# Patient Record
Sex: MALE | Race: WHITE | Employment: UNEMPLOYED | URBAN - METROPOLITAN AREA
[De-identification: names, ages, dates, MRNs, and addresses within clinical notes are randomized per-mention and may not be internally consistent; named-entity substitution may affect disease eponyms.]

---

## 2017-02-13 PROBLEM — E11.69 DIABETES MELLITUS TYPE 2 IN OBESE (HCC): Status: ACTIVE | Noted: 2017-02-13

## 2017-02-13 PROBLEM — E66.9 DIABETES MELLITUS TYPE 2 IN OBESE (HCC): Status: ACTIVE | Noted: 2017-02-13

## 2017-02-14 PROBLEM — H72.92 PERFORATION OF LEFT TYMPANIC MEMBRANE: Status: ACTIVE | Noted: 2017-02-14

## 2018-06-11 ENCOUNTER — HOSPITAL ENCOUNTER (OUTPATIENT)
Age: 48
Discharge: HOME | End: 2018-06-13
Payer: COMMERCIAL

## 2018-06-11 ENCOUNTER — OFFICE VISIT (OUTPATIENT)
Dept: FAMILY MEDICINE CLINIC | Age: 48
End: 2018-06-11
Payer: COMMERCIAL

## 2018-06-11 VITALS
RESPIRATION RATE: 18 BRPM | WEIGHT: 286 LBS | BODY MASS INDEX: 47.65 KG/M2 | SYSTOLIC BLOOD PRESSURE: 136 MMHG | HEART RATE: 84 BPM | HEIGHT: 65 IN | TEMPERATURE: 97.3 F | DIASTOLIC BLOOD PRESSURE: 88 MMHG | OXYGEN SATURATION: 96 %

## 2018-06-11 DIAGNOSIS — E66.9 DIABETES MELLITUS TYPE 2 IN OBESE (HCC): ICD-10-CM

## 2018-06-11 DIAGNOSIS — E11.69 DIABETES MELLITUS TYPE 2 IN OBESE (HCC): ICD-10-CM

## 2018-06-11 DIAGNOSIS — J44.9 CHRONIC OBSTRUCTIVE PULMONARY DISEASE, UNSPECIFIED COPD TYPE (HCC): ICD-10-CM

## 2018-06-11 DIAGNOSIS — J45.30 MILD PERSISTENT ASTHMA WITHOUT COMPLICATION: ICD-10-CM

## 2018-06-11 DIAGNOSIS — K43.9 VENTRAL HERNIA WITHOUT OBSTRUCTION OR GANGRENE: ICD-10-CM

## 2018-06-11 DIAGNOSIS — R10.84 GENERALIZED ABDOMINAL PAIN: Primary | ICD-10-CM

## 2018-06-11 LAB
BASOPHILS ABSOLUTE: 0.12 E9/L (ref 0–0.2)
BASOPHILS RELATIVE PERCENT: 0.9 % (ref 0–2)
EOSINOPHILS ABSOLUTE: 0.45 E9/L (ref 0.05–0.5)
EOSINOPHILS RELATIVE PERCENT: 3.3 % (ref 0–6)
HCT VFR BLD CALC: 47.7 % (ref 37–54)
HEMOGLOBIN: 15.8 G/DL (ref 12.5–16.5)
IMMATURE GRANULOCYTES #: 0.11 E9/L
IMMATURE GRANULOCYTES %: 0.8 % (ref 0–5)
LYMPHOCYTES ABSOLUTE: 3.48 E9/L (ref 1.5–4)
LYMPHOCYTES RELATIVE PERCENT: 25.2 % (ref 20–42)
MCH RBC QN AUTO: 30 PG (ref 26–35)
MCHC RBC AUTO-ENTMCNC: 33.1 % (ref 32–34.5)
MCV RBC AUTO: 90.5 FL (ref 80–99.9)
MONOCYTES ABSOLUTE: 1.24 E9/L (ref 0.1–0.95)
MONOCYTES RELATIVE PERCENT: 9 % (ref 2–12)
NEUTROPHILS ABSOLUTE: 8.41 E9/L (ref 1.8–7.3)
NEUTROPHILS RELATIVE PERCENT: 60.8 % (ref 43–80)
PDW BLD-RTO: 13.9 FL (ref 11.5–15)
PLATELET # BLD: 243 E9/L (ref 130–450)
PMV BLD AUTO: 11.5 FL (ref 7–12)
RBC # BLD: 5.27 E12/L (ref 3.8–5.8)
WBC # BLD: 13.8 E9/L (ref 4.5–11.5)

## 2018-06-11 PROCEDURE — 3023F SPIROM DOC REV: CPT | Performed by: FAMILY MEDICINE

## 2018-06-11 PROCEDURE — 3044F HG A1C LEVEL LT 7.0%: CPT | Performed by: FAMILY MEDICINE

## 2018-06-11 PROCEDURE — 36415 COLL VENOUS BLD VENIPUNCTURE: CPT | Performed by: FAMILY MEDICINE

## 2018-06-11 PROCEDURE — 2022F DILAT RTA XM EVC RTNOPTHY: CPT | Performed by: FAMILY MEDICINE

## 2018-06-11 PROCEDURE — 4004F PT TOBACCO SCREEN RCVD TLK: CPT | Performed by: FAMILY MEDICINE

## 2018-06-11 PROCEDURE — 85025 COMPLETE CBC W/AUTO DIFF WBC: CPT

## 2018-06-11 PROCEDURE — G8427 DOCREV CUR MEDS BY ELIG CLIN: HCPCS | Performed by: FAMILY MEDICINE

## 2018-06-11 PROCEDURE — 80061 LIPID PANEL: CPT

## 2018-06-11 PROCEDURE — G8926 SPIRO NO PERF OR DOC: HCPCS | Performed by: FAMILY MEDICINE

## 2018-06-11 PROCEDURE — 99214 OFFICE O/P EST MOD 30 MIN: CPT | Performed by: FAMILY MEDICINE

## 2018-06-11 PROCEDURE — 80053 COMPREHEN METABOLIC PANEL: CPT

## 2018-06-11 PROCEDURE — G8417 CALC BMI ABV UP PARAM F/U: HCPCS | Performed by: FAMILY MEDICINE

## 2018-06-11 PROCEDURE — 83036 HEMOGLOBIN GLYCOSYLATED A1C: CPT

## 2018-06-11 ASSESSMENT — PATIENT HEALTH QUESTIONNAIRE - PHQ9
2. FEELING DOWN, DEPRESSED OR HOPELESS: 0
1. LITTLE INTEREST OR PLEASURE IN DOING THINGS: 0
SUM OF ALL RESPONSES TO PHQ9 QUESTIONS 1 & 2: 0
SUM OF ALL RESPONSES TO PHQ QUESTIONS 1-9: 0

## 2018-06-12 LAB
ALBUMIN SERPL-MCNC: 4.2 G/DL (ref 3.5–5.2)
ALP BLD-CCNC: 72 U/L (ref 40–129)
ALT SERPL-CCNC: 34 U/L (ref 0–40)
ANION GAP SERPL CALCULATED.3IONS-SCNC: 15 MMOL/L (ref 7–16)
AST SERPL-CCNC: 25 U/L (ref 0–39)
BILIRUB SERPL-MCNC: 0.2 MG/DL (ref 0–1.2)
BUN BLDV-MCNC: 11 MG/DL (ref 6–20)
CALCIUM SERPL-MCNC: 9.5 MG/DL (ref 8.6–10.2)
CHLORIDE BLD-SCNC: 101 MMOL/L (ref 98–107)
CHOLESTEROL, TOTAL: 135 MG/DL (ref 0–199)
CO2: 24 MMOL/L (ref 22–29)
CREAT SERPL-MCNC: 0.9 MG/DL (ref 0.7–1.2)
GFR AFRICAN AMERICAN: >60
GFR NON-AFRICAN AMERICAN: >60 ML/MIN/1.73
GLUCOSE BLD-MCNC: 92 MG/DL (ref 74–109)
HBA1C MFR BLD: 6.6 % (ref 4.8–5.9)
HDLC SERPL-MCNC: 37 MG/DL
LDL CHOLESTEROL CALCULATED: 59 MG/DL (ref 0–99)
POTASSIUM SERPL-SCNC: 4.5 MMOL/L (ref 3.5–5)
SODIUM BLD-SCNC: 140 MMOL/L (ref 132–146)
TOTAL PROTEIN: 7.1 G/DL (ref 6.4–8.3)
TRIGL SERPL-MCNC: 193 MG/DL (ref 0–149)
VLDLC SERPL CALC-MCNC: 39 MG/DL

## 2018-07-02 ENCOUNTER — APPOINTMENT (OUTPATIENT)
Dept: GENERAL RADIOLOGY | Age: 48
End: 2018-07-02
Payer: COMMERCIAL

## 2018-07-02 ENCOUNTER — HOSPITAL ENCOUNTER (EMERGENCY)
Age: 48
Discharge: HOME | End: 2018-07-02
Payer: COMMERCIAL

## 2018-07-02 VITALS
HEIGHT: 65 IN | DIASTOLIC BLOOD PRESSURE: 110 MMHG | TEMPERATURE: 98.5 F | RESPIRATION RATE: 18 BRPM | BODY MASS INDEX: 47.15 KG/M2 | SYSTOLIC BLOOD PRESSURE: 162 MMHG | WEIGHT: 283 LBS | OXYGEN SATURATION: 95 % | HEART RATE: 84 BPM

## 2018-07-02 DIAGNOSIS — R68.89 FLU-LIKE SYMPTOMS: ICD-10-CM

## 2018-07-02 DIAGNOSIS — H73.012 BULLOUS MYRINGITIS OF LEFT EAR: Primary | ICD-10-CM

## 2018-07-02 LAB
INFLUENZA A BY PCR: NOT DETECTED
INFLUENZA B BY PCR: NOT DETECTED

## 2018-07-02 PROCEDURE — 99283 EMERGENCY DEPT VISIT LOW MDM: CPT

## 2018-07-02 PROCEDURE — 87502 INFLUENZA DNA AMP PROBE: CPT

## 2018-07-02 PROCEDURE — 71046 X-RAY EXAM CHEST 2 VIEWS: CPT

## 2018-07-02 RX ORDER — AMOXICILLIN AND CLAVULANATE POTASSIUM 875; 125 MG/1; MG/1
1 TABLET, FILM COATED ORAL 2 TIMES DAILY
Qty: 14 TABLET | Refills: 0 | Status: SHIPPED | OUTPATIENT
Start: 2018-07-02 | End: 2018-07-09

## 2018-07-02 RX ORDER — OFLOXACIN 3 MG/ML
5 SOLUTION AURICULAR (OTIC) 2 TIMES DAILY
Qty: 10 ML | Refills: 0 | Status: SHIPPED | OUTPATIENT
Start: 2018-07-02 | End: 2018-07-12

## 2018-07-02 RX ORDER — OSELTAMIVIR PHOSPHATE 75 MG/1
75 CAPSULE ORAL 2 TIMES DAILY
Qty: 20 CAPSULE | Refills: 0 | Status: SHIPPED | OUTPATIENT
Start: 2018-07-02 | End: 2018-07-02

## 2018-07-02 ASSESSMENT — PAIN DESCRIPTION - DESCRIPTORS: DESCRIPTORS: HEADACHE

## 2018-07-02 ASSESSMENT — PAIN DESCRIPTION - FREQUENCY: FREQUENCY: CONTINUOUS

## 2018-07-02 ASSESSMENT — PAIN DESCRIPTION - ORIENTATION: ORIENTATION: RIGHT;LEFT;ANTERIOR;POSTERIOR

## 2018-07-02 ASSESSMENT — PAIN DESCRIPTION - PAIN TYPE: TYPE: ACUTE PAIN

## 2018-07-02 ASSESSMENT — PAIN DESCRIPTION - LOCATION: LOCATION: HEAD

## 2018-07-02 ASSESSMENT — PAIN DESCRIPTION - PROGRESSION: CLINICAL_PROGRESSION: GRADUALLY WORSENING

## 2018-07-02 ASSESSMENT — PAIN SCALES - GENERAL: PAINLEVEL_OUTOF10: 4

## 2018-07-02 NOTE — ED PROVIDER NOTES
[facility]     Department of Emergency Medicine   ED  Provider Note  Admit Date/RoomTime: 7/2/2018  4:31 PM  ED Room: 15/15  Chief Complaint:   Otalgia (left ear pain starting yesterday morning) and Other (pt states he started with generalized body aches and a headache starting yesterday)    History of Present Illness   Source of history provided by:  patient. History/Exam Limitations: none. Waqar Orellana is a 50 y.o. old male with a past medical history of:   Past Medical History:   Diagnosis Date    Abdominal pain     for egd 6/8/2017    Allergic rhinitis     Anxiety     Asthma     stable    COPD (chronic obstructive pulmonary disease) [facility]     Depression     no meds    Diabetes mellitus ([facility]     GERD (gastroesophageal reflux disease)     Headache(784.0)     Hyperlipidemia     Hypertension     Obesity     Osteoarthritis     Sleep apnea     BMS CPAP 7, not using    presents to the emergency department by private vehicle, for nasal congestion, cough, body aches and left ear pain, which began 1 day(s) prior to arrival.  Since onset the symptoms have been persistent and mild-moderate in severity. The symptoms are associated with earache, muscle aches, nasal congestion and nausea. There has been NO chest pain, diarrhea, neck stiffness, rash, urinary frequency or vomiting. ROS   Pertinent positives and negatives are stated within HPI, all other systems reviewed and are negative. Past Surgical History:  has a past surgical history that includes Tonsillectomy (1983); hiatal hernia repair (2012); Tympanoplasty; Colonoscopy (06/08/2017); hernia repair (2012); and Endoscopy, colon, diagnostic. Social History:  reports that he has been smoking. He has a 45.00 pack-year smoking history. He has never used smokeless tobacco. He reports that he uses drugs, including Marijuana. He reports that he does not drink alcohol.   Family History: family history includes Asthma in his sister; COPD in his Questions are answered at this time and they are agreeable with the plan. Assessment     1. Bullous myringitis of left ear    2. Flu-like symptoms      Plan   Discharge to home  Patient condition is stable    New Medications     New Prescriptions    AMOXICILLIN-CLAVULANATE (AUGMENTIN) 875-125 MG PER TABLET    Take 1 tablet by mouth 2 times daily for 7 days    OFLOXACIN (FLOXIN) 0.3 % OTIC SOLUTION    Place 5 drops into the left ear 2 times daily for 10 days     Electronically signed by ANNABEL Philip CNP   DD: 7/2/18  **This report was transcribed using voice recognition software. Every effort was made to ensure accuracy; however, inadvertent computerized transcription errors may be present.   END OF ED PROVIDER NOTE         ANNABEL Ramirez CNP  07/02/18 0393

## 2018-09-18 ENCOUNTER — HOSPITAL ENCOUNTER (INPATIENT)
Age: 48
LOS: 3 days | Discharge: HOME HEALTH | DRG: 872 | End: 2018-09-21
Attending: EMERGENCY MEDICINE | Admitting: INTERNAL MEDICINE

## 2018-09-18 DIAGNOSIS — L02.219 CELLULITIS AND ABSCESS OF TRUNK: ICD-10-CM

## 2018-09-18 DIAGNOSIS — A41.9 SEPSIS, DUE TO UNSPECIFIED ORGANISM: Primary | ICD-10-CM

## 2018-09-18 DIAGNOSIS — L02.212 ABSCESS OF BACK: ICD-10-CM

## 2018-09-18 DIAGNOSIS — L03.319 CELLULITIS AND ABSCESS OF TRUNK: ICD-10-CM

## 2018-09-18 PROBLEM — H72.92 PERFORATION OF LEFT TYMPANIC MEMBRANE: Status: RESOLVED | Noted: 2017-02-14 | Resolved: 2018-09-18

## 2018-09-18 LAB
ALBUMIN SERPL-MCNC: 2.9 G/DL (ref 3.5–5.2)
ALP BLD-CCNC: 111 U/L (ref 40–129)
ALT SERPL-CCNC: 43 U/L (ref 0–40)
ANION GAP SERPL CALCULATED.3IONS-SCNC: 14 MMOL/L (ref 7–16)
AST SERPL-CCNC: 24 U/L (ref 0–39)
BASOPHILS ABSOLUTE: 0.09 E9/L (ref 0–0.2)
BASOPHILS RELATIVE PERCENT: 0.5 % (ref 0–2)
BILIRUB SERPL-MCNC: 0.3 MG/DL (ref 0–1.2)
BUN BLDV-MCNC: 12 MG/DL (ref 6–20)
CALCIUM SERPL-MCNC: 9 MG/DL (ref 8.6–10.2)
CHLORIDE BLD-SCNC: 93 MMOL/L (ref 98–107)
CHP ED QC CHECK: YES
CO2: 23 MMOL/L (ref 22–29)
CREAT SERPL-MCNC: 0.8 MG/DL (ref 0.7–1.2)
EKG ATRIAL RATE: 92 BPM
EKG P AXIS: 17 DEGREES
EKG P-R INTERVAL: 132 MS
EKG Q-T INTERVAL: 324 MS
EKG QRS DURATION: 88 MS
EKG QTC CALCULATION (BAZETT): 400 MS
EKG R AXIS: 72 DEGREES
EKG T AXIS: 66 DEGREES
EKG VENTRICULAR RATE: 92 BPM
EOSINOPHILS ABSOLUTE: 0.46 E9/L (ref 0.05–0.5)
EOSINOPHILS RELATIVE PERCENT: 2.3 % (ref 0–6)
GFR AFRICAN AMERICAN: >60
GFR NON-AFRICAN AMERICAN: >60 ML/MIN/1.73
GLUCOSE BLD-MCNC: 247 MG/DL
GLUCOSE BLD-MCNC: 308 MG/DL (ref 74–109)
HCT VFR BLD CALC: 45.3 % (ref 37–54)
HEMOGLOBIN: 14.7 G/DL (ref 12.5–16.5)
IMMATURE GRANULOCYTES #: 0.21 E9/L
IMMATURE GRANULOCYTES %: 1.1 % (ref 0–5)
LACTIC ACID: 1 MMOL/L (ref 0.5–2.2)
LACTIC ACID: 2.3 MMOL/L (ref 0.5–2.2)
LYMPHOCYTES ABSOLUTE: 1.68 E9/L (ref 1.5–4)
LYMPHOCYTES RELATIVE PERCENT: 8.5 % (ref 20–42)
MCH RBC QN AUTO: 29.5 PG (ref 26–35)
MCHC RBC AUTO-ENTMCNC: 32.5 % (ref 32–34.5)
MCV RBC AUTO: 90.8 FL (ref 80–99.9)
METER GLUCOSE: 247 MG/DL (ref 70–110)
MONOCYTES ABSOLUTE: 1.38 E9/L (ref 0.1–0.95)
MONOCYTES RELATIVE PERCENT: 7 % (ref 2–12)
NEUTROPHILS ABSOLUTE: 16 E9/L (ref 1.8–7.3)
NEUTROPHILS RELATIVE PERCENT: 80.6 % (ref 43–80)
PDW BLD-RTO: 13.5 FL (ref 11.5–15)
PLATELET # BLD: 283 E9/L (ref 130–450)
PMV BLD AUTO: 10.1 FL (ref 7–12)
POTASSIUM SERPL-SCNC: 4.3 MMOL/L (ref 3.5–5)
RBC # BLD: 4.99 E12/L (ref 3.8–5.8)
SODIUM BLD-SCNC: 130 MMOL/L (ref 132–146)
TOTAL PROTEIN: 7.4 G/DL (ref 6.4–8.3)
WBC # BLD: 19.8 E9/L (ref 4.5–11.5)

## 2018-09-18 PROCEDURE — 36415 COLL VENOUS BLD VENIPUNCTURE: CPT

## 2018-09-18 PROCEDURE — 6370000000 HC RX 637 (ALT 250 FOR IP): Performed by: PHYSICIAN ASSISTANT

## 2018-09-18 PROCEDURE — 82962 GLUCOSE BLOOD TEST: CPT

## 2018-09-18 PROCEDURE — 87040 BLOOD CULTURE FOR BACTERIA: CPT

## 2018-09-18 PROCEDURE — 96366 THER/PROPH/DIAG IV INF ADDON: CPT

## 2018-09-18 PROCEDURE — 6360000002 HC RX W HCPCS: Performed by: EMERGENCY MEDICINE

## 2018-09-18 PROCEDURE — 83605 ASSAY OF LACTIC ACID: CPT

## 2018-09-18 PROCEDURE — 80053 COMPREHEN METABOLIC PANEL: CPT

## 2018-09-18 PROCEDURE — 99285 EMERGENCY DEPT VISIT HI MDM: CPT

## 2018-09-18 PROCEDURE — 93005 ELECTROCARDIOGRAM TRACING: CPT | Performed by: EMERGENCY MEDICINE

## 2018-09-18 PROCEDURE — 96365 THER/PROPH/DIAG IV INF INIT: CPT

## 2018-09-18 PROCEDURE — 2060000000 HC ICU INTERMEDIATE R&B

## 2018-09-18 PROCEDURE — 85025 COMPLETE CBC W/AUTO DIFF WBC: CPT

## 2018-09-18 PROCEDURE — 2580000003 HC RX 258: Performed by: EMERGENCY MEDICINE

## 2018-09-18 RX ORDER — OXYCODONE HYDROCHLORIDE AND ACETAMINOPHEN 5; 325 MG/1; MG/1
1 TABLET ORAL ONCE
Status: COMPLETED | OUTPATIENT
Start: 2018-09-18 | End: 2018-09-18

## 2018-09-18 RX ORDER — ONDANSETRON 2 MG/ML
4 INJECTION INTRAMUSCULAR; INTRAVENOUS ONCE
Status: COMPLETED | OUTPATIENT
Start: 2018-09-18 | End: 2018-09-18

## 2018-09-18 RX ORDER — MORPHINE SULFATE 4 MG/ML
4 INJECTION, SOLUTION INTRAMUSCULAR; INTRAVENOUS ONCE
Status: COMPLETED | OUTPATIENT
Start: 2018-09-18 | End: 2018-09-18

## 2018-09-18 RX ORDER — 0.9 % SODIUM CHLORIDE 0.9 %
2000 INTRAVENOUS SOLUTION INTRAVENOUS ONCE
Status: COMPLETED | OUTPATIENT
Start: 2018-09-18 | End: 2018-09-18

## 2018-09-18 RX ADMIN — SODIUM CHLORIDE 2000 ML: 9 INJECTION, SOLUTION INTRAVENOUS at 21:09

## 2018-09-18 RX ADMIN — OXYCODONE HYDROCHLORIDE AND ACETAMINOPHEN 1 TABLET: 5; 325 TABLET ORAL at 20:47

## 2018-09-18 RX ADMIN — VANCOMYCIN HYDROCHLORIDE 2000 MG: 10 INJECTION, POWDER, LYOPHILIZED, FOR SOLUTION INTRAVENOUS at 21:30

## 2018-09-18 RX ADMIN — ONDANSETRON 4 MG: 2 SOLUTION INTRAMUSCULAR; INTRAVENOUS at 22:41

## 2018-09-18 RX ADMIN — MORPHINE SULFATE 4 MG: 4 INJECTION INTRAVENOUS at 22:41

## 2018-09-18 ASSESSMENT — PAIN DESCRIPTION - LOCATION: LOCATION: BACK

## 2018-09-18 ASSESSMENT — ENCOUNTER SYMPTOMS
SHORTNESS OF BREATH: 0
SORE THROAT: 0
ABDOMINAL PAIN: 0
COUGH: 0
VOMITING: 0
COLOR CHANGE: 1
BACK PAIN: 1
RHINORRHEA: 0
DIARRHEA: 0
NAUSEA: 0

## 2018-09-18 ASSESSMENT — PAIN SCALES - GENERAL
PAINLEVEL_OUTOF10: 9
PAINLEVEL_OUTOF10: 9
PAINLEVEL_OUTOF10: 10
PAINLEVEL_OUTOF10: 10

## 2018-09-18 ASSESSMENT — PAIN DESCRIPTION - PAIN TYPE: TYPE: ACUTE PAIN

## 2018-09-18 ASSESSMENT — PAIN DESCRIPTION - ORIENTATION: ORIENTATION: LEFT;UPPER

## 2018-09-18 ASSESSMENT — PAIN DESCRIPTION - DESCRIPTORS: DESCRIPTORS: THROBBING

## 2018-09-19 ENCOUNTER — ANESTHESIA (OUTPATIENT)
Dept: OPERATING ROOM | Age: 48
DRG: 872 | End: 2018-09-19

## 2018-09-19 ENCOUNTER — APPOINTMENT (OUTPATIENT)
Dept: CT IMAGING | Age: 48
DRG: 872 | End: 2018-09-19

## 2018-09-19 ENCOUNTER — ANESTHESIA EVENT (OUTPATIENT)
Dept: OPERATING ROOM | Age: 48
DRG: 872 | End: 2018-09-19

## 2018-09-19 VITALS — SYSTOLIC BLOOD PRESSURE: 153 MMHG | OXYGEN SATURATION: 100 % | DIASTOLIC BLOOD PRESSURE: 102 MMHG

## 2018-09-19 LAB
ANION GAP SERPL CALCULATED.3IONS-SCNC: 16 MMOL/L (ref 7–16)
BASOPHILS ABSOLUTE: 0 E9/L (ref 0–0.2)
BASOPHILS RELATIVE PERCENT: 0.4 % (ref 0–2)
BUN BLDV-MCNC: 9 MG/DL (ref 6–20)
CALCIUM SERPL-MCNC: 8.3 MG/DL (ref 8.6–10.2)
CHLORIDE BLD-SCNC: 95 MMOL/L (ref 98–107)
CO2: 23 MMOL/L (ref 22–29)
CREAT SERPL-MCNC: 0.8 MG/DL (ref 0.7–1.2)
EOSINOPHILS ABSOLUTE: 1.04 E9/L (ref 0.05–0.5)
EOSINOPHILS RELATIVE PERCENT: 5.2 % (ref 0–6)
GFR AFRICAN AMERICAN: >60
GFR NON-AFRICAN AMERICAN: >60 ML/MIN/1.73
GLUCOSE BLD-MCNC: 128 MG/DL
GLUCOSE BLD-MCNC: 224 MG/DL (ref 74–109)
HCT VFR BLD CALC: 39.5 % (ref 37–54)
HEMOGLOBIN: 12.9 G/DL (ref 12.5–16.5)
LYMPHOCYTES ABSOLUTE: 1.4 E9/L (ref 1.5–4)
LYMPHOCYTES RELATIVE PERCENT: 7 % (ref 20–42)
MCH RBC QN AUTO: 29.3 PG (ref 26–35)
MCHC RBC AUTO-ENTMCNC: 32.7 % (ref 32–34.5)
MCV RBC AUTO: 89.8 FL (ref 80–99.9)
METER GLUCOSE: 112 MG/DL (ref 70–110)
METER GLUCOSE: 128 MG/DL (ref 70–110)
METER GLUCOSE: 152 MG/DL (ref 70–110)
METER GLUCOSE: 185 MG/DL (ref 70–110)
MONOCYTES ABSOLUTE: 1.2 E9/L (ref 0.1–0.95)
MONOCYTES RELATIVE PERCENT: 6.1 % (ref 2–12)
NEUTROPHILS ABSOLUTE: 16.4 E9/L (ref 1.8–7.3)
NEUTROPHILS RELATIVE PERCENT: 81.7 % (ref 43–80)
PDW BLD-RTO: 13.3 FL (ref 11.5–15)
PLATELET # BLD: 244 E9/L (ref 130–450)
PMV BLD AUTO: 10.2 FL (ref 7–12)
POTASSIUM SERPL-SCNC: 3.9 MMOL/L (ref 3.5–5)
RBC # BLD: 4.4 E12/L (ref 3.8–5.8)
SODIUM BLD-SCNC: 134 MMOL/L (ref 132–146)
WBC # BLD: 20 E9/L (ref 4.5–11.5)

## 2018-09-19 PROCEDURE — 2709999900 HC NON-CHARGEABLE SUPPLY: Performed by: SURGERY

## 2018-09-19 PROCEDURE — 3700000000 HC ANESTHESIA ATTENDED CARE: Performed by: SURGERY

## 2018-09-19 PROCEDURE — 99254 IP/OBS CNSLTJ NEW/EST MOD 60: CPT | Performed by: SURGERY

## 2018-09-19 PROCEDURE — 71260 CT THORAX DX C+: CPT

## 2018-09-19 PROCEDURE — 2580000003 HC RX 258: Performed by: RADIOLOGY

## 2018-09-19 PROCEDURE — 6360000002 HC RX W HCPCS: Performed by: INTERNAL MEDICINE

## 2018-09-19 PROCEDURE — 87070 CULTURE OTHR SPECIMN AEROBIC: CPT

## 2018-09-19 PROCEDURE — 6370000000 HC RX 637 (ALT 250 FOR IP): Performed by: INTERNAL MEDICINE

## 2018-09-19 PROCEDURE — 36415 COLL VENOUS BLD VENIPUNCTURE: CPT

## 2018-09-19 PROCEDURE — 7100000001 HC PACU RECOVERY - ADDTL 15 MIN: Performed by: SURGERY

## 2018-09-19 PROCEDURE — 2580000003 HC RX 258: Performed by: INTERNAL MEDICINE

## 2018-09-19 PROCEDURE — 87186 SC STD MICRODIL/AGAR DIL: CPT

## 2018-09-19 PROCEDURE — 87075 CULTR BACTERIA EXCEPT BLOOD: CPT

## 2018-09-19 PROCEDURE — 0H96XZZ DRAINAGE OF BACK SKIN, EXTERNAL APPROACH: ICD-10-PCS | Performed by: SURGERY

## 2018-09-19 PROCEDURE — 80048 BASIC METABOLIC PNL TOTAL CA: CPT

## 2018-09-19 PROCEDURE — 6360000002 HC RX W HCPCS: Performed by: ANESTHESIOLOGY

## 2018-09-19 PROCEDURE — 6370000000 HC RX 637 (ALT 250 FOR IP): Performed by: STUDENT IN AN ORGANIZED HEALTH CARE EDUCATION/TRAINING PROGRAM

## 2018-09-19 PROCEDURE — 2500000003 HC RX 250 WO HCPCS: Performed by: NURSE ANESTHETIST, CERTIFIED REGISTERED

## 2018-09-19 PROCEDURE — 3600000012 HC SURGERY LEVEL 2 ADDTL 15MIN: Performed by: SURGERY

## 2018-09-19 PROCEDURE — 6360000002 HC RX W HCPCS: Performed by: EMERGENCY MEDICINE

## 2018-09-19 PROCEDURE — 7100000000 HC PACU RECOVERY - FIRST 15 MIN: Performed by: SURGERY

## 2018-09-19 PROCEDURE — 2580000003 HC RX 258: Performed by: NURSE ANESTHETIST, CERTIFIED REGISTERED

## 2018-09-19 PROCEDURE — 85025 COMPLETE CBC W/AUTO DIFF WBC: CPT

## 2018-09-19 PROCEDURE — 87205 SMEAR GRAM STAIN: CPT

## 2018-09-19 PROCEDURE — 3600000002 HC SURGERY LEVEL 2 BASE: Performed by: SURGERY

## 2018-09-19 PROCEDURE — 2580000003 HC RX 258: Performed by: STUDENT IN AN ORGANIZED HEALTH CARE EDUCATION/TRAINING PROGRAM

## 2018-09-19 PROCEDURE — 10060 I&D ABSCESS SIMPLE/SINGLE: CPT | Performed by: SURGERY

## 2018-09-19 PROCEDURE — 6360000002 HC RX W HCPCS: Performed by: NURSE ANESTHETIST, CERTIFIED REGISTERED

## 2018-09-19 PROCEDURE — 2060000000 HC ICU INTERMEDIATE R&B

## 2018-09-19 PROCEDURE — 82962 GLUCOSE BLOOD TEST: CPT

## 2018-09-19 PROCEDURE — 6360000004 HC RX CONTRAST MEDICATION: Performed by: RADIOLOGY

## 2018-09-19 PROCEDURE — 3700000001 HC ADD 15 MINUTES (ANESTHESIA): Performed by: SURGERY

## 2018-09-19 PROCEDURE — 2500000003 HC RX 250 WO HCPCS: Performed by: STUDENT IN AN ORGANIZED HEALTH CARE EDUCATION/TRAINING PROGRAM

## 2018-09-19 PROCEDURE — 6360000002 HC RX W HCPCS: Performed by: SURGERY

## 2018-09-19 RX ORDER — NICOTINE 21 MG/24HR
1 PATCH, TRANSDERMAL 24 HOURS TRANSDERMAL DAILY
Status: DISCONTINUED | OUTPATIENT
Start: 2018-09-19 | End: 2018-09-21 | Stop reason: HOSPADM

## 2018-09-19 RX ORDER — NICOTINE POLACRILEX 4 MG
15 LOZENGE BUCCAL PRN
Status: DISCONTINUED | OUTPATIENT
Start: 2018-09-19 | End: 2018-09-21 | Stop reason: HOSPADM

## 2018-09-19 RX ORDER — ONDANSETRON 2 MG/ML
4 INJECTION INTRAMUSCULAR; INTRAVENOUS EVERY 6 HOURS PRN
Status: DISCONTINUED | OUTPATIENT
Start: 2018-09-19 | End: 2018-09-21 | Stop reason: HOSPADM

## 2018-09-19 RX ORDER — PROPOFOL 10 MG/ML
INJECTION, EMULSION INTRAVENOUS CONTINUOUS PRN
Status: DISCONTINUED | OUTPATIENT
Start: 2018-09-19 | End: 2018-09-19 | Stop reason: SDUPTHER

## 2018-09-19 RX ORDER — FENTANYL CITRATE 50 UG/ML
50 INJECTION, SOLUTION INTRAMUSCULAR; INTRAVENOUS EVERY 5 MIN PRN
Status: DISCONTINUED | OUTPATIENT
Start: 2018-09-19 | End: 2018-09-19 | Stop reason: HOSPADM

## 2018-09-19 RX ORDER — DEXTROSE MONOHYDRATE 50 MG/ML
100 INJECTION, SOLUTION INTRAVENOUS PRN
Status: DISCONTINUED | OUTPATIENT
Start: 2018-09-19 | End: 2018-09-21 | Stop reason: HOSPADM

## 2018-09-19 RX ORDER — IBUPROFEN 800 MG/1
800 TABLET ORAL
Status: DISCONTINUED | OUTPATIENT
Start: 2018-09-19 | End: 2018-09-21 | Stop reason: HOSPADM

## 2018-09-19 RX ORDER — SODIUM CHLORIDE 0.9 % (FLUSH) 0.9 %
10 SYRINGE (ML) INJECTION EVERY 12 HOURS SCHEDULED
Status: DISCONTINUED | OUTPATIENT
Start: 2018-09-19 | End: 2018-09-21 | Stop reason: HOSPADM

## 2018-09-19 RX ORDER — SODIUM CHLORIDE 0.9 % (FLUSH) 0.9 %
10 SYRINGE (ML) INJECTION PRN
Status: DISCONTINUED | OUTPATIENT
Start: 2018-09-19 | End: 2018-09-21 | Stop reason: HOSPADM

## 2018-09-19 RX ORDER — KETAMINE HYDROCHLORIDE 10 MG/ML
INJECTION, SOLUTION INTRAMUSCULAR; INTRAVENOUS PRN
Status: DISCONTINUED | OUTPATIENT
Start: 2018-09-19 | End: 2018-09-19 | Stop reason: SDUPTHER

## 2018-09-19 RX ORDER — LABETALOL HYDROCHLORIDE 5 MG/ML
5 INJECTION, SOLUTION INTRAVENOUS EVERY 10 MIN PRN
Status: DISCONTINUED | OUTPATIENT
Start: 2018-09-19 | End: 2018-09-19 | Stop reason: HOSPADM

## 2018-09-19 RX ORDER — SODIUM CHLORIDE 0.9 % (FLUSH) 0.9 %
10 SYRINGE (ML) INJECTION PRN
Status: COMPLETED | OUTPATIENT
Start: 2018-09-19 | End: 2018-09-19

## 2018-09-19 RX ORDER — PANTOPRAZOLE SODIUM 40 MG/1
40 TABLET, DELAYED RELEASE ORAL
Status: DISCONTINUED | OUTPATIENT
Start: 2018-09-19 | End: 2018-09-21 | Stop reason: HOSPADM

## 2018-09-19 RX ORDER — CITALOPRAM 20 MG/1
20 TABLET ORAL DAILY
Status: DISCONTINUED | OUTPATIENT
Start: 2018-09-19 | End: 2018-09-21 | Stop reason: HOSPADM

## 2018-09-19 RX ORDER — FENTANYL CITRATE 50 UG/ML
25 INJECTION, SOLUTION INTRAMUSCULAR; INTRAVENOUS EVERY 5 MIN PRN
Status: DISCONTINUED | OUTPATIENT
Start: 2018-09-19 | End: 2018-09-19 | Stop reason: HOSPADM

## 2018-09-19 RX ORDER — MORPHINE SULFATE 2 MG/ML
2 INJECTION, SOLUTION INTRAMUSCULAR; INTRAVENOUS EVERY 4 HOURS PRN
Status: DISCONTINUED | OUTPATIENT
Start: 2018-09-19 | End: 2018-09-19 | Stop reason: SDUPTHER

## 2018-09-19 RX ORDER — MORPHINE SULFATE 2 MG/ML
2 INJECTION, SOLUTION INTRAMUSCULAR; INTRAVENOUS EVERY 4 HOURS PRN
Status: DISCONTINUED | OUTPATIENT
Start: 2018-09-19 | End: 2018-09-21 | Stop reason: HOSPADM

## 2018-09-19 RX ORDER — ATORVASTATIN CALCIUM 40 MG/1
40 TABLET, FILM COATED ORAL NIGHTLY
Status: DISCONTINUED | OUTPATIENT
Start: 2018-09-19 | End: 2018-09-21 | Stop reason: HOSPADM

## 2018-09-19 RX ORDER — LIDOCAINE HYDROCHLORIDE AND EPINEPHRINE 10; 10 MG/ML; UG/ML
40 INJECTION, SOLUTION INFILTRATION; PERINEURAL ONCE
Status: COMPLETED | OUTPATIENT
Start: 2018-09-19 | End: 2018-09-19

## 2018-09-19 RX ORDER — DIPHENHYDRAMINE HYDROCHLORIDE 50 MG/ML
12.5 INJECTION INTRAMUSCULAR; INTRAVENOUS
Status: DISCONTINUED | OUTPATIENT
Start: 2018-09-19 | End: 2018-09-19 | Stop reason: HOSPADM

## 2018-09-19 RX ORDER — ONDANSETRON 2 MG/ML
4 INJECTION INTRAMUSCULAR; INTRAVENOUS
Status: DISCONTINUED | OUTPATIENT
Start: 2018-09-19 | End: 2018-09-19 | Stop reason: HOSPADM

## 2018-09-19 RX ORDER — SODIUM CHLORIDE 9 MG/ML
INJECTION, SOLUTION INTRAVENOUS CONTINUOUS
Status: DISCONTINUED | OUTPATIENT
Start: 2018-09-19 | End: 2018-09-21 | Stop reason: HOSPADM

## 2018-09-19 RX ORDER — SODIUM CHLORIDE 9 MG/ML
INJECTION, SOLUTION INTRAVENOUS CONTINUOUS PRN
Status: DISCONTINUED | OUTPATIENT
Start: 2018-09-19 | End: 2018-09-19 | Stop reason: SDUPTHER

## 2018-09-19 RX ORDER — OXYCODONE HYDROCHLORIDE AND ACETAMINOPHEN 5; 325 MG/1; MG/1
1 TABLET ORAL EVERY 4 HOURS PRN
Status: DISCONTINUED | OUTPATIENT
Start: 2018-09-19 | End: 2018-09-21 | Stop reason: HOSPADM

## 2018-09-19 RX ORDER — IPRATROPIUM BROMIDE AND ALBUTEROL SULFATE 2.5; .5 MG/3ML; MG/3ML
1 SOLUTION RESPIRATORY (INHALATION) EVERY 4 HOURS PRN
Status: DISCONTINUED | OUTPATIENT
Start: 2018-09-19 | End: 2018-09-21 | Stop reason: HOSPADM

## 2018-09-19 RX ORDER — FENTANYL CITRATE 50 UG/ML
INJECTION, SOLUTION INTRAMUSCULAR; INTRAVENOUS PRN
Status: DISCONTINUED | OUTPATIENT
Start: 2018-09-19 | End: 2018-09-19 | Stop reason: SDUPTHER

## 2018-09-19 RX ORDER — MEPERIDINE HYDROCHLORIDE 50 MG/ML
12.5 INJECTION INTRAMUSCULAR; INTRAVENOUS; SUBCUTANEOUS EVERY 5 MIN PRN
Status: DISCONTINUED | OUTPATIENT
Start: 2018-09-19 | End: 2018-09-19 | Stop reason: HOSPADM

## 2018-09-19 RX ORDER — DEXTROSE MONOHYDRATE 25 G/50ML
12.5 INJECTION, SOLUTION INTRAVENOUS PRN
Status: DISCONTINUED | OUTPATIENT
Start: 2018-09-19 | End: 2018-09-21 | Stop reason: HOSPADM

## 2018-09-19 RX ORDER — BUDESONIDE 0.5 MG/2ML
500 INHALANT ORAL 2 TIMES DAILY
Status: DISCONTINUED | OUTPATIENT
Start: 2018-09-19 | End: 2018-09-21 | Stop reason: HOSPADM

## 2018-09-19 RX ORDER — MIDAZOLAM HYDROCHLORIDE 1 MG/ML
INJECTION INTRAMUSCULAR; INTRAVENOUS PRN
Status: DISCONTINUED | OUTPATIENT
Start: 2018-09-19 | End: 2018-09-19 | Stop reason: SDUPTHER

## 2018-09-19 RX ORDER — LISINOPRIL 20 MG/1
40 TABLET ORAL DAILY
Status: DISCONTINUED | OUTPATIENT
Start: 2018-09-19 | End: 2018-09-21 | Stop reason: HOSPADM

## 2018-09-19 RX ORDER — ACETAMINOPHEN 325 MG/1
650 TABLET ORAL EVERY 4 HOURS PRN
Status: DISCONTINUED | OUTPATIENT
Start: 2018-09-19 | End: 2018-09-21 | Stop reason: HOSPADM

## 2018-09-19 RX ADMIN — PROPOFOL 100 MCG/KG/MIN: 10 INJECTION, EMULSION INTRAVENOUS at 12:51

## 2018-09-19 RX ADMIN — Medication 10 ML: at 01:51

## 2018-09-19 RX ADMIN — IOPAMIDOL 90 ML: 755 INJECTION, SOLUTION INTRAVENOUS at 01:51

## 2018-09-19 RX ADMIN — KETAMINE HYDROCHLORIDE 10 MG: 10 INJECTION, SOLUTION INTRAMUSCULAR; INTRAVENOUS at 13:00

## 2018-09-19 RX ADMIN — VANCOMYCIN HYDROCHLORIDE 2000 MG: 10 INJECTION, POWDER, LYOPHILIZED, FOR SOLUTION INTRAVENOUS at 16:42

## 2018-09-19 RX ADMIN — FENTANYL CITRATE 25 MCG: 50 INJECTION, SOLUTION INTRAMUSCULAR; INTRAVENOUS at 13:07

## 2018-09-19 RX ADMIN — HYDROMORPHONE HYDROCHLORIDE 0.5 MG: 1 INJECTION, SOLUTION INTRAMUSCULAR; INTRAVENOUS; SUBCUTANEOUS at 01:37

## 2018-09-19 RX ADMIN — SODIUM CHLORIDE: 9 INJECTION, SOLUTION INTRAVENOUS at 12:41

## 2018-09-19 RX ADMIN — FENTANYL CITRATE 25 MCG: 50 INJECTION INTRAMUSCULAR; INTRAVENOUS at 13:55

## 2018-09-19 RX ADMIN — IBUPROFEN 800 MG: 800 TABLET ORAL at 08:09

## 2018-09-19 RX ADMIN — SODIUM CHLORIDE: 9 INJECTION, SOLUTION INTRAVENOUS at 04:20

## 2018-09-19 RX ADMIN — MIDAZOLAM 2 MG: 1 INJECTION INTRAMUSCULAR; INTRAVENOUS at 12:51

## 2018-09-19 RX ADMIN — KETAMINE HYDROCHLORIDE 50 MG: 10 INJECTION, SOLUTION INTRAMUSCULAR; INTRAVENOUS at 13:20

## 2018-09-19 RX ADMIN — SODIUM CHLORIDE: 9 INJECTION, SOLUTION INTRAVENOUS at 21:55

## 2018-09-19 RX ADMIN — ONDANSETRON 4 MG: 2 SOLUTION INTRAMUSCULAR; INTRAVENOUS at 13:00

## 2018-09-19 RX ADMIN — KETAMINE HYDROCHLORIDE 30 MG: 10 INJECTION, SOLUTION INTRAMUSCULAR; INTRAVENOUS at 12:53

## 2018-09-19 RX ADMIN — LISINOPRIL 40 MG: 20 TABLET ORAL at 08:09

## 2018-09-19 RX ADMIN — ATORVASTATIN CALCIUM 40 MG: 40 TABLET, FILM COATED ORAL at 20:23

## 2018-09-19 RX ADMIN — FENTANYL CITRATE 25 MCG: 50 INJECTION INTRAMUSCULAR; INTRAVENOUS at 13:50

## 2018-09-19 RX ADMIN — PIPERACILLIN SODIUM AND TAZOBACTAM SODIUM 3.38 G: 3; .375 INJECTION, POWDER, LYOPHILIZED, FOR SOLUTION INTRAVENOUS at 15:56

## 2018-09-19 RX ADMIN — PANTOPRAZOLE SODIUM 40 MG: 40 TABLET, DELAYED RELEASE ORAL at 06:48

## 2018-09-19 RX ADMIN — IBUPROFEN 800 MG: 800 TABLET ORAL at 04:22

## 2018-09-19 RX ADMIN — LIDOCAINE HYDROCHLORIDE,EPINEPHRINE BITARTRATE 40 ML: 10; .01 INJECTION, SOLUTION INFILTRATION; PERINEURAL at 03:32

## 2018-09-19 RX ADMIN — PIPERACILLIN SODIUM AND TAZOBACTAM SODIUM 3.38 G: 3; .375 INJECTION, POWDER, LYOPHILIZED, FOR SOLUTION INTRAVENOUS at 23:06

## 2018-09-19 RX ADMIN — MORPHINE SULFATE 2 MG: 2 INJECTION, SOLUTION INTRAMUSCULAR; INTRAVENOUS at 20:27

## 2018-09-19 RX ADMIN — Medication 2 G: at 12:56

## 2018-09-19 RX ADMIN — OXYCODONE HYDROCHLORIDE AND ACETAMINOPHEN 1 TABLET: 5; 325 TABLET ORAL at 16:00

## 2018-09-19 ASSESSMENT — PULMONARY FUNCTION TESTS
PIF_VALUE: 0

## 2018-09-19 ASSESSMENT — PAIN DESCRIPTION - LOCATION
LOCATION: BACK

## 2018-09-19 ASSESSMENT — PAIN DESCRIPTION - PAIN TYPE
TYPE: SURGICAL PAIN
TYPE: SURGICAL PAIN
TYPE: ACUTE PAIN
TYPE: SURGICAL PAIN
TYPE: SURGICAL PAIN
TYPE: ACUTE PAIN
TYPE: ACUTE PAIN

## 2018-09-19 ASSESSMENT — PAIN DESCRIPTION - DESCRIPTORS
DESCRIPTORS: OTHER (COMMENT)
DESCRIPTORS: BURNING;DISCOMFORT

## 2018-09-19 ASSESSMENT — PAIN SCALES - GENERAL
PAINLEVEL_OUTOF10: 7
PAINLEVEL_OUTOF10: 0
PAINLEVEL_OUTOF10: 8
PAINLEVEL_OUTOF10: 6
PAINLEVEL_OUTOF10: 4
PAINLEVEL_OUTOF10: 4
PAINLEVEL_OUTOF10: 6
PAINLEVEL_OUTOF10: 0
PAINLEVEL_OUTOF10: 10
PAINLEVEL_OUTOF10: 10
PAINLEVEL_OUTOF10: 3
PAINLEVEL_OUTOF10: 5

## 2018-09-19 ASSESSMENT — PAIN DESCRIPTION - ORIENTATION
ORIENTATION: UPPER

## 2018-09-19 ASSESSMENT — LIFESTYLE VARIABLES: SMOKING_STATUS: 1

## 2018-09-19 NOTE — ED NOTES
Patient extremely hostile, screaming at this RN yelling about an incident that happened on previous shift. Apologized to patient if he had an unpleasant experience and patient stated \"I know you know all about it, you people talk, that  out there staring at me probably told everyone\". Again attempted to offer blameless apology to patient and patient continued to yell at this RN. Patient states I was screaming and you never came, probably because you know about what happened earlier, I had a long talk to Scot Calderon about it. That's why you are ignoring me now\". Assured patient that I was not ignoring him and that I was not aware that he called. Patient stated  \"How was I going to call you without my call light? \". Noted call light that was previously on cart now to be on back wall and I put call light back on cart. Informed patient that he did have his call light on cart the last time I was in room and patient stated Nivia Holm my wife stuck it up there\" patient pointing at back wall. 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 Patient began sticking his finger in my face. Asked patient to not stick his finger in my face again. Patient states \"You are a liar, get out of here I want your supervisor now\".            Dayana Perez RN  09/19/18 [id number]

## 2018-09-19 NOTE — H&P
[address] [facility]  Attending History and Physical      CHIEF COMPLAINT:  Back pain      HISTORY OF PRESENT ILLNESS:      The patient is a 50 y.o. male patient of Dr Jennie Arguelles who presents with back pain. Worsening over 2 weeks. + raised red area. No drainage. No associated fever and chills. exac pressure. Relieved after pain meds. Pt was found to have an abscess in ED and were unable to I&D the wound. He was taken to OR for drainage.     Past Medical History:    Past Medical History:   Diagnosis Date    Abdominal pain     for egd 6/8/2017    Allergic rhinitis     Anxiety     Asthma     stable    COPD (chronic obstructive pulmonary disease) (HCC)     Depression     no meds    Diabetes mellitus (HCC)     GERD (gastroesophageal reflux disease)     Headache(784.0)     Hyperlipidemia     Hypertension     Obesity     Osteoarthritis     Sleep apnea     BMS CPAP 7, not using       Past Surgical History:    Past Surgical History:   Procedure Laterality Date    COLONOSCOPY  06/08/2017    Dr. Tawana Bethea polyp, diverticulitis    ENDOSCOPY, COLON, DIAGNOSTIC      HERNIA REPAIR  2012    Dr. January Toney  2012   Philippe Ramon 50         Medications Prior to Admission:    Prescriptions Prior to Admission: FREESTYLE LANCETS MISC, CHECK BLOOD SUGAR ONCE DAILY  lisinopril (PRINIVIL;ZESTRIL) 40 MG tablet, TAKE 1 TABLET BY MOUTH DAILY  metFORMIN (GLUCOPHAGE) 500 MG tablet, TAKE 1 TABLET BY MOUTH TWICE DAILY WITH MEALS  VENTOLIN  (90 Base) MCG/ACT inhaler, INHALE 2 PUFFS INTO THE LUNGS EVERY 6 HOURS AS NEEDED FOR WHEEZING OR SHORTNESS OF BREATH  atorvastatin (LIPITOR) 40 MG tablet, TAKE 1 TABLET BY MOUTH DAILY  omeprazole (PRILOSEC) 40 MG delayed release capsule, TAKE 1 CAPSULE BY MOUTH DAILY  FREESTYLE LITE strip, TEST BLOOD SUGAR TWICE DAILY  citalopram (CELEXA) 20 MG tablet, TAKE 1 TABLET BY MOUTH DAILY  FREESTYLE LITE strip, TEST BLOOD SUGAR ONCE focal deficits  Breast: deferred  Rectal: deferred  Genitalia:  deferred    LABS:    CBC with Differential:    Lab Results   Component Value Date    WBC 20.0 09/19/2018    RBC 4.40 09/19/2018    HGB 12.9 09/19/2018    HCT 39.5 09/19/2018     09/19/2018    MCV 89.8 09/19/2018    MCH 29.3 09/19/2018    MCHC 32.7 09/19/2018    RDW 13.3 09/19/2018    SEGSPCT 58 08/02/2012    LYMPHOPCT 7.0 09/19/2018    MONOPCT 6.1 09/19/2018    BASOPCT 0.4 09/19/2018    MONOSABS 1.20 09/19/2018    LYMPHSABS 1.40 09/19/2018    EOSABS 1.04 09/19/2018    BASOSABS 0.00 09/19/2018     CMP:    Lab Results   Component Value Date     09/19/2018    K 3.9 09/19/2018    CL 95 09/19/2018    CO2 23 09/19/2018    BUN 9 09/19/2018    CREATININE 0.8 09/19/2018    GFRAA >60 09/19/2018    LABGLOM >60 09/19/2018    GLUCOSE 128 09/19/2018    PROT 7.4 09/18/2018    LABALBU 2.9 09/18/2018    CALCIUM 8.3 09/19/2018    BILITOT 0.3 09/18/2018    ALKPHOS 111 09/18/2018    AST 24 09/18/2018    ALT 43 09/18/2018     Magnesium:  No results found for: MG  Phosphorus:  No results found for: PHOS  HgBA1c:    Lab Results   Component Value Date    LABA1C 6.6 06/11/2018     FLP:    Lab Results   Component Value Date    TRIG 193 06/11/2018    HDL 37 06/11/2018    LDLCALC 59 06/11/2018    LABVLDL 39 06/11/2018     TSH:    Lab Results   Component Value Date    TSH 0.961 09/08/2017       ASSESSMENT:      Principal Problem:    Abscess of back  Active Problems:    COPD (chronic obstructive pulmonary disease) (HCC)    SERGEY (obstructive sleep apnea)    Asthma    Morbid obesity with BMI of 45.0-49.9, adult ([facility] [address]    Diabetes mellitus type 2, uncontrolled ([facility] [address]    [facility]epsis ([facility]  Resolved Problems:    * No resolved hospital problems.  *      PLAN:    Surgical I&D  abx  Medications for all comorbities revciewed and ordered as appropriate  D/C planning    Cheikh Dias MD  1:37 PM  9/19/2018

## 2018-09-19 NOTE — OP NOTE
OPERATIVE NOTE    DATE OF PROCEDURE: 9/19/2018     SURGEON: Dr Coretta Chong    ASSISTANT: Dr Mason Duverney DIAGNOSIS: Back abscess    POSTOPERATIVE DIAGNOSIS: Back abscess    OPERATION: I&D Back Abscess    ANESTHESIA: General    ESTIMATED BLOOD LOSS:  <31 cc      COMPLICATIONS: None     SPECIMENS: Was Not Obtained    HISTORY: The patient is a 50y.o. year old male with history of above preop diagnosis. I explained the risk, benefits, expected outcome, and alternatives to the procedure. Patient understands and is in agreement. PROCEDURE: The patient was brought to the operating room and positioned lateral recumbent on the OR table. Sequential compression devices were placed on the patient's lower extremities and functioning. Preoperative antibiotics were administered. Anesthesia was obtained without complication as per the anesthesia record. Immediately prior to the procedure a time-out was called and the surgical checklist was reviewed and agreed upon by all present. The patient was prepped and draped in the usual sterile fashion and the procedure went forth with strict aseptic technique under maximal barrier precautions. The patient's bandages were undressed and the incision from prior bedside I&D was exposed. The site was extensively irrigated with saline and all loculations were broken up and irrigated thoroughly. The site was packed appropriately and topped with 4x4's and ABD. The ABD was taped and the surrounding area was wiped clean. Needle, sponge, and instrument counts were reported as correct x2. Dr. Coretta Chong was present and scrubbed throughout the case. The patient tolerated the procedure well without complications. He was transferred to the recovery area in good condition.     Electronically signed by Bill Pa DO on 9/19/18 at 1:35 PM

## 2018-09-19 NOTE — PROGRESS NOTES
Patient wants to go for a walk around the hospital. Told patient he is to stay on the floor. Said he either goes for a walk or he is going to leave AMA. Pt left to go for a walk with his wife.

## 2018-09-19 NOTE — ANESTHESIA PRE PROCEDURE
No relevant prior studies available. FINDINGS:     Lungs:  Unremarkable. No mass. No consolidation. Pleural space:  Unremarkable. No pneumothorax. No significant effusion. Heart:  Unremarkable. No cardiomegaly. No significant pericardial effusion. Bones/joints:  Unremarkable. No acute fracture. No dislocation. Soft tissues: There are strandy and patchy opacities seen in the    subcutaneous tissues of the back on the left measuring 13.8 cm transverse    dimension by 9.1 cm craniocaudal dimension by 5.8 cm AP dimension. There is    mild asymmetric thickening of the skin overlying the inflammatory mass. No    organized abscess is seen. Vasculature:  Unremarkable. No thoracic aortic aneurysm. Lymph nodes:  Unremarkable. No enlarged lymph nodes. Liver: There is mild diffuse hypoattenuation of the hepatic parenchyma    compatible with fatty infiltration. Kidneys and ureters: There is a 2 cm intermediate attenuation cystic mass    seen within the left kidney likely representing a benign hemorrhagic cyst.           Impression     1. Inflammatory mass seen within the posterior subcutaneous tissues of the    back on the left. No organized abscess is seen. 2.  Fatty infiltration of liver     3. Probable small hemorrhagic cyst of the left kidney       This report has been electronically signed by Chastity Aldana MD.              Anesthesia Plan      general     ASA 3       Induction: intravenous. BIS    Anesthetic plan and risks discussed with patient. Plan discussed with CRNA and attending.                   Kirsten Sargent RN   9/19/2018

## 2018-09-19 NOTE — PROCEDURES
Incision and Drainage Procedure Note    Indication: Back Abscess    Procedure: The patient was positioned appropriately and the skin over the incision site was prepped with betadine and draped in a sterile fashion. Local anesthesia was obtained by infiltration using 1% Lidocaine with epinephrine. An incision was then made over the greatest area of fluctuance and approximately 50 cc of thick, purulent and bloody material was expressed. Loculations were present but not able to be broken up due to patient intolerance. The drainage cavity was then irrigated, packed with sterile gauze and dressed with a sterile dressing and tape.      The patient tolerated the procedure poorly and the procedure was terminated prior to completion, at the patient's request.    Complications: None    Electronically signed by Siena Hale MD on 9/19/2018 at 3:32 AM

## 2018-09-19 NOTE — CONSULTS
Back:  L side of back has 20cm area of erythema, induration, edema, and TTP      LABS:  CBC  Recent Labs      09/18/18 1942   WBC  19.8*   HGB  14.7   HCT  45.3   PLT  283     BMP  Recent Labs      09/18/18 1942   NA  130*   K  4.3   CL  93*   CO2  23   BUN  12   CREATININE  0.8   CALCIUM  9.0     Liver Function  Recent Labs      09/18/18 1942   BILITOT  0.3   AST  24   ALT  43*   ALKPHOS  111   PROT  7.4   LABALBU  2.9*     No results for input(s): LACTATE in the last 72 hours. No results for input(s): INR, PTT in the last 72 hours. Invalid input(s): PT    RADIOLOGY  No results found.       ASSESSMENT:  50 y.o. male with back abscess    PLAN:  Will likely need I&D at bedside pending CT     Electronically signed by Michae Romberg, MD on 9/19/18 at 12:17 AM

## 2018-09-20 PROBLEM — E11.9 DM2 (DIABETES MELLITUS, TYPE 2) (HCC): Status: ACTIVE | Noted: 2018-09-18

## 2018-09-20 LAB
METER GLUCOSE: 122 MG/DL (ref 70–110)
METER GLUCOSE: 144 MG/DL (ref 70–110)
METER GLUCOSE: 195 MG/DL (ref 70–110)
METER GLUCOSE: 228 MG/DL (ref 70–110)

## 2018-09-20 PROCEDURE — 6370000000 HC RX 637 (ALT 250 FOR IP): Performed by: STUDENT IN AN ORGANIZED HEALTH CARE EDUCATION/TRAINING PROGRAM

## 2018-09-20 PROCEDURE — 2580000003 HC RX 258: Performed by: INTERNAL MEDICINE

## 2018-09-20 PROCEDURE — 6360000002 HC RX W HCPCS: Performed by: INTERNAL MEDICINE

## 2018-09-20 PROCEDURE — 2580000003 HC RX 258: Performed by: STUDENT IN AN ORGANIZED HEALTH CARE EDUCATION/TRAINING PROGRAM

## 2018-09-20 PROCEDURE — 82962 GLUCOSE BLOOD TEST: CPT

## 2018-09-20 PROCEDURE — 1200000000 HC SEMI PRIVATE

## 2018-09-20 PROCEDURE — 99024 POSTOP FOLLOW-UP VISIT: CPT | Performed by: SURGERY

## 2018-09-20 PROCEDURE — 6370000000 HC RX 637 (ALT 250 FOR IP): Performed by: INTERNAL MEDICINE

## 2018-09-20 RX ORDER — SENNA AND DOCUSATE SODIUM 50; 8.6 MG/1; MG/1
2 TABLET, FILM COATED ORAL DAILY
Status: DISCONTINUED | OUTPATIENT
Start: 2018-09-20 | End: 2018-09-21 | Stop reason: HOSPADM

## 2018-09-20 RX ORDER — DOCUSATE SODIUM 100 MG/1
100 CAPSULE, LIQUID FILLED ORAL 2 TIMES DAILY
Status: DISCONTINUED | OUTPATIENT
Start: 2018-09-20 | End: 2018-09-21 | Stop reason: HOSPADM

## 2018-09-20 RX ADMIN — PIPERACILLIN SODIUM AND TAZOBACTAM SODIUM 3.38 G: 3; .375 INJECTION, POWDER, LYOPHILIZED, FOR SOLUTION INTRAVENOUS at 15:46

## 2018-09-20 RX ADMIN — IBUPROFEN 800 MG: 800 TABLET ORAL at 16:56

## 2018-09-20 RX ADMIN — VANCOMYCIN HYDROCHLORIDE 2000 MG: 10 INJECTION, POWDER, LYOPHILIZED, FOR SOLUTION INTRAVENOUS at 16:57

## 2018-09-20 RX ADMIN — SODIUM CHLORIDE: 9 INJECTION, SOLUTION INTRAVENOUS at 12:54

## 2018-09-20 RX ADMIN — PIPERACILLIN SODIUM AND TAZOBACTAM SODIUM 3.38 G: 3; .375 INJECTION, POWDER, LYOPHILIZED, FOR SOLUTION INTRAVENOUS at 08:04

## 2018-09-20 RX ADMIN — PANTOPRAZOLE SODIUM 40 MG: 40 TABLET, DELAYED RELEASE ORAL at 08:04

## 2018-09-20 RX ADMIN — LISINOPRIL 40 MG: 20 TABLET ORAL at 08:03

## 2018-09-20 RX ADMIN — MORPHINE SULFATE 2 MG: 2 INJECTION, SOLUTION INTRAMUSCULAR; INTRAVENOUS at 02:23

## 2018-09-20 RX ADMIN — MORPHINE SULFATE 2 MG: 2 INJECTION, SOLUTION INTRAMUSCULAR; INTRAVENOUS at 05:52

## 2018-09-20 RX ADMIN — IBUPROFEN 800 MG: 800 TABLET ORAL at 08:04

## 2018-09-20 RX ADMIN — ATORVASTATIN CALCIUM 40 MG: 40 TABLET, FILM COATED ORAL at 20:31

## 2018-09-20 RX ADMIN — IBUPROFEN 800 MG: 800 TABLET ORAL at 11:54

## 2018-09-20 RX ADMIN — OXYCODONE HYDROCHLORIDE AND ACETAMINOPHEN 1 TABLET: 5; 325 TABLET ORAL at 20:31

## 2018-09-20 RX ADMIN — VANCOMYCIN HYDROCHLORIDE 2000 MG: 10 INJECTION, POWDER, LYOPHILIZED, FOR SOLUTION INTRAVENOUS at 04:15

## 2018-09-20 ASSESSMENT — PAIN SCALES - GENERAL
PAINLEVEL_OUTOF10: 7
PAINLEVEL_OUTOF10: 4
PAINLEVEL_OUTOF10: 7
PAINLEVEL_OUTOF10: 10
PAINLEVEL_OUTOF10: 8
PAINLEVEL_OUTOF10: 0
PAINLEVEL_OUTOF10: 8
PAINLEVEL_OUTOF10: 0

## 2018-09-20 ASSESSMENT — PAIN DESCRIPTION - PAIN TYPE: TYPE: ACUTE PAIN

## 2018-09-20 ASSESSMENT — PAIN DESCRIPTION - DESCRIPTORS: DESCRIPTORS: SHARP

## 2018-09-20 ASSESSMENT — PAIN DESCRIPTION - ORIENTATION: ORIENTATION: LEFT;UPPER

## 2018-09-20 ASSESSMENT — PAIN DESCRIPTION - LOCATION
LOCATION: BACK
LOCATION: BACK

## 2018-09-20 ASSESSMENT — PAIN DESCRIPTION - FREQUENCY: FREQUENCY: CONTINUOUS

## 2018-09-20 NOTE — PROGRESS NOTES
Patient is refusing to wear heart monitor and continues to refuse insulin despite continued education about diabetes and blood sugar control related to infection. Patient extremely aggravated with his care and doctors and states he has not seen anyone in over 24 hours from his surgical team for answers. Patient will not allow any explanation of hospitalization or otherwise. Will continue to monitor and attempt education.

## 2018-09-20 NOTE — PROGRESS NOTES
GENERAL SURGERY  DAILY PROGRESS NOTE  9/20/2018    Chief Complaint:  Chief Complaint   Patient presents with    Abscess     pt has abscess noted to left thoracic area. states it has been growing over last 10 days. pt states has had in the past and they have gone away on their own        Subjective:  S/P OR I&D of back abscess  No complaints this morning. Dressing had to be changed overnight due to drainage  No fevers, chills, pain controlled. Objective:  BP (!) 150/93   Pulse 94   Temp 98 °F (36.7 °C) (Oral)   Resp 18   Ht 5' 5\" (1.651 m)   Wt 280 lb (127 kg)   SpO2 97%   BMI 46.59 kg/m²     GENERAL:  NAD. A&Ox3. EYES:   No scleral icterus. LUNGS:  No increased work of breathing. CARDIOVASCULAR: RR  ABDOMEN:  Soft, non-distended, non-tender. BACK: L lateral wound without erythema. Packing and dressing changed at bedside.  Serosang drainage  SKIN:  Warm and dry    Assessment/Plan:  50 y.o. male with large back abscess, s/p I&D in OR 9/19    Dressing and packing changes daily  Likely ok to be discharged from surgical perspective with [facility] for wound care      Robbie Coronado DO  Resident, PGY-1  9/20/2018  6:03 AM

## 2018-09-20 NOTE — PROGRESS NOTES
Subjective:  Feeling better No CP, SOB, F, V, D, P discussed w NSG    Objective:    BP (!) 143/87   Pulse 79   Temp 97.2 °F (36.2 °C) (Temporal)   Resp 18   Ht 5' 5\" (1.651 m)   Wt 280 lb (127 kg)   SpO2 97%   BMI 46.59 kg/m²     24HR INTAKE/OUTPUT:    Intake/Output Summary (Last 24 hours) at 09/20/18 1439  Last data filed at 09/20/18 1329   Gross per 24 hour   Intake             3855 ml   Output                0 ml   Net             3855 ml     Wound dressed  Heart:  RRR, no murmurs, gallops, or rubs. Lungs:  CTA bilaterally, no wheeze, rales or rhonchi  Abd: bowel sounds present, nontender, nondistended, no masses  Extrem:  No clubbing, cyanosis, or edema    Most Recent Labs  Lab Results   Component Value Date    WBC 20.0 (H) 09/19/2018    HGB 12.9 09/19/2018    HCT 39.5 09/19/2018     09/19/2018     09/19/2018    K 3.9 09/19/2018    CL 95 (L) 09/19/2018    CREATININE 0.8 09/19/2018    BUN 9 09/19/2018    CO2 23 09/19/2018    GLUCOSE 128 09/19/2018    ALT 43 (H) 09/18/2018    AST 24 09/18/2018    INR 1.0 08/02/2012    TSH 0.961 09/08/2017    LABA1C 6.6 (H) 06/11/2018       Assessment    Principal Problem:    Abscess of back  Active Problems:    COPD (chronic obstructive pulmonary disease) (HCC)    SERGEY (obstructive sleep apnea)    Asthma    Morbid obesity with BMI of 45.0-49.9, adult ([facility] [address]    Diabetes mellitus type 2, uncontrolled ([facility] [address]    Sepsis ([facility] [address]  Resolved Problems:    * No resolved hospital problems.  *      Plan:    Cont IV abx await cultures  Wound care  ID and GS following  D/C planning-- await D/C recommendations    Teri East MD  2:39 PM  9/20/2018

## 2018-09-21 VITALS
TEMPERATURE: 98 F | OXYGEN SATURATION: 96 % | WEIGHT: 288 LBS | BODY MASS INDEX: 47.98 KG/M2 | DIASTOLIC BLOOD PRESSURE: 80 MMHG | SYSTOLIC BLOOD PRESSURE: 144 MMHG | HEIGHT: 65 IN | HEART RATE: 79 BPM | RESPIRATION RATE: 16 BRPM

## 2018-09-21 PROBLEM — A49.01 MSSA (METHICILLIN SUSCEPTIBLE STAPHYLOCOCCUS AUREUS) INFECTION: Status: ACTIVE | Noted: 2018-09-21

## 2018-09-21 LAB
ANAEROBIC CULTURE: NORMAL
ANION GAP SERPL CALCULATED.3IONS-SCNC: 18 MMOL/L (ref 7–16)
BASOPHILS ABSOLUTE: 0.06 E9/L (ref 0–0.2)
BASOPHILS RELATIVE PERCENT: 0.5 % (ref 0–2)
BUN BLDV-MCNC: 8 MG/DL (ref 6–20)
CALCIUM SERPL-MCNC: 8.6 MG/DL (ref 8.6–10.2)
CHLORIDE BLD-SCNC: 98 MMOL/L (ref 98–107)
CO2: 22 MMOL/L (ref 22–29)
CREAT SERPL-MCNC: 0.8 MG/DL (ref 0.7–1.2)
EOSINOPHILS ABSOLUTE: 0.69 E9/L (ref 0.05–0.5)
EOSINOPHILS RELATIVE PERCENT: 5.8 % (ref 0–6)
GFR AFRICAN AMERICAN: >60
GFR NON-AFRICAN AMERICAN: >60 ML/MIN/1.73
GLUCOSE BLD-MCNC: 139 MG/DL (ref 74–109)
GRAM STAIN RESULT: ABNORMAL
HCT VFR BLD CALC: 38.2 % (ref 37–54)
HEMOGLOBIN: 12.4 G/DL (ref 12.5–16.5)
IMMATURE GRANULOCYTES #: 0.19 E9/L
IMMATURE GRANULOCYTES %: 1.6 % (ref 0–5)
LYMPHOCYTES ABSOLUTE: 1.69 E9/L (ref 1.5–4)
LYMPHOCYTES RELATIVE PERCENT: 14.2 % (ref 20–42)
MCH RBC QN AUTO: 29.6 PG (ref 26–35)
MCHC RBC AUTO-ENTMCNC: 32.5 % (ref 32–34.5)
MCV RBC AUTO: 91.2 FL (ref 80–99.9)
METER GLUCOSE: 195 MG/DL (ref 70–110)
METER GLUCOSE: 95 MG/DL (ref 70–110)
MONOCYTES ABSOLUTE: 0.89 E9/L (ref 0.1–0.95)
MONOCYTES RELATIVE PERCENT: 7.5 % (ref 2–12)
NEUTROPHILS ABSOLUTE: 8.39 E9/L (ref 1.8–7.3)
NEUTROPHILS RELATIVE PERCENT: 70.4 % (ref 43–80)
ORGANISM: ABNORMAL
PDW BLD-RTO: 13.2 FL (ref 11.5–15)
PLATELET # BLD: 268 E9/L (ref 130–450)
PMV BLD AUTO: 10.1 FL (ref 7–12)
POTASSIUM SERPL-SCNC: 4 MMOL/L (ref 3.5–5)
RBC # BLD: 4.19 E12/L (ref 3.8–5.8)
SODIUM BLD-SCNC: 138 MMOL/L (ref 132–146)
WBC # BLD: 11.9 E9/L (ref 4.5–11.5)
WOUND/ABSCESS: ABNORMAL
WOUND/ABSCESS: ABNORMAL

## 2018-09-21 PROCEDURE — 2580000003 HC RX 258: Performed by: INTERNAL MEDICINE

## 2018-09-21 PROCEDURE — 99024 POSTOP FOLLOW-UP VISIT: CPT | Performed by: SURGERY

## 2018-09-21 PROCEDURE — 82962 GLUCOSE BLOOD TEST: CPT

## 2018-09-21 PROCEDURE — 6370000000 HC RX 637 (ALT 250 FOR IP): Performed by: INTERNAL MEDICINE

## 2018-09-21 PROCEDURE — 6370000000 HC RX 637 (ALT 250 FOR IP): Performed by: STUDENT IN AN ORGANIZED HEALTH CARE EDUCATION/TRAINING PROGRAM

## 2018-09-21 PROCEDURE — 85025 COMPLETE CBC W/AUTO DIFF WBC: CPT

## 2018-09-21 PROCEDURE — 6360000002 HC RX W HCPCS: Performed by: INTERNAL MEDICINE

## 2018-09-21 PROCEDURE — 36415 COLL VENOUS BLD VENIPUNCTURE: CPT

## 2018-09-21 PROCEDURE — 80048 BASIC METABOLIC PNL TOTAL CA: CPT

## 2018-09-21 RX ORDER — IBUPROFEN 800 MG/1
800 TABLET ORAL
Qty: 120 TABLET | Refills: 3 | Status: SHIPPED | OUTPATIENT
Start: 2018-09-21 | End: 2019-06-24

## 2018-09-21 RX ORDER — SENNA AND DOCUSATE SODIUM 50; 8.6 MG/1; MG/1
2 TABLET, FILM COATED ORAL 2 TIMES DAILY PRN
Qty: 50 TABLET | Refills: 0 | Status: SHIPPED | OUTPATIENT
Start: 2018-09-21 | End: 2019-06-24

## 2018-09-21 RX ORDER — OXYCODONE HYDROCHLORIDE AND ACETAMINOPHEN 5; 325 MG/1; MG/1
1 TABLET ORAL EVERY 6 HOURS PRN
Qty: 30 TABLET | Refills: 0 | Status: SHIPPED | OUTPATIENT
Start: 2018-09-21 | End: 2018-10-05

## 2018-09-21 RX ORDER — ACETAMINOPHEN 325 MG/1
650 TABLET ORAL EVERY 4 HOURS PRN
Qty: 120 TABLET | Refills: 3 | Status: SHIPPED | OUTPATIENT
Start: 2018-09-21 | End: 2019-06-24

## 2018-09-21 RX ORDER — DOXYCYCLINE HYCLATE 100 MG
100 TABLET ORAL 2 TIMES DAILY
Qty: 28 TABLET | Refills: 0 | Status: SHIPPED | OUTPATIENT
Start: 2018-09-21 | End: 2018-10-05

## 2018-09-21 RX ADMIN — STANDARDIZED SENNA CONCENTRATE AND DOCUSATE SODIUM 2 TABLET: 8.6; 5 TABLET, FILM COATED ORAL at 08:24

## 2018-09-21 RX ADMIN — IBUPROFEN 800 MG: 800 TABLET ORAL at 11:19

## 2018-09-21 RX ADMIN — DOCUSATE SODIUM 100 MG: 100 CAPSULE, LIQUID FILLED ORAL at 08:24

## 2018-09-21 RX ADMIN — LISINOPRIL 40 MG: 20 TABLET ORAL at 08:23

## 2018-09-21 RX ADMIN — PIPERACILLIN SODIUM AND TAZOBACTAM SODIUM 3.38 G: 3; .375 INJECTION, POWDER, LYOPHILIZED, FOR SOLUTION INTRAVENOUS at 08:26

## 2018-09-21 RX ADMIN — MORPHINE SULFATE 2 MG: 2 INJECTION, SOLUTION INTRAMUSCULAR; INTRAVENOUS at 05:12

## 2018-09-21 RX ADMIN — Medication 10 ML: at 08:25

## 2018-09-21 RX ADMIN — PIPERACILLIN SODIUM AND TAZOBACTAM SODIUM 3.38 G: 3; .375 INJECTION, POWDER, LYOPHILIZED, FOR SOLUTION INTRAVENOUS at 01:05

## 2018-09-21 RX ADMIN — OXYCODONE HYDROCHLORIDE AND ACETAMINOPHEN 1 TABLET: 5; 325 TABLET ORAL at 01:00

## 2018-09-21 RX ADMIN — PANTOPRAZOLE SODIUM 40 MG: 40 TABLET, DELAYED RELEASE ORAL at 08:25

## 2018-09-21 RX ADMIN — IBUPROFEN 800 MG: 800 TABLET ORAL at 08:24

## 2018-09-21 ASSESSMENT — PAIN DESCRIPTION - PAIN TYPE
TYPE: ACUTE PAIN
TYPE: ACUTE PAIN

## 2018-09-21 ASSESSMENT — PAIN SCALES - GENERAL
PAINLEVEL_OUTOF10: 5
PAINLEVEL_OUTOF10: 4
PAINLEVEL_OUTOF10: 10
PAINLEVEL_OUTOF10: 7

## 2018-09-21 ASSESSMENT — PAIN DESCRIPTION - DESCRIPTORS
DESCRIPTORS: ACHING;DISCOMFORT;SORE
DESCRIPTORS: SHOOTING;STABBING;THROBBING

## 2018-09-21 ASSESSMENT — PAIN DESCRIPTION - ONSET: ONSET: ON-GOING

## 2018-09-21 ASSESSMENT — PAIN DESCRIPTION - FREQUENCY
FREQUENCY: CONTINUOUS
FREQUENCY: CONTINUOUS

## 2018-09-21 ASSESSMENT — PAIN DESCRIPTION - PROGRESSION: CLINICAL_PROGRESSION: NOT CHANGED

## 2018-09-21 ASSESSMENT — PAIN DESCRIPTION - ORIENTATION: ORIENTATION: LEFT;UPPER

## 2018-09-21 ASSESSMENT — PAIN DESCRIPTION - LOCATION
LOCATION: BACK
LOCATION: BACK

## 2018-09-21 NOTE — CONSULTS
grandfather; Heart Disease (age of onset: 43) in his brother; Heart Disease (age of onset: 48) in his brother; Heart Disease (age of onset: 46) in his father. REVIEW OF SYSTEMS:    Constitutional: + fever and chills, weight loss, loss of appetite  HEENT: no headaches, blurring, eye pain, tinnitus, colds/nasal congestion, sore throat  Respiratory: no cough, dyspnea  Cardiovascular: no chest pain, orthopnea, PND, palpitations  Gastrointestinal: no nausea, vomiting, diarrhea, constipation, abdominal pain, bloody stools  Genitourinary: no dysuria, frequency, nocturia, urgency, hesitancy, incontinence  Musculoskeletal: no arthralgia, +myalgia, morning stiffness  Neurologic: no paralysis, paresthesia, seizures, tremors, or headaches  Skin: as per HPI  Endocrine: no heat or cold intolerance and no polyphagia, polydipsia, or polyuria        PHYSICAL EXAM:    BP (!) 140/90   Pulse 79   Temp 97.8 °F (36.6 °C) (Temporal)   Resp 16   Ht 5' 5\" (1.651 m)   Wt 288 lb (130.6 kg)   SpO2 98%   BMI 47.93 kg/m²    VENT SETTINGS:   Vent Information  FiO2 : 21 %    General appearance: Alert, Awake, Oriented times 3, no distress  Skin: Back abscess status post I&D, with deep packing in place surrounding minimal redness    Left groin another lesion- tender lesion, red, minimal fluctuation, no drainage  Eyes: Pink palpebral conjunctivae. PERRL  Ears: External ears normal. No tragal tenderness. TM intact  Nose/Sinuses: Nares normal. Septum midline. Mucosa normal. No sinus tenderness. Oropharynx: Oropharynx clear with no exudates seen  Neck: Neck supple. No jugular venous distension, lymphadenopathy or thyromegaly Trachea midline  Lungs: Lungs clear to auscultation bilaterally. No rhonchi, crackles or wheezes  Heart: S1 S2  Regular rate and rhythm. No rub, murmur or gallop  Abdomen: Abdomen soft, non-tender.  BS normal. No masses, organomegaly  Extremities: No edema, Peripheral pulses palpable  Musculoskeletal: Muscular strength appears intact. No joint effusion, tenderness, swelling or warmth      DATA:    Labs:     Last 3 CBC:  Recent Labs      09/18/18 1942 09/19/18   0435  09/21/18   0628   WBC  19.8*  20.0*  11.9*   RBC  4.99  4.40  4.19   HGB  14.7  12.9  12.4*   PLT  283  244  268   MPV  10.1  10.2  10.1       Last 3 BMP  Recent Labs      09/18/18   1942   09/19/18   0435  09/19/18   1155  09/21/18   0628   NA  130*   --   134   --   138   K  4.3   --   3.9   --   4.0   CL  93*   --   95*   --   98   CO2  23   --   23   --   22   BUN  12   --   9   --   8   CREATININE  0.8   --   0.8   --   0.8   GLUCOSE  308*   < >  224*  128  139*   CALCIUM  9.0   --   8.3*   --   8.6    < > = values in this interval not displayed. LIVER PROFILE:  Recent Labs      09/18/18 1942   AST  24   ALT  43*   LABALBU  2.9*     Microbiology :  Recent Labs      09/18/18 1942   BC  24 Hours- no growth     Recent Labs      09/18/18 2115   BLOODCULT2  24 Hours- no growth     No results for input(s): Laneta Abiel in the last 72 hours. No results for input(s): CULTRESP in the last 72 hours. Recent Labs      09/19/18   0350   WNDABS  Heavy growth         Radiology :  CT CHEST W CONTRAST   Final Result     1. Inflammatory mass seen within the posterior subcutaneous tissues of the    back on the left. No organized abscess is seen. 2.  Fatty infiltration of liver     3.   Probable small hemorrhagic cyst of the left kidney      This report has been electronically signed by Lola Malik MD.              Assessment and Plan:      Back abscess(carbuncle) s/p I and D on 9/19  Left groin lesion - not yet an abscess  DM type 2    Plan  - D/C van and zosyn  - can be discharged on Doxycycline 100mg BID for 2 weeks (avoding too freq drugs as he doesn't take much)  - needs a close follow up with gen surg also , he has another abscess developing in left groin  - chlorhexidine baths    Thank you for your consult, please call for any qs                Melissa Tamia Belcher MD

## 2018-09-21 NOTE — CARE COORDINATION
Per [employer]'s financial screening, patient IS NOT eligible for help with meds; will await ID input for antibiotic plan

## 2018-09-21 NOTE — DISCHARGE SUMMARY
Physician Discharge Summary     Patient ID:  Florentin Carpio  [id number]  86 y.o.  1970    Admit date: 9/18/2018    Discharge date and time:  9/21/2018    Admission Diagnoses:   Patient Active Problem List   Diagnosis    COPD (chronic obstructive pulmonary disease) ([facility]    Smoker    SERGEY (obstructive sleep apnea)    Asthma    Morbid obesity with BMI of 45.0-49.9, adult ([facility]    Diverticulitis of colon    Abscess of back    DM2 (diabetes mellitus, type 2) ([facility]    Sepsis ([facility]    MSSA (methicillin susceptible Staphylococcus aureus) infection       Discharge Diagnoses: MSSA back abscess    Consults: ID and general surgery    Procedures: I&D     Hospital Course: Pt admitted w back abscess after failed I&D in ER. Underwent operative I&D. Seen by ID. Will complete abx as outpatient. Discharge Exam:  See progress note from today    Disposition: home    Patient Instructions:   Current Discharge Medication List      START taking these medications    Details   oxyCODONE-acetaminophen (PERCOCET) 5-325 MG per tablet Take 1 tablet by mouth every 6 hours as needed for Pain (for pain level 4-6) for up to 14 days. Екатерина Jimenes Date: 9/21/18  Qty: 30 tablet, Refills: 0    Associated Diagnoses: Abscess of back      ibuprofen (ADVIL;MOTRIN) 800 MG tablet Take 1 tablet by mouth 3 times daily (with meals)  Qty: 120 tablet, Refills: 3      acetaminophen (TYLENOL) 325 MG tablet Take 2 tablets by mouth every 4 hours as needed for Fever (Temp greater than 100.5, for pain score 1-3)  Qty: 120 tablet, Refills: 3      sennosides-docusate sodium (SENOKOT-S) 8.6-50 MG tablet Take 2 tablets by mouth 2 times daily as needed for Constipation  Qty: 50 tablet, Refills: 0         CONTINUE these medications which have NOT CHANGED    Details   FREESTYLE LANCETS [facility] ONCE DAILY  Qty: 100 each, Refills: 0      lisinopril (PRINIVIL;ZESTRIL) 40 MG tablet TAKE 1 TABLET BY MOUTH DAILY  Qty: 30 tablet, Refills: 0 metFORMIN (GLUCOPHAGE) 500 MG tablet TAKE 1 TABLET BY MOUTH TWICE DAILY WITH MEALS  Qty: 60 tablet, Refills: 0      VENTOLIN  (90 Base) MCG/ACT inhaler INHALE 2 PUFFS INTO THE LUNGS EVERY 6 HOURS AS NEEDED FOR WHEEZING OR SHORTNESS OF BREATH  Qty: 18 g, Refills: 0      atorvastatin (LIPITOR) 40 MG tablet TAKE 1 TABLET BY MOUTH DAILY  Qty: 30 tablet, Refills: 0      omeprazole (PRILOSEC) 40 MG delayed release capsule TAKE 1 CAPSULE BY MOUTH DAILY  Qty: 30 capsule, Refills: 0      !! FREESTYLE LITE strip TEST BLOOD SUGAR TWICE DAILY  Qty: 200 strip, Refills: 0      citalopram (CELEXA) 20 MG tablet TAKE 1 TABLET BY MOUTH DAILY  Qty: 30 tablet, Refills: 5      !! FREESTYLE LITE strip TEST BLOOD SUGAR ONCE DAILY  Qty: 100 strip, Refills: 0      BREO ELLIPTA 200-25 MCG/INH AEPB INHALE 1 PUFF INTO THE LUNGS DAILY  Qty: 60 each, Refills: 5      Multiple Vitamin (ONE-A-DAY MENS PO) Take by mouth      Blood Glucose Monitoring Suppl EZEKIEL Dx: DM2  Qty: 1 Device, Refills: 0      Respiratory Therapy Supplies EZKEIEL Please provide patient with CPAP equipment. Setting CPAP 7. Medium Respironics Nasal Gel Mask with heated humidification at HS. Qty: 1 Device, Refills: 0   Doxycycline for 14 days    ! ! - Potential duplicate medications found. Please discuss with provider.         Activity: no heavy lifting, pushing, pulling with the implant side for 2 months  Diet: diabetic diet    Follow-up with [address] 2 weeks  Note that over 30 minutes was spent in preparing discharge papers, discussing discharge with patient, medication review, etc.    Signed:  Keiko Lauren MD  9/21/2018  9:03 AM

## 2018-09-23 LAB
BLOOD CULTURE, ROUTINE: NORMAL
CULTURE, BLOOD 2: NORMAL

## 2018-09-25 ENCOUNTER — TELEPHONE (OUTPATIENT)
Dept: SURGERY | Age: 48
End: 2018-09-25

## 2018-09-25 NOTE — TELEPHONE ENCOUNTER
MA received a call from pt's wife/EC [place] Red stating that pt needs a post op appointment with Dr. Geovani Del Castillo, MA scheduled pt for 10/10/18 at 1:15 pm, pt's wife verbalized understanding of time/date/location of appointment. MA mailed appointment letter as well. Electronically signed by Dion Daniel on 9/25/18 at 2:41 PM

## 2018-10-10 ENCOUNTER — OFFICE VISIT (OUTPATIENT)
Dept: SURGERY | Age: 48
End: 2018-10-10

## 2018-10-10 VITALS
HEART RATE: 102 BPM | RESPIRATION RATE: 19 BRPM | HEIGHT: 65 IN | DIASTOLIC BLOOD PRESSURE: 86 MMHG | WEIGHT: 285 LBS | BODY MASS INDEX: 47.48 KG/M2 | OXYGEN SATURATION: 98 % | SYSTOLIC BLOOD PRESSURE: 136 MMHG

## 2018-10-10 DIAGNOSIS — L02.212 BACK ABSCESS: Primary | ICD-10-CM

## 2018-10-10 PROCEDURE — 99212 OFFICE O/P EST SF 10 MIN: CPT | Performed by: SURGERY

## 2018-10-24 RX ORDER — LISINOPRIL 40 MG/1
40 TABLET ORAL DAILY
Qty: 30 TABLET | Refills: 0 | Status: SHIPPED | OUTPATIENT
Start: 2018-10-24 | End: 2019-06-24

## 2018-10-24 RX ORDER — ALBUTEROL SULFATE 90 UG/1
AEROSOL, METERED RESPIRATORY (INHALATION)
Qty: 18 G | Refills: 0 | Status: SHIPPED | OUTPATIENT
Start: 2018-10-24 | End: 2019-06-24

## 2018-10-24 NOTE — TELEPHONE ENCOUNTER
From: Nancy Clark  Sent: 10/24/2018 12:45 AM EDT  Subject: Medication Renewal Request    Harwood Lor would like a refill of the following medications:     Blood Glucose Monitoring Suppl EZEKIEL Michael Hyatt MD]   Patient Comment: I'd like to have this sent to [address] in [facility] if that's possible     VENTOLIN  (90 Base) MCG/ACT inhaler Michael Hyatt MD]   Patient Comment: I'd like to have this sent to [address] in [facility] if that's possible     lisinopril (PRINIVIL;ZESTRIL) 40 MG tablet Michael Hyatt MD]   Patient Comment: I'd like to have this sent to [address] in [facility] if that's possible    Preferred pharmacy: [facility] [address] Christine Chawla [phone]

## 2018-11-07 ENCOUNTER — TELEPHONE (OUTPATIENT)
Dept: SURGERY | Age: 48
End: 2018-11-07

## 2018-11-21 ENCOUNTER — OFFICE VISIT (OUTPATIENT)
Dept: SURGERY | Age: 48
End: 2018-11-21

## 2018-11-21 VITALS
HEIGHT: 65 IN | BODY MASS INDEX: 47.82 KG/M2 | RESPIRATION RATE: 16 BRPM | HEART RATE: 88 BPM | TEMPERATURE: 98.1 F | SYSTOLIC BLOOD PRESSURE: 158 MMHG | WEIGHT: 287 LBS | DIASTOLIC BLOOD PRESSURE: 100 MMHG | OXYGEN SATURATION: 95 %

## 2018-11-21 DIAGNOSIS — L02.212 ABSCESS OF BACK: ICD-10-CM

## 2018-11-21 PROCEDURE — 99024 POSTOP FOLLOW-UP VISIT: CPT | Performed by: SURGERY

## 2018-11-21 PROCEDURE — 99211 OFF/OP EST MAY X REQ PHY/QHP: CPT | Performed by: SURGERY

## 2019-05-21 ENCOUNTER — HOSPITAL ENCOUNTER (EMERGENCY)
Age: 49
Discharge: HOME | End: 2019-05-21
Attending: EMERGENCY MEDICINE

## 2019-05-21 VITALS
SYSTOLIC BLOOD PRESSURE: 146 MMHG | BODY MASS INDEX: 47.48 KG/M2 | WEIGHT: 285 LBS | OXYGEN SATURATION: 98 % | HEART RATE: 85 BPM | DIASTOLIC BLOOD PRESSURE: 96 MMHG | RESPIRATION RATE: 14 BRPM | HEIGHT: 65 IN | TEMPERATURE: 97 F

## 2019-05-21 DIAGNOSIS — L02.91 ABSCESS: Primary | ICD-10-CM

## 2019-05-21 PROCEDURE — 10060 I&D ABSCESS SIMPLE/SINGLE: CPT

## 2019-05-21 PROCEDURE — 6370000000 HC RX 637 (ALT 250 FOR IP): Performed by: EMERGENCY MEDICINE

## 2019-05-21 PROCEDURE — 99282 EMERGENCY DEPT VISIT SF MDM: CPT

## 2019-05-21 RX ORDER — DOXYCYCLINE HYCLATE 100 MG
100 TABLET ORAL 2 TIMES DAILY
Qty: 20 TABLET | Refills: 0 | Status: SHIPPED | OUTPATIENT
Start: 2019-05-21 | End: 2019-05-31

## 2019-05-21 RX ORDER — DOXYCYCLINE HYCLATE 100 MG/1
100 CAPSULE ORAL ONCE
Status: COMPLETED | OUTPATIENT
Start: 2019-05-21 | End: 2019-05-21

## 2019-05-21 RX ORDER — HYDROCODONE BITARTRATE AND ACETAMINOPHEN 5; 325 MG/1; MG/1
1 TABLET ORAL ONCE
Status: COMPLETED | OUTPATIENT
Start: 2019-05-21 | End: 2019-05-21

## 2019-05-21 RX ORDER — LIDOCAINE HYDROCHLORIDE AND EPINEPHRINE 10; 10 MG/ML; UG/ML
20 INJECTION, SOLUTION INFILTRATION; PERINEURAL ONCE
Status: DISCONTINUED | OUTPATIENT
Start: 2019-05-21 | End: 2019-05-22 | Stop reason: HOSPADM

## 2019-05-21 RX ADMIN — HYDROCODONE BITARTRATE AND ACETAMINOPHEN 1 TABLET: 5; 325 TABLET ORAL at 22:37

## 2019-05-21 RX ADMIN — DOXYCYCLINE HYCLATE 100 MG: 100 CAPSULE ORAL at 22:37

## 2019-05-21 ASSESSMENT — PAIN SCALES - GENERAL: PAINLEVEL_OUTOF10: 5

## 2019-05-24 NOTE — ED PROVIDER NOTES
HPI:  5/24/19, Time: 6:48 AM         Jc Price is a 50 y.o. male presenting to the ED for abscess. Patient is concerned that he has an abscess on his back. He has these multiple times in the past. He tried nothing at all with any relief. Denies fever, chills, nausea, vomiting, chest pain, change in bowel or bladder, neck pain or stiffness, or any other symptoms or complaints. Review of Systems:   Pertinent positives and negatives are stated within HPI, all other systems reviewed and are negative.          --------------------------------------------- PAST HISTORY ---------------------------------------------  Past Medical History:  has a past medical history of Abdominal pain, Allergic rhinitis, Anxiety, Asthma, COPD (chronic obstructive pulmonary disease) ([facility] Depression, Diabetes mellitus ([facility] GERD (gastroesophageal reflux disease), Headache(784.0), Hyperlipidemia, Hypertension, Obesity, Osteoarthritis, and Sleep apnea. Past Surgical History:  has a past surgical history that includes Tonsillectomy (1983); hiatal hernia repair (2012); Tympanoplasty; Colonoscopy (06/08/2017); hernia repair (2012); Endoscopy, colon, diagnostic; and pr drain skin abscess simple (N/A, 9/19/2018). Social History:  reports that he has been smoking cigarettes. He has a 45.00 pack-year smoking history. He has never used smokeless tobacco. He reports that he has current or past drug history. Drug: Marijuana. He reports that he does not drink alcohol. Family History: family history includes Asthma in his sister; COPD in his brother and mother; Cancer in his maternal grandmother, mother, and paternal grandmother; Diabetes in his brother, father, and sister; Heart Disease in his maternal grandmother and paternal grandfather; Heart Disease (age of onset: 43) in his brother; Heart Disease (age of onset: 48) in his brother; Heart Disease (age of onset: 46) in his father.      The patients home medications have been reviewed. Allergies: Patient has no known allergies. -------------------------------------------------- RESULTS -------------------------------------------------  All laboratory and radiology results have been personally reviewed by myself   LABS:  No results found for this visit on 05/21/19. RADIOLOGY:  Interpreted by Radiologist.  No orders to display       ------------------------- NURSING NOTES AND VITALS REVIEWED ---------------------------   The nursing notes within the ED encounter and vital signs as below have been reviewed. BP (!) 146/96   Pulse 85   Temp 97 °F (36.1 °C)   Resp 14   Ht 5' 5\" (1.651 m)   Wt 285 lb (129.3 kg)   SpO2 98%   BMI 47.43 kg/m²   Oxygen Saturation Interpretation: Normal      ---------------------------------------------------PHYSICAL EXAM--------------------------------------      Constitutional/General: Alert and oriented x3, well appearing, non toxic in NAD  Head: Normocephalic and atraumatic  Eyes: PERRL, EOMI  Mouth: Oropharynx clear, handling secretions, no trismus  Neck: Supple, full ROM,   Pulmonary: Lungs clear to auscultation bilaterally, no wheezes, rales, or rhonchi. Not in respiratory distress  Cardiovascular:  Regular rate and rhythm, no murmurs, gallops, or rubs. 2+ distal pulses  Abdomen: Soft, non tender, non distended,   Extremities: Moves all extremities x 4. Warm and well perfused  Skin: warm and dry without rash. Multiple remote scars. Abscess noted to the right scapular area  Neurologic: GCS 15,  Psych: Normal Affect      ------------------------------ ED COURSE/MEDICAL DECISION MAKING----------------------  Medications   HYDROcodone-acetaminophen (NORCO) 5-325 MG per tablet 1 tablet (1 tablet Oral Given 5/21/19 2237)   doxycycline hyclate (VIBRAMYCIN) capsule 100 mg (100 mg Oral Given 5/21/19 2237)     PROCEDURE  5/21/19       Time: 1130    INCISION AND DRAINAGE  Risks, benefits and alternatives (for applicable procedures below) described. Performed By: Rajan Winter DO. Indication: Abscess  Informed consent obtained: The patient provided verbal consent for this procedure. .  Prep: The skin was cleansed with povidone iodine and draped in a sterile fashion. Anesthetic: The wound area was anesthetized with Lidocaine 1% with epinephrine. Incision: Soft tissue abscess of back was Incised by scalpal and moderate fluid was drained. A wound culture was not obtained. The wound  was irrigated and was not packed with iodoform gauze. The wound was then covered with a sterile dressing. Patient tolerated the procedure well. ED COURSE:       Medical Decision Making:    Patient presents with an uncomplicated abscess. This was incised and drained. He was started on doxycycline. He is to follow up with his PCP in one to 2 days. He is educated on post I&D care. Counseling: The emergency provider has spoken with the patient and discussed todays results, in addition to providing specific details for the plan of care and counseling regarding the diagnosis and prognosis. Questions are answered at this time and they are agreeable with the plan.      --------------------------------- IMPRESSION AND DISPOSITION ---------------------------------    IMPRESSION  1. Abscess        DISPOSITION  Disposition: Discharge to home  Patient condition is stable      NOTE: This report was transcribed using voice recognition software.  Every effort was made to ensure accuracy; however, inadvertent computerized transcription errors may be present        Jeanine Cortes MD  05/24/19 [id number]

## 2019-06-24 ENCOUNTER — HOSPITAL ENCOUNTER (INPATIENT)
Age: 49
LOS: 7 days | Discharge: HOME HEALTH | DRG: 463 | End: 2019-07-01
Attending: EMERGENCY MEDICINE | Admitting: SURGERY

## 2019-06-24 ENCOUNTER — ANESTHESIA (OUTPATIENT)
Dept: OPERATING ROOM | Age: 49
DRG: 463 | End: 2019-06-24

## 2019-06-24 ENCOUNTER — ANESTHESIA EVENT (OUTPATIENT)
Dept: OPERATING ROOM | Age: 49
DRG: 463 | End: 2019-06-24

## 2019-06-24 ENCOUNTER — APPOINTMENT (OUTPATIENT)
Dept: CT IMAGING | Age: 49
DRG: 463 | End: 2019-06-24

## 2019-06-24 ENCOUNTER — APPOINTMENT (OUTPATIENT)
Dept: ULTRASOUND IMAGING | Age: 49
DRG: 463 | End: 2019-06-24

## 2019-06-24 DIAGNOSIS — A41.9 SEPTICEMIA (HCC): ICD-10-CM

## 2019-06-24 DIAGNOSIS — M79.89 NECROTIZING SOFT TISSUE INFECTION: ICD-10-CM

## 2019-06-24 DIAGNOSIS — L02.91 ABSCESS: ICD-10-CM

## 2019-06-24 DIAGNOSIS — M72.6 NECROTIZING FASCIITIS (HCC): ICD-10-CM

## 2019-06-24 DIAGNOSIS — A41.9 SEPSIS, DUE TO UNSPECIFIED ORGANISM: Primary | ICD-10-CM

## 2019-06-24 LAB
ALBUMIN SERPL-MCNC: 3.2 G/DL (ref 3.5–5.2)
ALP BLD-CCNC: 77 U/L (ref 40–129)
ALT SERPL-CCNC: 21 U/L (ref 0–40)
ANION GAP SERPL CALCULATED.3IONS-SCNC: 14 MMOL/L (ref 7–16)
AST SERPL-CCNC: 11 U/L (ref 0–39)
BASOPHILS ABSOLUTE: 0.12 E9/L (ref 0–0.2)
BASOPHILS RELATIVE PERCENT: 0.5 % (ref 0–2)
BILIRUB SERPL-MCNC: 0.9 MG/DL (ref 0–1.2)
BUN BLDV-MCNC: 10 MG/DL (ref 6–20)
CALCIUM SERPL-MCNC: 9.2 MG/DL (ref 8.6–10.2)
CHLORIDE BLD-SCNC: 95 MMOL/L (ref 98–107)
CHP ED QC CHECK: YES
CO2: 22 MMOL/L (ref 22–29)
CREAT SERPL-MCNC: 0.8 MG/DL (ref 0.7–1.2)
EOSINOPHILS ABSOLUTE: 0.03 E9/L (ref 0.05–0.5)
EOSINOPHILS RELATIVE PERCENT: 0.1 % (ref 0–6)
GFR AFRICAN AMERICAN: >60
GFR NON-AFRICAN AMERICAN: >60 ML/MIN/1.73
GLUCOSE BLD-MCNC: 158 MG/DL
GLUCOSE BLD-MCNC: 158 MG/DL (ref 74–99)
HCT VFR BLD CALC: 45.1 % (ref 37–54)
HEMOGLOBIN: 14.9 G/DL (ref 12.5–16.5)
IMMATURE GRANULOCYTES #: 0.32 E9/L
IMMATURE GRANULOCYTES %: 1.3 % (ref 0–5)
LACTIC ACID: 1.7 MMOL/L (ref 0.5–2.2)
LYMPHOCYTES ABSOLUTE: 1.28 E9/L (ref 1.5–4)
LYMPHOCYTES RELATIVE PERCENT: 5.4 % (ref 20–42)
MCH RBC QN AUTO: 29.4 PG (ref 26–35)
MCHC RBC AUTO-ENTMCNC: 33 % (ref 32–34.5)
MCV RBC AUTO: 89 FL (ref 80–99.9)
MONOCYTES ABSOLUTE: 1.57 E9/L (ref 0.1–0.95)
MONOCYTES RELATIVE PERCENT: 6.6 % (ref 2–12)
NEUTROPHILS ABSOLUTE: 20.52 E9/L (ref 1.8–7.3)
NEUTROPHILS RELATIVE PERCENT: 86.1 % (ref 43–80)
PDW BLD-RTO: 13.2 FL (ref 11.5–15)
PLATELET # BLD: 170 E9/L (ref 130–450)
PMV BLD AUTO: 10.7 FL (ref 7–12)
POIKILOCYTES: ABNORMAL
POLYCHROMASIA: ABNORMAL
POTASSIUM REFLEX MAGNESIUM: 3.8 MMOL/L (ref 3.5–5)
RBC # BLD: 5.07 E12/L (ref 3.8–5.8)
SODIUM BLD-SCNC: 131 MMOL/L (ref 132–146)
TOTAL PROTEIN: 7.6 G/DL (ref 6.4–8.3)
WBC # BLD: 23.8 E9/L (ref 4.5–11.5)

## 2019-06-24 PROCEDURE — 86850 RBC ANTIBODY SCREEN: CPT

## 2019-06-24 PROCEDURE — 99285 EMERGENCY DEPT VISIT HI MDM: CPT

## 2019-06-24 PROCEDURE — 6360000002 HC RX W HCPCS: Performed by: STUDENT IN AN ORGANIZED HEALTH CARE EDUCATION/TRAINING PROGRAM

## 2019-06-24 PROCEDURE — 81001 URINALYSIS AUTO W/SCOPE: CPT

## 2019-06-24 PROCEDURE — 99223 1ST HOSP IP/OBS HIGH 75: CPT | Performed by: SURGERY

## 2019-06-24 PROCEDURE — 86901 BLOOD TYPING SEROLOGIC RH(D): CPT

## 2019-06-24 PROCEDURE — 0JBB0ZZ EXCISION OF PERINEUM SUBCUTANEOUS TISSUE AND FASCIA, OPEN APPROACH: ICD-10-PCS | Performed by: SURGERY

## 2019-06-24 PROCEDURE — 3600000012 HC SURGERY LEVEL 2 ADDTL 15MIN: Performed by: SURGERY

## 2019-06-24 PROCEDURE — 96367 TX/PROPH/DG ADDL SEQ IV INF: CPT

## 2019-06-24 PROCEDURE — 96366 THER/PROPH/DIAG IV INF ADDON: CPT

## 2019-06-24 PROCEDURE — 2000000000 HC ICU R&B

## 2019-06-24 PROCEDURE — 2500000003 HC RX 250 WO HCPCS: Performed by: NURSE ANESTHETIST, CERTIFIED REGISTERED

## 2019-06-24 PROCEDURE — 2580000003 HC RX 258: Performed by: STUDENT IN AN ORGANIZED HEALTH CARE EDUCATION/TRAINING PROGRAM

## 2019-06-24 PROCEDURE — 87040 BLOOD CULTURE FOR BACTERIA: CPT

## 2019-06-24 PROCEDURE — 87088 URINE BACTERIA CULTURE: CPT

## 2019-06-24 PROCEDURE — 3600000002 HC SURGERY LEVEL 2 BASE: Performed by: SURGERY

## 2019-06-24 PROCEDURE — 2580000003 HC RX 258: Performed by: NURSE ANESTHETIST, CERTIFIED REGISTERED

## 2019-06-24 PROCEDURE — 86900 BLOOD TYPING SEROLOGIC ABO: CPT

## 2019-06-24 PROCEDURE — 74177 CT ABD & PELVIS W/CONTRAST: CPT

## 2019-06-24 PROCEDURE — 2500000003 HC RX 250 WO HCPCS: Performed by: STUDENT IN AN ORGANIZED HEALTH CARE EDUCATION/TRAINING PROGRAM

## 2019-06-24 PROCEDURE — 83605 ASSAY OF LACTIC ACID: CPT

## 2019-06-24 PROCEDURE — 6360000004 HC RX CONTRAST MEDICATION: Performed by: RADIOLOGY

## 2019-06-24 PROCEDURE — 6360000002 HC RX W HCPCS: Performed by: NURSE ANESTHETIST, CERTIFIED REGISTERED

## 2019-06-24 PROCEDURE — 96376 TX/PRO/DX INJ SAME DRUG ADON: CPT

## 2019-06-24 PROCEDURE — 80053 COMPREHEN METABOLIC PANEL: CPT

## 2019-06-24 PROCEDURE — 96375 TX/PRO/DX INJ NEW DRUG ADDON: CPT

## 2019-06-24 PROCEDURE — 96365 THER/PROPH/DIAG IV INF INIT: CPT

## 2019-06-24 PROCEDURE — 85025 COMPLETE CBC W/AUTO DIFF WBC: CPT

## 2019-06-24 PROCEDURE — 76870 US EXAM SCROTUM: CPT

## 2019-06-24 PROCEDURE — 2580000003 HC RX 258: Performed by: RADIOLOGY

## 2019-06-24 PROCEDURE — 36415 COLL VENOUS BLD VENIPUNCTURE: CPT

## 2019-06-24 PROCEDURE — 3700000000 HC ANESTHESIA ATTENDED CARE: Performed by: SURGERY

## 2019-06-24 PROCEDURE — 2709999900 HC NON-CHARGEABLE SUPPLY: Performed by: SURGERY

## 2019-06-24 PROCEDURE — 3700000001 HC ADD 15 MINUTES (ANESTHESIA): Performed by: SURGERY

## 2019-06-24 RX ORDER — OXYCODONE HYDROCHLORIDE AND ACETAMINOPHEN 5; 325 MG/1; MG/1
2 TABLET ORAL PRN
Status: ACTIVE | OUTPATIENT
Start: 2019-06-24 | End: 2019-06-24

## 2019-06-24 RX ORDER — MORPHINE SULFATE 2 MG/ML
1 INJECTION, SOLUTION INTRAMUSCULAR; INTRAVENOUS EVERY 5 MIN PRN
Status: DISCONTINUED | OUTPATIENT
Start: 2019-06-24 | End: 2019-06-25 | Stop reason: HOSPADM

## 2019-06-24 RX ORDER — MORPHINE SULFATE 2 MG/ML
2 INJECTION, SOLUTION INTRAMUSCULAR; INTRAVENOUS EVERY 5 MIN PRN
Status: DISCONTINUED | OUTPATIENT
Start: 2019-06-24 | End: 2019-06-25 | Stop reason: HOSPADM

## 2019-06-24 RX ORDER — LIDOCAINE HYDROCHLORIDE 20 MG/ML
INJECTION, SOLUTION INTRAVENOUS PRN
Status: DISCONTINUED | OUTPATIENT
Start: 2019-06-24 | End: 2019-06-25 | Stop reason: SDUPTHER

## 2019-06-24 RX ORDER — OXYCODONE HYDROCHLORIDE AND ACETAMINOPHEN 5; 325 MG/1; MG/1
1 TABLET ORAL PRN
Status: ACTIVE | OUTPATIENT
Start: 2019-06-24 | End: 2019-06-24

## 2019-06-24 RX ORDER — FENTANYL CITRATE 50 UG/ML
50 INJECTION, SOLUTION INTRAMUSCULAR; INTRAVENOUS ONCE
Status: COMPLETED | OUTPATIENT
Start: 2019-06-24 | End: 2019-06-24

## 2019-06-24 RX ORDER — ONDANSETRON 2 MG/ML
4 INJECTION INTRAMUSCULAR; INTRAVENOUS
Status: ACTIVE | OUTPATIENT
Start: 2019-06-24 | End: 2019-06-24

## 2019-06-24 RX ORDER — SODIUM CHLORIDE 9 MG/ML
1000 INJECTION, SOLUTION INTRAVENOUS ONCE
Status: DISCONTINUED | OUTPATIENT
Start: 2019-06-24 | End: 2019-06-25

## 2019-06-24 RX ORDER — SODIUM CHLORIDE 9 MG/ML
1000 INJECTION, SOLUTION INTRAVENOUS ONCE
Status: COMPLETED | OUTPATIENT
Start: 2019-06-24 | End: 2019-06-24

## 2019-06-24 RX ORDER — MEPERIDINE HYDROCHLORIDE 50 MG/ML
12.5 INJECTION INTRAMUSCULAR; INTRAVENOUS; SUBCUTANEOUS
Status: DISCONTINUED | OUTPATIENT
Start: 2019-06-24 | End: 2019-06-25 | Stop reason: HOSPADM

## 2019-06-24 RX ORDER — CLINDAMYCIN PHOSPHATE 900 MG/50ML
900 INJECTION INTRAVENOUS ONCE
Status: COMPLETED | OUTPATIENT
Start: 2019-06-24 | End: 2019-06-24

## 2019-06-24 RX ORDER — FENTANYL CITRATE 50 UG/ML
INJECTION, SOLUTION INTRAMUSCULAR; INTRAVENOUS PRN
Status: DISCONTINUED | OUTPATIENT
Start: 2019-06-24 | End: 2019-06-25 | Stop reason: SDUPTHER

## 2019-06-24 RX ORDER — SODIUM CHLORIDE 0.9 % (FLUSH) 0.9 %
10 SYRINGE (ML) INJECTION ONCE
Status: COMPLETED | OUTPATIENT
Start: 2019-06-24 | End: 2019-06-24

## 2019-06-24 RX ORDER — PROPOFOL 10 MG/ML
INJECTION, EMULSION INTRAVENOUS PRN
Status: DISCONTINUED | OUTPATIENT
Start: 2019-06-24 | End: 2019-06-25 | Stop reason: SDUPTHER

## 2019-06-24 RX ORDER — SUCCINYLCHOLINE CHLORIDE 20 MG/ML
INJECTION INTRAMUSCULAR; INTRAVENOUS PRN
Status: DISCONTINUED | OUTPATIENT
Start: 2019-06-24 | End: 2019-06-25 | Stop reason: SDUPTHER

## 2019-06-24 RX ORDER — SODIUM CHLORIDE, SODIUM LACTATE, POTASSIUM CHLORIDE, AND CALCIUM CHLORIDE .6; .31; .03; .02 G/100ML; G/100ML; G/100ML; G/100ML
4000 INJECTION, SOLUTION INTRAVENOUS ONCE
Status: DISCONTINUED | OUTPATIENT
Start: 2019-06-24 | End: 2019-06-24

## 2019-06-24 RX ADMIN — MIDAZOLAM HYDROCHLORIDE 2 MG: 1 INJECTION, SOLUTION INTRAMUSCULAR; INTRAVENOUS at 23:44

## 2019-06-24 RX ADMIN — SUCCINYLCHOLINE CHLORIDE 160 MG: 20 INJECTION, SOLUTION INTRAMUSCULAR; INTRAVENOUS at 23:49

## 2019-06-24 RX ADMIN — FENTANYL CITRATE 50 MCG: 50 INJECTION INTRAMUSCULAR; INTRAVENOUS at 22:11

## 2019-06-24 RX ADMIN — PROPOFOL 200 MG: 10 INJECTION, EMULSION INTRAVENOUS at 23:49

## 2019-06-24 RX ADMIN — ROCURONIUM BROMIDE 30 MG: 10 INJECTION, SOLUTION INTRAVENOUS at 23:52

## 2019-06-24 RX ADMIN — LIDOCAINE HYDROCHLORIDE 60 MG: 20 INJECTION, SOLUTION INTRAVENOUS at 23:49

## 2019-06-24 RX ADMIN — CLINDAMYCIN IN 5 PERCENT DEXTROSE 900 MG: 18 INJECTION, SOLUTION INTRAVENOUS at 19:48

## 2019-06-24 RX ADMIN — FENTANYL CITRATE 100 MCG: 50 INJECTION, SOLUTION INTRAMUSCULAR; INTRAVENOUS at 23:49

## 2019-06-24 RX ADMIN — VANCOMYCIN HYDROCHLORIDE 2500 MG: 1 INJECTION, POWDER, LYOPHILIZED, FOR SOLUTION INTRAVENOUS at 21:41

## 2019-06-24 RX ADMIN — SODIUM CHLORIDE 1000 ML: 9 INJECTION, SOLUTION INTRAVENOUS at 23:13

## 2019-06-24 RX ADMIN — SODIUM CHLORIDE 1000 ML: 9 INJECTION, SOLUTION INTRAVENOUS at 23:10

## 2019-06-24 RX ADMIN — SODIUM CHLORIDE: 9 INJECTION, SOLUTION INTRAVENOUS at 23:43

## 2019-06-24 RX ADMIN — PIPERACILLIN, TAZOBACTAM 4.5 G: 4; .5 INJECTION, POWDER, LYOPHILIZED, FOR SOLUTION INTRAVENOUS at 19:59

## 2019-06-24 RX ADMIN — IOPAMIDOL 110 ML: 755 INJECTION, SOLUTION INTRAVENOUS at 21:21

## 2019-06-24 RX ADMIN — FENTANYL CITRATE 100 MCG: 50 INJECTION INTRAMUSCULAR; INTRAVENOUS at 19:45

## 2019-06-24 RX ADMIN — Medication 10 ML: at 21:21

## 2019-06-24 ASSESSMENT — PAIN DESCRIPTION - LOCATION
LOCATION: OTHER (COMMENT)
LOCATION: SCROTUM

## 2019-06-24 ASSESSMENT — PAIN SCALES - GENERAL
PAINLEVEL_OUTOF10: 10
PAINLEVEL_OUTOF10: 8

## 2019-06-24 ASSESSMENT — PAIN DESCRIPTION - PAIN TYPE: TYPE: ACUTE PAIN

## 2019-06-24 ASSESSMENT — LIFESTYLE VARIABLES: SMOKING_STATUS: 1

## 2019-06-24 ASSESSMENT — PAIN DESCRIPTION - ORIENTATION: ORIENTATION: RIGHT;LEFT

## 2019-06-24 NOTE — ED PROVIDER NOTES
Patient is a 66-year-old male with history of high blood pressure, high choelstrol, diabetes who is presenting for evaluation of swelling and pain left testicle. Patient notes that he started with a boil and then Saturday started getting progressively more painful and enlarged. It is been swollen as well. Patient notes that has been getting chills, sweats at home. He is nauseous with no vomiting. He denies any recent antibiotic use. He has not been evaluated for this prior. The history is provided by the patient. Review of Systems   Constitutional: Positive for chills and fever. Negative for fatigue. HENT: Negative for congestion and rhinorrhea. Respiratory: Negative for cough, chest tightness and shortness of breath. Cardiovascular: Negative for chest pain. Gastrointestinal: Negative for abdominal pain, blood in stool, constipation, diarrhea, nausea and vomiting. Genitourinary: Positive for genital sores, scrotal swelling and testicular pain. Negative for difficulty urinating and dysuria. Musculoskeletal: Negative for back pain. Skin: Negative for rash. Neurological: Negative for dizziness and numbness. Physical Exam   Constitutional: He is oriented to person, place, and time. He appears well-developed and well-nourished. HENT:   Head: Normocephalic and atraumatic. Eyes: Conjunctivae and EOM are normal. Right eye exhibits no discharge. Left eye exhibits no discharge. Neck: Normal range of motion. Cardiovascular: Normal rate, regular rhythm and normal heart sounds. No murmur heard. Pulmonary/Chest: Effort normal and breath sounds normal. No stridor. No respiratory distress. He has no wheezes. Abdominal: Soft. Bowel sounds are normal. He exhibits no distension. There is no tenderness. There is no guarding. Genitourinary: Penile tenderness present. Genitourinary Comments: Left scrotal and perineal erythema, induration and swelling.    Musculoskeletal: Normal Surgical History:  has a past surgical history that includes Tonsillectomy (1983); hiatal hernia repair (2012); Tympanoplasty; Colonoscopy (06/08/2017); hernia repair (2012); Endoscopy, colon, diagnostic; pr drain skin abscess simple (N/A, 9/19/2018); and Abdomen surgery (N/A, 6/24/2019). Social History:  reports that he has been smoking cigarettes. He has a 45.00 pack-year smoking history. He has never used smokeless tobacco. He reports that he has current or past drug history. Drug: Marijuana. He reports that he does not drink alcohol. Family History: family history includes Asthma in his sister; COPD in his brother and mother; Cancer in his maternal grandmother, mother, and paternal grandmother; Diabetes in his brother, father, and sister; Heart Disease in his maternal grandmother and paternal grandfather; Heart Disease (age of onset: 43) in his brother; Heart Disease (age of onset: 48) in his brother; Heart Disease (age of onset: 46) in his father. The patients home medications have been reviewed. Allergies: Patient has no known allergies.     -------------------------------------------------- RESULTS -------------------------------------------------    LABS:  Results for orders placed or performed during the hospital encounter of 06/24/19   Blood Culture #1   Result Value Ref Range    Blood Culture, Routine 24 Hours- no growth    Culture blood #1   Result Value Ref Range    Blood Culture, Routine 24 Hours- no growth    CBC Auto Differential   Result Value Ref Range    WBC 23.8 (H) 4.5 - 11.5 E9/L    RBC 5.07 3.80 - 5.80 E12/L    Hemoglobin 14.9 12.5 - 16.5 g/dL    Hematocrit 45.1 37.0 - 54.0 %    MCV 89.0 80.0 - 99.9 fL    MCH 29.4 26.0 - 35.0 pg    MCHC 33.0 32.0 - 34.5 %    RDW 13.2 11.5 - 15.0 fL    Platelets 404 985 - 893 E9/L    MPV 10.7 7.0 - 12.0 fL    Neutrophils % 86.1 (H) 43.0 - 80.0 %    Immature Granulocytes % 1.3 0.0 - 5.0 %    Lymphocytes % 5.4 (L) 20.0 - 42.0 %    Monocytes % 6.6 2.0 - 12.0 %    Eosinophils % 0.1 0.0 - 6.0 %    Basophils % 0.5 0.0 - 2.0 %    Neutrophils # 20.52 (H) 1.80 - 7.30 E9/L    Immature Granulocytes # 0.32 E9/L    Lymphocytes # 1.28 (L) 1.50 - 4.00 E9/L    Monocytes # 1.57 (H) 0.10 - 0.95 E9/L    Eosinophils # 0.03 (L) 0.05 - 0.50 E9/L    Basophils # 0.12 0.00 - 0.20 E9/L    Polychromasia 1+     Poikilocytes 1+    Comprehensive Metabolic Panel w/ Reflex to MG   Result Value Ref Range    Sodium 131 (L) 132 - 146 mmol/L    Potassium reflex Magnesium 3.8 3.5 - 5.0 mmol/L    Chloride 95 (L) 98 - 107 mmol/L    CO2 22 22 - 29 mmol/L    Anion Gap 14 7 - 16 mmol/L    Glucose 158 (H) 74 - 99 mg/dL    BUN 10 6 - 20 mg/dL    CREATININE 0.8 0.7 - 1.2 mg/dL    GFR Non-African American >60 >=60 mL/min/1.73    GFR African American >60     Calcium 9.2 8.6 - 10.2 mg/dL    Total Protein 7.6 6.4 - 8.3 g/dL    Alb 3.2 (L) 3.5 - 5.2 g/dL    Total Bilirubin 0.9 0.0 - 1.2 mg/dL    Alkaline Phosphatase 77 40 - 129 U/L    ALT 21 0 - 40 U/L    AST 11 0 - 39 U/L   Lactic Acid, Plasma   Result Value Ref Range    Lactic Acid 1.7 0.5 - 2.2 mmol/L   Urinalysis   Result Value Ref Range    Color, UA Yellow Straw/Yellow    Clarity, UA Clear Clear    Glucose, Ur Negative Negative mg/dL    Bilirubin Urine Negative Negative    Ketones, Urine 15 (A) Negative mg/dL    Specific Gravity, UA 1.010 1.005 - 1.030    Blood, Urine Negative Negative    pH, UA 6.0 5.0 - 9.0    Protein, UA 30 (A) Negative mg/dL    Urobilinogen, Urine 1.0 <2.0 E.U./dL    Nitrite, Urine Negative Negative    Leukocyte Esterase, Urine Negative Negative   Microscopic Urinalysis   Result Value Ref Range    WBC, UA 2-5 0 - 5 /HPF    RBC, UA NONE 0 - 2 /HPF    Bacteria, UA MODERATE (A) /HPF   Comprehensive Metabolic Panel w/ Reflex to MG   Result Value Ref Range    Sodium 133 132 - 146 mmol/L    Potassium reflex Magnesium 3.7 3.5 - 5.0 mmol/L    Chloride 101 98 - 107 mmol/L    CO2 21 (L) 22 - 29 mmol/L    Anion Gap 11 7 - 16 mmol/L abscess was   identified ultrasonographically. ------------------------- NURSING NOTES AND VITALS REVIEWED ---------------------------  Date / Time Roomed:  6/24/2019  6:50 PM  ED Bed Assignment:  [id number]    The nursing notes within the ED encounter and vital signs as below have been reviewed.      Patient Vitals for the past 24 hrs:   BP Temp Temp src Pulse Resp SpO2 Height   06/26/19 0100 (!) 92/58 -- -- 76 18 98 % --   06/26/19 0000 (!) 99/59 97.3 °F (36.3 °C) Temporal 77 16 99 % --   06/25/19 2300 (!) 103/59 -- -- 81 19 97 % --   06/25/19 2200 (!) 104/59 98.4 °F (36.9 °C) Temporal 80 29 100 % --   06/25/19 2100 112/65 -- -- 77 13 93 % --   06/25/19 2000 116/64 97.6 °F (36.4 °C) Temporal 80 17 100 % --   06/25/19 1900 117/71 98.1 °F (36.7 °C) Temporal 83 18 99 % --   06/25/19 1800 105/71 98.2 °F (36.8 °C) -- 90 16 99 % --   06/25/19 1700 (!) 107/59 -- -- 76 19 100 % --   06/25/19 1600 125/70 98.1 °F (36.7 °C) Temporal 78 21 100 % --   06/25/19 1500 127/71 -- -- 85 18 100 % --   06/25/19 1400 115/63 97.4 °F (36.3 °C) Temporal 79 19 100 % --   06/25/19 1300 103/78 -- -- 85 (!) 6 100 % --   06/25/19 1200 124/67 97.6 °F (36.4 °C) Temporal 80 14 100 % --   06/25/19 1100 112/71 -- -- 82 22 100 % --   06/25/19 1000 127/82 98 °F (36.7 °C) Temporal 89 22 100 % --   06/25/19 0900 -- -- -- 93 23 100 % --   06/25/19 0800 108/79 97 °F (36.1 °C) Temporal 81 16 100 % --   06/25/19 0745 -- -- -- -- 28 98 % --   06/25/19 0723 -- -- -- 85 29 100 % --   06/25/19 0700 90/63 -- -- 85 (!) 31 98 % --   06/25/19 0645 -- -- -- -- -- -- 5' 5\" (1.651 m)   06/25/19 0600 110/74 97.8 °F (36.6 °C) Temporal 88 21 100 % --   06/25/19 0500 88/62 -- -- 85 (!) 32 99 % --   06/25/19 0400 100/68 97.4 °F (36.3 °C) Temporal 85 (!) 31 100 % --   06/25/19 0300 102/76 -- -- 90 27 100 % --   06/25/19 0230 116/74 99.4 °F (37.4 °C) Temporal 88 (!) 33 100 % --   06/25/19 0215 106/71 99.4 °F (37.4 °C) -- 90 (!) 33 99 % --   06/25/19 0200 101/68 99 °F (37.2 °C) Temporal 93 28 98 % --       Oxygen Saturation Interpretation: Normal    ------------------------------------------ PROGRESS NOTES ------------------------------------------    Counseling:  I have spoken with the patient and discussed todays results, in addition to providing specific details for the plan of care and counseling regarding the diagnosis and prognosis. Their questions are answered at this time and they are agreeable with the plan of admission.    --------------------------------- ADDITIONAL PROVIDER NOTES ---------------------------------  Consultations:  Time: 2231. Spoke with Surgical service. Discussed case. They will admit the patient. This patient's ED course included: a personal history and physicial examination, re-evaluation prior to disposition, multiple bedside re-evaluations, IV medications, cardiac monitoring, continuous pulse oximetry and complex medical decision making and emergency management    This patient has remained hemodynamically stable during their ED course. Diagnosis:  1. Sepsis, due to unspecified organism ([facility]    2. Necrotizing soft tissue infection    3. Abscess    4. Septicemia ([facility]    5. Necrotizing fasciitis ([facility]        Disposition:  Patient's disposition: Admit to surgical service. Patient to OR  Patient's condition is stable.               Jahaira Mejia DO  Resident  06/26/19 Denver Badger, MD  06/30/19 [id number]

## 2019-06-25 ENCOUNTER — ANESTHESIA EVENT (OUTPATIENT)
Dept: OPERATING ROOM | Age: 49
DRG: 463 | End: 2019-06-25

## 2019-06-25 ENCOUNTER — APPOINTMENT (OUTPATIENT)
Dept: GENERAL RADIOLOGY | Age: 49
DRG: 463 | End: 2019-06-25

## 2019-06-25 VITALS
SYSTOLIC BLOOD PRESSURE: 135 MMHG | DIASTOLIC BLOOD PRESSURE: 49 MMHG | TEMPERATURE: 102.4 F | OXYGEN SATURATION: 97 % | RESPIRATION RATE: 15 BRPM

## 2019-06-25 LAB
AADO2: 78 MMHG
ABO/RH: NORMAL
ALBUMIN SERPL-MCNC: 2.6 G/DL (ref 3.5–5.2)
ALP BLD-CCNC: 58 U/L (ref 40–129)
ALT SERPL-CCNC: 16 U/L (ref 0–40)
ANION GAP SERPL CALCULATED.3IONS-SCNC: 11 MMOL/L (ref 7–16)
ANTIBODY SCREEN: NORMAL
AST SERPL-CCNC: 9 U/L (ref 0–39)
B.E.: -4.8 MMOL/L (ref -3–3)
BACTERIA: ABNORMAL /HPF
BILIRUB SERPL-MCNC: 0.6 MG/DL (ref 0–1.2)
BILIRUBIN URINE: NEGATIVE
BLOOD, URINE: NEGATIVE
BUN BLDV-MCNC: 12 MG/DL (ref 6–20)
CALCIUM IONIZED: 1.1 MMOL/L (ref 1.15–1.33)
CALCIUM SERPL-MCNC: 7.3 MG/DL (ref 8.6–10.2)
CHLORIDE BLD-SCNC: 101 MMOL/L (ref 98–107)
CLARITY: CLEAR
CO2: 21 MMOL/L (ref 22–29)
COHB: 0.1 % (ref 0–1.5)
COLOR: YELLOW
CREAT SERPL-MCNC: 1 MG/DL (ref 0.7–1.2)
CRITICAL: ABNORMAL
DATE ANALYZED: ABNORMAL
DATE OF COLLECTION: ABNORMAL
FIO2: 50 %
GFR AFRICAN AMERICAN: >60
GFR NON-AFRICAN AMERICAN: >60 ML/MIN/1.73
GLUCOSE BLD-MCNC: 163 MG/DL (ref 74–99)
GLUCOSE URINE: NEGATIVE MG/DL
HBA1C MFR BLD: 7.5 % (ref 4–5.6)
HCO3: 20.3 MMOL/L (ref 22–26)
HCT VFR BLD CALC: 35.9 % (ref 37–54)
HEMOGLOBIN: 11.7 G/DL (ref 12.5–16.5)
HHB: 0.8 % (ref 0–5)
KETONES, URINE: 15 MG/DL
LAB: ABNORMAL
LEUKOCYTE ESTERASE, URINE: NEGATIVE
Lab: ABNORMAL
MAGNESIUM: 1.7 MG/DL (ref 1.6–2.6)
MCH RBC QN AUTO: 29.8 PG (ref 26–35)
MCHC RBC AUTO-ENTMCNC: 32.6 % (ref 32–34.5)
MCV RBC AUTO: 91.3 FL (ref 80–99.9)
METER GLUCOSE: 106 MG/DL (ref 74–99)
METER GLUCOSE: 121 MG/DL (ref 74–99)
METER GLUCOSE: 164 MG/DL (ref 74–99)
METER GLUCOSE: 181 MG/DL (ref 74–99)
METER GLUCOSE: 189 MG/DL (ref 74–99)
METER GLUCOSE: 201 MG/DL (ref 74–99)
METHB: 0.4 % (ref 0–1.5)
MODE: AC
NITRITE, URINE: NEGATIVE
O2 CONTENT: 18.1 ML/DL
O2 SATURATION: 99.2 % (ref 92–98.5)
O2HB: 98.7 % (ref 94–97)
OPERATOR ID: ABNORMAL
PATIENT TEMP: 37 C
PCO2: 38 MMHG (ref 35–45)
PDW BLD-RTO: 13.3 FL (ref 11.5–15)
PEEP/CPAP: 5 CMH2O
PFO2: 4.47 MMHG/%
PH BLOOD GAS: 7.35 (ref 7.35–7.45)
PH UA: 6 (ref 5–9)
PHOSPHORUS: 3 MG/DL (ref 2.5–4.5)
PLATELET # BLD: 158 E9/L (ref 130–450)
PMV BLD AUTO: 11.1 FL (ref 7–12)
PO2: 223.3 MMHG (ref 60–100)
POTASSIUM REFLEX MAGNESIUM: 3.7 MMOL/L (ref 3.5–5)
PROTEIN UA: 30 MG/DL
RBC # BLD: 3.93 E12/L (ref 3.8–5.8)
RBC UA: ABNORMAL /HPF (ref 0–2)
RI(T): 35 %
RR MECHANICAL: 16 B/MIN
SODIUM BLD-SCNC: 133 MMOL/L (ref 132–146)
SOURCE, BLOOD GAS: ABNORMAL
SPECIFIC GRAVITY UA: 1.01 (ref 1–1.03)
THB: 12.7 G/DL (ref 11.5–16.5)
TIME ANALYZED: 604
TOTAL PROTEIN: 5.5 G/DL (ref 6.4–8.3)
UROBILINOGEN, URINE: 1 E.U./DL
VT MECHANICAL: 600 ML
WBC # BLD: 20.2 E9/L (ref 4.5–11.5)
WBC UA: ABNORMAL /HPF (ref 0–5)

## 2019-06-25 PROCEDURE — 87075 CULTR BACTERIA EXCEPT BLOOD: CPT

## 2019-06-25 PROCEDURE — 87186 SC STD MICRODIL/AGAR DIL: CPT

## 2019-06-25 PROCEDURE — 71045 X-RAY EXAM CHEST 1 VIEW: CPT

## 2019-06-25 PROCEDURE — 6370000000 HC RX 637 (ALT 250 FOR IP): Performed by: STUDENT IN AN ORGANIZED HEALTH CARE EDUCATION/TRAINING PROGRAM

## 2019-06-25 PROCEDURE — 36556 INSERT NON-TUNNEL CV CATH: CPT

## 2019-06-25 PROCEDURE — 6360000002 HC RX W HCPCS: Performed by: STUDENT IN AN ORGANIZED HEALTH CARE EDUCATION/TRAINING PROGRAM

## 2019-06-25 PROCEDURE — 84100 ASSAY OF PHOSPHORUS: CPT

## 2019-06-25 PROCEDURE — 88304 TISSUE EXAM BY PATHOLOGIST: CPT

## 2019-06-25 PROCEDURE — 83036 HEMOGLOBIN GLYCOSYLATED A1C: CPT

## 2019-06-25 PROCEDURE — 6360000002 HC RX W HCPCS

## 2019-06-25 PROCEDURE — 82962 GLUCOSE BLOOD TEST: CPT

## 2019-06-25 PROCEDURE — 83735 ASSAY OF MAGNESIUM: CPT

## 2019-06-25 PROCEDURE — 80053 COMPREHEN METABOLIC PANEL: CPT

## 2019-06-25 PROCEDURE — 2580000003 HC RX 258: Performed by: STUDENT IN AN ORGANIZED HEALTH CARE EDUCATION/TRAINING PROGRAM

## 2019-06-25 PROCEDURE — 94799 UNLISTED PULMONARY SVC/PX: CPT

## 2019-06-25 PROCEDURE — 7100000001 HC PACU RECOVERY - ADDTL 15 MIN

## 2019-06-25 PROCEDURE — 0BH17EZ INSERTION OF ENDOTRACHEAL AIRWAY INTO TRACHEA, VIA NATURAL OR ARTIFICIAL OPENING: ICD-10-PCS | Performed by: SURGERY

## 2019-06-25 PROCEDURE — 82805 BLOOD GASES W/O2 SATURATION: CPT

## 2019-06-25 PROCEDURE — 94003 VENT MGMT INPAT SUBQ DAY: CPT

## 2019-06-25 PROCEDURE — 87205 SMEAR GRAM STAIN: CPT

## 2019-06-25 PROCEDURE — 36592 COLLECT BLOOD FROM PICC: CPT

## 2019-06-25 PROCEDURE — 2500000003 HC RX 250 WO HCPCS: Performed by: STUDENT IN AN ORGANIZED HEALTH CARE EDUCATION/TRAINING PROGRAM

## 2019-06-25 PROCEDURE — 36415 COLL VENOUS BLD VENIPUNCTURE: CPT

## 2019-06-25 PROCEDURE — C9113 INJ PANTOPRAZOLE SODIUM, VIA: HCPCS | Performed by: STUDENT IN AN ORGANIZED HEALTH CARE EDUCATION/TRAINING PROGRAM

## 2019-06-25 PROCEDURE — 2700000000 HC OXYGEN THERAPY PER DAY

## 2019-06-25 PROCEDURE — 82330 ASSAY OF CALCIUM: CPT

## 2019-06-25 PROCEDURE — 87081 CULTURE SCREEN ONLY: CPT

## 2019-06-25 PROCEDURE — 85027 COMPLETE CBC AUTOMATED: CPT

## 2019-06-25 PROCEDURE — 5A1935Z RESPIRATORY VENTILATION, LESS THAN 24 CONSECUTIVE HOURS: ICD-10-PCS | Performed by: SURGERY

## 2019-06-25 PROCEDURE — 6360000002 HC RX W HCPCS: Performed by: NURSE ANESTHETIST, CERTIFIED REGISTERED

## 2019-06-25 PROCEDURE — 10061 I&D ABSCESS COMP/MULTIPLE: CPT | Performed by: SURGERY

## 2019-06-25 PROCEDURE — 2000000000 HC ICU R&B

## 2019-06-25 PROCEDURE — 94002 VENT MGMT INPAT INIT DAY: CPT

## 2019-06-25 PROCEDURE — 87077 CULTURE AEROBIC IDENTIFY: CPT

## 2019-06-25 PROCEDURE — 7100000000 HC PACU RECOVERY - FIRST 15 MIN

## 2019-06-25 PROCEDURE — 2500000003 HC RX 250 WO HCPCS: Performed by: NURSE ANESTHETIST, CERTIFIED REGISTERED

## 2019-06-25 PROCEDURE — 99291 CRITICAL CARE FIRST HOUR: CPT | Performed by: SURGERY

## 2019-06-25 PROCEDURE — 02HV33Z INSERTION OF INFUSION DEVICE INTO SUPERIOR VENA CAVA, PERCUTANEOUS APPROACH: ICD-10-PCS | Performed by: STUDENT IN AN ORGANIZED HEALTH CARE EDUCATION/TRAINING PROGRAM

## 2019-06-25 PROCEDURE — 87070 CULTURE OTHR SPECIMN AEROBIC: CPT

## 2019-06-25 RX ORDER — MIDAZOLAM HYDROCHLORIDE 1 MG/ML
INJECTION INTRAMUSCULAR; INTRAVENOUS PRN
Status: DISCONTINUED | OUTPATIENT
Start: 2019-06-24 | End: 2019-06-25 | Stop reason: SDUPTHER

## 2019-06-25 RX ORDER — SODIUM HYPOCHLORITE 2.5 MG/ML
SOLUTION TOPICAL DAILY
Status: DISCONTINUED | OUTPATIENT
Start: 2019-06-25 | End: 2019-07-01 | Stop reason: HOSPADM

## 2019-06-25 RX ORDER — OXYCODONE HYDROCHLORIDE 5 MG/1
5 TABLET ORAL EVERY 4 HOURS PRN
Status: DISCONTINUED | OUTPATIENT
Start: 2019-06-25 | End: 2019-07-01 | Stop reason: HOSPADM

## 2019-06-25 RX ORDER — CITALOPRAM 20 MG/1
20 TABLET ORAL DAILY
Status: DISCONTINUED | OUTPATIENT
Start: 2019-06-25 | End: 2019-06-25

## 2019-06-25 RX ORDER — SODIUM CHLORIDE 0.9 % (FLUSH) 0.9 %
10 SYRINGE (ML) INJECTION EVERY 12 HOURS SCHEDULED
Status: DISCONTINUED | OUTPATIENT
Start: 2019-06-25 | End: 2019-07-01 | Stop reason: HOSPADM

## 2019-06-25 RX ORDER — PROPOFOL 10 MG/ML
10 INJECTION, EMULSION INTRAVENOUS
Status: DISCONTINUED | OUTPATIENT
Start: 2019-06-25 | End: 2019-06-25

## 2019-06-25 RX ORDER — MIDAZOLAM HYDROCHLORIDE 1 MG/ML
INJECTION INTRAMUSCULAR; INTRAVENOUS
Status: COMPLETED
Start: 2019-06-25 | End: 2019-06-25

## 2019-06-25 RX ORDER — MORPHINE SULFATE 2 MG/ML
2 INJECTION, SOLUTION INTRAMUSCULAR; INTRAVENOUS
Status: DISCONTINUED | OUTPATIENT
Start: 2019-06-25 | End: 2019-06-26 | Stop reason: SDUPTHER

## 2019-06-25 RX ORDER — SODIUM CHLORIDE 9 MG/ML
INJECTION, SOLUTION INTRAVENOUS CONTINUOUS
Status: DISCONTINUED | OUTPATIENT
Start: 2019-06-25 | End: 2019-06-25

## 2019-06-25 RX ORDER — NEOSTIGMINE METHYLSULFATE 1 MG/ML
INJECTION, SOLUTION INTRAVENOUS PRN
Status: DISCONTINUED | OUTPATIENT
Start: 2019-06-25 | End: 2019-06-25 | Stop reason: SDUPTHER

## 2019-06-25 RX ORDER — CHLORHEXIDINE GLUCONATE 0.12 MG/ML
15 RINSE ORAL 2 TIMES DAILY
Status: DISCONTINUED | OUTPATIENT
Start: 2019-06-25 | End: 2019-06-25

## 2019-06-25 RX ORDER — GLYCOPYRROLATE 1 MG/5 ML
SYRINGE (ML) INTRAVENOUS PRN
Status: DISCONTINUED | OUTPATIENT
Start: 2019-06-25 | End: 2019-06-25 | Stop reason: SDUPTHER

## 2019-06-25 RX ORDER — DEXTROSE MONOHYDRATE 50 MG/ML
100 INJECTION, SOLUTION INTRAVENOUS PRN
Status: DISCONTINUED | OUTPATIENT
Start: 2019-06-25 | End: 2019-07-01 | Stop reason: HOSPADM

## 2019-06-25 RX ORDER — PROPOFOL 10 MG/ML
INJECTION, EMULSION INTRAVENOUS
Status: COMPLETED
Start: 2019-06-25 | End: 2019-06-25

## 2019-06-25 RX ORDER — MORPHINE SULFATE 4 MG/ML
4 INJECTION, SOLUTION INTRAMUSCULAR; INTRAVENOUS
Status: DISCONTINUED | OUTPATIENT
Start: 2019-06-25 | End: 2019-06-26 | Stop reason: SDUPTHER

## 2019-06-25 RX ORDER — CLINDAMYCIN PHOSPHATE 900 MG/50ML
900 INJECTION INTRAVENOUS EVERY 8 HOURS
Status: COMPLETED | OUTPATIENT
Start: 2019-06-25 | End: 2019-06-26

## 2019-06-25 RX ORDER — OXYCODONE HYDROCHLORIDE 10 MG/1
10 TABLET ORAL EVERY 4 HOURS PRN
Status: DISCONTINUED | OUTPATIENT
Start: 2019-06-25 | End: 2019-07-01 | Stop reason: HOSPADM

## 2019-06-25 RX ORDER — SODIUM CHLORIDE 9 MG/ML
INJECTION, SOLUTION INTRAVENOUS CONTINUOUS
Status: DISCONTINUED | OUTPATIENT
Start: 2019-06-26 | End: 2019-06-27

## 2019-06-25 RX ORDER — MIDAZOLAM HYDROCHLORIDE 1 MG/ML
4 INJECTION INTRAMUSCULAR; INTRAVENOUS ONCE
Status: COMPLETED | OUTPATIENT
Start: 2019-06-25 | End: 2019-06-25

## 2019-06-25 RX ORDER — SODIUM CHLORIDE 0.9 % (FLUSH) 0.9 %
10 SYRINGE (ML) INJECTION PRN
Status: DISCONTINUED | OUTPATIENT
Start: 2019-06-25 | End: 2019-07-01 | Stop reason: HOSPADM

## 2019-06-25 RX ORDER — SODIUM CHLORIDE 9 MG/ML
1000 INJECTION, SOLUTION INTRAVENOUS ONCE
Status: COMPLETED | OUTPATIENT
Start: 2019-06-25 | End: 2019-06-25

## 2019-06-25 RX ORDER — SODIUM CHLORIDE 9 MG/ML
INJECTION, SOLUTION INTRAVENOUS CONTINUOUS PRN
Status: DISCONTINUED | OUTPATIENT
Start: 2019-06-24 | End: 2019-06-25 | Stop reason: SDUPTHER

## 2019-06-25 RX ORDER — ROCURONIUM BROMIDE 10 MG/ML
INJECTION, SOLUTION INTRAVENOUS PRN
Status: DISCONTINUED | OUTPATIENT
Start: 2019-06-24 | End: 2019-06-25 | Stop reason: SDUPTHER

## 2019-06-25 RX ORDER — MAGNESIUM SULFATE IN WATER 40 MG/ML
4 INJECTION, SOLUTION INTRAVENOUS ONCE
Status: COMPLETED | OUTPATIENT
Start: 2019-06-25 | End: 2019-06-25

## 2019-06-25 RX ORDER — DEXTROSE MONOHYDRATE 25 G/50ML
12.5 INJECTION, SOLUTION INTRAVENOUS PRN
Status: DISCONTINUED | OUTPATIENT
Start: 2019-06-25 | End: 2019-07-01 | Stop reason: HOSPADM

## 2019-06-25 RX ORDER — NICOTINE POLACRILEX 4 MG
15 LOZENGE BUCCAL PRN
Status: DISCONTINUED | OUTPATIENT
Start: 2019-06-25 | End: 2019-07-01 | Stop reason: HOSPADM

## 2019-06-25 RX ORDER — ONDANSETRON 2 MG/ML
INJECTION INTRAMUSCULAR; INTRAVENOUS PRN
Status: DISCONTINUED | OUTPATIENT
Start: 2019-06-25 | End: 2019-06-25 | Stop reason: SDUPTHER

## 2019-06-25 RX ORDER — ACETAMINOPHEN 325 MG/1
650 TABLET ORAL EVERY 4 HOURS PRN
Status: DISCONTINUED | OUTPATIENT
Start: 2019-06-25 | End: 2019-07-01 | Stop reason: HOSPADM

## 2019-06-25 RX ORDER — PANTOPRAZOLE SODIUM 40 MG/10ML
40 INJECTION, POWDER, LYOPHILIZED, FOR SOLUTION INTRAVENOUS DAILY
Status: DISCONTINUED | OUTPATIENT
Start: 2019-06-25 | End: 2019-07-01 | Stop reason: HOSPADM

## 2019-06-25 RX ADMIN — MORPHINE SULFATE 4 MG: 4 INJECTION INTRAVENOUS at 16:25

## 2019-06-25 RX ADMIN — Medication 25 MCG/HR: at 01:35

## 2019-06-25 RX ADMIN — CLINDAMYCIN IN 5 PERCENT DEXTROSE 900 MG: 18 INJECTION, SOLUTION INTRAVENOUS at 12:11

## 2019-06-25 RX ADMIN — HYOSCYAMINE SULFATE: 16 SOLUTION at 08:41

## 2019-06-25 RX ADMIN — PROPOFOL 10 MCG/KG/MIN: 10 INJECTION, EMULSION INTRAVENOUS at 01:35

## 2019-06-25 RX ADMIN — VANCOMYCIN HYDROCHLORIDE 1500 MG: 10 INJECTION, POWDER, LYOPHILIZED, FOR SOLUTION INTRAVENOUS at 10:20

## 2019-06-25 RX ADMIN — MORPHINE SULFATE 4 MG: 4 INJECTION INTRAVENOUS at 13:18

## 2019-06-25 RX ADMIN — ENOXAPARIN SODIUM 40 MG: 40 INJECTION SUBCUTANEOUS at 08:40

## 2019-06-25 RX ADMIN — CALCIUM GLUCONATE 2 G: 98 INJECTION, SOLUTION INTRAVENOUS at 08:41

## 2019-06-25 RX ADMIN — PROPOFOL 30 MCG/KG/MIN: 10 INJECTION, EMULSION INTRAVENOUS at 04:17

## 2019-06-25 RX ADMIN — FENTANYL CITRATE 50 MCG: 50 INJECTION, SOLUTION INTRAMUSCULAR; INTRAVENOUS at 00:58

## 2019-06-25 RX ADMIN — INSULIN LISPRO 6 UNITS: 100 INJECTION, SOLUTION INTRAVENOUS; SUBCUTANEOUS at 18:05

## 2019-06-25 RX ADMIN — Medication 3 MG: at 00:55

## 2019-06-25 RX ADMIN — Medication 10 ML: at 22:13

## 2019-06-25 RX ADMIN — POTASSIUM BICARBONATE 40 MEQ: 782 TABLET, EFFERVESCENT ORAL at 08:41

## 2019-06-25 RX ADMIN — MAGNESIUM SULFATE IN WATER 4 G: 40 INJECTION, SOLUTION INTRAVENOUS at 09:37

## 2019-06-25 RX ADMIN — INSULIN LISPRO 3 UNITS: 100 INJECTION, SOLUTION INTRAVENOUS; SUBCUTANEOUS at 01:59

## 2019-06-25 RX ADMIN — Medication 10 ML: at 08:41

## 2019-06-25 RX ADMIN — MIDAZOLAM 4 MG: 1 INJECTION INTRAMUSCULAR; INTRAVENOUS at 01:53

## 2019-06-25 RX ADMIN — SODIUM CHLORIDE: 9 INJECTION, SOLUTION INTRAVENOUS at 11:36

## 2019-06-25 RX ADMIN — Medication 0.6 MG: at 00:55

## 2019-06-25 RX ADMIN — CLINDAMYCIN IN 5 PERCENT DEXTROSE 900 MG: 18 INJECTION, SOLUTION INTRAVENOUS at 04:10

## 2019-06-25 RX ADMIN — CHLORHEXIDINE GLUCONATE 15 ML: 1.2 RINSE ORAL at 01:57

## 2019-06-25 RX ADMIN — ONDANSETRON HYDROCHLORIDE 4 MG: 2 INJECTION, SOLUTION INTRAMUSCULAR; INTRAVENOUS at 00:09

## 2019-06-25 RX ADMIN — MIDAZOLAM HYDROCHLORIDE 4 MG: 1 INJECTION INTRAMUSCULAR; INTRAVENOUS at 01:53

## 2019-06-25 RX ADMIN — INSULIN LISPRO 3 UNITS: 100 INJECTION, SOLUTION INTRAVENOUS; SUBCUTANEOUS at 04:48

## 2019-06-25 RX ADMIN — OXYCODONE HYDROCHLORIDE 10 MG: 10 TABLET ORAL at 18:05

## 2019-06-25 RX ADMIN — INSULIN LISPRO 3 UNITS: 100 INJECTION, SOLUTION INTRAVENOUS; SUBCUTANEOUS at 10:13

## 2019-06-25 RX ADMIN — SODIUM CHLORIDE 1000 ML: 9 INJECTION, SOLUTION INTRAVENOUS at 02:49

## 2019-06-25 RX ADMIN — SODIUM CHLORIDE: 9 INJECTION, SOLUTION INTRAVENOUS at 01:45

## 2019-06-25 RX ADMIN — CLINDAMYCIN IN 5 PERCENT DEXTROSE 900 MG: 18 INJECTION, SOLUTION INTRAVENOUS at 22:13

## 2019-06-25 RX ADMIN — PANTOPRAZOLE SODIUM 40 MG: 40 INJECTION, POWDER, FOR SOLUTION INTRAVENOUS at 08:40

## 2019-06-25 RX ADMIN — FENTANYL CITRATE 50 MCG: 50 INJECTION, SOLUTION INTRAMUSCULAR; INTRAVENOUS at 00:20

## 2019-06-25 RX ADMIN — VANCOMYCIN HYDROCHLORIDE 1500 MG: 10 INJECTION, POWDER, LYOPHILIZED, FOR SOLUTION INTRAVENOUS at 22:13

## 2019-06-25 ASSESSMENT — PULMONARY FUNCTION TESTS
PIF_VALUE: 26
PIF_VALUE: 29
PIF_VALUE: 16
PIF_VALUE: 26
PIF_VALUE: 29
PIF_VALUE: 31
PIF_VALUE: 25
PIF_VALUE: 16
PIF_VALUE: 28

## 2019-06-25 ASSESSMENT — PAIN SCALES - GENERAL
PAINLEVEL_OUTOF10: 0
PAINLEVEL_OUTOF10: 2
PAINLEVEL_OUTOF10: 0
PAINLEVEL_OUTOF10: 9
PAINLEVEL_OUTOF10: 0
PAINLEVEL_OUTOF10: 8
PAINLEVEL_OUTOF10: 10
PAINLEVEL_OUTOF10: 0
PAINLEVEL_OUTOF10: 0
PAINLEVEL_OUTOF10: 2
PAINLEVEL_OUTOF10: 10

## 2019-06-25 ASSESSMENT — LIFESTYLE VARIABLES: SMOKING_STATUS: 1

## 2019-06-25 NOTE — H&P
GENERAL SURGERY  CONSULT NOTE  6/24/2019  HPI  Aaron Lund is a 52 y.o. male who presents for evaluation of scrotal pain. Patient has a PMH of DM and has been taking his medication. He states on Saturday he noticed a boil on his scrotum and since then it has progressively increased and become more red and painful. He has never had this happen before. He did have a boil on his back I&D in October 2018. He has had some chills, denies fevers. He has been unable to sit on his scrotum due to pain. He is not on [address]. Past Medical History:   Diagnosis Date    Abdominal pain     for egd 6/8/2017    Allergic rhinitis     Anxiety     Asthma     stable    COPD (chronic obstructive pulmonary disease) (HCC)     Depression     no meds    Diabetes mellitus (HCC)     GERD (gastroesophageal reflux disease)     Headache(784.0)     Hyperlipidemia     Hypertension     Obesity     Osteoarthritis     Sleep apnea     BMS CPAP 7, not using       Past Surgical History:   Procedure Laterality Date    COLONOSCOPY  06/08/2017    Dr. Darina Lombard polyp, diverticulitis    ENDOSCOPY, COLON, DIAGNOSTIC      HERNIA REPAIR  2012    Dr. Mancini [facility]  2012    ND DRAIN SKIN ABSCESS SIMPLE N/A 9/19/2018    I AND D BACK ABCESS performed by Claudia Lopez MD at [address] [facility]         Medications Prior to Admission:    Prior to Admission medications    Medication Sig Start Date End Date Taking?  Authorizing Provider   metFORMIN (GLUCOPHAGE) 500 MG tablet TAKE 1 TABLET BY MOUTH TWICE DAILY WITH MEALS 8/6/18   Ginna Ta MD       No Known Allergies    Family History   Problem Relation Age of Onset   [employer] COPD Mother    [employer] Cancer Mother     Heart Disease Father 46        CABG    Diabetes Father     Diabetes Sister     Diabetes Brother     Heart Disease Brother 43        MI    Heart Disease Maternal Grandmother     Cancer Maternal Grandmother     Cancer Paternal inguinoscrotal area and into the left para gluteal region where an abscess formation is seen more posteriorly. 2.Presently there is no soft tissue air air associated with this inflammatory process to full characterize necrotizing fasciitis which is a potential development. Preliminary report given to Dr. Jean Marie at the time of the interpretation. ALERT:  ABNORMAL REPORT. ASSESSMENT:  52 y.o. male with scrotal erythema with concern for cellulitis vs NSTI.     PLAN:  IVF  Type and Screen  Antibiotics  OR for incision and drainage of perineal abscess possible excisional debridement with Dr. Tamela Mills  D/w Dr. Tamela Mills    Electronically signed by Britney Wasserman MD on 6/24/19 at 11:02 PM

## 2019-06-25 NOTE — ED NOTES
Blood cultures obtained by Viv Zamora RN , per policy. Set two of two drawn at this time.  Previous second set refused by lab for being sent with no stickers              Nayla Chauhan RN  06/24/19 2100

## 2019-06-25 NOTE — BRIEF OP NOTE
Brief Postoperative Note  ______________________________________________________________    Patient: Radha Chris  YOB: 1970  MRN: [id number]  Date of Procedure: 6/24/2019    Pre-Op Diagnosis: perineal abscess    Post-Op Diagnosis: Same       Procedure(s):  INCISION AND DRAINAGE PERINEAL ABSCESS, INCISIONAL DEBRIDEMENT    Anesthesia: General    Surgeon(s):  Neda Tolentino MD    Assistant: Willow PASCAL    Estimated Blood Loss (mL): 50    Complications: None    Specimens:   ID Type Source Tests Collected by Time Destination   1 : PERINEAL ABSCESS Body Fluid Perineum ANAEROBIC CULTURE, FUNGUS CULTURE, GRAM STAIN, SURGICAL CULTURE, ACID FAST CULTURE WITH SMEAR Neda Tolentino MD 6/25/2019 0018    A : PERINEAL ABSCESS Tissue Perineum SURGICAL PATHOLOGY Neda Tolentino MD 6/25/2019 0029        Implants:  * No implants in log *      Drains:   NG/OG/NJ/NE Tube Orogastric 18 fr Left mouth (Active)       Urethral Catheter Straight-tip; Non-latex (Active)       Findings: Necrotic subcutaneous tissue, fascia and muscle    Marylen Dyers, MD  Date: 6/25/2019  Time: 2:33 AM

## 2019-06-25 NOTE — PROGRESS NOTES
VT=.42L  VC= 1.4L  VE= 9.7L   NIF= -40 CM H20  RSBI= 48  RR= 23  LEAK TEST= YES LEAK HEARD AFTER DEFLATING ET TUBE

## 2019-06-25 NOTE — ANESTHESIA PRE PROCEDURE
Department of Anesthesiology  Preprocedure Note       Name:  Lainey Damian   Age:  52 y.o.  :  1970                                          MRN:  [id number]         Date:  2019      Surgeon: Mack Cabral):  Sherren Gowers, MD    Procedure: DEBRIDEMENT PERINEAL WOUND (N/A )    Medications prior to admission:   Prior to Admission medications    Medication Sig Start Date End Date Taking?  Authorizing Provider   metFORMIN (GLUCOPHAGE) 500 MG tablet TAKE 1 TABLET BY MOUTH TWICE DAILY WITH MEALS 18   Guillermo Coker MD       Current medications:    Current Facility-Administered Medications   Medication Dose Route Frequency Provider Last Rate Last Dose    sodium chloride flush 0.9 % injection 10 mL  10 mL Intravenous 2 times per day Lake Pham MD   10 mL at 19 0841    sodium chloride flush 0.9 % injection 10 mL  10 mL Intravenous PRN Lake Pham MD        acetaminophen (TYLENOL) tablet 650 mg  650 mg Oral Q4H PRN Lake Pham MD        enoxaparin (LOVENOX) injection 40 mg  40 mg Subcutaneous Daily Lake Pham MD   40 mg at 19 0840    [START ON 2019] piperacillin-tazobactam (ZOSYN) 3.375 g in dextrose 5 % 100 mL IVPB extended infusion (mini-bag)  3.375 g Intravenous Q8H Lake Pham MD        clindamycin (CLEOCIN) 900 mg in dextrose 5 % 50 mL IVPB  900 mg Intravenous Clotjaime Mon MD   Stopped at 19 1318    glucose (GLUTOSE) 40 % oral gel 15 g  15 g Oral PRN Shira Coleman MD        dextrose 50 % IV solution  12.5 g Intravenous PRN Shira Coleman MD        glucagon (rDNA) injection 1 mg  1 mg Intramuscular PRN Shira Coleman MD        dextrose 5 % solution  100 mL/hr Intravenous PRN Shira Coleman MD        insulin lispro (HUMALOG) injection vial 0-18 Units  0-18 Units Subcutaneous Q4H Shira Coleman MD   3 Units at 19 1013    pantoprazole (PROTONIX) injection 40 mg  40 mg Intravenous Daily Shira Coleman MD   40 mg at 06/25/19 0840    sodium hypochlorite (DAKINS) external solution   Irrigation Daily Nga Walton MD        oxyCODONE (ROXICODONE) immediate release tablet 5 mg  5 mg Oral Q4H PRN Shashi Parish MD        Or   Chandra Meza oxyCODONE HCl (OXY-IR) immediate release tablet 10 mg  10 mg Oral Q4H PRN Shashi Parish MD        morphine (PF) injection 2 mg  2 mg Intravenous Q2H PRN Shashi Parish MD        Or    morphine sulfate (PF) injection 4 mg  4 mg Intravenous Q2H PRN Shashi Parish MD   4 mg at 06/25/19 1625    vancomycin (VANCOCIN) 1,500 mg in sodium chloride 0.9 % 300 mL IVPB  1,500 mg Intravenous Q12H Shashi Parish MD   Stopped at 06/25/19 1210    [START ON 6/26/2019] 0.9 % sodium chloride infusion   Intravenous Continuous Shashi Parish MD           Allergies:  No Known Allergies    Problem List:    Patient Active Problem List   Diagnosis Code    [facility]ch[facility] obstructive [facility] disease) [facility] J44.9    Smoker F17.200    SERGEY (obstructive sleep apnea) G47.33    Asthma J45.909    Morbid obesity with BMI of 45.0-49.9, adult ([facility]) E66.01, Z68.42    Diverticulitis of colon K57.32    Abscess of back L02.212    DM2 (diabetes mellitus, type 2) ([facility]) E11.9    Sepsis ([facility] A41.9    MSSA (methicillin susceptible Staphylococcus aureus) infection A49.01    Necrotizing soft tissue infection M79.89       Past Medical History:        Diagnosis Date    Abdominal pain     for egd 6/8/2017    Allergic rhinitis     Anxiety     Asthma     stable    COPD (chronic obstructive pulmonary disease) [facility]     Depression     no meds    Diabetes mellitus ([facility]     GERD (gastroesophageal reflux disease)     Headache(784.0)     Hyperlipidemia     Hypertension     Obesity     Osteoarthritis     Sleep apnea     BMS CPAP 7, not using       Past Surgical History:        Procedure Laterality Date    ABDOMEN SURGERY N/A 6/24/2019    INCISION AND DRAINAGE PERINEAL ABSCESS, INCISIONAL DEBRIDEMENT performed by Sharif Watson MD at [facility] COLONOSCOPY  06/08/2017    Dr. Dorothy Meyers polyp, diverticulitis    ENDOSCOPY, COLON, DIAGNOSTIC      HERNIA REPAIR  2012    Dr. Juancarlos Blanco N/A 9/19/2018    I AND D BACK ABCESS performed by Gavi Gomez MD at [address]         Social History:    Social History     Tobacco Use    Smoking status: Current Every Day Smoker     Packs/day: 1.50     Years: 30.00     Pack years: 45.00     Types: Cigarettes    Smokeless tobacco: Never Used   Substance Use Topics    Alcohol use: No     Alcohol/week: 0.0 oz                                Ready to quit: Not Answered  Counseling given: Not Answered      Vital Signs (Current):   Vitals:    06/25/19 1200 06/25/19 1300 06/25/19 1400 06/25/19 1500   BP: 124/67 103/78 115/63    Pulse: 80 85 79 85   Resp: 14 (!) 6 19 18   Temp: 36.4 °C (97.6 °F)  36.3 °C (97.4 °F)    TempSrc: Temporal  Temporal    SpO2: 100% 100% 100% 100%   Weight:       Height:                                                  BP Readings from Last 3 Encounters:   06/25/19 115/63   06/25/19 (!) 135/49   05/21/19 (!) 146/96       NPO Status: Pt instructed to be NPO after 0000 for surgery tomorrow 6/26/19      BMI:   Wt Readings from Last 3 Encounters:   06/25/19 283 lb 6.4 oz (128.5 kg)   05/21/19 285 lb (129.3 kg)   11/21/18 287 lb (130.2 kg)     Body mass index is 47.16 kg/m².     CBC:   Lab Results   Component Value Date    WBC 20.2 06/25/2019    RBC 3.93 06/25/2019    HGB 11.7 06/25/2019    HCT 35.9 06/25/2019    MCV 91.3 06/25/2019    RDW 13.3 06/25/2019     06/25/2019       CMP:   Lab Results   Component Value Date     06/25/2019    K 3.7 06/25/2019     06/25/2019    CO2 21 06/25/2019    BUN 12 06/25/2019    CREATININE 1.0 06/25/2019    GFRAA >60 06/25/2019    LABGLOM >60 06/25/2019    GLUCOSE 163 06/25/2019    PROT 5.5 06/25/2019    CALCIUM 7.3 06/25/2019    AISHAT Final 09/18/2018  9:02 PM [facility]   Q-T Interval 324  ms Final 09/18/2018  9:02 PM [facility]   QTc Calculation (Bazett) 400  ms Final 09/18/2018  9:02 PM [facility]   P [place] 17  degrees Final 09/18/2018  9:02 PM [facility]   R [place] 72  degrees Final 09/18/2018  9:02 PM [facility]   T [place] 66  degrees Final 09/18/2018  9:02 PM [facility]   Testing Performed By     Lab - [address] Name Director Address Valid Date Range   360-[facility] MUSE Unknown Unknown 04/18/16 0721-Present   Narrative   Performed by: [facility]   Normal sinus rhythm  Normal ECG  No previous ECGs available  Confirmed by Brady Sullivan ([id number]) on 9/21/2018 11:50:46 PM         Anesthesia Evaluation  Patient summary reviewed and Nursing notes reviewed no history of anesthetic complications:   Airway: Mallampati: III  TM distance: <3 FB   Neck ROM: full  Mouth opening: < 3 FB Dental:      Comment: Very poor dentition. Denies loose, chipped, cracked teeth    Pulmonary:   (+) COPD:  sleep apnea: on noncompliant,  decreased breath sounds,  asthma: current smoker                           Cardiovascular:    (+) hypertension:, hyperlipidemia      ECG reviewed  Rhythm: regular  Rate: normal           Beta Blocker:  Not on Beta Blocker         Neuro/Psych:   (+) headaches:, psychiatric history:depression/anxiety             GI/Hepatic/Renal:   (+) GERD:, morbid obesity         ROS comment: Hx: Diverticulitis of colon  . Endo/Other:    (+) Diabetes (metformin @ home)Type II DM, using insulin, : arthritis: OA., . Pt had no PAT visit        ROS comment: Hx: Sepsis  MSSA (methicillin susceptible Staphylococcus aureus) infection  Necrotizing soft tissue infection    Had I&D perineal abscess in OR 6/24/19    For debridement perineal wound 6/26/19 in OR Abdominal:   (+) obese,     Abdomen: soft. Vascular: negative vascular ROS.                                    Anesthesia Plan      general     ASA 4     (NC 3L  R chest subclavian  #18g RAC)  Induction: intravenous. MIPS: Postoperative opioids intended, Prophylactic antiemetics administered and Postoperative trial extubation. Anesthetic plan and risks discussed with patient. Use of blood products discussed with patient whom consented to blood products. Plan discussed with CRNA and attending. Terri Lobato RN , [facility]  6/25/2019        Pt seen, examined, chart reviewed, plan discussed. Belkis Romero  6/25/2019  10:46 PM     Agree with above assessment. Physical exam unchanged. Spoke to patient about anesthetic plan. Patient understands and wishes to proceed.

## 2019-06-25 NOTE — DISCHARGE SUMMARY
Physician Discharge Summary     Patient ID:  Kit Lopez  [id number]  79 y.o.  1970    Admit date: 6/24/2019    Discharge date and time: 7/1/2019    Admitting Physician: Gil Cowart MD     Admission Diagnoses: Necrotizing soft tissue infection [M79.89]  Necrotizing soft tissue infection [M79.89]    Discharge Diagnoses: Active Problems:    Necrotizing soft tissue infection    HANS (acute kidney injury) ([address]    Acute blood loss anemia  Resolved Problems:    * No resolved hospital problems. *      Admission Condition: serious    Discharged Condition: stable    Indication for Admission: necrotizing soft tissue infection    HPI: This is a 52yo male with history of insulin independent diabetes who presented with scrotal pain. Found to have necrotizing soft tissue infection and underwent urgent OR debridement of left perineum overnight from 6/24-6/25. He was left intubated and admitted to SICU postoperatively. Started on empiric vancomycin, zosyn, and clindamycin. Hospital Course/Procedures/Operation/treatments:   6/25: s/p debridement overnight. Extubated in SICU. Dressing changes started with 1/4% Dakins. 6/26: patient was taken back for a 2nd look at wound and more debridement was performed. Patient tolerated well. Transferred out of the SICU to a general floor  6/27: still complaining of tenderness over wound area. Tolerates dressing changes when prefaced with pain medications. No other complaints. Hemodynamically stable. White count trending down. 6/28: No acute issues overnight. Wound with improved cellulitis around edges, dry, no visible necrotic tissue requiring debridement. Continue daily dressing changes. AMPAC 15/24 and due to IV antibiotics, will require placement. 6/29: Continued improved cellulitis. ID recommended Vanc/Zosyn 10-14 more days, PICC ordered  6/30: pain continues to improve. Wound appears clean  7/1: Pain improved, Cr bumped for last two days, urine lytes ordered.  Rob consistent w Intrinsic injury, ?secondary to Vanc. ID Rx'd Abx. Patient left AMA. Consults:   IP CONSULT TO INFECTIOUS DISEASES    Significant Diagnostic Studies:   Us Scrotum And Testicles    Result Date: 2019  Patient MRN:  [id number] : 1970 Age: 52 years Gender: Male Order Date:  2019 4:45 PM EXAM: US SCROTUM AND TESTICLES NUMBER OF IMAGES:  79 views INDICATION: Left inguinal abscess COMPARISON: CT abdomen and pelvis 2005 The right testicle measures 3.9 x 3.5 x 2.3 cm, and shows uniform and normal testicular echotexture and normal color and spectral Doppler flow. A small amount of scrotal fluid is present on the right. There is no evidence of epididymal pathology. The left testicle measures 4.0 x 2.9 x 2.4 cm, and shows normal parenchymal echotexture without focal abnormalities. Vascularity is intact by color and spectral Doppler imaging. There is a moderate amount of left hemiscrotal fluid, and there is some echoes in the upper scrotum, which could represent a hernia. This should be correlated clinically. There is no evidence of epididymal inflammatory change. There is lateral scrotal wall thickening. The testicles are normal in appearance. Increased echogenic appearance in the upper left hemiscrotum could be a hernia. The left scrotal wall is abnormally thickened and shows increased vascularity that may represent cellulitis, but no discrete abscess was identified ultrasonographically. Ct Abdomen Pelvis W Iv Contrast Additional Contrast? None    Result Date: 2019  Patient MRN:  [id number] : 1970 Age: 52 years Gender: Male Order Date:  2019 4:45 PM TECHNIQUE/NUMBER OF IMAGES/COMPARISON/CLINICAL HISTORY: CT of abdomen and pelvis IV contrast After IV contrast axial images with sagittal and coronal reconstructions were obtained. 488 images 110 mL of Isovue-370 History abscess under the left testicle recent examination at worst. Fever and chills.  Comparison

## 2019-06-25 NOTE — ANESTHESIA PRE PROCEDURE
Department of Anesthesiology  Preprocedure Note       Name:  Lg Reyes   Age:  52 y.o.  :  1970                                          MRN:  [id number]         Date:  2019      Surgeon: Lenin Lowe):  Arminda Barraza MD    Procedure: Procedure(s): I AND D perineal abscess    Medications prior to admission:   Prior to Admission medications    Medication Sig Start Date End Date Taking?  Authorizing Provider   metFORMIN (GLUCOPHAGE) 500 MG tablet TAKE 1 TABLET BY MOUTH TWICE DAILY WITH MEALS 18   Ericka Castillo MD       Current medications:    Current Facility-Administered Medications   Medication Dose Route Frequency Provider Last Rate Last Dose    vancomycin (VANCOCIN) 2,500 mg in dextrose 5 % 500 mL IVPB  2,500 mg Intravenous Once Min Toure DO   2,500 mg at 19 214    0.9 % sodium chloride infusion  1,000 mL Intravenous Once Betina Canales MD        0.9 % sodium chloride infusion  1,000 mL Intravenous Once Betina Canales MD         Current Outpatient Medications   Medication Sig Dispense Refill    metFORMIN (GLUCOPHAGE) 500 MG tablet TAKE 1 TABLET BY MOUTH TWICE DAILY WITH MEALS 60 tablet 0       Allergies:  No Known Allergies    Problem List:    Patient Active Problem List   Diagnosis Code    COPD (chronic obstructive pulmonary disease) ([facility] J44.9    Smoker F17.200    SERGEY (obstructive sleep apnea) G47.33    Asthma J45.909    Morbid obesity with BMI of 45.0-49.9, adult ([facility]) E66.01, Z68.42    Diverticulitis of colon K57.32    Abscess of back L02.212    DM2 (diabetes mellitus, type 2) ([facility]) E11.9    Seps[facility] A41.9    MSSA (methicillin susceptible Staphylococcus aureus) infection A49.01    Necrotizing soft tissue infection M79.89       Past Medical History:        Diagnosis Date    Abdominal pain     for egd 2017    Allergic rhinitis     Anxiety     Asthma     stable    COPD [facility]) [facility]     Depression no meds    Diabetes mellitus [facility]     GERD (gastroesophageal reflux disease)     Headache(784.0)     Hyperlipidemia     Hypertension     Obesity     Osteoarthritis     Sleep apnea     BMS CPAP 7, not using       Past Surgical History:        Procedure Laterality Date    COLONOSCOPY  06/08/2017    Dr. Chuy Arriaga polyp, diverticulitis    ENDOSCOPY, COLON, DIAGNOSTIC      HERNIA REPAIR  2012    Dr. Rajani Lozano N/A 9/19/2018    I AND D BACK ABCESS performed by Steffi Desouza MD at [address] [facility]         Social History:    Social History     Tobacco Use    Smoking status: Current Every Day Smoker     Packs/day: 1.50     Years: 30.00     Pack years: 45.00     Types: Cigarettes    Smokeless tobacco: Never Used   Substance Use Topics    Alcohol use: No     Alcohol/week: 0.0 oz                                Ready to quit: Not Answered  Counseling given: Not Answered      Vital Signs (Current):   Vitals:    06/24/19 1609 06/24/19 1636 06/24/19 1905 06/24/19 2143   BP:  97/63 137/83 136/79   Pulse: 109 110 104 100   Resp:  24 17 20   Temp:  97.7 °F (36.5 °C)     TempSrc:  Temporal     SpO2: 96% 97% 97% 96%   Weight:  285 lb (129.3 kg)                                                BP Readings from Last 3 Encounters:   06/24/19 136/79   05/21/19 (!) 146/96   11/21/18 (!) 158/100       NPO Status> 8hrs  BMI:   Wt Readings from Last 3 Encounters:   06/24/19 285 lb (129.3 kg)   05/21/19 285 lb (129.3 kg)   11/21/18 287 lb (130.2 kg)     Body mass index is 47.43 kg/m².     CBC:   Lab Results   Component Value Date    WBC 23.8 06/24/2019    RBC 5.07 06/24/2019    HGB 14.9 06/24/2019    HCT 45.1 06/24/2019    MCV 89.0 06/24/2019    RDW 13.2 06/24/2019     06/24/2019       CMP:   Lab Results   Component Value Date     06/24/2019    K 3.8 06/24/2019    CL 95 06/24/2019    CO2 22 06/24/2019    BUN 10 06/24/2019 CREATININE 0.8 06/24/2019    GFRAA >60 06/24/2019    LABGLOM >60 06/24/2019    GLUCOSE 158 06/24/2019    GLUCOSE 158 06/24/2019    PROT 7.6 06/24/2019    CALCIUM 9.2 06/24/2019    BILITOT 0.9 06/24/2019    ALKPHOS 77 06/24/2019    AST 11 06/24/2019    ALT 21 06/24/2019       POC Tests: No results for input(s): POCGLU, POCNA, POCK, POCCL, POCBUN, POCHEMO, POCHCT in the last 72 hours. Coags:   Lab Results   Component Value Date    PROTIME 11.2 08/02/2012    INR 1.0 08/02/2012    APTT 33.9 08/02/2012       HCG (If Applicable): No results found for: PREGTESTUR, PREGSERUM, HCG, HCGQUANT     ABGs:   Lab Results   Component Value Date    J7ZWNFFD 96.4 02/17/2012        Type & Screen (If Applicable):  No results found for: LABABO, [address]    Anesthesia Evaluation  Patient summary reviewed and Nursing notes reviewed no history of anesthetic complications:   Airway: Mallampati: III  TM distance: <3 FB   Neck ROM: full  Mouth opening: > = 3 FB Dental:      Comment: Denies loose teeth  Poor dentition    Pulmonary: breath sounds clear to auscultation  (+) COPD:  sleep apnea: on noncompliant,  asthma: current smoker                           Cardiovascular:    (+) hypertension:, hyperlipidemia        Rhythm: regular  Rate: normal                    Neuro/Psych:   (+) headaches:, psychiatric history:depression/anxiety             GI/Hepatic/Renal:   (+) hiatal hernia ( repaired [place] fundoplication), GERD:, morbid obesity         ROS comment: diverticulitis.    Endo/Other:    (+) DiabetesType II DM, , : arthritis: OA., .                 Abdominal:           Vascular:                                   Ventricular Rate 79  BPM Final 05/12/2017 5:41 PM [facility]   Atrial Rate 79  BPM Final 05/12/2017 5:41 PM [facility]   P-R Interval 136  ms Final 05/12/2017 5:41 PM [facility]   QRS Duration 88  ms Final 05/12/2017 5:41 PM [facility]   Q-T Interval 368  ms Final 05/12/2017 5:41 PM [facility]   QTc Calculation (Bazett) 421  ms Final 05/12/2017 5:41 PM [facility]   P [place] 18  degrees Final 05/12/2017 5:41 PM [facility]   R [place] 50  degrees Final 05/12/2017 5:41 PM [facility]   T [place] 22  degrees Final 05/12/2017 5:41 PM [facility]   Testing Performed By   Lab - Abbreviation Name Director Address Valid Date Range   360-[facility] MUSE Unknown Unknown 04/18/16 1121-Present   Narrative   Sinus rhythm with occasional premature ventricular complexes  Otherwise normal ECG  No previous ECGs available     CT Chest With Intravenous Contrast       CLINICAL HISTORY:     50years old, male; Condition or disease; Other: Abscess; Additional info:    Posterior thorax abscess radiation anteriorly       TECHNIQUE:     Axial computed tomography images of the chest with intravenous contrast.  All    CT scans at this facility use at least one of these dose optimization    techniques: automated exposure control; mA and/or kV adjustment per patient    size (includes targeted exams where dose is matched to clinical indication); or    iterative reconstruction. Coronal and sagittal reformatted images were created and reviewed. CONTRAST:     90 mL of ISOVUE 370 administered intravenously. COMPARISON:     No relevant prior studies available. FINDINGS:     Lungs:  Unremarkable. No mass. No consolidation. Pleural space:  Unremarkable. No pneumothorax. No significant effusion. Heart:  Unremarkable. No cardiomegaly. No significant pericardial effusion. Bones/joints:  Unremarkable. No acute fracture. No dislocation. Soft tissues: There are strandy and patchy opacities seen in the    subcutaneous tissues of the back on the left measuring 13.8 cm transverse    dimension by 9.1 cm craniocaudal dimension by 5.8 cm AP dimension. There is    mild asymmetric thickening of the skin overlying the inflammatory mass. No    organized abscess is seen. Vasculature:  Unremarkable. No thoracic aortic aneurysm. Lymph nodes:  Unremarkable. No enlarged lymph nodes. Liver: There is mild diffuse hypoattenuation of the hepatic parenchyma    compatible with fatty infiltration. Kidneys and ureters: There is a 2 cm intermediate attenuation cystic mass    seen within the left kidney likely representing a benign hemorrhagic cyst.           Impression     1. Inflammatory mass seen within the posterior subcutaneous tissues of the    back on the left. No organized abscess is seen. 2.  Fatty infiltration of liver     3. Probable small hemorrhagic cyst of the left kidney       This report has been electronically signed by Juan Manuel Shetty MD.                Anesthesia Plan      general     ASA 3 - emergent       Induction: intravenous and rapid sequence. BIS  MIPS: Postoperative opioids intended, Prophylactic antiemetics administered and Postoperative trial extubation. Anesthetic plan and risks discussed with patient. Plan discussed with CRNA and attending.                 Roberto Topete DO   6/24/2019

## 2019-06-25 NOTE — CARE COORDINATION
SOCIAL WORK/CASEMANAGEMENT TRANSITION OF CARE PLANNING: I called wife to complete assessment. Pt was on vent today and now extubated but asleep. The wife works 10-12 hour shifts. The pt was denied disability but never appealed it. The couple have a 21year old autistic son and wife drives weekly over 407 miles to care for her father that is ill. The pt was independent pta. No dme or hhc pta. They share a 2 story home with 2nd floor bed and bath. Pt is not a  and doesn't have a pcp. Wife said she doesn't qualify for medicaid. Will wait for public benefits information to see if pt is hcap with meds on discharge. sw to follow. Philomena Mena  6/25/2019

## 2019-06-26 ENCOUNTER — ANESTHESIA (OUTPATIENT)
Dept: OPERATING ROOM | Age: 49
DRG: 463 | End: 2019-06-26

## 2019-06-26 VITALS — TEMPERATURE: 98.2 F | DIASTOLIC BLOOD PRESSURE: 89 MMHG | SYSTOLIC BLOOD PRESSURE: 142 MMHG | OXYGEN SATURATION: 99 %

## 2019-06-26 LAB
ALBUMIN SERPL-MCNC: 2.4 G/DL (ref 3.5–5.2)
ALP BLD-CCNC: 57 U/L (ref 40–129)
ALT SERPL-CCNC: 14 U/L (ref 0–40)
ANION GAP SERPL CALCULATED.3IONS-SCNC: 9 MMOL/L (ref 7–16)
AST SERPL-CCNC: 12 U/L (ref 0–39)
BILIRUB SERPL-MCNC: 0.4 MG/DL (ref 0–1.2)
BUN BLDV-MCNC: 8 MG/DL (ref 6–20)
CALCIUM IONIZED: 1.17 MMOL/L (ref 1.15–1.33)
CALCIUM SERPL-MCNC: 7.9 MG/DL (ref 8.6–10.2)
CHLORIDE BLD-SCNC: 104 MMOL/L (ref 98–107)
CO2: 24 MMOL/L (ref 22–29)
CREAT SERPL-MCNC: 0.7 MG/DL (ref 0.7–1.2)
GFR AFRICAN AMERICAN: >60
GFR NON-AFRICAN AMERICAN: >60 ML/MIN/1.73
GLUCOSE BLD-MCNC: 92 MG/DL (ref 74–99)
GRAM STAIN ORDERABLE: NORMAL
HCT VFR BLD CALC: 32.6 % (ref 37–54)
HEMOGLOBIN: 10.6 G/DL (ref 12.5–16.5)
MAGNESIUM: 2.3 MG/DL (ref 1.6–2.6)
MCH RBC QN AUTO: 29.7 PG (ref 26–35)
MCHC RBC AUTO-ENTMCNC: 32.5 % (ref 32–34.5)
MCV RBC AUTO: 91.3 FL (ref 80–99.9)
METER GLUCOSE: 111 MG/DL (ref 74–99)
METER GLUCOSE: 133 MG/DL (ref 74–99)
METER GLUCOSE: 218 MG/DL (ref 74–99)
METER GLUCOSE: 96 MG/DL (ref 74–99)
MRSA CULTURE ONLY: NORMAL
PDW BLD-RTO: 13.4 FL (ref 11.5–15)
PHOSPHORUS: 2.6 MG/DL (ref 2.5–4.5)
PLATELET # BLD: 172 E9/L (ref 130–450)
PMV BLD AUTO: 11 FL (ref 7–12)
POTASSIUM REFLEX MAGNESIUM: 4 MMOL/L (ref 3.5–5)
RBC # BLD: 3.57 E12/L (ref 3.8–5.8)
SODIUM BLD-SCNC: 137 MMOL/L (ref 132–146)
TOTAL PROTEIN: 5.3 G/DL (ref 6.4–8.3)
VANCOMYCIN TROUGH: 7.4 MCG/ML (ref 5–16)
WBC # BLD: 14.1 E9/L (ref 4.5–11.5)

## 2019-06-26 PROCEDURE — 85027 COMPLETE CBC AUTOMATED: CPT

## 2019-06-26 PROCEDURE — 6370000000 HC RX 637 (ALT 250 FOR IP): Performed by: STUDENT IN AN ORGANIZED HEALTH CARE EDUCATION/TRAINING PROGRAM

## 2019-06-26 PROCEDURE — 2580000003 HC RX 258: Performed by: STUDENT IN AN ORGANIZED HEALTH CARE EDUCATION/TRAINING PROGRAM

## 2019-06-26 PROCEDURE — 36415 COLL VENOUS BLD VENIPUNCTURE: CPT

## 2019-06-26 PROCEDURE — 3700000000 HC ANESTHESIA ATTENDED CARE: Performed by: SURGERY

## 2019-06-26 PROCEDURE — 2140000000 HC CCU INTERMEDIATE R&B

## 2019-06-26 PROCEDURE — 3600000002 HC SURGERY LEVEL 2 BASE: Performed by: SURGERY

## 2019-06-26 PROCEDURE — 6360000002 HC RX W HCPCS: Performed by: STUDENT IN AN ORGANIZED HEALTH CARE EDUCATION/TRAINING PROGRAM

## 2019-06-26 PROCEDURE — 3600000012 HC SURGERY LEVEL 2 ADDTL 15MIN: Performed by: SURGERY

## 2019-06-26 PROCEDURE — 80053 COMPREHEN METABOLIC PANEL: CPT

## 2019-06-26 PROCEDURE — 6360000002 HC RX W HCPCS

## 2019-06-26 PROCEDURE — 83735 ASSAY OF MAGNESIUM: CPT

## 2019-06-26 PROCEDURE — C9113 INJ PANTOPRAZOLE SODIUM, VIA: HCPCS | Performed by: STUDENT IN AN ORGANIZED HEALTH CARE EDUCATION/TRAINING PROGRAM

## 2019-06-26 PROCEDURE — 36592 COLLECT BLOOD FROM PICC: CPT

## 2019-06-26 PROCEDURE — 82330 ASSAY OF CALCIUM: CPT

## 2019-06-26 PROCEDURE — 2500000003 HC RX 250 WO HCPCS

## 2019-06-26 PROCEDURE — 7100000001 HC PACU RECOVERY - ADDTL 15 MIN

## 2019-06-26 PROCEDURE — 99233 SBSQ HOSP IP/OBS HIGH 50: CPT | Performed by: SURGERY

## 2019-06-26 PROCEDURE — 2580000003 HC RX 258

## 2019-06-26 PROCEDURE — 2500000003 HC RX 250 WO HCPCS: Performed by: STUDENT IN AN ORGANIZED HEALTH CARE EDUCATION/TRAINING PROGRAM

## 2019-06-26 PROCEDURE — 6370000000 HC RX 637 (ALT 250 FOR IP)

## 2019-06-26 PROCEDURE — 84100 ASSAY OF PHOSPHORUS: CPT

## 2019-06-26 PROCEDURE — 0JBB0ZZ EXCISION OF PERINEUM SUBCUTANEOUS TISSUE AND FASCIA, OPEN APPROACH: ICD-10-PCS | Performed by: SURGERY

## 2019-06-26 PROCEDURE — 82962 GLUCOSE BLOOD TEST: CPT

## 2019-06-26 PROCEDURE — 3700000001 HC ADD 15 MINUTES (ANESTHESIA): Performed by: SURGERY

## 2019-06-26 PROCEDURE — 51702 INSERT TEMP BLADDER CATH: CPT

## 2019-06-26 PROCEDURE — 2709999900 HC NON-CHARGEABLE SUPPLY: Performed by: SURGERY

## 2019-06-26 PROCEDURE — 80202 ASSAY OF VANCOMYCIN: CPT

## 2019-06-26 PROCEDURE — 11004 DBRDMT SKIN XTRNL GENT&PER: CPT | Performed by: SURGERY

## 2019-06-26 PROCEDURE — 2700000000 HC OXYGEN THERAPY PER DAY

## 2019-06-26 PROCEDURE — 7100000000 HC PACU RECOVERY - FIRST 15 MIN

## 2019-06-26 RX ORDER — PROMETHAZINE HYDROCHLORIDE 25 MG/ML
6.25 INJECTION, SOLUTION INTRAMUSCULAR; INTRAVENOUS
Status: DISCONTINUED | OUTPATIENT
Start: 2019-06-26 | End: 2019-06-26

## 2019-06-26 RX ORDER — MEPERIDINE HYDROCHLORIDE 50 MG/ML
12.5 INJECTION INTRAMUSCULAR; INTRAVENOUS; SUBCUTANEOUS EVERY 5 MIN PRN
Status: DISCONTINUED | OUTPATIENT
Start: 2019-06-26 | End: 2019-06-26

## 2019-06-26 RX ORDER — FENTANYL CITRATE 50 UG/ML
50 INJECTION, SOLUTION INTRAMUSCULAR; INTRAVENOUS EVERY 5 MIN PRN
Status: DISCONTINUED | OUTPATIENT
Start: 2019-06-26 | End: 2019-06-26

## 2019-06-26 RX ORDER — ONDANSETRON 2 MG/ML
INJECTION INTRAMUSCULAR; INTRAVENOUS
Status: COMPLETED
Start: 2019-06-26 | End: 2019-06-26

## 2019-06-26 RX ORDER — MIDAZOLAM HYDROCHLORIDE 1 MG/ML
INJECTION INTRAMUSCULAR; INTRAVENOUS PRN
Status: DISCONTINUED | OUTPATIENT
Start: 2019-06-26 | End: 2019-06-26 | Stop reason: SDUPTHER

## 2019-06-26 RX ORDER — NEOSTIGMINE METHYLSULFATE 1 MG/ML
INJECTION, SOLUTION INTRAVENOUS PRN
Status: DISCONTINUED | OUTPATIENT
Start: 2019-06-26 | End: 2019-06-26 | Stop reason: SDUPTHER

## 2019-06-26 RX ORDER — SODIUM CHLORIDE 9 MG/ML
INJECTION, SOLUTION INTRAVENOUS CONTINUOUS PRN
Status: DISCONTINUED | OUTPATIENT
Start: 2019-06-26 | End: 2019-06-26 | Stop reason: SDUPTHER

## 2019-06-26 RX ORDER — ALBUTEROL SULFATE 90 UG/1
AEROSOL, METERED RESPIRATORY (INHALATION) PRN
Status: DISCONTINUED | OUTPATIENT
Start: 2019-06-26 | End: 2019-06-26 | Stop reason: SDUPTHER

## 2019-06-26 RX ORDER — ONDANSETRON 2 MG/ML
4 INJECTION INTRAMUSCULAR; INTRAVENOUS EVERY 6 HOURS PRN
Status: DISCONTINUED | OUTPATIENT
Start: 2019-06-26 | End: 2019-07-01 | Stop reason: HOSPADM

## 2019-06-26 RX ORDER — LIDOCAINE HYDROCHLORIDE 20 MG/ML
INJECTION, SOLUTION INTRAVENOUS PRN
Status: DISCONTINUED | OUTPATIENT
Start: 2019-06-26 | End: 2019-06-26 | Stop reason: SDUPTHER

## 2019-06-26 RX ORDER — NICOTINE 21 MG/24HR
1 PATCH, TRANSDERMAL 24 HOURS TRANSDERMAL DAILY
Status: DISCONTINUED | OUTPATIENT
Start: 2019-06-26 | End: 2019-07-01 | Stop reason: HOSPADM

## 2019-06-26 RX ORDER — MORPHINE SULFATE 2 MG/ML
2 INJECTION, SOLUTION INTRAMUSCULAR; INTRAVENOUS
Status: DISCONTINUED | OUTPATIENT
Start: 2019-06-26 | End: 2019-06-30

## 2019-06-26 RX ORDER — MORPHINE SULFATE 2 MG/ML
4 INJECTION, SOLUTION INTRAMUSCULAR; INTRAVENOUS
Status: DISCONTINUED | OUTPATIENT
Start: 2019-06-26 | End: 2019-06-30

## 2019-06-26 RX ORDER — ONDANSETRON 2 MG/ML
INJECTION INTRAMUSCULAR; INTRAVENOUS PRN
Status: DISCONTINUED | OUTPATIENT
Start: 2019-06-26 | End: 2019-06-26 | Stop reason: SDUPTHER

## 2019-06-26 RX ORDER — DIPHENHYDRAMINE HYDROCHLORIDE 50 MG/ML
12.5 INJECTION INTRAMUSCULAR; INTRAVENOUS
Status: DISCONTINUED | OUTPATIENT
Start: 2019-06-26 | End: 2019-06-26

## 2019-06-26 RX ORDER — FENTANYL CITRATE 50 UG/ML
25 INJECTION, SOLUTION INTRAMUSCULAR; INTRAVENOUS EVERY 5 MIN PRN
Status: DISCONTINUED | OUTPATIENT
Start: 2019-06-26 | End: 2019-06-26

## 2019-06-26 RX ORDER — ROCURONIUM BROMIDE 10 MG/ML
INJECTION, SOLUTION INTRAVENOUS PRN
Status: DISCONTINUED | OUTPATIENT
Start: 2019-06-26 | End: 2019-06-26 | Stop reason: SDUPTHER

## 2019-06-26 RX ORDER — OXYCODONE HYDROCHLORIDE AND ACETAMINOPHEN 5; 325 MG/1; MG/1
1 TABLET ORAL
Status: DISCONTINUED | OUTPATIENT
Start: 2019-06-26 | End: 2019-06-26

## 2019-06-26 RX ORDER — PROPOFOL 10 MG/ML
INJECTION, EMULSION INTRAVENOUS PRN
Status: DISCONTINUED | OUTPATIENT
Start: 2019-06-26 | End: 2019-06-26 | Stop reason: SDUPTHER

## 2019-06-26 RX ORDER — GLYCOPYRROLATE 1 MG/5 ML
SYRINGE (ML) INTRAVENOUS PRN
Status: DISCONTINUED | OUTPATIENT
Start: 2019-06-26 | End: 2019-06-26 | Stop reason: SDUPTHER

## 2019-06-26 RX ORDER — FENTANYL CITRATE 50 UG/ML
INJECTION, SOLUTION INTRAMUSCULAR; INTRAVENOUS PRN
Status: DISCONTINUED | OUTPATIENT
Start: 2019-06-26 | End: 2019-06-26 | Stop reason: SDUPTHER

## 2019-06-26 RX ADMIN — FENTANYL CITRATE 50 MCG: 50 INJECTION, SOLUTION INTRAMUSCULAR; INTRAVENOUS at 11:47

## 2019-06-26 RX ADMIN — CLINDAMYCIN IN 5 PERCENT DEXTROSE 900 MG: 18 INJECTION, SOLUTION INTRAVENOUS at 04:47

## 2019-06-26 RX ADMIN — LIDOCAINE HYDROCHLORIDE 80 MG: 20 INJECTION, SOLUTION INTRAVENOUS at 11:18

## 2019-06-26 RX ADMIN — OXYCODONE HYDROCHLORIDE 10 MG: 10 TABLET ORAL at 05:54

## 2019-06-26 RX ADMIN — ONDANSETRON HYDROCHLORIDE 4 MG: 2 INJECTION, SOLUTION INTRAMUSCULAR; INTRAVENOUS at 11:49

## 2019-06-26 RX ADMIN — FENTANYL CITRATE 50 MCG: 50 INJECTION, SOLUTION INTRAMUSCULAR; INTRAVENOUS at 11:18

## 2019-06-26 RX ADMIN — MIDAZOLAM HYDROCHLORIDE 2 MG: 1 INJECTION, SOLUTION INTRAMUSCULAR; INTRAVENOUS at 11:09

## 2019-06-26 RX ADMIN — SODIUM PHOSPHATE, MONOBASIC, MONOHYDRATE 10 MMOL: 276; 142 INJECTION, SOLUTION INTRAVENOUS at 08:08

## 2019-06-26 RX ADMIN — SODIUM CHLORIDE: 9 INJECTION, SOLUTION INTRAVENOUS at 00:35

## 2019-06-26 RX ADMIN — CLINDAMYCIN IN 5 PERCENT DEXTROSE 900 MG: 18 INJECTION, SOLUTION INTRAVENOUS at 19:31

## 2019-06-26 RX ADMIN — SODIUM CHLORIDE: 9 INJECTION, SOLUTION INTRAVENOUS at 19:21

## 2019-06-26 RX ADMIN — Medication 10 ML: at 19:32

## 2019-06-26 RX ADMIN — MORPHINE SULFATE 4 MG: 4 INJECTION INTRAVENOUS at 14:56

## 2019-06-26 RX ADMIN — PIPERACILLIN SODIUM,TAZOBACTAM SODIUM 3.38 G: 3; .375 INJECTION, POWDER, FOR SOLUTION INTRAVENOUS at 13:06

## 2019-06-26 RX ADMIN — OXYCODONE HYDROCHLORIDE 10 MG: 10 TABLET ORAL at 19:30

## 2019-06-26 RX ADMIN — ROCURONIUM BROMIDE 35 MG: 10 INJECTION, SOLUTION INTRAVENOUS at 11:18

## 2019-06-26 RX ADMIN — Medication 10 ML: at 22:55

## 2019-06-26 RX ADMIN — MORPHINE SULFATE 4 MG: 4 INJECTION INTRAVENOUS at 06:36

## 2019-06-26 RX ADMIN — ENOXAPARIN SODIUM 40 MG: 40 INJECTION SUBCUTANEOUS at 09:17

## 2019-06-26 RX ADMIN — HYOSCYAMINE SULFATE: 16 SOLUTION at 09:26

## 2019-06-26 RX ADMIN — OXYCODONE HYDROCHLORIDE 10 MG: 10 TABLET ORAL at 00:42

## 2019-06-26 RX ADMIN — MORPHINE SULFATE 2 MG: 2 INJECTION, SOLUTION INTRAMUSCULAR; INTRAVENOUS at 22:55

## 2019-06-26 RX ADMIN — Medication 3 MG: at 12:16

## 2019-06-26 RX ADMIN — INSULIN LISPRO 6 UNITS: 100 INJECTION, SOLUTION INTRAVENOUS; SUBCUTANEOUS at 22:01

## 2019-06-26 RX ADMIN — PIPERACILLIN SODIUM,TAZOBACTAM SODIUM 3.38 G: 3; .375 INJECTION, POWDER, FOR SOLUTION INTRAVENOUS at 19:31

## 2019-06-26 RX ADMIN — MORPHINE SULFATE 4 MG: 4 INJECTION INTRAVENOUS at 19:31

## 2019-06-26 RX ADMIN — PROPOFOL 170 MG: 10 INJECTION, EMULSION INTRAVENOUS at 11:18

## 2019-06-26 RX ADMIN — OXYCODONE HYDROCHLORIDE 5 MG: 5 TABLET ORAL at 23:48

## 2019-06-26 RX ADMIN — PIPERACILLIN SODIUM,TAZOBACTAM SODIUM 3.38 G: 3; .375 INJECTION, POWDER, FOR SOLUTION INTRAVENOUS at 04:48

## 2019-06-26 RX ADMIN — CLINDAMYCIN IN 5 PERCENT DEXTROSE 900 MG: 18 INJECTION, SOLUTION INTRAVENOUS at 13:05

## 2019-06-26 RX ADMIN — Medication 10 ML: at 09:21

## 2019-06-26 RX ADMIN — ONDANSETRON 4 MG: 2 INJECTION INTRAMUSCULAR; INTRAVENOUS at 13:08

## 2019-06-26 RX ADMIN — ROCURONIUM BROMIDE 15 MG: 10 INJECTION, SOLUTION INTRAVENOUS at 11:23

## 2019-06-26 RX ADMIN — VANCOMYCIN HYDROCHLORIDE 2000 MG: 10 INJECTION, POWDER, LYOPHILIZED, FOR SOLUTION INTRAVENOUS at 19:31

## 2019-06-26 RX ADMIN — VANCOMYCIN HYDROCHLORIDE 1500 MG: 10 INJECTION, POWDER, LYOPHILIZED, FOR SOLUTION INTRAVENOUS at 09:08

## 2019-06-26 RX ADMIN — ALBUTEROL SULFATE 2 PUFF: 90 AEROSOL, METERED RESPIRATORY (INHALATION) at 12:39

## 2019-06-26 RX ADMIN — Medication 0.6 MG: at 12:16

## 2019-06-26 RX ADMIN — SODIUM CHLORIDE: 9 INJECTION, SOLUTION INTRAVENOUS at 11:09

## 2019-06-26 RX ADMIN — SODIUM CHLORIDE: 9 INJECTION, SOLUTION INTRAVENOUS at 02:15

## 2019-06-26 RX ADMIN — PANTOPRAZOLE SODIUM 40 MG: 40 INJECTION, POWDER, FOR SOLUTION INTRAVENOUS at 09:17

## 2019-06-26 ASSESSMENT — PULMONARY FUNCTION TESTS
PIF_VALUE: 28
PIF_VALUE: 11
PIF_VALUE: 28
PIF_VALUE: 20
PIF_VALUE: 28
PIF_VALUE: 0
PIF_VALUE: 1
PIF_VALUE: 4
PIF_VALUE: 1
PIF_VALUE: 28
PIF_VALUE: 4
PIF_VALUE: 19
PIF_VALUE: 28
PIF_VALUE: 22
PIF_VALUE: 28
PIF_VALUE: 1
PIF_VALUE: 28
PIF_VALUE: 2
PIF_VALUE: 28
PIF_VALUE: 2
PIF_VALUE: 31
PIF_VALUE: 28
PIF_VALUE: 1
PIF_VALUE: 0
PIF_VALUE: 28
PIF_VALUE: 28
PIF_VALUE: 30
PIF_VALUE: 1
PIF_VALUE: 28
PIF_VALUE: 2
PIF_VALUE: 18
PIF_VALUE: 10
PIF_VALUE: 6
PIF_VALUE: 3
PIF_VALUE: 28
PIF_VALUE: 23
PIF_VALUE: 36
PIF_VALUE: 28
PIF_VALUE: 28
PIF_VALUE: 1
PIF_VALUE: 28
PIF_VALUE: 20
PIF_VALUE: 29
PIF_VALUE: 28
PIF_VALUE: 1
PIF_VALUE: 1
PIF_VALUE: 3
PIF_VALUE: 36
PIF_VALUE: 28
PIF_VALUE: 2
PIF_VALUE: 28
PIF_VALUE: 7
PIF_VALUE: 1
PIF_VALUE: 3
PIF_VALUE: 6
PIF_VALUE: 0
PIF_VALUE: 31
PIF_VALUE: 28
PIF_VALUE: 28
PIF_VALUE: 22
PIF_VALUE: 1
PIF_VALUE: 28
PIF_VALUE: 38
PIF_VALUE: 1
PIF_VALUE: 28
PIF_VALUE: 1
PIF_VALUE: 29
PIF_VALUE: 1
PIF_VALUE: 2
PIF_VALUE: 10
PIF_VALUE: 28
PIF_VALUE: 28
PIF_VALUE: 35
PIF_VALUE: 28

## 2019-06-26 ASSESSMENT — PAIN DESCRIPTION - PROGRESSION
CLINICAL_PROGRESSION: NOT CHANGED
CLINICAL_PROGRESSION: NOT CHANGED
CLINICAL_PROGRESSION: GRADUALLY WORSENING

## 2019-06-26 ASSESSMENT — PAIN DESCRIPTION - DESCRIPTORS
DESCRIPTORS: CONSTANT;DISCOMFORT;SORE
DESCRIPTORS: CONSTANT;PENETRATING;SORE
DESCRIPTORS: ACHING;CONSTANT;DISCOMFORT;SORE;TENDER

## 2019-06-26 ASSESSMENT — ENCOUNTER SYMPTOMS
BACK PAIN: 0
VOMITING: 0
COUGH: 0
ABDOMINAL PAIN: 0
RHINORRHEA: 0
SHORTNESS OF BREATH: 0
BLOOD IN STOOL: 0
NAUSEA: 0
CONSTIPATION: 0
CHEST TIGHTNESS: 0
DIARRHEA: 0

## 2019-06-26 ASSESSMENT — PAIN DESCRIPTION - LOCATION
LOCATION: SCROTUM
LOCATION: GROIN
LOCATION: GROIN

## 2019-06-26 ASSESSMENT — PAIN SCALES - GENERAL
PAINLEVEL_OUTOF10: 8
PAINLEVEL_OUTOF10: 9
PAINLEVEL_OUTOF10: 0
PAINLEVEL_OUTOF10: 9
PAINLEVEL_OUTOF10: 0
PAINLEVEL_OUTOF10: 6
PAINLEVEL_OUTOF10: 8
PAINLEVEL_OUTOF10: 8
PAINLEVEL_OUTOF10: 4
PAINLEVEL_OUTOF10: 6
PAINLEVEL_OUTOF10: 4
PAINLEVEL_OUTOF10: 8

## 2019-06-26 ASSESSMENT — PAIN DESCRIPTION - ORIENTATION
ORIENTATION: RIGHT;LEFT
ORIENTATION: RIGHT;LEFT

## 2019-06-26 ASSESSMENT — PAIN DESCRIPTION - ONSET
ONSET: ON-GOING

## 2019-06-26 ASSESSMENT — PAIN DESCRIPTION - FREQUENCY
FREQUENCY: CONTINUOUS

## 2019-06-26 ASSESSMENT — PAIN DESCRIPTION - PAIN TYPE
TYPE: ACUTE PAIN;SURGICAL PAIN

## 2019-06-26 NOTE — PLAN OF CARE
Problem: Airway Clearance - Ineffective:  Goal: Ability to maintain a clear airway will improve  Description  Ability to maintain a clear airway will improve  Outcome: Met This Shift     Problem: Breathing Pattern - Ineffective:  Goal: Ability to achieve and maintain a regular respiratory rate will improve  Description  Ability to achieve and maintain a regular respiratory rate will improve  Outcome: Met This Shift     Problem: Gas Exchange - Impaired:  Goal: Levels of oxygenation will improve  Description  Levels of oxygenation will improve  Outcome: Met This Shift     Problem: Pain:  Goal: Pain level will decrease  Description  Pain level will decrease  6/26/2019 1509 by Rodolfo Mckeon RN  Outcome: Met This Shift     Problem: Pain:  Goal: Recognizes and communicates pain  Description  Recognizes and communicates pain  Outcome: Met This Shift     Problem: Pain:  Goal: Control of acute pain  Description  Control of acute pain  Outcome: Met This Shift     Problem: Falls - Risk of:  Goal: Will remain free from falls  Description  Will remain free from falls  6/26/2019 1509 by Rodolfo Mckeon RN  Outcome: Met This Shift     Problem: Pain:  Goal: Pain level will decrease  Description  Pain level will decrease  6/26/2019 1509 by Rodolfo Mckeon RN  Outcome: Met This Shift     Problem: Skin Integrity:  Goal: Will show no infection signs and symptoms  Description  Will show no infection signs and symptoms  6/26/2019 1509 by Rodolfo Mckeon RN  Outcome: Not Met This Shift     Problem: Skin Integrity:  Goal: Absence of new skin breakdown  Description  Absence of new skin breakdown  Outcome: Not Met This Shift     Problem: Infection - Ventilator-Associated Pneumonia:  Goal: Absence of pulmonary infection  Description  Absence of pulmonary infection  Outcome: Completed

## 2019-06-26 NOTE — OP NOTE
and suctioned dry. The cavity was inspected for bleeding, and any bleeding points were cauterized. Saline-soaked Kerlix Guaze was used to pack the wound, and a sterile ABD dressing was placed, and covered with mesh panties. The patient tolerated the procedure well. Needle, sponge, and instrument counts were reported as correct x2. Dr. Lola Argueta was present and scrubbed throughout the case. The patient tolerated the procedure well without complications. The patient was transferred back to the SICU in stable condition.       Fidel Salas DO  Resident, PGY-1  6/26/2019  12:33 PM

## 2019-06-26 NOTE — ANESTHESIA POSTPROCEDURE EVALUATION
Department of Anesthesiology  Postprocedure Note    Patient: Rocio Mendosa  MRN: [id number]  YOB: 1970  Date of evaluation: 6/26/2019  Time:  1:25 PM     Procedure Summary     Date:  06/26/19 Room / Location:  [facility] [place] [address] [place] [address] OR    Anesthesia Start:  1109 Anesthesia Stop:  4166    Procedure:  DEBRIDEMENT PERINEAL WOUND (N/A ) Diagnosis:  (.)    Surgeon:  Tawana Hernandez MD Responsible Provider:  Vidal Runner, MD    Anesthesia Type:  general ASA Status:  4          Anesthesia Type: general    Regine Phase I: Regine Score: 6    Regine Phase II: Regine Score: 6    Last vitals: Reviewed and per EMR flowsheets.        Anesthesia Post Evaluation    Patient location during evaluation: ICU  Patient participation: complete - patient participated  Level of consciousness: awake  Pain score: 2  Airway patency: patent  Nausea & Vomiting: no vomiting and no nausea  Complications: no  Cardiovascular status: hemodynamically stable  Respiratory status: acceptable  Hydration status: stable

## 2019-06-27 LAB
ALBUMIN SERPL-MCNC: 2.4 G/DL (ref 3.5–5.2)
ALP BLD-CCNC: 70 U/L (ref 40–129)
ALT SERPL-CCNC: 16 U/L (ref 0–40)
ANION GAP SERPL CALCULATED.3IONS-SCNC: 9 MMOL/L (ref 7–16)
AST SERPL-CCNC: 14 U/L (ref 0–39)
BILIRUB SERPL-MCNC: 0.3 MG/DL (ref 0–1.2)
BUN BLDV-MCNC: 6 MG/DL (ref 6–20)
CALCIUM IONIZED: 1.13 MMOL/L (ref 1.15–1.33)
CALCIUM SERPL-MCNC: 7.5 MG/DL (ref 8.6–10.2)
CHLORIDE BLD-SCNC: 104 MMOL/L (ref 98–107)
CO2: 26 MMOL/L (ref 22–29)
CREAT SERPL-MCNC: 0.9 MG/DL (ref 0.7–1.2)
CULTURE SURGICAL: ABNORMAL
CULTURE SURGICAL: ABNORMAL
GFR AFRICAN AMERICAN: >60
GFR NON-AFRICAN AMERICAN: >60 ML/MIN/1.73
GLUCOSE BLD-MCNC: 135 MG/DL (ref 74–99)
GRAM STAIN RESULT: ABNORMAL
HCT VFR BLD CALC: 31.3 % (ref 37–54)
HEMOGLOBIN: 9.9 G/DL (ref 12.5–16.5)
MAGNESIUM: 2.1 MG/DL (ref 1.6–2.6)
MCH RBC QN AUTO: 29 PG (ref 26–35)
MCHC RBC AUTO-ENTMCNC: 31.6 % (ref 32–34.5)
MCV RBC AUTO: 91.8 FL (ref 80–99.9)
METER GLUCOSE: 110 MG/DL (ref 74–99)
METER GLUCOSE: 111 MG/DL (ref 74–99)
METER GLUCOSE: 132 MG/DL (ref 74–99)
METER GLUCOSE: 197 MG/DL (ref 74–99)
METER GLUCOSE: 84 MG/DL (ref 74–99)
METER GLUCOSE: 97 MG/DL (ref 74–99)
ORGANISM: ABNORMAL
PDW BLD-RTO: 13.4 FL (ref 11.5–15)
PHOSPHORUS: 2.5 MG/DL (ref 2.5–4.5)
PLATELET # BLD: 197 E9/L (ref 130–450)
PMV BLD AUTO: 10.8 FL (ref 7–12)
POTASSIUM REFLEX MAGNESIUM: 3.5 MMOL/L (ref 3.5–5)
RBC # BLD: 3.41 E12/L (ref 3.8–5.8)
SODIUM BLD-SCNC: 139 MMOL/L (ref 132–146)
TOTAL PROTEIN: 5.4 G/DL (ref 6.4–8.3)
URINE CULTURE, ROUTINE: NORMAL
WBC # BLD: 12.2 E9/L (ref 4.5–11.5)

## 2019-06-27 PROCEDURE — 36415 COLL VENOUS BLD VENIPUNCTURE: CPT

## 2019-06-27 PROCEDURE — 2140000000 HC CCU INTERMEDIATE R&B

## 2019-06-27 PROCEDURE — 83735 ASSAY OF MAGNESIUM: CPT

## 2019-06-27 PROCEDURE — 6370000000 HC RX 637 (ALT 250 FOR IP): Performed by: STUDENT IN AN ORGANIZED HEALTH CARE EDUCATION/TRAINING PROGRAM

## 2019-06-27 PROCEDURE — 82962 GLUCOSE BLOOD TEST: CPT

## 2019-06-27 PROCEDURE — 2580000003 HC RX 258: Performed by: STUDENT IN AN ORGANIZED HEALTH CARE EDUCATION/TRAINING PROGRAM

## 2019-06-27 PROCEDURE — 6360000002 HC RX W HCPCS: Performed by: SURGERY

## 2019-06-27 PROCEDURE — 82330 ASSAY OF CALCIUM: CPT

## 2019-06-27 PROCEDURE — 97530 THERAPEUTIC ACTIVITIES: CPT

## 2019-06-27 PROCEDURE — 80053 COMPREHEN METABOLIC PANEL: CPT

## 2019-06-27 PROCEDURE — 99232 SBSQ HOSP IP/OBS MODERATE 35: CPT | Performed by: SURGERY

## 2019-06-27 PROCEDURE — C9113 INJ PANTOPRAZOLE SODIUM, VIA: HCPCS | Performed by: STUDENT IN AN ORGANIZED HEALTH CARE EDUCATION/TRAINING PROGRAM

## 2019-06-27 PROCEDURE — 6360000002 HC RX W HCPCS: Performed by: STUDENT IN AN ORGANIZED HEALTH CARE EDUCATION/TRAINING PROGRAM

## 2019-06-27 PROCEDURE — 97165 OT EVAL LOW COMPLEX 30 MIN: CPT

## 2019-06-27 PROCEDURE — 84100 ASSAY OF PHOSPHORUS: CPT

## 2019-06-27 PROCEDURE — 85027 COMPLETE CBC AUTOMATED: CPT

## 2019-06-27 PROCEDURE — 97161 PT EVAL LOW COMPLEX 20 MIN: CPT

## 2019-06-27 RX ORDER — POLYETHYLENE GLYCOL 3350 17 G/17G
17 POWDER, FOR SOLUTION ORAL DAILY
Status: DISCONTINUED | OUTPATIENT
Start: 2019-06-27 | End: 2019-07-01 | Stop reason: HOSPADM

## 2019-06-27 RX ORDER — CALCIUM CARBONATE 200(500)MG
500 TABLET,CHEWABLE ORAL ONCE
Status: COMPLETED | OUTPATIENT
Start: 2019-06-27 | End: 2019-06-27

## 2019-06-27 RX ORDER — SENNA PLUS 8.6 MG/1
1 TABLET ORAL NIGHTLY
Status: DISCONTINUED | OUTPATIENT
Start: 2019-06-27 | End: 2019-07-01 | Stop reason: HOSPADM

## 2019-06-27 RX ADMIN — INSULIN LISPRO 3 UNITS: 100 INJECTION, SOLUTION INTRAVENOUS; SUBCUTANEOUS at 10:50

## 2019-06-27 RX ADMIN — ENOXAPARIN SODIUM 40 MG: 40 INJECTION SUBCUTANEOUS at 22:17

## 2019-06-27 RX ADMIN — MORPHINE SULFATE 2 MG: 2 INJECTION, SOLUTION INTRAMUSCULAR; INTRAVENOUS at 11:02

## 2019-06-27 RX ADMIN — CALCIUM CARBONATE (ANTACID) CHEW TAB 500 MG 500 MG: 500 CHEW TAB at 06:43

## 2019-06-27 RX ADMIN — PIPERACILLIN SODIUM,TAZOBACTAM SODIUM 3.38 G: 3; .375 INJECTION, POWDER, FOR SOLUTION INTRAVENOUS at 04:23

## 2019-06-27 RX ADMIN — Medication 10 ML: at 04:27

## 2019-06-27 RX ADMIN — HYOSCYAMINE SULFATE: 16 SOLUTION at 09:12

## 2019-06-27 RX ADMIN — MORPHINE SULFATE 2 MG: 2 INJECTION, SOLUTION INTRAMUSCULAR; INTRAVENOUS at 22:18

## 2019-06-27 RX ADMIN — MORPHINE SULFATE 2 MG: 2 INJECTION, SOLUTION INTRAMUSCULAR; INTRAVENOUS at 14:14

## 2019-06-27 RX ADMIN — MORPHINE SULFATE 2 MG: 2 INJECTION, SOLUTION INTRAMUSCULAR; INTRAVENOUS at 04:41

## 2019-06-27 RX ADMIN — VANCOMYCIN HYDROCHLORIDE 2000 MG: 10 INJECTION, POWDER, LYOPHILIZED, FOR SOLUTION INTRAVENOUS at 22:17

## 2019-06-27 RX ADMIN — PIPERACILLIN SODIUM,TAZOBACTAM SODIUM 3.38 G: 3; .375 INJECTION, POWDER, FOR SOLUTION INTRAVENOUS at 12:43

## 2019-06-27 RX ADMIN — PANTOPRAZOLE SODIUM 40 MG: 40 INJECTION, POWDER, FOR SOLUTION INTRAVENOUS at 09:10

## 2019-06-27 RX ADMIN — PIPERACILLIN SODIUM,TAZOBACTAM SODIUM 3.38 G: 3; .375 INJECTION, POWDER, FOR SOLUTION INTRAVENOUS at 22:12

## 2019-06-27 RX ADMIN — VANCOMYCIN HYDROCHLORIDE 2000 MG: 10 INJECTION, POWDER, LYOPHILIZED, FOR SOLUTION INTRAVENOUS at 09:09

## 2019-06-27 RX ADMIN — OXYCODONE HYDROCHLORIDE 10 MG: 10 TABLET ORAL at 05:34

## 2019-06-27 RX ADMIN — OXYCODONE HYDROCHLORIDE 5 MG: 5 TABLET ORAL at 13:20

## 2019-06-27 RX ADMIN — SODIUM CHLORIDE: 9 INJECTION, SOLUTION INTRAVENOUS at 04:23

## 2019-06-27 RX ADMIN — Medication 10 ML: at 04:42

## 2019-06-27 RX ADMIN — Medication 10 ML: at 09:11

## 2019-06-27 RX ADMIN — ENOXAPARIN SODIUM 40 MG: 40 INJECTION SUBCUTANEOUS at 09:10

## 2019-06-27 RX ADMIN — Medication 10 ML: at 22:17

## 2019-06-27 RX ADMIN — Medication 10 ML: at 17:18

## 2019-06-27 RX ADMIN — Medication 10 ML: at 04:23

## 2019-06-27 ASSESSMENT — PAIN DESCRIPTION - PROGRESSION
CLINICAL_PROGRESSION: NOT CHANGED

## 2019-06-27 ASSESSMENT — PAIN DESCRIPTION - ONSET
ONSET: ON-GOING

## 2019-06-27 ASSESSMENT — PAIN DESCRIPTION - PAIN TYPE
TYPE: ACUTE PAIN;SURGICAL PAIN
TYPE: SURGICAL PAIN
TYPE: ACUTE PAIN;SURGICAL PAIN
TYPE: SURGICAL PAIN

## 2019-06-27 ASSESSMENT — PAIN - FUNCTIONAL ASSESSMENT
PAIN_FUNCTIONAL_ASSESSMENT: ACTIVITIES ARE NOT PREVENTED
PAIN_FUNCTIONAL_ASSESSMENT: ACTIVITIES ARE NOT PREVENTED

## 2019-06-27 ASSESSMENT — PAIN SCALES - GENERAL
PAINLEVEL_OUTOF10: 8
PAINLEVEL_OUTOF10: 3
PAINLEVEL_OUTOF10: 6
PAINLEVEL_OUTOF10: 8
PAINLEVEL_OUTOF10: 0
PAINLEVEL_OUTOF10: 8

## 2019-06-27 ASSESSMENT — PAIN DESCRIPTION - FREQUENCY
FREQUENCY: INTERMITTENT
FREQUENCY: CONTINUOUS
FREQUENCY: INTERMITTENT
FREQUENCY: CONTINUOUS

## 2019-06-27 ASSESSMENT — PAIN DESCRIPTION - ORIENTATION
ORIENTATION: LEFT
ORIENTATION: RIGHT;LEFT

## 2019-06-27 ASSESSMENT — PAIN DESCRIPTION - LOCATION
LOCATION: SCROTUM

## 2019-06-27 ASSESSMENT — PAIN DESCRIPTION - DESCRIPTORS
DESCRIPTORS: ACHING;CONSTANT;DISCOMFORT
DESCRIPTORS: ACHING;CONSTANT;DISCOMFORT
DESCRIPTORS: ACHING;CONSTANT
DESCRIPTORS: ACHING;CONSTANT

## 2019-06-27 NOTE — PROGRESS NOTES
[facility]  PROGRESS NOTE  ATTENDING NOTE    S:  Transferred from SICU. Doing ok. Pain controlled.   + flatus    O:  BP (!) 154/90   Pulse 78   Temp 98.4 °F (36.9 °C) (Temporal)   Resp 16   Ht 5' 5\" (1.651 m)   Wt 284 lb 11.2 oz (129.1 kg)   SpO2 96%   BMI 47.38 kg/m²   Gen:  NAD  Chest:  CTAB, RRR  Abd:  Soft, NT, ND  Left groin:  Cellulitis present, healthy subcutaneous tissue in wound    ASSESSMENT/PLAN:  NSTI of the left groin and perineum  --ID c/s  --c/w Abx, pharm to dose vanco  --glucose control    Camron Christy MD, MSc, FACS  6/27/2019  4:24 PM

## 2019-06-27 NOTE — CONSULTS
simple (N/A, 9/19/2018); Abdomen surgery (N/A, 6/24/2019); and EXCISION HIDRADENITIS OF INGUINAL / UMBILICAL AREA (N/A, 7/27/8361). Home Medications:    Prior to Admission medications    Medication Sig Start Date End Date Taking? Authorizing Provider   metFORMIN (GLUCOPHAGE) 500 MG tablet TAKE 1 TABLET BY MOUTH TWICE DAILY WITH MEALS 8/6/18   Tristan Pimentel MD       Allergies:  Patient has no known allergies. Social History:   reports that he has been smoking cigarettes. He has a 45.00 pack-year smoking history. He has never used smokeless tobacco. He reports that he has current or past drug history. Drug: Marijuana. He reports that he does not drink alcohol. Family History: family history includes Asthma in his sister; COPD in his brother and mother; Cancer in his maternal grandmother, mother, and paternal grandmother; Diabetes in his brother, father, and sister; Heart Disease in his maternal grandmother and paternal grandfather; Heart Disease (age of onset: 43) in his brother; Heart Disease (age of onset: 48) in his brother; Heart Disease (age of onset: 46) in his father. REVIEW OF SYSTEMS:    12 point ROS has been done and pertinent neg and positive has been included in HPI and rest are non contributory        PHYSICAL EXAM:    /86   Pulse 93   Temp 97 °F (36.1 °C) (Temporal)   Resp 16   Ht 5' 5\" (1.651 m)   Wt 284 lb 11.2 oz (129.1 kg)   SpO2 93%   BMI 47.38 kg/m²    VENT SETTINGS:   Vent Information  $Ventilation: $Subsequent Day  Ventilator Started: Yes  Equipment ID: [id number]  Vent Type: 980  Vent Mode: PS  Vt Ordered: 0 mL  Rate Set: 0 bmp  Peak Flow: 0 L/min  Pressure Support: 10 cmH20  FiO2 : 40 %  Sensitivity: 3  PEEP/CPAP: 5  I Time/ I Time %: 0 s  Humidification Source: Heated wire  Humidification Temp: 37    General appearance: Alert, Awake, Oriented times 3, no distress  Skin: Perineal dressing in place  Eyes: Pink palpebral conjunctivae.  PERRL  Ears: External ears

## 2019-06-27 NOTE — PROGRESS NOTES
transfers. Did not follow commands to maintain safety. Assisted to EOB and immediately pt stood and would not sit down to EOB d/t pain - pt c/o dizziness in standing. Explained to pt that if he sat down the dizziness may subside but continued to stand anyway. Max verbal cues needed to prevent lines from being pulled d/t pt attempting to stand and turn on his own. Pt experienced x1 LOB standing and was assisted back onto EOB but immediately stood up again. Completed a few steps forward and backward with [address]. Pt then returned to side lying in bed without assist. Pt with all needs met and call light within reach. Pt will benefit from further skilled PT to address functional deficits described above. Pts/ family goals   1. None stated     Patient and or family understand(s) diagnosis, prognosis, and plan of care. Yes     PLAN  --PT care will be provided in accordance with the objectives noted above. Whenever appropriate, clear delegation orders will be provided for nursing staff. Exercises and functional mobility practice will be used as well as appropriate assistive devices or modalities to obtain goals. Patient and family education will also be administered as needed. --Frequency of treatments: 2-5x/week x 7-10 days.     Time in  0730  Time out  Na Dawood 135, PT, DPT  [id number]

## 2019-06-27 NOTE — PROGRESS NOTES
Spoke with Graciela from ID office ans notified her of new consult. Waiting on call back to hear accepting physician.

## 2019-06-28 LAB
ANION GAP SERPL CALCULATED.3IONS-SCNC: 14 MMOL/L (ref 7–16)
BUN BLDV-MCNC: 5 MG/DL (ref 6–20)
CALCIUM SERPL-MCNC: 8.4 MG/DL (ref 8.6–10.2)
CHLORIDE BLD-SCNC: 102 MMOL/L (ref 98–107)
CO2: 25 MMOL/L (ref 22–29)
CREAT SERPL-MCNC: 0.7 MG/DL (ref 0.7–1.2)
GFR AFRICAN AMERICAN: >60
GFR NON-AFRICAN AMERICAN: >60 ML/MIN/1.73
GLUCOSE BLD-MCNC: 107 MG/DL (ref 74–99)
HCT VFR BLD CALC: 32.7 % (ref 37–54)
HEMOGLOBIN: 10.7 G/DL (ref 12.5–16.5)
MCH RBC QN AUTO: 29.7 PG (ref 26–35)
MCHC RBC AUTO-ENTMCNC: 32.7 % (ref 32–34.5)
MCV RBC AUTO: 90.8 FL (ref 80–99.9)
METER GLUCOSE: 109 MG/DL (ref 74–99)
METER GLUCOSE: 111 MG/DL (ref 74–99)
METER GLUCOSE: 116 MG/DL (ref 74–99)
METER GLUCOSE: 146 MG/DL (ref 74–99)
METER GLUCOSE: 195 MG/DL (ref 74–99)
PDW BLD-RTO: 13.2 FL (ref 11.5–15)
PLATELET # BLD: 230 E9/L (ref 130–450)
PMV BLD AUTO: 10.3 FL (ref 7–12)
POTASSIUM SERPL-SCNC: 3.5 MMOL/L (ref 3.5–5)
RBC # BLD: 3.6 E12/L (ref 3.8–5.8)
SODIUM BLD-SCNC: 141 MMOL/L (ref 132–146)
VANCOMYCIN TROUGH: 11.1 MCG/ML (ref 5–16)
WBC # BLD: 11.4 E9/L (ref 4.5–11.5)

## 2019-06-28 PROCEDURE — 2580000003 HC RX 258: Performed by: STUDENT IN AN ORGANIZED HEALTH CARE EDUCATION/TRAINING PROGRAM

## 2019-06-28 PROCEDURE — 6360000002 HC RX W HCPCS: Performed by: STUDENT IN AN ORGANIZED HEALTH CARE EDUCATION/TRAINING PROGRAM

## 2019-06-28 PROCEDURE — 6370000000 HC RX 637 (ALT 250 FOR IP): Performed by: STUDENT IN AN ORGANIZED HEALTH CARE EDUCATION/TRAINING PROGRAM

## 2019-06-28 PROCEDURE — 36415 COLL VENOUS BLD VENIPUNCTURE: CPT

## 2019-06-28 PROCEDURE — 82962 GLUCOSE BLOOD TEST: CPT

## 2019-06-28 PROCEDURE — 99024 POSTOP FOLLOW-UP VISIT: CPT | Performed by: SURGERY

## 2019-06-28 PROCEDURE — 2140000000 HC CCU INTERMEDIATE R&B

## 2019-06-28 PROCEDURE — 80202 ASSAY OF VANCOMYCIN: CPT

## 2019-06-28 PROCEDURE — 80048 BASIC METABOLIC PNL TOTAL CA: CPT

## 2019-06-28 PROCEDURE — 6360000002 HC RX W HCPCS

## 2019-06-28 PROCEDURE — C9113 INJ PANTOPRAZOLE SODIUM, VIA: HCPCS | Performed by: STUDENT IN AN ORGANIZED HEALTH CARE EDUCATION/TRAINING PROGRAM

## 2019-06-28 PROCEDURE — 2580000003 HC RX 258

## 2019-06-28 PROCEDURE — 36592 COLLECT BLOOD FROM PICC: CPT

## 2019-06-28 PROCEDURE — 6360000002 HC RX W HCPCS: Performed by: SURGERY

## 2019-06-28 PROCEDURE — 85027 COMPLETE CBC AUTOMATED: CPT

## 2019-06-28 PROCEDURE — 2700000000 HC OXYGEN THERAPY PER DAY

## 2019-06-28 RX ADMIN — MORPHINE SULFATE 2 MG: 2 INJECTION, SOLUTION INTRAMUSCULAR; INTRAVENOUS at 23:53

## 2019-06-28 RX ADMIN — OXYCODONE HYDROCHLORIDE 5 MG: 5 TABLET ORAL at 22:22

## 2019-06-28 RX ADMIN — VANCOMYCIN HYDROCHLORIDE 2000 MG: 10 INJECTION, POWDER, LYOPHILIZED, FOR SOLUTION INTRAVENOUS at 09:49

## 2019-06-28 RX ADMIN — ENOXAPARIN SODIUM 40 MG: 40 INJECTION SUBCUTANEOUS at 21:10

## 2019-06-28 RX ADMIN — INSULIN LISPRO 3 UNITS: 100 INJECTION, SOLUTION INTRAVENOUS; SUBCUTANEOUS at 02:32

## 2019-06-28 RX ADMIN — Medication 10 ML: at 00:57

## 2019-06-28 RX ADMIN — PIPERACILLIN SODIUM,TAZOBACTAM SODIUM 3.38 G: 3; .375 INJECTION, POWDER, FOR SOLUTION INTRAVENOUS at 06:25

## 2019-06-28 RX ADMIN — PANTOPRAZOLE SODIUM 40 MG: 40 INJECTION, POWDER, FOR SOLUTION INTRAVENOUS at 08:30

## 2019-06-28 RX ADMIN — Medication 10 ML: at 08:31

## 2019-06-28 RX ADMIN — MORPHINE SULFATE 2 MG: 2 INJECTION, SOLUTION INTRAMUSCULAR; INTRAVENOUS at 08:31

## 2019-06-28 RX ADMIN — ENOXAPARIN SODIUM 40 MG: 40 INJECTION SUBCUTANEOUS at 08:30

## 2019-06-28 RX ADMIN — Medication 10 ML: at 21:12

## 2019-06-28 RX ADMIN — VANCOMYCIN HYDROCHLORIDE 2250 MG: 10 INJECTION, POWDER, LYOPHILIZED, FOR SOLUTION INTRAVENOUS at 21:10

## 2019-06-28 RX ADMIN — PIPERACILLIN SODIUM,TAZOBACTAM SODIUM 3.38 G: 3; .375 INJECTION, POWDER, FOR SOLUTION INTRAVENOUS at 13:26

## 2019-06-28 RX ADMIN — PIPERACILLIN SODIUM,TAZOBACTAM SODIUM 3.38 G: 3; .375 INJECTION, POWDER, FOR SOLUTION INTRAVENOUS at 22:22

## 2019-06-28 RX ADMIN — INSULIN LISPRO 3 UNITS: 100 INJECTION, SOLUTION INTRAVENOUS; SUBCUTANEOUS at 11:33

## 2019-06-28 RX ADMIN — Medication 10 ML: at 02:51

## 2019-06-28 RX ADMIN — OXYCODONE HYDROCHLORIDE 5 MG: 5 TABLET ORAL at 08:02

## 2019-06-28 RX ADMIN — HYOSCYAMINE SULFATE: 16 SOLUTION at 09:00

## 2019-06-28 ASSESSMENT — PAIN DESCRIPTION - ORIENTATION
ORIENTATION: LEFT

## 2019-06-28 ASSESSMENT — PAIN SCALES - GENERAL
PAINLEVEL_OUTOF10: 0
PAINLEVEL_OUTOF10: 6
PAINLEVEL_OUTOF10: 8
PAINLEVEL_OUTOF10: 5
PAINLEVEL_OUTOF10: 7
PAINLEVEL_OUTOF10: 5
PAINLEVEL_OUTOF10: 6
PAINLEVEL_OUTOF10: 6
PAINLEVEL_OUTOF10: 7

## 2019-06-28 ASSESSMENT — PAIN DESCRIPTION - LOCATION
LOCATION: SCROTUM

## 2019-06-28 ASSESSMENT — PAIN - FUNCTIONAL ASSESSMENT
PAIN_FUNCTIONAL_ASSESSMENT: PREVENTS OR INTERFERES SOME ACTIVE ACTIVITIES AND ADLS

## 2019-06-28 ASSESSMENT — PAIN DESCRIPTION - DESCRIPTORS
DESCRIPTORS: ACHING;DISCOMFORT
DESCRIPTORS: ACHING;DISCOMFORT;CONSTANT
DESCRIPTORS: ACHING;DISCOMFORT;CONSTANT

## 2019-06-28 ASSESSMENT — PAIN DESCRIPTION - PAIN TYPE
TYPE: ACUTE PAIN;SURGICAL PAIN

## 2019-06-28 ASSESSMENT — PAIN DESCRIPTION - FREQUENCY
FREQUENCY: CONTINUOUS

## 2019-06-28 ASSESSMENT — PAIN DESCRIPTION - PROGRESSION
CLINICAL_PROGRESSION: GRADUALLY IMPROVING
CLINICAL_PROGRESSION: GRADUALLY WORSENING
CLINICAL_PROGRESSION: GRADUALLY WORSENING

## 2019-06-28 ASSESSMENT — PAIN DESCRIPTION - ONSET
ONSET: ON-GOING

## 2019-06-28 NOTE — PROGRESS NOTES
Pharmacy Consultation Note  (Antibiotic Dosing and Monitoring)    Initial consult date: 19  Consulting physician: Dr Dave Woodward  Drug(s): Vancomycin   Indication: NSTI    Ht Readings from Last 1 Encounters:   19 5' 5\" (1.651 m)     Wt Readings from Last 1 Encounters:   19 286 lb 4.8 oz (129.9 kg)       Age/  Gender Actual BW IBW DW  Allergy Information   52 y.o. male 128.5 kg 61.5 kg 87.1 kg  Patient has no known allergies. Date  WBC BUN/CR Drug/Dose Time   Given Level(s)   (Time) Comments   19  Day #1 23.8 10.8 Vancomycin 2500 mg IV x1 dose 2141       #2 20.2 12/1 Vancomycin 1500 mg IV q12h 1020  2213  Pharmacy consulted     #3 14.1 8/0.7 Vancomycin 1500 mg IV q12h    Vancomycin 2000 mg IV q12h 0908        1931 Vanco trough 7.4 mcg/mL @0920      #4 12.2 6/0.9 Vancomycin 2000 mg IV q12h 0909  2217       #5 11.4 5/0.7 Vancomycin 2000 mg IV q12h    Vancomycin 2250 mg IV q12h 0949        <2100> Vanco trough 11.1 mcg/mL @0840      Estimated Creatinine Clearance: 161 mL/min (based on SCr of 0.7 mg/dL).       Intake/Output Summary (Last 24 hours) at 2019 1028  Last data filed at 2019 6311  Gross per 24 hour   Intake 1767 ml   Output 3825 ml   Net -2058 ml     Urine output over the last 24 hours: 1.2 mL/kg/hr (3825 mL total)    Diuretics ordered in the last 24 hours: N/A      Temp max: Temp (24hrs), Av.9 °F (36.6 °C), Min:97.2 °F (36.2 °C), Max:98.4 °F (36.9 °C)      Cultures:  available culture and sensitivity results were reviewed in [facility]     Blood cx's - Prelim no growth     Urine cx - No growth final     Surgical cx (perineal abscess) - Light growth Coag Neg Staph (Oxacillin R);          evaluating for anaerobes     Nares - MRSA not isolated    Assessment:  · 51 yo M admitted with scrotal pain and found to have an NSTI s/p excisional debridement of perineum and L scrotum on   · Empiric antibiotics initiated - Piperacillin/tazobactam and

## 2019-06-28 NOTE — PROGRESS NOTES
Patient up to bathroom during shift to shift hand off. Had a normal large BM. Denies increased scrotal pain.   Tolerated well

## 2019-06-29 LAB
ANAEROBIC CULTURE: ABNORMAL
ANION GAP SERPL CALCULATED.3IONS-SCNC: 12 MMOL/L (ref 7–16)
BLOOD CULTURE, ROUTINE: NORMAL
BLOOD CULTURE, ROUTINE: NORMAL
BUN BLDV-MCNC: 5 MG/DL (ref 6–20)
CALCIUM SERPL-MCNC: 8.6 MG/DL (ref 8.6–10.2)
CHLORIDE BLD-SCNC: 102 MMOL/L (ref 98–107)
CO2: 27 MMOL/L (ref 22–29)
CREAT SERPL-MCNC: 0.9 MG/DL (ref 0.7–1.2)
GFR AFRICAN AMERICAN: >60
GFR NON-AFRICAN AMERICAN: >60 ML/MIN/1.73
GLUCOSE BLD-MCNC: 111 MG/DL (ref 74–99)
HCT VFR BLD CALC: 33.2 % (ref 37–54)
HEMOGLOBIN: 10.9 G/DL (ref 12.5–16.5)
MCH RBC QN AUTO: 29.4 PG (ref 26–35)
MCHC RBC AUTO-ENTMCNC: 32.8 % (ref 32–34.5)
MCV RBC AUTO: 89.5 FL (ref 80–99.9)
METER GLUCOSE: 120 MG/DL (ref 74–99)
METER GLUCOSE: 124 MG/DL (ref 74–99)
METER GLUCOSE: 126 MG/DL (ref 74–99)
METER GLUCOSE: 146 MG/DL (ref 74–99)
METER GLUCOSE: 149 MG/DL (ref 74–99)
METER GLUCOSE: 151 MG/DL (ref 74–99)
METER GLUCOSE: 170 MG/DL (ref 74–99)
ORGANISM: ABNORMAL
PDW BLD-RTO: 13.1 FL (ref 11.5–15)
PLATELET # BLD: 274 E9/L (ref 130–450)
PMV BLD AUTO: 10.4 FL (ref 7–12)
POTASSIUM SERPL-SCNC: 3.5 MMOL/L (ref 3.5–5)
RBC # BLD: 3.71 E12/L (ref 3.8–5.8)
SODIUM BLD-SCNC: 141 MMOL/L (ref 132–146)
WBC # BLD: 11.6 E9/L (ref 4.5–11.5)

## 2019-06-29 PROCEDURE — 2720000010 HC SURG SUPPLY STERILE

## 2019-06-29 PROCEDURE — 6370000000 HC RX 637 (ALT 250 FOR IP): Performed by: STUDENT IN AN ORGANIZED HEALTH CARE EDUCATION/TRAINING PROGRAM

## 2019-06-29 PROCEDURE — 2140000000 HC CCU INTERMEDIATE R&B

## 2019-06-29 PROCEDURE — C9113 INJ PANTOPRAZOLE SODIUM, VIA: HCPCS | Performed by: STUDENT IN AN ORGANIZED HEALTH CARE EDUCATION/TRAINING PROGRAM

## 2019-06-29 PROCEDURE — 99024 POSTOP FOLLOW-UP VISIT: CPT | Performed by: SURGERY

## 2019-06-29 PROCEDURE — 76937 US GUIDE VASCULAR ACCESS: CPT

## 2019-06-29 PROCEDURE — 36569 INSJ PICC 5 YR+ W/O IMAGING: CPT

## 2019-06-29 PROCEDURE — 6360000002 HC RX W HCPCS: Performed by: STUDENT IN AN ORGANIZED HEALTH CARE EDUCATION/TRAINING PROGRAM

## 2019-06-29 PROCEDURE — 6360000002 HC RX W HCPCS

## 2019-06-29 PROCEDURE — 6360000002 HC RX W HCPCS: Performed by: SURGERY

## 2019-06-29 PROCEDURE — 2580000003 HC RX 258

## 2019-06-29 PROCEDURE — 36415 COLL VENOUS BLD VENIPUNCTURE: CPT

## 2019-06-29 PROCEDURE — 82962 GLUCOSE BLOOD TEST: CPT

## 2019-06-29 PROCEDURE — 2580000003 HC RX 258: Performed by: STUDENT IN AN ORGANIZED HEALTH CARE EDUCATION/TRAINING PROGRAM

## 2019-06-29 PROCEDURE — C1751 CATH, INF, PER/CENT/MIDLINE: HCPCS

## 2019-06-29 PROCEDURE — 80048 BASIC METABOLIC PNL TOTAL CA: CPT

## 2019-06-29 PROCEDURE — 85027 COMPLETE CBC AUTOMATED: CPT

## 2019-06-29 RX ORDER — HEPARIN SODIUM (PORCINE) LOCK FLUSH IV SOLN 100 UNIT/ML 100 UNIT/ML
3 SOLUTION INTRAVENOUS EVERY 12 HOURS SCHEDULED
Status: DISCONTINUED | OUTPATIENT
Start: 2019-06-29 | End: 2019-06-29 | Stop reason: SDUPTHER

## 2019-06-29 RX ORDER — HEPARIN SODIUM (PORCINE) LOCK FLUSH IV SOLN 100 UNIT/ML 100 UNIT/ML
3 SOLUTION INTRAVENOUS EVERY 12 HOURS SCHEDULED
Status: DISCONTINUED | OUTPATIENT
Start: 2019-06-29 | End: 2019-07-01 | Stop reason: HOSPADM

## 2019-06-29 RX ORDER — LIDOCAINE HYDROCHLORIDE 10 MG/ML
5 INJECTION, SOLUTION EPIDURAL; INFILTRATION; INTRACAUDAL; PERINEURAL ONCE
Status: DISCONTINUED | OUTPATIENT
Start: 2019-06-29 | End: 2019-07-01 | Stop reason: HOSPADM

## 2019-06-29 RX ORDER — HEPARIN SODIUM (PORCINE) LOCK FLUSH IV SOLN 100 UNIT/ML 100 UNIT/ML
SOLUTION INTRAVENOUS
Status: COMPLETED
Start: 2019-06-29 | End: 2019-06-29

## 2019-06-29 RX ORDER — LIDOCAINE HYDROCHLORIDE 10 MG/ML
5 INJECTION, SOLUTION EPIDURAL; INFILTRATION; INTRACAUDAL; PERINEURAL ONCE
Status: DISCONTINUED | OUTPATIENT
Start: 2019-06-29 | End: 2019-06-29 | Stop reason: SDUPTHER

## 2019-06-29 RX ORDER — SODIUM CHLORIDE 0.9 % (FLUSH) 0.9 %
10 SYRINGE (ML) INJECTION PRN
Status: DISCONTINUED | OUTPATIENT
Start: 2019-06-29 | End: 2019-06-29 | Stop reason: SDUPTHER

## 2019-06-29 RX ORDER — HEPARIN SODIUM (PORCINE) LOCK FLUSH IV SOLN 100 UNIT/ML 100 UNIT/ML
3 SOLUTION INTRAVENOUS PRN
Status: DISCONTINUED | OUTPATIENT
Start: 2019-06-29 | End: 2019-07-01 | Stop reason: HOSPADM

## 2019-06-29 RX ORDER — HEPARIN SODIUM (PORCINE) LOCK FLUSH IV SOLN 100 UNIT/ML 100 UNIT/ML
3 SOLUTION INTRAVENOUS PRN
Status: DISCONTINUED | OUTPATIENT
Start: 2019-06-29 | End: 2019-06-29 | Stop reason: SDUPTHER

## 2019-06-29 RX ADMIN — OXYCODONE HYDROCHLORIDE 5 MG: 5 TABLET ORAL at 23:03

## 2019-06-29 RX ADMIN — OXYCODONE HYDROCHLORIDE 5 MG: 5 TABLET ORAL at 14:27

## 2019-06-29 RX ADMIN — ENOXAPARIN SODIUM 40 MG: 40 INJECTION SUBCUTANEOUS at 20:13

## 2019-06-29 RX ADMIN — INSULIN LISPRO 3 UNITS: 100 INJECTION, SOLUTION INTRAVENOUS; SUBCUTANEOUS at 00:56

## 2019-06-29 RX ADMIN — VANCOMYCIN HYDROCHLORIDE 2250 MG: 10 INJECTION, POWDER, LYOPHILIZED, FOR SOLUTION INTRAVENOUS at 21:34

## 2019-06-29 RX ADMIN — PIPERACILLIN SODIUM,TAZOBACTAM SODIUM 3.38 G: 3; .375 INJECTION, POWDER, FOR SOLUTION INTRAVENOUS at 14:57

## 2019-06-29 RX ADMIN — Medication 10 ML: at 09:36

## 2019-06-29 RX ADMIN — VANCOMYCIN HYDROCHLORIDE 2250 MG: 10 INJECTION, POWDER, LYOPHILIZED, FOR SOLUTION INTRAVENOUS at 09:35

## 2019-06-29 RX ADMIN — PANTOPRAZOLE SODIUM 40 MG: 40 INJECTION, POWDER, FOR SOLUTION INTRAVENOUS at 09:36

## 2019-06-29 RX ADMIN — HYOSCYAMINE SULFATE: 16 SOLUTION at 14:57

## 2019-06-29 RX ADMIN — HEPARIN 300 UNITS: 100 SYRINGE at 20:15

## 2019-06-29 RX ADMIN — HEPARIN 500 UNITS: 100 SYRINGE at 06:08

## 2019-06-29 RX ADMIN — HEPARIN 300 UNITS: 100 SYRINGE at 09:37

## 2019-06-29 RX ADMIN — PIPERACILLIN SODIUM,TAZOBACTAM SODIUM 3.38 G: 3; .375 INJECTION, POWDER, FOR SOLUTION INTRAVENOUS at 06:04

## 2019-06-29 RX ADMIN — PIPERACILLIN SODIUM,TAZOBACTAM SODIUM 3.38 G: 3; .375 INJECTION, POWDER, FOR SOLUTION INTRAVENOUS at 21:34

## 2019-06-29 RX ADMIN — ENOXAPARIN SODIUM 40 MG: 40 INJECTION SUBCUTANEOUS at 09:36

## 2019-06-29 RX ADMIN — Medication 10 ML: at 20:14

## 2019-06-29 RX ADMIN — INSULIN LISPRO 3 UNITS: 100 INJECTION, SOLUTION INTRAVENOUS; SUBCUTANEOUS at 23:57

## 2019-06-29 ASSESSMENT — PAIN DESCRIPTION - ONSET
ONSET: ON-GOING

## 2019-06-29 ASSESSMENT — PAIN DESCRIPTION - LOCATION
LOCATION: SCROTUM
LOCATION: GROIN
LOCATION: GROIN
LOCATION: SCROTUM

## 2019-06-29 ASSESSMENT — PAIN SCALES - GENERAL
PAINLEVEL_OUTOF10: 4
PAINLEVEL_OUTOF10: 6
PAINLEVEL_OUTOF10: 0
PAINLEVEL_OUTOF10: 6
PAINLEVEL_OUTOF10: 3
PAINLEVEL_OUTOF10: 6
PAINLEVEL_OUTOF10: 4
PAINLEVEL_OUTOF10: 2

## 2019-06-29 ASSESSMENT — PAIN - FUNCTIONAL ASSESSMENT
PAIN_FUNCTIONAL_ASSESSMENT: ACTIVITIES ARE NOT PREVENTED

## 2019-06-29 ASSESSMENT — PAIN DESCRIPTION - PROGRESSION
CLINICAL_PROGRESSION: NOT CHANGED

## 2019-06-29 ASSESSMENT — PAIN DESCRIPTION - DESCRIPTORS
DESCRIPTORS: BURNING;DISCOMFORT;DULL
DESCRIPTORS: ACHING;DISCOMFORT
DESCRIPTORS: ACHING;DISCOMFORT;CONSTANT
DESCRIPTORS: ACHING;DISCOMFORT;CONSTANT

## 2019-06-29 ASSESSMENT — PAIN DESCRIPTION - PAIN TYPE
TYPE: ACUTE PAIN;SURGICAL PAIN
TYPE: ACUTE PAIN
TYPE: ACUTE PAIN;SURGICAL PAIN
TYPE: ACUTE PAIN

## 2019-06-29 ASSESSMENT — PAIN DESCRIPTION - ORIENTATION
ORIENTATION: LEFT
ORIENTATION: LEFT

## 2019-06-29 ASSESSMENT — PAIN DESCRIPTION - FREQUENCY
FREQUENCY: CONTINUOUS
FREQUENCY: CONTINUOUS

## 2019-06-29 NOTE — PROGRESS NOTES
[facility]  PROGRESS NOTE  ATTENDING NOTE    S:  Feeling better today    O:  BP (!) 154/73   Pulse 74   Temp 97 °F (36.1 °C) (Temporal)   Resp 17   Ht 5' 5\" (1.651 m)   Wt 286 lb 4.8 oz (129.9 kg)   SpO2 95%   BMI 47.64 kg/m²   Gen:  NAD  Chest:  CTAB, RRR  Abd:  Soft, NT, ND  Left groin:  Cellulitis present but improved, healthy subcutaneous tissue in wound    ASSESSMENT/PLAN:  NSTI of the left groin and perineum  --ID c/s--vanc and zosyn x 14days, will need PICC  --c/w Abx, pharm to dose vanco  --glucose control    Will need SNF placement for IV abx    Tami Robert MD, MSc, FACS  6/29/2019  8:39 AM

## 2019-06-29 NOTE — PROGRESS NOTES
Straight-tip; Non-latex-Output (mL): 450 mL  [REMOVED] Urethral Catheter Non-latex 16 fr-Output (mL): 2000 mL    DRAIN/TUBE OUTPUT:  [REMOVED] NG/OG/NJ/NE Tube Orogastric 18 fr Left mouth-Output (mL): 0 ml,     Assessment and Plan:         · Necrotizing skin and soft tissue infection of scrotal/ perineal area              -Status post I&D              -Wound cultures shows polymicrobial growth (Klebsiella pneumonia, anaerobic gram-positive cocci, anaerobic gram-positive karen, coagulase-negative staph)  · History of MSSA skin infection/carbuncle of back in September 2018  · DM type II     Plan  -Continue with vancomycin and Zosyn for now, (polymicrobial growth continue with broad coverage for now)  -Follow closely  - PICC line placed today  - van and zosyn for 10 days  -Case management to look for SNF placement              Electronically signed by Fercho Curtis MD on 6/29/2019 at 11:58 AM

## 2019-06-29 NOTE — PROGRESS NOTES
Pharmacy Consultation Note  (Antibiotic Dosing and Monitoring)    Initial consult date: 6/25/19  Consulting physician: Dr Na Balderas  Drug(s): Vancomycin   Indication: NSTI    Ht Readings from Last 1 Encounters:   06/25/19 5' 5\" (1.651 m)     Wt Readings from Last 1 Encounters:   06/28/19 286 lb 4.8 oz (129.9 kg)       Age/  Gender Actual BW IBW DW  Allergy Information   52 y.o. male 128.5 kg 61.5 kg 87.1 kg  Patient has no known allergies. Date  WBC BUN/CR Drug/Dose Time   Given Level(s)   (Time) Comments   6/24/19  Day #1 23.8 10.8 Vancomycin 2500 mg IV x1 dose 2141     6/25  #2 20.2 12/1 Vancomycin 1500 mg IV q12h 1020  2213  Pharmacy consulted   6/26  #3 14.1 8/0.7 Vancomycin 1500 mg IV q12h    Vancomycin 2000 mg IV q12h 0908        1931 Vanco trough 7.4 mcg/mL @0920    6/27  #4 12.2 6/0.9 Vancomycin 2000 mg IV q12h 0909  2217     6/28  #5 11.4 5/0.7 Vancomycin 2000 mg IV q12h    Vancomycin 2250 mg IV q12h 0949        2110 Vanco trough 11.1 mcg/mL @0840    6/29  #6 11.6  5/0.9  vancomycin 2250 mg IV q12 hr  0935       Estimated Creatinine Clearance: 125 mL/min (based on SCr of 0.9 mg/dL).       Intake/Output Summary (Last 24 hours) at 6/29/2019 1333  Last data filed at 6/29/2019 1036  Gross per 24 hour   Intake 730 ml   Output 4805 ml   Net -4075 ml     Urine output over the last 24 hours: 1.2 mL/kg/hr (3655 mL total)    Diuretics ordered in the last 24 hours: N/A      Temp max: afebrile    Cultures:    6/24 Blood cx's - Prelim no growth  6/24 Urine cx - No growth final  6/25: perineal body fluid culture:  heavy kleb (S) zosyn, anaerobic GNR, anaerobic GPC, anaerobic GPR  6/25 Surgical cx (perineal abscess) -coag neg staph (R) oxacillin, (S) vanco  6/25 Nares - MRSA not isolated    Assessment:  · 51 yo M admitted with scrotal pain and found to have an NSTI s/p excisional debridement of perineum and L scrotum on 6/24  · Empiric antibiotics initiated - Piperacillin/tazobactam and Vancomycin day

## 2019-06-30 LAB
ANION GAP SERPL CALCULATED.3IONS-SCNC: 13 MMOL/L (ref 7–16)
BUN BLDV-MCNC: 8 MG/DL (ref 6–20)
CALCIUM SERPL-MCNC: 8.6 MG/DL (ref 8.6–10.2)
CHLORIDE BLD-SCNC: 105 MMOL/L (ref 98–107)
CO2: 27 MMOL/L (ref 22–29)
CREAT SERPL-MCNC: 1.3 MG/DL (ref 0.7–1.2)
GFR AFRICAN AMERICAN: >60
GFR NON-AFRICAN AMERICAN: 59 ML/MIN/1.73
GLUCOSE BLD-MCNC: 110 MG/DL (ref 74–99)
HCT VFR BLD CALC: 34 % (ref 37–54)
HEMOGLOBIN: 11.1 G/DL (ref 12.5–16.5)
MCH RBC QN AUTO: 29.3 PG (ref 26–35)
MCHC RBC AUTO-ENTMCNC: 32.6 % (ref 32–34.5)
MCV RBC AUTO: 89.7 FL (ref 80–99.9)
METER GLUCOSE: 105 MG/DL (ref 74–99)
METER GLUCOSE: 118 MG/DL (ref 74–99)
METER GLUCOSE: 128 MG/DL (ref 74–99)
METER GLUCOSE: 129 MG/DL (ref 74–99)
METER GLUCOSE: 144 MG/DL (ref 74–99)
METER GLUCOSE: 163 MG/DL (ref 74–99)
PDW BLD-RTO: 13.2 FL (ref 11.5–15)
PLATELET # BLD: 295 E9/L (ref 130–450)
PMV BLD AUTO: 10.2 FL (ref 7–12)
POTASSIUM SERPL-SCNC: 3.5 MMOL/L (ref 3.5–5)
RBC # BLD: 3.79 E12/L (ref 3.8–5.8)
SODIUM BLD-SCNC: 145 MMOL/L (ref 132–146)
VANCOMYCIN TROUGH: 27.8 MCG/ML (ref 5–16)
WBC # BLD: 12.9 E9/L (ref 4.5–11.5)

## 2019-06-30 PROCEDURE — 2580000003 HC RX 258: Performed by: STUDENT IN AN ORGANIZED HEALTH CARE EDUCATION/TRAINING PROGRAM

## 2019-06-30 PROCEDURE — 85027 COMPLETE CBC AUTOMATED: CPT

## 2019-06-30 PROCEDURE — 6360000002 HC RX W HCPCS: Performed by: STUDENT IN AN ORGANIZED HEALTH CARE EDUCATION/TRAINING PROGRAM

## 2019-06-30 PROCEDURE — 36592 COLLECT BLOOD FROM PICC: CPT

## 2019-06-30 PROCEDURE — 6360000002 HC RX W HCPCS: Performed by: SURGERY

## 2019-06-30 PROCEDURE — 82962 GLUCOSE BLOOD TEST: CPT

## 2019-06-30 PROCEDURE — 6370000000 HC RX 637 (ALT 250 FOR IP): Performed by: STUDENT IN AN ORGANIZED HEALTH CARE EDUCATION/TRAINING PROGRAM

## 2019-06-30 PROCEDURE — 6360000002 HC RX W HCPCS

## 2019-06-30 PROCEDURE — 36415 COLL VENOUS BLD VENIPUNCTURE: CPT

## 2019-06-30 PROCEDURE — 80048 BASIC METABOLIC PNL TOTAL CA: CPT

## 2019-06-30 PROCEDURE — C9113 INJ PANTOPRAZOLE SODIUM, VIA: HCPCS | Performed by: STUDENT IN AN ORGANIZED HEALTH CARE EDUCATION/TRAINING PROGRAM

## 2019-06-30 PROCEDURE — 80202 ASSAY OF VANCOMYCIN: CPT

## 2019-06-30 PROCEDURE — 2580000003 HC RX 258

## 2019-06-30 PROCEDURE — 99024 POSTOP FOLLOW-UP VISIT: CPT | Performed by: SURGERY

## 2019-06-30 PROCEDURE — 2060000000 HC ICU INTERMEDIATE R&B

## 2019-06-30 PROCEDURE — 6370000000 HC RX 637 (ALT 250 FOR IP): Performed by: SURGERY

## 2019-06-30 RX ORDER — ATORVASTATIN CALCIUM 40 MG/1
40 TABLET, FILM COATED ORAL NIGHTLY
Status: DISCONTINUED | OUTPATIENT
Start: 2019-06-30 | End: 2019-07-01 | Stop reason: HOSPADM

## 2019-06-30 RX ORDER — SODIUM CHLORIDE 9 MG/ML
INJECTION, SOLUTION INTRAVENOUS CONTINUOUS
Status: DISCONTINUED | OUTPATIENT
Start: 2019-06-30 | End: 2019-07-01 | Stop reason: HOSPADM

## 2019-06-30 RX ORDER — DEXTROSE MONOHYDRATE 50 MG/ML
INJECTION, SOLUTION INTRAVENOUS
Status: DISPENSED
Start: 2019-06-30 | End: 2019-06-30

## 2019-06-30 RX ADMIN — HEPARIN 300 UNITS: 100 SYRINGE at 09:03

## 2019-06-30 RX ADMIN — HYOSCYAMINE SULFATE: 16 SOLUTION at 14:36

## 2019-06-30 RX ADMIN — PIPERACILLIN SODIUM,TAZOBACTAM SODIUM 3.38 G: 3; .375 INJECTION, POWDER, FOR SOLUTION INTRAVENOUS at 06:05

## 2019-06-30 RX ADMIN — SODIUM CHLORIDE: 9 INJECTION, SOLUTION INTRAVENOUS at 10:09

## 2019-06-30 RX ADMIN — ATORVASTATIN CALCIUM 40 MG: 40 TABLET, FILM COATED ORAL at 20:43

## 2019-06-30 RX ADMIN — OXYCODONE HYDROCHLORIDE 10 MG: 10 TABLET ORAL at 14:36

## 2019-06-30 RX ADMIN — Medication 10 ML: at 20:43

## 2019-06-30 RX ADMIN — SODIUM CHLORIDE: 9 INJECTION, SOLUTION INTRAVENOUS at 20:43

## 2019-06-30 RX ADMIN — INSULIN LISPRO 3 UNITS: 100 INJECTION, SOLUTION INTRAVENOUS; SUBCUTANEOUS at 04:11

## 2019-06-30 RX ADMIN — MORPHINE SULFATE 2 MG: 2 INJECTION, SOLUTION INTRAMUSCULAR; INTRAVENOUS at 01:21

## 2019-06-30 RX ADMIN — Medication 10 ML: at 06:09

## 2019-06-30 RX ADMIN — VANCOMYCIN HYDROCHLORIDE 2250 MG: 10 INJECTION, POWDER, LYOPHILIZED, FOR SOLUTION INTRAVENOUS at 09:02

## 2019-06-30 RX ADMIN — OXYCODONE HYDROCHLORIDE 10 MG: 10 TABLET ORAL at 20:43

## 2019-06-30 RX ADMIN — HEPARIN 300 UNITS: 100 SYRINGE at 20:43

## 2019-06-30 RX ADMIN — PIPERACILLIN SODIUM,TAZOBACTAM SODIUM 3.38 G: 3; .375 INJECTION, POWDER, FOR SOLUTION INTRAVENOUS at 21:30

## 2019-06-30 RX ADMIN — PANTOPRAZOLE SODIUM 40 MG: 40 INJECTION, POWDER, FOR SOLUTION INTRAVENOUS at 09:03

## 2019-06-30 RX ADMIN — Medication 10 ML: at 09:02

## 2019-06-30 RX ADMIN — OXYCODONE HYDROCHLORIDE 5 MG: 5 TABLET ORAL at 03:11

## 2019-06-30 RX ADMIN — PIPERACILLIN SODIUM,TAZOBACTAM SODIUM 3.38 G: 3; .375 INJECTION, POWDER, FOR SOLUTION INTRAVENOUS at 14:36

## 2019-06-30 RX ADMIN — Medication 10 ML: at 06:11

## 2019-06-30 ASSESSMENT — PAIN DESCRIPTION - PAIN TYPE
TYPE: ACUTE PAIN

## 2019-06-30 ASSESSMENT — PAIN DESCRIPTION - PROGRESSION
CLINICAL_PROGRESSION: NOT CHANGED
CLINICAL_PROGRESSION: GRADUALLY IMPROVING
CLINICAL_PROGRESSION: GRADUALLY IMPROVING
CLINICAL_PROGRESSION: NOT CHANGED
CLINICAL_PROGRESSION: GRADUALLY IMPROVING

## 2019-06-30 ASSESSMENT — PAIN SCALES - GENERAL
PAINLEVEL_OUTOF10: 8
PAINLEVEL_OUTOF10: 7
PAINLEVEL_OUTOF10: 6
PAINLEVEL_OUTOF10: 3
PAINLEVEL_OUTOF10: 6
PAINLEVEL_OUTOF10: 5
PAINLEVEL_OUTOF10: 2
PAINLEVEL_OUTOF10: 5
PAINLEVEL_OUTOF10: 3

## 2019-06-30 ASSESSMENT — PAIN DESCRIPTION - ORIENTATION
ORIENTATION: LEFT

## 2019-06-30 ASSESSMENT — PAIN DESCRIPTION - LOCATION
LOCATION: GROIN

## 2019-06-30 ASSESSMENT — PAIN - FUNCTIONAL ASSESSMENT
PAIN_FUNCTIONAL_ASSESSMENT: ACTIVITIES ARE NOT PREVENTED
PAIN_FUNCTIONAL_ASSESSMENT: PREVENTS OR INTERFERES SOME ACTIVE ACTIVITIES AND ADLS

## 2019-06-30 ASSESSMENT — PAIN DESCRIPTION - DESCRIPTORS
DESCRIPTORS: BURNING;DISCOMFORT;DULL
DESCRIPTORS: ACHING;DISCOMFORT;DULL
DESCRIPTORS: BURNING;DISCOMFORT
DESCRIPTORS: BURNING;DISCOMFORT;DULL
DESCRIPTORS: BURNING;DISCOMFORT;DULL

## 2019-06-30 ASSESSMENT — PAIN DESCRIPTION - FREQUENCY
FREQUENCY: INTERMITTENT

## 2019-06-30 ASSESSMENT — PAIN DESCRIPTION - ONSET
ONSET: ON-GOING

## 2019-06-30 NOTE — PROGRESS NOTES
and Vancomycin day #6  · Consulted by Dr. Veronica Bear to dose/monitor Vancomycin  · Vanco trough 11.1 mcg/mL today; Goal trough level:  15-20 mcg/mL  · SCr 1.3 up from 0.9 today; UOP 1.2 mL/kg/hr    Plan:  · Recheck trough tonight since bump in SCr and hold dose if level > 20 mcg/mL  · UO still good but even if trough therapeutic plan to empirically reduce dose    Will continue to follow.       Le Greene, PharmD, BCPS, BCCCP  6/30/2019  1:27 PM

## 2019-06-30 NOTE — PROGRESS NOTES
06/30/2019    BILITOT 0.3 06/27/2019    ALKPHOS 70 06/27/2019    AST 14 06/27/2019    ALT 16 06/27/2019          Microbiology :  No results for input(s): BC in the last 72 hours. No results for input(s): Patti Libman in the last 72 hours. No results for input(s): LABURIN in the last 72 hours. No results for input(s): CULTRESP in the last 72 hours. No results for input(s): WNDABS in the last 72 hours. Radiology :  XR CHEST PORTABLE   Final Result   1. Stable, enlarged cardiomediastinal silhouette suspected   underlying mild central pulmonary vascular congestion      XR CHEST PORTABLE   Final Result   1. Stable, enlarged cardiomediastinal silhouette with an endotracheal   tube and NG tube as described above. 2. Suspected underlying mild central pulmonary vascular congestion      XR Chest Abdomen Ng Placement   Final Result   NG tube with its distal tip overlying the generalized   region of the stomach antrum. CT ABDOMEN PELVIS W IV CONTRAST Additional Contrast? None   Final Result   1. Extensive severe cellulitis spreading through fat planes along the   left scrotal region into the left inguinoscrotal area and into the   left para gluteal region where an abscess formation is seen more   posteriorly. 2.Presently there is no soft tissue air air associated with this   inflammatory process to full characterize necrotizing fasciitis which   is a potential development. Preliminary report given to Dr. Maty Jordan at the time of the   interpretation. ALERT:  ABNORMAL REPORT.            US SCROTUM AND TESTICLES   Final Result   The testicles are normal in appearance. Increased echogenic appearance in the upper left hemiscrotum could be   a hernia. The left scrotal wall is abnormally thickened and shows increased   vascularity that may represent cellulitis, but no discrete abscess was   identified ultrasonographically.                URINARY CATHETER OUTPUT ([place]):  [REMOVED] Urethral Catheter

## 2019-07-01 VITALS
OXYGEN SATURATION: 97 % | DIASTOLIC BLOOD PRESSURE: 87 MMHG | HEART RATE: 82 BPM | BODY MASS INDEX: 45.38 KG/M2 | TEMPERATURE: 98.3 F | RESPIRATION RATE: 20 BRPM | SYSTOLIC BLOOD PRESSURE: 137 MMHG | WEIGHT: 272.4 LBS | HEIGHT: 65 IN

## 2019-07-01 PROBLEM — D62 ACUTE BLOOD LOSS ANEMIA: Status: ACTIVE | Noted: 2019-07-01

## 2019-07-01 PROBLEM — N17.9 AKI (ACUTE KIDNEY INJURY) (HCC): Status: ACTIVE | Noted: 2019-07-01

## 2019-07-01 LAB
ANION GAP SERPL CALCULATED.3IONS-SCNC: 11 MMOL/L (ref 7–16)
ANION GAP SERPL CALCULATED.3IONS-SCNC: 9 MMOL/L (ref 7–16)
BUN BLDV-MCNC: 8 MG/DL (ref 6–20)
BUN BLDV-MCNC: 8 MG/DL (ref 6–20)
CALCIUM SERPL-MCNC: 7.8 MG/DL (ref 8.6–10.2)
CALCIUM SERPL-MCNC: 8.4 MG/DL (ref 8.6–10.2)
CHLORIDE BLD-SCNC: 106 MMOL/L (ref 98–107)
CHLORIDE BLD-SCNC: 109 MMOL/L (ref 98–107)
CO2: 27 MMOL/L (ref 22–29)
CO2: 28 MMOL/L (ref 22–29)
CREAT SERPL-MCNC: 1.4 MG/DL (ref 0.7–1.2)
CREAT SERPL-MCNC: 1.4 MG/DL (ref 0.7–1.2)
CREATININE URINE: 19 MG/DL (ref 40–278)
GFR AFRICAN AMERICAN: >60
GFR AFRICAN AMERICAN: >60
GFR NON-AFRICAN AMERICAN: 54 ML/MIN/1.73
GFR NON-AFRICAN AMERICAN: 54 ML/MIN/1.73
GLUCOSE BLD-MCNC: 109 MG/DL (ref 74–99)
GLUCOSE BLD-MCNC: 115 MG/DL (ref 74–99)
HCT VFR BLD CALC: 33.8 % (ref 37–54)
HEMOGLOBIN: 10.8 G/DL (ref 12.5–16.5)
MCH RBC QN AUTO: 29.7 PG (ref 26–35)
MCHC RBC AUTO-ENTMCNC: 32 % (ref 32–34.5)
MCV RBC AUTO: 92.9 FL (ref 80–99.9)
METER GLUCOSE: 116 MG/DL (ref 74–99)
PDW BLD-RTO: 13.2 FL (ref 11.5–15)
PLATELET # BLD: 287 E9/L (ref 130–450)
PMV BLD AUTO: 9.9 FL (ref 7–12)
POTASSIUM SERPL-SCNC: 3.3 MMOL/L (ref 3.5–5)
POTASSIUM SERPL-SCNC: 3.5 MMOL/L (ref 3.5–5)
RBC # BLD: 3.64 E12/L (ref 3.8–5.8)
SODIUM BLD-SCNC: 144 MMOL/L (ref 132–146)
SODIUM BLD-SCNC: 146 MMOL/L (ref 132–146)
SODIUM URINE: 35 MMOL/L
UREA NITROGEN, UR: 58 MG/DL (ref 800–1666)
VANCOMYCIN RANDOM: 18.5 MCG/ML (ref 5–40)
WBC # BLD: 11.7 E9/L (ref 4.5–11.5)

## 2019-07-01 PROCEDURE — 6360000002 HC RX W HCPCS

## 2019-07-01 PROCEDURE — 6360000002 HC RX W HCPCS: Performed by: STUDENT IN AN ORGANIZED HEALTH CARE EDUCATION/TRAINING PROGRAM

## 2019-07-01 PROCEDURE — 80202 ASSAY OF VANCOMYCIN: CPT

## 2019-07-01 PROCEDURE — 6370000000 HC RX 637 (ALT 250 FOR IP): Performed by: STUDENT IN AN ORGANIZED HEALTH CARE EDUCATION/TRAINING PROGRAM

## 2019-07-01 PROCEDURE — 82570 ASSAY OF URINE CREATININE: CPT

## 2019-07-01 PROCEDURE — 80048 BASIC METABOLIC PNL TOTAL CA: CPT

## 2019-07-01 PROCEDURE — 36415 COLL VENOUS BLD VENIPUNCTURE: CPT

## 2019-07-01 PROCEDURE — 84300 ASSAY OF URINE SODIUM: CPT

## 2019-07-01 PROCEDURE — 84540 ASSAY OF URINE/UREA-N: CPT

## 2019-07-01 PROCEDURE — 99232 SBSQ HOSP IP/OBS MODERATE 35: CPT | Performed by: SURGERY

## 2019-07-01 PROCEDURE — 97530 THERAPEUTIC ACTIVITIES: CPT

## 2019-07-01 PROCEDURE — 2580000003 HC RX 258: Performed by: STUDENT IN AN ORGANIZED HEALTH CARE EDUCATION/TRAINING PROGRAM

## 2019-07-01 PROCEDURE — 6370000000 HC RX 637 (ALT 250 FOR IP): Performed by: OTOLARYNGOLOGY

## 2019-07-01 PROCEDURE — 85027 COMPLETE CBC AUTOMATED: CPT

## 2019-07-01 PROCEDURE — 82962 GLUCOSE BLOOD TEST: CPT

## 2019-07-01 PROCEDURE — C9113 INJ PANTOPRAZOLE SODIUM, VIA: HCPCS | Performed by: STUDENT IN AN ORGANIZED HEALTH CARE EDUCATION/TRAINING PROGRAM

## 2019-07-01 PROCEDURE — 2580000003 HC RX 258

## 2019-07-01 RX ORDER — DOXYCYCLINE HYCLATE 100 MG/1
100 CAPSULE ORAL EVERY 12 HOURS SCHEDULED
Qty: 20 CAPSULE | Refills: 0 | Status: SHIPPED | OUTPATIENT
Start: 2019-07-01 | End: 2020-02-06 | Stop reason: SDUPTHER

## 2019-07-01 RX ORDER — CEFDINIR 300 MG/1
300 CAPSULE ORAL EVERY 12 HOURS SCHEDULED
Qty: 20 CAPSULE | Refills: 0 | Status: SHIPPED | OUTPATIENT
Start: 2019-07-01 | End: 2019-07-11

## 2019-07-01 RX ORDER — DOXYCYCLINE HYCLATE 100 MG/1
100 CAPSULE ORAL EVERY 12 HOURS SCHEDULED
Status: DISCONTINUED | OUTPATIENT
Start: 2019-07-01 | End: 2019-07-01 | Stop reason: HOSPADM

## 2019-07-01 RX ORDER — CEFDINIR 300 MG/1
300 CAPSULE ORAL EVERY 12 HOURS SCHEDULED
Status: DISCONTINUED | OUTPATIENT
Start: 2019-07-01 | End: 2019-07-01 | Stop reason: HOSPADM

## 2019-07-01 RX ORDER — METRONIDAZOLE 500 MG/1
500 TABLET ORAL 3 TIMES DAILY
Qty: 30 TABLET | Refills: 0 | Status: SHIPPED | OUTPATIENT
Start: 2019-07-01 | End: 2019-07-11

## 2019-07-01 RX ORDER — METRONIDAZOLE 500 MG/1
500 TABLET ORAL EVERY 8 HOURS SCHEDULED
Status: DISCONTINUED | OUTPATIENT
Start: 2019-07-01 | End: 2019-07-01 | Stop reason: HOSPADM

## 2019-07-01 RX ADMIN — OXYCODONE HYDROCHLORIDE 10 MG: 10 TABLET ORAL at 01:39

## 2019-07-01 RX ADMIN — VANCOMYCIN HYDROCHLORIDE 2000 MG: 10 INJECTION, POWDER, LYOPHILIZED, FOR SOLUTION INTRAVENOUS at 13:09

## 2019-07-01 RX ADMIN — OXYCODONE HYDROCHLORIDE 10 MG: 10 TABLET ORAL at 05:41

## 2019-07-01 RX ADMIN — HEPARIN 300 UNITS: 100 SYRINGE at 10:10

## 2019-07-01 RX ADMIN — PIPERACILLIN SODIUM,TAZOBACTAM SODIUM 3.38 G: 3; .375 INJECTION, POWDER, FOR SOLUTION INTRAVENOUS at 05:33

## 2019-07-01 RX ADMIN — Medication 10 ML: at 10:10

## 2019-07-01 RX ADMIN — PANTOPRAZOLE SODIUM 40 MG: 40 INJECTION, POWDER, FOR SOLUTION INTRAVENOUS at 10:11

## 2019-07-01 RX ADMIN — MOMETASONE FUROATE AND FORMOTEROL FUMARATE DIHYDRATE 2 PUFF: 200; 5 AEROSOL RESPIRATORY (INHALATION) at 10:11

## 2019-07-01 RX ADMIN — OXYCODONE HYDROCHLORIDE 10 MG: 10 TABLET ORAL at 13:09

## 2019-07-01 ASSESSMENT — PAIN - FUNCTIONAL ASSESSMENT
PAIN_FUNCTIONAL_ASSESSMENT: ACTIVITIES ARE NOT PREVENTED

## 2019-07-01 ASSESSMENT — PAIN SCALES - GENERAL
PAINLEVEL_OUTOF10: 0
PAINLEVEL_OUTOF10: 4
PAINLEVEL_OUTOF10: 7
PAINLEVEL_OUTOF10: 8
PAINLEVEL_OUTOF10: 7

## 2019-07-01 ASSESSMENT — PAIN DESCRIPTION - PAIN TYPE
TYPE: ACUTE PAIN

## 2019-07-01 ASSESSMENT — PAIN DESCRIPTION - FREQUENCY
FREQUENCY: INTERMITTENT

## 2019-07-01 ASSESSMENT — PAIN DESCRIPTION - DESCRIPTORS
DESCRIPTORS: ACHING;DISCOMFORT

## 2019-07-01 ASSESSMENT — PAIN DESCRIPTION - LOCATION
LOCATION: GROIN

## 2019-07-01 ASSESSMENT — PAIN DESCRIPTION - ONSET
ONSET: GRADUAL
ONSET: AWAKENED FROM SLEEP
ONSET: AWAKENED FROM SLEEP

## 2019-07-01 NOTE — PROGRESS NOTES
Nutrition Assessment    Type and Reason for Visit: Initial    Nutrition Recommendations: Continue current diet, Start Ensure HP & Beto BID    Nutrition Assessment: Pt nutritionally at risk w/ increased nutrient needs for wound healing w/ noted NSTI s/p excisional debridement x2. Pt reports improving appetite w/ resolving nausea. Will provide ONS and monitor    Malnutrition Assessment:  · Malnutrition Status: No malnutrition  · Findings of the 6 clinical characteristics of malnutrition (Minimum of 2 out of 6 clinical characteristics is required to make the diagnosis of moderate or severe Protein Calorie Malnutrition based on AND/ASPEN Guidelines):  1. Energy Intake-Greater than 75% of estimated energy requirement, (x1 day w/ returning appetite)    2. Weight Loss-Unable to assess(d/t fluids)    3. Fat Loss-No significant subcutaneous fat loss    4. Muscle Loss-No significant muscle mass loss    5. Fluid Accumulation-No significant fluid accumulation    6.  Strength-Not measured    Nutrition Risk Level:  Moderate    Nutrient Needs:  · Estimated Daily Total Kcal: (MSJ 2031 x 1.2 SF)  · Estimated Daily Protein (g): 125-135(2.0-2.2 gm/kg IBW)  · Estimated Daily Total Fluid (ml/day):     Nutrition Diagnosis:   · Problem: Increased nutrient needs  · Etiology: related to Increased demand for energy/nutrients     Signs and symptoms:  as evidenced by Presence of wounds    Objective Information:  · Nutrition-Focused Physical Findings: pt alert in bed, soft abd, active BS, missing teeth, perineal edema, -I/Os, resolving nausea per pt  · Wound Type: Multiple, Surgical Wound, Open Wounds  · Current Nutrition Therapies:  · Oral Diet Orders: Carb Control 4 Carbs/Meal   · Oral Diet intake: %(RD student observed breakfast tray)  · Oral Nutrition Supplement (ONS) Orders: None  · Anthropometric Measures:  · Ht: 5' 5\" (165.1 cm)   · Current Body Wt: 272 lb (123.4 kg)(bed scale 6/30, UO updated wt d/t pt position)  · Admission Body Wt: 283 lb (128.4 kg)(bed scale 6/25)  · Usual Body Wt: 288 lb (130.6 kg)(actual per EMR 09/2018)  · % Weight Change:   noted 11# wt loss since adm w/ current fluids -7.7L at this time  · Ideal Body Wt: 136 lb (61.7 kg), % Ideal Body 200%  · BMI Classification: BMI > or equal to 40.0 Obese Class III    Nutrition Interventions:   Continued Inpatient Monitoring, Education Initiated, Coordination of Care(Reviewed ONS options/preferences)    Nutrition Evaluation:   · Evaluation: Goals set   · Goals: Consume >75% meals/ONS    · Monitoring: Meal Intake, Supplement Intake, Diet Tolerance, Skin Integrity, Wound Healing, I&O, Weight, Pertinent Labs, Monitor Bowel Function      Electronically signed by Sadie Barajas MS, RD, LD on 7/1/19 at 2:53 PM    Contact Number: [phone]

## 2019-07-01 NOTE — PROGRESS NOTES
This RN spent time at length providing education on why trauma is againt discharge, pt stated he is leaving AMA, papers signed, trauma resident notified

## 2019-07-01 NOTE — PROGRESS NOTES
[facility]  ATTENDING PHYSICIAN SURGERY PROGRESS NOTE     I have examined the patient, reviewed the record, and discussed the case with the APN/  Resident. I have reviewed all relevant labs and imaging data. The following summarizes my clinical findings and independent assessment. Chief Complaint   Patient presents with    Abscess     Pt felt an abscess under his testicle on Saturday, and the swelling and pain has gotten worse. Pt is unable to sit. Sweats and chills with. S: Patient feels great wants to be discharged     O:  Physical Exam   Constitutional: He is oriented to person, place, and time. He appears well-developed and well-nourished. HENT:   Head: Atraumatic. Eyes: EOM are normal.   Neck: Neck supple. Cardiovascular: Normal rate. Pulmonary/Chest: Effort normal. No respiratory distress. Abdominal: Soft. He exhibits no distension. Genitourinary:   Genitourinary Comments: Dressing in place    Musculoskeletal: Normal range of motion. Neurological: He is alert and oriented to person, place, and time. Skin: Skin is warm. Assessment   Active Problems:    Necrotizing soft tissue infection    HANS (acute kidney injury) ([address]    Acute blood loss anemia  Resolved Problems:    * No resolved hospital problems. *     6/25,6/26 Excisional debridement of perineum and left  scrotum.         Plan   Regular diet Senna,glycolax  Pain control -tylenol,Oxy   IVF,Monitor HANS Check FeNa ( Intrinsic - DC once Cr downtrends)   OT/PT    Aggressive pulmonary hygiene   ID on Board - Vanc/Zosyn changed to enteral Abx per ID   No indication for transfusion   PCDs, Lovenox   PICC   Cardiac-Lipator  Restart remaining home medications except Meformin,lisinipril and ibuprofen   PPI ( home medication)  Glucose Control    Dressing changes with Dakins , change to NS on DC     Dispo DC home once Cr down trends     Diana Watts MD

## 2019-07-01 NOTE — PLAN OF CARE
Problem: Skin Integrity:  Goal: Will show no infection signs and symptoms  Description  Will show no infection signs and symptoms  Outcome: Met This Shift  Goal: Absence of new skin breakdown  Description  Absence of new skin breakdown  Outcome: Met This Shift     Problem: Airway Clearance - Ineffective:  Goal: Ability to maintain a clear airway will improve  Description  Ability to maintain a clear airway will improve  7/1/2019 0012 by Mark Napier RN  Outcome: Met This Shift  6/30/2019 1902 by Zenobia Valdes RN  Outcome: Met This Shift     Problem: Breathing Pattern - Ineffective:  Goal: Ability to achieve and maintain a regular respiratory rate will improve  Description  Ability to achieve and maintain a regular respiratory rate will improve  Outcome: Met This Shift     Problem: Gas Exchange - Impaired:  Goal: Levels of oxygenation will improve  Description  Levels of oxygenation will improve  Outcome: Met This Shift     Problem: Pain:  Goal: Pain level will decrease  Description  Pain level will decrease  Outcome: Met This Shift  Goal: Recognizes and communicates pain  Description  Recognizes and communicates pain  Outcome: Met This Shift  Goal: Control of acute pain  Description  Control of acute pain  Outcome: Met This Shift     Problem: Risk for Impaired Skin Integrity  Goal: Tissue integrity - skin and mucous membranes  Description  Structural intactness and normal physiological function of skin and  mucous membranes.   Outcome: Met This Shift     Problem: Pain:  Goal: Pain level will decrease  Description  Pain level will decrease  Outcome: Met This Shift  Goal: Control of acute pain  Description  Control of acute pain  Outcome: Met This Shift

## 2019-07-01 NOTE — PROGRESS NOTES
Message sent to [facility] for discharge order[facility] and written scripts for pt

## 2019-07-01 NOTE — PLAN OF CARE
Problem: Increased nutrient needs (NI-5.1)  Goal: Food and/or Nutrient Delivery  Description: Ensure HP & bhanu BID  Individualized approach for food/nutrient provision.   Outcome: Met This Shift

## 2019-07-01 NOTE — PROGRESS NOTES
CLINICAL PHARMACY NOTE: MEDS TO [address] Select Patient?: No  Total # of Prescriptions Filled: 3   The following medications were delivered to the patient:  · Doxycycline Hyclate 100  · Metronidazole 500  · Cefdinir 300  Total # of Interventions Completed: 3  Time Spent (min): 30    Additional Documentation:

## 2019-07-02 ENCOUNTER — TELEPHONE (OUTPATIENT)
Dept: SURGERY | Age: 49
End: 2019-07-02

## 2019-07-02 NOTE — TELEPHONE ENCOUNTER
MA received a call from Homecare intake asking if the patient has an order for homecare and if / Francia Krishnamurthy is who will follow. Patient left the hospital AMA yesterday s/p Excisional Debridement of Perineum 06/26. Forwarding message to Dr. Francia Krishnamurthy for advisement.   Electronically signed by Ciarra Shirley on 7/2/19 at 2:22 PM

## 2019-07-02 NOTE — TELEPHONE ENCOUNTER
MA spoke with Roni Pacheco, RN at [facility] and informed Dr. Shan Ramirez will follow. She explained they needed to know what wound care was needed. MA placed her on hold and contacted Dr. Shan Ramirez via phone. Per Dr. Shan Ramirez verbal order, wet to dry daily dressing changes. Roni Pacheco understood.   Electronically signed by Mann Gordillo on 7/2/19 at 3:08 PM

## 2019-07-04 ENCOUNTER — HOSPITAL ENCOUNTER (EMERGENCY)
Age: 49
Discharge: HOME | End: 2019-07-04
Attending: EMERGENCY MEDICINE

## 2019-07-04 VITALS
TEMPERATURE: 99.1 F | WEIGHT: 280 LBS | RESPIRATION RATE: 16 BRPM | DIASTOLIC BLOOD PRESSURE: 92 MMHG | BODY MASS INDEX: 46.65 KG/M2 | SYSTOLIC BLOOD PRESSURE: 142 MMHG | OXYGEN SATURATION: 98 % | HEART RATE: 110 BPM | HEIGHT: 65 IN

## 2019-07-04 DIAGNOSIS — G89.18 ACUTE POST-OPERATIVE PAIN: Primary | ICD-10-CM

## 2019-07-04 PROCEDURE — 6370000000 HC RX 637 (ALT 250 FOR IP): Performed by: EMERGENCY MEDICINE

## 2019-07-04 PROCEDURE — 99283 EMERGENCY DEPT VISIT LOW MDM: CPT

## 2019-07-04 RX ORDER — HYDROCODONE BITARTRATE AND ACETAMINOPHEN 5; 325 MG/1; MG/1
2 TABLET ORAL ONCE
Status: COMPLETED | OUTPATIENT
Start: 2019-07-04 | End: 2019-07-04

## 2019-07-04 RX ORDER — HYDROCODONE BITARTRATE AND ACETAMINOPHEN 5; 325 MG/1; MG/1
1 TABLET ORAL EVERY 6 HOURS PRN
Qty: 20 TABLET | Refills: 0 | Status: SHIPPED | OUTPATIENT
Start: 2019-07-04 | End: 2019-07-09

## 2019-07-04 RX ADMIN — HYDROCODONE BITARTRATE AND ACETAMINOPHEN 2 TABLET: 5; 325 TABLET ORAL at 21:44

## 2019-07-04 ASSESSMENT — PAIN DESCRIPTION - PAIN TYPE: TYPE: ACUTE PAIN

## 2019-07-04 ASSESSMENT — PAIN SCALES - GENERAL
PAINLEVEL_OUTOF10: 8
PAINLEVEL_OUTOF10: 8

## 2019-07-04 ASSESSMENT — PAIN DESCRIPTION - LOCATION: LOCATION: GROIN

## 2019-07-04 ASSESSMENT — PAIN DESCRIPTION - FREQUENCY: FREQUENCY: CONTINUOUS

## 2019-07-04 ASSESSMENT — PAIN DESCRIPTION - DESCRIPTORS: DESCRIPTORS: ACHING;THROBBING;CONSTANT

## 2019-07-05 NOTE — ED PROVIDER NOTES
HPI:   Zeferino Alvarado is a 52 y.o. male presenting to the ED for joint pain. The patient left the hospital AGAINST MEDICAL ADVICE after surgery for necrotizing fasciitis with abscess formation. Patient was apparently all set to go home (they just put a dog to sleep) and the with his family but apparently had a slight elevation of his creatinine 1.4 instead of 1.2 and more the physicians which seems to stay overnight for IV fluids. However the patient, wanted to go and be with his family. While he signed out    ROS:   Unless otherwise stated in this report or unable to obtain because of the patient's clinical or mental status as evidenced by the medical record, this patients's positive and negative responses for Review of Systems, constitutional, psych, eyes, ENT, cardiovascular, respiratory, gastrointestinal, neurological, genitourinary, musculoskeletal, integument systems and systems related to the presenting problem are either stated in the preceding or were not pertinent or were negative for the symptoms and/or complaints related to the medical problem. --------------------------------------------- PAST HISTORY ---------------------------------------------  Past Medical History:  has a past medical history of Abdominal pain, Allergic rhinitis, Anxiety, Asthma, COPD (chronic obstructive pulmonary disease) ([address] Depression, Diabetes mellitus ([address] GERD (gastroesophageal reflux disease), Headache(784.0), Hyperlipidemia, Hypertension, Obesity, Osteoarthritis, and Sleep apnea. Past Surgical History:  has a past surgical history that includes Tonsillectomy (1983); hiatal hernia repair (2012); Tympanoplasty; Colonoscopy (06/08/2017); hernia repair (2012); Endoscopy, colon, diagnostic; pr drain skin abscess simple (N/A, 9/19/2018); Abdomen surgery (N/A, 6/24/2019); and EXCISION HIDRADENITIS OF INGUINAL / UMBILICAL AREA (N/A, 4/50/5898). Social History:  reports that he has been smoking cigarettes.   He has Extremities: Moves all extremities x 4. Warm and well perfused  Skin: warm and dry without rash  Neurologic: GCS 15,  Groin: Surgical site appears clean there would also appear to be wet-to-dry dressings in place. There is area of slight erythema over the pubis which does not appear to be frankly cellulitic. Psych: Normal Affect      ------------------------------ ED COURSE/MEDICAL DECISION MAKING----------------------  Medications - No data to display      Medical Decision Making:    I think the patient's aftercare has been slightly compromised by the fact that he left AMA. He does not yet have a follow-up appointment with the surgery clinic and I will attempt to remedy this by giving him the number of the surgery clinic. Home health care has already contacted Juma Dinh of course is still following this gentlemen's case. I will provide him with some pain control and he will continue his antibiotics. I will also give him a referral to the medical clinic as he lost his PCP when he lost medical coverage and he is a diabetic. Counseling: The emergency provider has spoken with the patient and discussed todays results, in addition to providing specific details for the plan of care and counseling regarding the diagnosis and prognosis. Questions are answered at this time and they are agreeable with the plan.      --------------------------------- IMPRESSION AND DISPOSITION ---------------------------------    IMPRESSION  1.  Acute post-operative pain        DISPOSITION  Disposition: Discharge to home  Patient condition is stable              Praful Kevin MD  07/04/19 4549

## 2019-07-08 ENCOUNTER — TELEPHONE (OUTPATIENT)
Dept: SURGERY | Age: 49
End: 2019-07-08

## 2019-07-18 ENCOUNTER — OFFICE VISIT (OUTPATIENT)
Dept: SURGERY | Age: 49
End: 2019-07-18

## 2019-07-18 VITALS
HEART RATE: 114 BPM | DIASTOLIC BLOOD PRESSURE: 82 MMHG | BODY MASS INDEX: 49.31 KG/M2 | WEIGHT: 296 LBS | HEIGHT: 65 IN | SYSTOLIC BLOOD PRESSURE: 138 MMHG

## 2019-07-18 DIAGNOSIS — Z09 POSTOPERATIVE EXAMINATION: Primary | ICD-10-CM

## 2019-07-18 PROCEDURE — 99024 POSTOP FOLLOW-UP VISIT: CPT | Performed by: SURGERY

## 2019-07-18 NOTE — PATIENT INSTRUCTIONS
PATIENT INSTRUCTIONS      ACTIVITY: Pt is to increase activities as tolerated. DIET: You may resume your normal diet. RETURN TO WORK/SCHOOL: May return to full duty immediately with no restrictions.     WOUND CARE: ok to take showers  Pack wound wet to dry daily

## 2019-09-12 ENCOUNTER — OFFICE VISIT (OUTPATIENT)
Dept: SURGERY | Age: 49
End: 2019-09-12

## 2019-09-12 VITALS
OXYGEN SATURATION: 98 % | HEART RATE: 90 BPM | HEIGHT: 64 IN | BODY MASS INDEX: 46.95 KG/M2 | WEIGHT: 275 LBS | SYSTOLIC BLOOD PRESSURE: 138 MMHG | RESPIRATION RATE: 16 BRPM | DIASTOLIC BLOOD PRESSURE: 98 MMHG

## 2019-09-12 DIAGNOSIS — Z48.89 ENCOUNTER FOR POSTOPERATIVE CARE: Primary | ICD-10-CM

## 2019-09-12 PROCEDURE — 99024 POSTOP FOLLOW-UP VISIT: CPT | Performed by: SURGERY

## 2019-11-15 DIAGNOSIS — J44.9 COPD (CHRONIC OBSTRUCTIVE PULMONARY DISEASE) (HCC): ICD-10-CM

## 2019-11-15 DIAGNOSIS — F17.200 SMOKER: ICD-10-CM

## 2019-11-15 DIAGNOSIS — J45.909 ASTHMA: ICD-10-CM

## 2019-11-15 DIAGNOSIS — G47.33 OSA (OBSTRUCTIVE SLEEP APNEA): ICD-10-CM

## 2019-11-15 RX ORDER — OMEPRAZOLE 40 MG/1
40 CAPSULE, DELAYED RELEASE ORAL DAILY
Qty: 30 CAPSULE | Refills: 3 | Status: SHIPPED
Start: 2019-11-15 | End: 2020-03-24 | Stop reason: SDUPTHER

## 2019-11-15 RX ORDER — ALBUTEROL SULFATE 90 UG/1
2 AEROSOL, METERED RESPIRATORY (INHALATION) EVERY 6 HOURS PRN
Qty: 1 INHALER | Refills: 3 | Status: SHIPPED
Start: 2019-11-15 | End: 2020-03-10 | Stop reason: SDUPTHER

## 2019-11-15 RX ORDER — FLUTICASONE FUROATE AND VILANTEROL 200; 25 UG/1; UG/1
1 POWDER RESPIRATORY (INHALATION) DAILY
Qty: 60 EACH | Refills: 1 | Status: SHIPPED | OUTPATIENT
Start: 2019-11-15 | End: 2020-01-08 | Stop reason: SDUPTHER

## 2019-11-15 RX ORDER — LISINOPRIL 40 MG/1
40 TABLET ORAL DAILY
Qty: 30 TABLET | Refills: 1 | Status: SHIPPED | OUTPATIENT
Start: 2019-11-15 | End: 2020-01-21

## 2019-11-20 ENCOUNTER — HOSPITAL ENCOUNTER (OUTPATIENT)
Age: 49
Discharge: HOME | End: 2019-11-22
Payer: COMMERCIAL

## 2019-11-20 ENCOUNTER — OFFICE VISIT (OUTPATIENT)
Dept: FAMILY MEDICINE CLINIC | Age: 49
End: 2019-11-20
Payer: COMMERCIAL

## 2019-11-20 VITALS
TEMPERATURE: 98 F | BODY MASS INDEX: 47.53 KG/M2 | OXYGEN SATURATION: 98 % | HEIGHT: 64 IN | RESPIRATION RATE: 17 BRPM | WEIGHT: 278.4 LBS | DIASTOLIC BLOOD PRESSURE: 84 MMHG | HEART RATE: 90 BPM | SYSTOLIC BLOOD PRESSURE: 131 MMHG

## 2019-11-20 DIAGNOSIS — Z13.31 POSITIVE DEPRESSION SCREENING: Primary | ICD-10-CM

## 2019-11-20 DIAGNOSIS — F41.8 DEPRESSION WITH ANXIETY: ICD-10-CM

## 2019-11-20 DIAGNOSIS — E13.69 OTHER SPECIFIED DIABETES MELLITUS WITH OTHER SPECIFIED COMPLICATION, UNSPECIFIED WHETHER LONG TERM INSULIN USE (HCC): ICD-10-CM

## 2019-11-20 DIAGNOSIS — M25.561 CHRONIC PAIN OF BOTH KNEES: ICD-10-CM

## 2019-11-20 DIAGNOSIS — G89.29 CHRONIC PAIN OF BOTH KNEES: ICD-10-CM

## 2019-11-20 DIAGNOSIS — Z23 NEED FOR INFLUENZA VACCINATION: ICD-10-CM

## 2019-11-20 DIAGNOSIS — M25.562 CHRONIC PAIN OF BOTH KNEES: ICD-10-CM

## 2019-11-20 DIAGNOSIS — Z00.00 ANNUAL PHYSICAL EXAM: ICD-10-CM

## 2019-11-20 LAB
ALBUMIN SERPL-MCNC: 4 G/DL (ref 3.5–5.2)
ALP BLD-CCNC: 98 U/L (ref 40–129)
ALT SERPL-CCNC: 73 U/L (ref 0–40)
ANION GAP SERPL CALCULATED.3IONS-SCNC: 17 MMOL/L (ref 7–16)
AST SERPL-CCNC: 51 U/L (ref 0–39)
BASOPHILS ABSOLUTE: 0.09 E9/L (ref 0–0.2)
BASOPHILS RELATIVE PERCENT: 0.9 % (ref 0–2)
BILIRUB SERPL-MCNC: 0.5 MG/DL (ref 0–1.2)
BUN BLDV-MCNC: 9 MG/DL (ref 6–20)
CALCIUM SERPL-MCNC: 9.7 MG/DL (ref 8.6–10.2)
CHLORIDE BLD-SCNC: 99 MMOL/L (ref 98–107)
CHOLESTEROL, TOTAL: 216 MG/DL (ref 0–199)
CO2: 22 MMOL/L (ref 22–29)
CREAT SERPL-MCNC: 0.8 MG/DL (ref 0.7–1.2)
EOSINOPHILS ABSOLUTE: 0.3 E9/L (ref 0.05–0.5)
EOSINOPHILS RELATIVE PERCENT: 3.1 % (ref 0–6)
GFR AFRICAN AMERICAN: >60
GFR NON-AFRICAN AMERICAN: >60 ML/MIN/1.73
GLUCOSE BLD-MCNC: 269 MG/DL (ref 74–99)
HBA1C MFR BLD: 9.5 % (ref 4–5.6)
HCT VFR BLD CALC: 55.1 % (ref 37–54)
HDLC SERPL-MCNC: 35 MG/DL
HEMOGLOBIN: 16.7 G/DL (ref 12.5–16.5)
IMMATURE GRANULOCYTES #: 0.07 E9/L
IMMATURE GRANULOCYTES %: 0.7 % (ref 0–5)
LDL CHOLESTEROL CALCULATED: 134 MG/DL (ref 0–99)
LYMPHOCYTES ABSOLUTE: 2.02 E9/L (ref 1.5–4)
LYMPHOCYTES RELATIVE PERCENT: 21.1 % (ref 20–42)
MCH RBC QN AUTO: 27.7 PG (ref 26–35)
MCHC RBC AUTO-ENTMCNC: 30.3 % (ref 32–34.5)
MCV RBC AUTO: 91.5 FL (ref 80–99.9)
MONOCYTES ABSOLUTE: 0.76 E9/L (ref 0.1–0.95)
MONOCYTES RELATIVE PERCENT: 7.9 % (ref 2–12)
NEUTROPHILS ABSOLUTE: 6.33 E9/L (ref 1.8–7.3)
NEUTROPHILS RELATIVE PERCENT: 66.3 % (ref 43–80)
PDW BLD-RTO: 14.2 FL (ref 11.5–15)
PLATELET # BLD: 207 E9/L (ref 130–450)
PMV BLD AUTO: 11.9 FL (ref 7–12)
POTASSIUM SERPL-SCNC: 4.6 MMOL/L (ref 3.5–5)
RBC # BLD: 6.02 E12/L (ref 3.8–5.8)
SODIUM BLD-SCNC: 138 MMOL/L (ref 132–146)
TOTAL PROTEIN: 7.8 G/DL (ref 6.4–8.3)
TRIGL SERPL-MCNC: 236 MG/DL (ref 0–149)
TSH SERPL DL<=0.05 MIU/L-ACNC: 1.96 UIU/ML (ref 0.27–4.2)
VLDLC SERPL CALC-MCNC: 47 MG/DL
WBC # BLD: 9.6 E9/L (ref 4.5–11.5)

## 2019-11-20 PROCEDURE — 90471 IMMUNIZATION ADMIN: CPT | Performed by: FAMILY MEDICINE

## 2019-11-20 PROCEDURE — 99396 PREV VISIT EST AGE 40-64: CPT | Performed by: FAMILY MEDICINE

## 2019-11-20 PROCEDURE — 80061 LIPID PANEL: CPT

## 2019-11-20 PROCEDURE — 90686 IIV4 VACC NO PRSV 0.5 ML IM: CPT | Performed by: FAMILY MEDICINE

## 2019-11-20 PROCEDURE — 85025 COMPLETE CBC W/AUTO DIFF WBC: CPT

## 2019-11-20 PROCEDURE — G8431 POS CLIN DEPRES SCRN F/U DOC: HCPCS | Performed by: FAMILY MEDICINE

## 2019-11-20 PROCEDURE — 80053 COMPREHEN METABOLIC PANEL: CPT

## 2019-11-20 PROCEDURE — G8482 FLU IMMUNIZE ORDER/ADMIN: HCPCS | Performed by: FAMILY MEDICINE

## 2019-11-20 PROCEDURE — 36415 COLL VENOUS BLD VENIPUNCTURE: CPT | Performed by: FAMILY MEDICINE

## 2019-11-20 PROCEDURE — 83036 HEMOGLOBIN GLYCOSYLATED A1C: CPT

## 2019-11-20 PROCEDURE — 84443 ASSAY THYROID STIM HORMONE: CPT

## 2019-11-20 PROCEDURE — 96160 PT-FOCUSED HLTH RISK ASSMT: CPT | Performed by: FAMILY MEDICINE

## 2019-11-20 RX ORDER — METRONIDAZOLE 250 MG/1
250 TABLET ORAL 3 TIMES DAILY
Qty: 42 TABLET | Refills: 0 | Status: SHIPPED | OUTPATIENT
Start: 2019-11-20 | End: 2019-12-04

## 2019-11-20 RX ORDER — CIPROFLOXACIN 500 MG/1
500 TABLET, FILM COATED ORAL 2 TIMES DAILY
Qty: 20 TABLET | Refills: 0 | Status: SHIPPED | OUTPATIENT
Start: 2019-11-20 | End: 2019-11-30

## 2019-11-20 RX ORDER — NICOTINE 21 MG/24HR
1 PATCH, TRANSDERMAL 24 HOURS TRANSDERMAL EVERY 24 HOURS
Qty: 30 PATCH | Refills: 1 | Status: SHIPPED
Start: 2019-11-20 | End: 2020-07-17

## 2019-11-20 ASSESSMENT — PATIENT HEALTH QUESTIONNAIRE - PHQ9
2. FEELING DOWN, DEPRESSED OR HOPELESS: 2
SUM OF ALL RESPONSES TO PHQ QUESTIONS 1-9: 22
7. TROUBLE CONCENTRATING ON THINGS, SUCH AS READING THE NEWSPAPER OR WATCHING TELEVISION: 3
9. THOUGHTS THAT YOU WOULD BE BETTER OFF DEAD, OR OF HURTING YOURSELF: 3
1. LITTLE INTEREST OR PLEASURE IN DOING THINGS: 3
4. FEELING TIRED OR HAVING LITTLE ENERGY: 3
SUM OF ALL RESPONSES TO PHQ QUESTIONS 1-9: 22
8. MOVING OR SPEAKING SO SLOWLY THAT OTHER PEOPLE COULD HAVE NOTICED. OR THE OPPOSITE, BEING SO FIGETY OR RESTLESS THAT YOU HAVE BEEN MOVING AROUND A LOT MORE THAN USUAL: 3
10. IF YOU CHECKED OFF ANY PROBLEMS, HOW DIFFICULT HAVE THESE PROBLEMS MADE IT FOR YOU TO DO YOUR WORK, TAKE CARE OF THINGS AT HOME, OR GET ALONG WITH OTHER PEOPLE: 3
3. TROUBLE FALLING OR STAYING ASLEEP: 2
SUM OF ALL RESPONSES TO PHQ9 QUESTIONS 1 & 2: 5
6. FEELING BAD ABOUT YOURSELF - OR THAT YOU ARE A FAILURE OR HAVE LET YOURSELF OR YOUR FAMILY DOWN: 3
5. POOR APPETITE OR OVEREATING: 0

## 2019-11-21 ENCOUNTER — HOSPITAL ENCOUNTER (OUTPATIENT)
Age: 49
Discharge: HOME | End: 2019-11-23
Payer: COMMERCIAL

## 2019-11-21 ENCOUNTER — HOSPITAL ENCOUNTER (OUTPATIENT)
Dept: GENERAL RADIOLOGY | Age: 49
Discharge: HOME | End: 2019-11-23
Payer: COMMERCIAL

## 2019-11-21 ENCOUNTER — TELEPHONE (OUTPATIENT)
Dept: INTERNAL MEDICINE | Age: 49
End: 2019-11-21

## 2019-11-21 DIAGNOSIS — M25.561 CHRONIC PAIN OF BOTH KNEES: ICD-10-CM

## 2019-11-21 DIAGNOSIS — M25.562 CHRONIC PAIN OF BOTH KNEES: ICD-10-CM

## 2019-11-21 DIAGNOSIS — G89.29 CHRONIC PAIN OF BOTH KNEES: ICD-10-CM

## 2019-11-21 PROCEDURE — 73565 X-RAY EXAM OF KNEES: CPT

## 2019-11-22 DIAGNOSIS — M23.42 LOOSE BODY IN KNEE, LEFT KNEE: Primary | ICD-10-CM

## 2019-11-22 RX ORDER — GLUCOSAMINE HCL/CHONDROITIN SU 500-400 MG
CAPSULE ORAL
Qty: 100 STRIP | Refills: 1 | Status: SHIPPED
Start: 2019-11-22 | End: 2020-09-15 | Stop reason: SDUPTHER

## 2019-11-22 RX ORDER — BLOOD-GLUCOSE METER
1 KIT MISCELLANEOUS DAILY
Qty: 1 KIT | Refills: 0 | Status: SHIPPED | OUTPATIENT
Start: 2019-11-22

## 2019-11-22 RX ORDER — LANCETS 30 GAUGE
1 EACH MISCELLANEOUS DAILY
Qty: 100 EACH | Refills: 3 | Status: SHIPPED | OUTPATIENT
Start: 2019-11-22

## 2019-11-25 ENCOUNTER — TELEPHONE (OUTPATIENT)
Dept: FAMILY MEDICINE CLINIC | Age: 49
End: 2019-11-25

## 2019-12-02 ENCOUNTER — OFFICE VISIT (OUTPATIENT)
Dept: ORTHOPEDIC SURGERY | Age: 49
End: 2019-12-02
Payer: COMMERCIAL

## 2019-12-02 VITALS
HEIGHT: 64 IN | WEIGHT: 278 LBS | BODY MASS INDEX: 47.46 KG/M2 | SYSTOLIC BLOOD PRESSURE: 128 MMHG | HEART RATE: 96 BPM | DIASTOLIC BLOOD PRESSURE: 92 MMHG

## 2019-12-02 DIAGNOSIS — M25.511 RIGHT SHOULDER PAIN, UNSPECIFIED CHRONICITY: ICD-10-CM

## 2019-12-02 DIAGNOSIS — M17.12 PRIMARY OSTEOARTHRITIS OF LEFT KNEE: Primary | ICD-10-CM

## 2019-12-02 DIAGNOSIS — M25.562 LEFT KNEE PAIN, UNSPECIFIED CHRONICITY: ICD-10-CM

## 2019-12-02 DIAGNOSIS — M75.41 IMPINGEMENT SYNDROME OF RIGHT SHOULDER: ICD-10-CM

## 2019-12-02 DIAGNOSIS — E66.01 MORBID OBESITY WITH BMI OF 45.0-49.9, ADULT (HCC): ICD-10-CM

## 2019-12-02 PROCEDURE — 99243 OFF/OP CNSLTJ NEW/EST LOW 30: CPT | Performed by: ORTHOPAEDIC SURGERY

## 2019-12-02 PROCEDURE — G8417 CALC BMI ABV UP PARAM F/U: HCPCS | Performed by: ORTHOPAEDIC SURGERY

## 2019-12-02 PROCEDURE — G8427 DOCREV CUR MEDS BY ELIG CLIN: HCPCS | Performed by: ORTHOPAEDIC SURGERY

## 2019-12-02 PROCEDURE — G8482 FLU IMMUNIZE ORDER/ADMIN: HCPCS | Performed by: ORTHOPAEDIC SURGERY

## 2019-12-16 ENCOUNTER — OFFICE VISIT (OUTPATIENT)
Dept: FAMILY MEDICINE CLINIC | Age: 49
End: 2019-12-16
Payer: COMMERCIAL

## 2019-12-16 DIAGNOSIS — F41.8 DEPRESSION WITH ANXIETY: Primary | ICD-10-CM

## 2019-12-16 PROCEDURE — 90837 PSYTX W PT 60 MINUTES: CPT | Performed by: SOCIAL WORKER

## 2019-12-16 ASSESSMENT — PATIENT HEALTH QUESTIONNAIRE - PHQ9
8. MOVING OR SPEAKING SO SLOWLY THAT OTHER PEOPLE COULD HAVE NOTICED. OR THE OPPOSITE, BEING SO FIGETY OR RESTLESS THAT YOU HAVE BEEN MOVING AROUND A LOT MORE THAN USUAL: 3
7. TROUBLE CONCENTRATING ON THINGS, SUCH AS READING THE NEWSPAPER OR WATCHING TELEVISION: 3
SUM OF ALL RESPONSES TO PHQ QUESTIONS 1-9: 20
2. FEELING DOWN, DEPRESSED OR HOPELESS: 2
4. FEELING TIRED OR HAVING LITTLE ENERGY: 3
1. LITTLE INTEREST OR PLEASURE IN DOING THINGS: 3
5. POOR APPETITE OR OVEREATING: 1
3. TROUBLE FALLING OR STAYING ASLEEP: 2
10. IF YOU CHECKED OFF ANY PROBLEMS, HOW DIFFICULT HAVE THESE PROBLEMS MADE IT FOR YOU TO DO YOUR WORK, TAKE CARE OF THINGS AT HOME, OR GET ALONG WITH OTHER PEOPLE: 2
SUM OF ALL RESPONSES TO PHQ9 QUESTIONS 1 & 2: 5
SUM OF ALL RESPONSES TO PHQ QUESTIONS 1-9: 20
6. FEELING BAD ABOUT YOURSELF - OR THAT YOU ARE A FAILURE OR HAVE LET YOURSELF OR YOUR FAMILY DOWN: 3

## 2019-12-16 ASSESSMENT — COLUMBIA-SUICIDE SEVERITY RATING SCALE - C-SSRS
2. HAVE YOU ACTUALLY HAD ANY THOUGHTS OF KILLING YOURSELF?: YES
3. HAVE YOU BEEN THINKING ABOUT HOW YOU MIGHT KILL YOURSELF?: YES
4. HAVE YOU HAD THESE THOUGHTS AND HAD SOME INTENTION OF ACTING ON THEM?: NO
6. HAVE YOU EVER DONE ANYTHING, STARTED TO DO ANYTHING, OR PREPARED TO DO ANYTHING TO END YOUR LIFE?: NO
1. WITHIN THE PAST MONTH, HAVE YOU WISHED YOU WERE DEAD OR WISHED YOU COULD GO TO SLEEP AND NOT WAKE UP?: YES
5. HAVE YOU STARTED TO WORK OUT OR WORKED OUT THE DETAILS OF HOW TO KILL YOURSELF? DO YOU INTEND TO CARRY OUT THIS PLAN?: NO

## 2019-12-27 ENCOUNTER — TELEPHONE (OUTPATIENT)
Dept: FAMILY MEDICINE CLINIC | Age: 49
End: 2019-12-27

## 2020-01-03 ENCOUNTER — CARE COORDINATION (OUTPATIENT)
Dept: FAMILY MEDICINE CLINIC | Age: 50
End: 2020-01-03

## 2020-01-09 RX ORDER — FLUTICASONE FUROATE AND VILANTEROL 200; 25 UG/1; UG/1
1 POWDER RESPIRATORY (INHALATION) DAILY
Qty: 60 EACH | Refills: 1 | Status: SHIPPED
Start: 2020-01-09 | End: 2020-02-06

## 2020-01-20 ENCOUNTER — CARE COORDINATION (OUTPATIENT)
Dept: FAMILY MEDICINE CLINIC | Age: 50
End: 2020-01-20

## 2020-01-20 NOTE — CARE COORDINATION
Called Meryle Cuna and left a message for him to call me Diabetic Educator at [phone] to see if I can be of assistance to him with his diabetic management

## 2020-01-21 RX ORDER — LISINOPRIL 40 MG/1
40 TABLET ORAL DAILY
Qty: 30 TABLET | Refills: 1 | Status: SHIPPED
Start: 2020-01-21 | End: 2020-03-24 | Stop reason: SDUPTHER

## 2020-02-06 ENCOUNTER — TELEPHONE (OUTPATIENT)
Dept: FAMILY MEDICINE CLINIC | Age: 50
End: 2020-02-06

## 2020-02-06 RX ORDER — DOXYCYCLINE HYCLATE 100 MG/1
100 CAPSULE ORAL EVERY 12 HOURS SCHEDULED
Qty: 20 CAPSULE | Refills: 0 | Status: SHIPPED
Start: 2020-02-06 | End: 2020-03-24 | Stop reason: SDUPTHER

## 2020-02-06 NOTE — TELEPHONE ENCOUNTER
[employer] will not cover Breo so he is requesting an alternative. He also asked for an antibiotic for boil on the inside of right leg for 2 days. He said it did break open and is oozing bloody fluid. He had same thing in the Summer and needed antibiotic.

## 2020-02-18 ENCOUNTER — CARE COORDINATION (OUTPATIENT)
Dept: INTERNAL MEDICINE | Age: 50
End: 2020-02-18

## 2020-02-18 NOTE — CARE COORDINATION
Ambulatory Care Coordination Note  2/18/2020  CM Risk Score: 7  Charlson 10 Year Mortality Risk Score: 10%     ACC: Mis Mcclelland, RN    Summary Note: Jessica Lee returned my call. He was without insurance so he was off all his medications for an undisclosed period of time but has insurance now and went back on all his medications for the last 2 months. When he checks his blood sugar bid FBS the last three were 180, 170, 198. and 1 to 2 hr after eating supper ranging 165-200. He has been drinking juice sometime bid when he has it. We discussed cutting back on any sugary drinks. He knows he has been eating more and reviewed carbohydrates and portion control. He states he went to diabetic education 3 years ago and would be interested in seeing a dietitian again because he lost the diet she had given him. He was encouraged to make his next appointment with Dr Deyanira Escobar office and that we could meet at that time. He will start testing his blood sugars again and make his next appointment. Reviewed with the doctor who is putting a referral in for Diabetes Education to see dietitian. Prior to Admission medications    Medication Sig Start Date End Date Taking?  Authorizing Provider   mometasone-formoterol [facility]) 200-5 MCG/ACT inhaler Inhale 1 puff into the lungs 2 times daily 2/6/20   Radha Villeda MD   lisinopril (PRINIVIL;ZESTRIL) 40 MG tablet TAKE 1 TABLET BY MOUTH DAILY 1/21/20   Radha Villeda MD   Ertugliflozin L-PyroglutamicAc 5 MG TABS Take 5 mg by mouth daily 12/17/19   Radha Villeda MD   metFORMIN (GLUCOPHAGE) 500 MG tablet Take 2 tablets by mouth 2 times daily (with meals) 11/22/19   Radha Villeda MD   glucose monitoring kit (FREESTYLE) monitoring kit 1 kit by Does not apply route daily Dx E11.9 11/22/19   Radha Villeda MD   Lancets MISC 1 each by Does not apply route daily 11/22/19   Radha Villeda MD   blood glucose monitor strips Test twice daily & as needed for

## 2020-03-10 RX ORDER — ALBUTEROL SULFATE 90 UG/1
2 AEROSOL, METERED RESPIRATORY (INHALATION) EVERY 6 HOURS PRN
Qty: 1 INHALER | Refills: 3 | Status: SHIPPED
Start: 2020-03-10 | End: 2020-03-24 | Stop reason: SDUPTHER

## 2020-03-24 RX ORDER — OMEPRAZOLE 40 MG/1
40 CAPSULE, DELAYED RELEASE ORAL DAILY
Qty: 30 CAPSULE | Refills: 3 | Status: SHIPPED
Start: 2020-03-24 | End: 2020-08-03

## 2020-03-24 RX ORDER — LISINOPRIL 40 MG/1
40 TABLET ORAL DAILY
Qty: 30 TABLET | Refills: 1 | Status: SHIPPED
Start: 2020-03-24 | End: 2020-05-18

## 2020-03-24 RX ORDER — DOXYCYCLINE HYCLATE 100 MG/1
100 CAPSULE ORAL EVERY 12 HOURS SCHEDULED
Qty: 20 CAPSULE | Refills: 0 | Status: SHIPPED
Start: 2020-03-24 | End: 2020-06-04 | Stop reason: SDUPTHER

## 2020-03-24 RX ORDER — ALBUTEROL SULFATE 90 UG/1
2 AEROSOL, METERED RESPIRATORY (INHALATION) EVERY 6 HOURS PRN
Qty: 1 INHALER | Refills: 3 | Status: SHIPPED
Start: 2020-03-24 | End: 2020-09-14

## 2020-04-03 ENCOUNTER — TELEPHONE (OUTPATIENT)
Dept: FAMILY MEDICINE CLINIC | Age: 50
End: 2020-04-03

## 2020-04-20 ENCOUNTER — PATIENT MESSAGE (OUTPATIENT)
Dept: FAMILY MEDICINE CLINIC | Age: 50
End: 2020-04-20

## 2020-04-20 RX ORDER — ERTUGLIFLOZIN 15 MG/1
TABLET, FILM COATED ORAL
Qty: 30 TABLET | Refills: 5 | Status: SHIPPED
Start: 2020-04-20 | End: 2020-09-15 | Stop reason: SDUPTHER

## 2020-04-20 RX ORDER — ERTUGLIFLOZIN 15 MG/1
TABLET, FILM COATED ORAL
COMMUNITY
Start: 2020-03-11 | End: 2020-04-20 | Stop reason: SDUPTHER

## 2020-05-18 RX ORDER — LISINOPRIL 40 MG/1
TABLET ORAL
Qty: 30 TABLET | Refills: 1 | Status: SHIPPED
Start: 2020-05-18 | End: 2020-07-14

## 2020-06-04 ENCOUNTER — OFFICE VISIT (OUTPATIENT)
Dept: FAMILY MEDICINE CLINIC | Age: 50
End: 2020-06-04
Payer: COMMERCIAL

## 2020-06-04 ENCOUNTER — HOSPITAL ENCOUNTER (OUTPATIENT)
Age: 50
Discharge: HOME | End: 2020-06-06
Payer: COMMERCIAL

## 2020-06-04 VITALS
WEIGHT: 262.2 LBS | TEMPERATURE: 96.7 F | SYSTOLIC BLOOD PRESSURE: 126 MMHG | HEIGHT: 64 IN | BODY MASS INDEX: 44.76 KG/M2 | OXYGEN SATURATION: 98 % | RESPIRATION RATE: 16 BRPM | DIASTOLIC BLOOD PRESSURE: 84 MMHG | HEART RATE: 80 BPM

## 2020-06-04 LAB
BASOPHILS ABSOLUTE: 0.1 E9/L (ref 0–0.2)
BASOPHILS RELATIVE PERCENT: 0.8 % (ref 0–2)
BILIRUBIN, POC: NORMAL
BLOOD URINE, POC: NORMAL
CLARITY, POC: CLEAR
COLOR, POC: NORMAL
EOSINOPHILS ABSOLUTE: 0.22 E9/L (ref 0.05–0.5)
EOSINOPHILS RELATIVE PERCENT: 1.8 % (ref 0–6)
GLUCOSE URINE, POC: NORMAL
HCT VFR BLD CALC: 54.2 % (ref 37–54)
HEMOGLOBIN: 16.3 G/DL (ref 12.5–16.5)
IMMATURE GRANULOCYTES #: 0.12 E9/L
IMMATURE GRANULOCYTES %: 1 % (ref 0–5)
KETONES, POC: NORMAL
LEUKOCYTE EST, POC: NORMAL
LYMPHOCYTES ABSOLUTE: 2.42 E9/L (ref 1.5–4)
LYMPHOCYTES RELATIVE PERCENT: 19.3 % (ref 20–42)
MCH RBC QN AUTO: 27.3 PG (ref 26–35)
MCHC RBC AUTO-ENTMCNC: 30.1 % (ref 32–34.5)
MCV RBC AUTO: 90.8 FL (ref 80–99.9)
MONOCYTES ABSOLUTE: 0.93 E9/L (ref 0.1–0.95)
MONOCYTES RELATIVE PERCENT: 7.4 % (ref 2–12)
NEUTROPHILS ABSOLUTE: 8.78 E9/L (ref 1.8–7.3)
NEUTROPHILS RELATIVE PERCENT: 69.7 % (ref 43–80)
NITRITE, POC: NORMAL
PDW BLD-RTO: 15.9 FL (ref 11.5–15)
PH, POC: 5.5
PLATELET # BLD: 248 E9/L (ref 130–450)
PMV BLD AUTO: 11.1 FL (ref 7–12)
PROTEIN, POC: NORMAL
RBC # BLD: 5.97 E12/L (ref 3.8–5.8)
SPECIFIC GRAVITY, POC: 1.01
UROBILINOGEN, POC: NORMAL
WBC # BLD: 12.6 E9/L (ref 4.5–11.5)

## 2020-06-04 PROCEDURE — 2022F DILAT RTA XM EVC RTNOPTHY: CPT | Performed by: FAMILY MEDICINE

## 2020-06-04 PROCEDURE — 80053 COMPREHEN METABOLIC PANEL: CPT

## 2020-06-04 PROCEDURE — 85025 COMPLETE CBC W/AUTO DIFF WBC: CPT

## 2020-06-04 PROCEDURE — G8427 DOCREV CUR MEDS BY ELIG CLIN: HCPCS | Performed by: FAMILY MEDICINE

## 2020-06-04 PROCEDURE — 3046F HEMOGLOBIN A1C LEVEL >9.0%: CPT | Performed by: FAMILY MEDICINE

## 2020-06-04 PROCEDURE — 99214 OFFICE O/P EST MOD 30 MIN: CPT | Performed by: FAMILY MEDICINE

## 2020-06-04 PROCEDURE — G0103 PSA SCREENING: HCPCS

## 2020-06-04 PROCEDURE — 80061 LIPID PANEL: CPT

## 2020-06-04 PROCEDURE — 87088 URINE BACTERIA CULTURE: CPT

## 2020-06-04 PROCEDURE — 4004F PT TOBACCO SCREEN RCVD TLK: CPT | Performed by: FAMILY MEDICINE

## 2020-06-04 PROCEDURE — G8417 CALC BMI ABV UP PARAM F/U: HCPCS | Performed by: FAMILY MEDICINE

## 2020-06-04 PROCEDURE — 83036 HEMOGLOBIN GLYCOSYLATED A1C: CPT

## 2020-06-04 PROCEDURE — 81002 URINALYSIS NONAUTO W/O SCOPE: CPT | Performed by: FAMILY MEDICINE

## 2020-06-04 RX ORDER — FLUOXETINE HYDROCHLORIDE 20 MG/1
CAPSULE ORAL
COMMUNITY
Start: 2020-05-15 | End: 2021-03-17

## 2020-06-04 RX ORDER — TAMSULOSIN HYDROCHLORIDE 0.4 MG/1
0.4 CAPSULE ORAL DAILY
Qty: 30 CAPSULE | Refills: 5 | Status: SHIPPED
Start: 2020-06-04 | End: 2022-03-31 | Stop reason: SDUPTHER

## 2020-06-04 RX ORDER — DOXYCYCLINE HYCLATE 100 MG/1
100 CAPSULE ORAL EVERY 12 HOURS SCHEDULED
Qty: 20 CAPSULE | Refills: 0 | Status: SHIPPED | OUTPATIENT
Start: 2020-06-04 | End: 2020-06-14

## 2020-06-04 RX ORDER — PSYLLIUM HUSK 3.4 G/7G
POWDER ORAL
COMMUNITY
Start: 2020-05-28 | End: 2020-09-15

## 2020-06-04 RX ORDER — M-VIT,TX,IRON,MINS/CALC/FOLIC 27MG-0.4MG
1 TABLET ORAL DAILY
COMMUNITY
End: 2022-06-12 | Stop reason: ALTCHOICE

## 2020-06-04 ASSESSMENT — PATIENT HEALTH QUESTIONNAIRE - PHQ9
2. FEELING DOWN, DEPRESSED OR HOPELESS: 0
SUM OF ALL RESPONSES TO PHQ QUESTIONS 1-9: 0
SUM OF ALL RESPONSES TO PHQ9 QUESTIONS 1 & 2: 0
SUM OF ALL RESPONSES TO PHQ QUESTIONS 1-9: 0
1. LITTLE INTEREST OR PLEASURE IN DOING THINGS: 0

## 2020-06-04 NOTE — PROGRESS NOTES
Chief Complaint   Patient presents with    Urinary Frequency       HPI:  Patient is here for complaints of urinary urgency and incontinence for 2 weeks. He also complains of pain in right lower quadrant of abdomen. He is due for colonoscopy due to polyps and needs referral. He needs fasting labs checked. No family h/o prostate issues. He c/o weak stream with starting and stopping. He also c/o needing to sit down to urinate at times. He has nocturia x 3. Patient's past medical, surgical, social and/or family history reviewed, updated in chart, and are non-contributory (unless otherwise stated). Medications and allergies also reviewed and updated in chart.     Review of Systems:  Constitutional:  No fever, no fatigue, no chills, no headaches, no weight change  Dermatology:  No rash, no mole, no dry or sensitive skin  ENT:  No cough, no sore throat, no sinus pain, no runny nose, no ear pain  Cardiology:  No chest pain, no palpitations, no leg edema, no shortness of breath, no PND  Gastroenterology:  No dysphagia, no abdominal pain, no nausea, no vomiting, no constipation, no diarrhea, no heartburn  Musculoskeletal:  No joint pain, no leg cramps, no back pain, no muscle aches  Respiratory:  No shortness of breath, no orthopnea, no wheezing, no OSORIO, no hemoptysis  Urology:  No blood in the urine, no urinary frequency, no urinary incontinence, no urinary urgency, no nocturia, no dysuria  Vitals:    06/04/20 1216   BP: 126/84   Pulse: 80   Resp: 16   Temp: 96.7 °F (35.9 °C)   TempSrc: Temporal   SpO2: 98%   Weight: 262 lb 3.2 oz (118.9 kg)   Height: 5' 4\" (1.626 m)       General:  Patient alert and oriented x 3, NAD, pleasant  HEENT:  Atraumatic, normocephalic, PERRLA, EOMI, clear conjunctiva, TMs clear, nose-clear, throat - no erythema  Neck:  Supple, no goiter, no carotid bruits, no lymphadenopathy  Lungs:  CTA B  Heart:  RRR, no murmurs, gallops or rubs  Abdomen:  Soft/nt/nd, + bowel sounds  Extremities: Advised patient regarding importance of keeping up with recommended health maintenance and to schedule as soon as possible if overdue, as this is important in assessing for undiagnosed pathology, especially cancer, as well as protecting against potentially harmful/life threatening disease. Patient and/or guardian verbalizes understanding and agrees with above counseling, assessment and plan. All questions answered. Erica Gonzalez MD  6/4/2020    I have personally reviewed and updated the chief complaint, HPI, Past Medical, Family and Social History, as well as the above Review of Systems.

## 2020-06-05 LAB
ALBUMIN SERPL-MCNC: 4.2 G/DL (ref 3.5–5.2)
ALP BLD-CCNC: 86 U/L (ref 40–129)
ALT SERPL-CCNC: 48 U/L (ref 0–40)
ANION GAP SERPL CALCULATED.3IONS-SCNC: 18 MMOL/L (ref 7–16)
AST SERPL-CCNC: 31 U/L (ref 0–39)
BILIRUB SERPL-MCNC: 0.5 MG/DL (ref 0–1.2)
BUN BLDV-MCNC: 14 MG/DL (ref 6–20)
CALCIUM SERPL-MCNC: 9.9 MG/DL (ref 8.6–10.2)
CHLORIDE BLD-SCNC: 101 MMOL/L (ref 98–107)
CHOLESTEROL, TOTAL: 259 MG/DL (ref 0–199)
CO2: 19 MMOL/L (ref 22–29)
CREAT SERPL-MCNC: 0.8 MG/DL (ref 0.7–1.2)
GFR AFRICAN AMERICAN: >60
GFR NON-AFRICAN AMERICAN: >60 ML/MIN/1.73
GLUCOSE BLD-MCNC: 114 MG/DL (ref 74–99)
HBA1C MFR BLD: 7.2 % (ref 4–5.6)
HDLC SERPL-MCNC: 40 MG/DL
LDL CHOLESTEROL CALCULATED: 177 MG/DL (ref 0–99)
POTASSIUM SERPL-SCNC: 4.7 MMOL/L (ref 3.5–5)
PROSTATE SPECIFIC ANTIGEN: 1.73 NG/ML (ref 0–4)
SODIUM BLD-SCNC: 138 MMOL/L (ref 132–146)
TOTAL PROTEIN: 8 G/DL (ref 6.4–8.3)
TRIGL SERPL-MCNC: 209 MG/DL (ref 0–149)
VLDLC SERPL CALC-MCNC: 42 MG/DL

## 2020-06-05 RX ORDER — ATORVASTATIN CALCIUM 40 MG/1
TABLET, FILM COATED ORAL
Qty: 30 TABLET | Refills: 2 | Status: SHIPPED
Start: 2020-06-05 | End: 2020-10-05

## 2020-06-07 LAB — URINE CULTURE, ROUTINE: NORMAL

## 2020-06-17 ENCOUNTER — OFFICE VISIT (OUTPATIENT)
Dept: SURGERY | Age: 50
End: 2020-06-17
Payer: COMMERCIAL

## 2020-06-17 VITALS
HEIGHT: 64 IN | WEIGHT: 262 LBS | HEART RATE: 88 BPM | BODY MASS INDEX: 44.73 KG/M2 | OXYGEN SATURATION: 95 % | TEMPERATURE: 98 F | RESPIRATION RATE: 18 BRPM | SYSTOLIC BLOOD PRESSURE: 131 MMHG | DIASTOLIC BLOOD PRESSURE: 85 MMHG

## 2020-06-17 PROCEDURE — 99214 OFFICE O/P EST MOD 30 MIN: CPT | Performed by: SURGERY

## 2020-06-17 PROCEDURE — G8427 DOCREV CUR MEDS BY ELIG CLIN: HCPCS | Performed by: SURGERY

## 2020-06-17 PROCEDURE — 3017F COLORECTAL CA SCREEN DOC REV: CPT | Performed by: SURGERY

## 2020-06-17 PROCEDURE — 4004F PT TOBACCO SCREEN RCVD TLK: CPT | Performed by: SURGERY

## 2020-06-17 PROCEDURE — G8417 CALC BMI ABV UP PARAM F/U: HCPCS | Performed by: SURGERY

## 2020-06-17 NOTE — PROGRESS NOTES
Patient's Name/Date of Birth: Sonia Jean / 1970    Date: 6/17/2020    PCP: Yara Griffith MD    Chief Complaint   Patient presents with    Colonoscopy     Pt is here for a colonoscopy consult. Last one was three years ago. States he is having abdominal pain. HPI:  Patient sen for evaluation of 2 problems;  1. Recent onset of dysphagia  2. Hx of adenomatous colon polyps    Patient's medications, allergies, past medical, surgical, social and family histories were reviewed and updated as appropriate.     No Known Allergies    Past Medical History:   Diagnosis Date    Abdominal pain     for egd 6/8/2017    Allergic rhinitis     Anxiety     Asthma     stable    COPD (chronic obstructive pulmonary disease) (HCC)     Depression     no meds    Diabetes mellitus (HCC)     GERD (gastroesophageal reflux disease)     Headache(784.0)     Hyperlipidemia     Hypertension     Obesity     Osteoarthritis     Sleep apnea     BMS CPAP 7, not using        Past Surgical History:   Procedure Laterality Date    ABDOMEN SURGERY N/A 6/24/2019    INCISION AND DRAINAGE PERINEAL ABSCESS, INCISIONAL DEBRIDEMENT performed by Jamaal Seymour MD at [place] [address] COLONOSCOPY  06/08/2017    Dr. Gustavo Kiran polyp, diverticulitis    ENDOSCOPY, COLON, DIAGNOSTIC      EXCISION HIDRADENITIS OF INGUINAL / UMBILICAL AREA N/A 0/23/7432    DEBRIDEMENT PERINEAL WOUND performed by Jamaal Seymour MD at [address] [place] [address]    Dr. Venkat Bustos N/A 9/19/2018    I AND D BACK ABCESS performed by Eli Osorio MD at [address] [facility]          Social History     Tobacco Use    Smoking status: Current Every Day Smoker     Packs/day: 1.00     Years: 30.00     Pack years: 30.00     Types: Cigarettes    Smokeless tobacco: Never Used   Substance Use Topics    Alcohol use: No     Alcohol/week: 0.0 standard drinks       Current Outpatient Medications   Medication Sig Dispense Refill    atorvastatin (LIPITOR) 40 MG tablet TAKE 1 TABLET BY MOUTH DAILY 30 tablet 2    Multiple Vitamins-Minerals (THERAPEUTIC MULTIVITAMIN-MINERALS) tablet Take 1 tablet by mouth daily      FLUoxetine (PROZAC) 20 MG capsule take 2 capsules by mouth once daily      RA MELATONIN 5 MG TABS tablet take 1 tablet by mouth at bedtime      metFORMIN (GLUCOPHAGE) 500 MG tablet Take 2 tablets by mouth daily (with breakfast) 60 tablet 3    tamsulosin (FLOMAX) 0.4 MG capsule Take 1 capsule by mouth daily 30 capsule 5    lisinopril (PRINIVIL;ZESTRIL) 40 MG tablet take 1 tablet by mouth once daily 30 tablet 1    STEGLATRO 15 MG TABS take 1 tablet by mouth once daily 30 tablet 5    omeprazole (PRILOSEC) 40 MG delayed release capsule Take 1 capsule by mouth daily 30 capsule 3    mometasone-formoterol (DULERA) 200-5 MCG/ACT inhaler Inhale 1 puff into the lungs 2 times daily 1 Inhaler 3    albuterol sulfate  (90 Base) MCG/ACT inhaler Inhale 2 puffs into the lungs every 6 hours as needed for Wheezing 1 Inhaler 3    Ertugliflozin L-PyroglutamicAc 5 MG TABS Take 5 mg by mouth daily 30 tablet 3    glucose monitoring kit (FREESTYLE) monitoring kit 1 kit by Does not apply route daily Dx E11.9 1 kit 0    Lancets MISC 1 each by Does not apply route daily 100 each 3    blood glucose monitor strips Test twice daily & as needed for symptoms of irregular blood glucose. Dx: E11.9 100 strip 1    Mometasone Furo-Formoterol Fum (DULERA IN) Inhale into the lungs 2 puffs twice daily      nicotine (NICODERM CQ) 21 MG/24HR Place 1 patch onto the skin every 24 hours (Patient not taking: Reported on 12/2/2019) 30 patch 1     No current facility-administered medications for this visit.          Review of Systems  Constitutional: negative  Eyes: negative  Ears, nose, mouth, throat, and face: negative  Respiratory: negative  Cardiovascular: negative  Gastrointestinal: negative  Genitourinary:negative  Integument/breast: negative  Hematologic/lymphatic: negative  Musculoskeletal:negative  Neurological: negative  Allergic/Immunologic: negative    Physical exam:  /85 (Site: Right Upper Arm, Position: Sitting, Cuff Size: Medium Adult)   Pulse 88   Temp 98 °F (36.7 °C) (Oral)   Resp 18   Ht 5' 4\" (1.626 m)   Wt 262 lb (118.8 kg)   SpO2 95%   BMI 44.97 kg/m²   General appearance: no acute distress  Head:NCAT, EOMI, PERRLA, conjunctiva pink  Neck: no masses, supple  Lungs: CTABL  Heart: RRR  Abdomen: soft, nondistended, nontender, no guarding, no peritoneal signs, normoactive bowel sounds  Extremities:no edema  Neuro exam: normal  Skin: no lesions, no rashes    Assessment/Plan:  .proceed with panendoscopy  The procedure risks, benfits, possible complications and alternative options where explained to the patient, he understands and agrees to proceed with surgery. Return in about 4 weeks (around 7/15/2020).     Maribell Parker MD      Send copy of H&P to PCP, Erica Gonzalez MD

## 2020-06-23 ENCOUNTER — TELEPHONE (OUTPATIENT)
Dept: SURGERY | Age: 50
End: 2020-06-23

## 2020-07-12 ENCOUNTER — HOSPITAL ENCOUNTER (OUTPATIENT)
Dept: GENERAL RADIOLOGY | Age: 50
Discharge: HOME | End: 2020-07-14
Payer: COMMERCIAL

## 2020-07-12 ENCOUNTER — HOSPITAL ENCOUNTER (OUTPATIENT)
Age: 50
Discharge: HOME | End: 2020-07-14
Payer: COMMERCIAL

## 2020-07-12 PROCEDURE — 74018 RADEX ABDOMEN 1 VIEW: CPT

## 2020-07-14 RX ORDER — LISINOPRIL 40 MG/1
TABLET ORAL
Qty: 30 TABLET | Refills: 1 | Status: SHIPPED
Start: 2020-07-14 | End: 2020-09-08

## 2020-07-15 ENCOUNTER — HOSPITAL ENCOUNTER (OUTPATIENT)
Age: 50
Discharge: HOME | End: 2020-07-17
Payer: COMMERCIAL

## 2020-07-15 PROCEDURE — U0003 INFECTIOUS AGENT DETECTION BY NUCLEIC ACID (DNA OR RNA); SEVERE ACUTE RESPIRATORY SYNDROME CORONAVIRUS 2 (SARS-COV-2) (CORONAVIRUS DISEASE [COVID-19]), AMPLIFIED PROBE TECHNIQUE, MAKING USE OF HIGH THROUGHPUT TECHNOLOGIES AS DESCRIBED BY CMS-2020-01-R: HCPCS

## 2020-07-16 RX ORDER — CHLORHEXIDINE GLUCONATE 0.12 MG/ML
RINSE ORAL
COMMUNITY
Start: 2020-07-02 | End: 2020-09-15

## 2020-07-16 RX ORDER — FLUTICASONE FUROATE AND VILANTEROL 200; 25 UG/1; UG/1
1 POWDER RESPIRATORY (INHALATION)
COMMUNITY
Start: 2019-11-15 | End: 2020-07-16

## 2020-07-16 RX ORDER — PENICILLIN V POTASSIUM 500 MG/1
TABLET ORAL
COMMUNITY
Start: 2020-07-02 | End: 2020-07-17

## 2020-07-16 RX ORDER — HYDROCODONE BITARTRATE AND ACETAMINOPHEN 5; 325 MG/1; MG/1
TABLET ORAL
COMMUNITY
Start: 2020-07-02 | End: 2020-09-15

## 2020-07-17 NOTE — PROGRESS NOTES
midnight    [x] No oral diabetic medications after midnight    [x] If diabetic and have low blood sugar or feel symptomatic, take 1-2oz apple juice only     [x] Bring inhalers day of surgery    [] Bring C-PAP/ Bi-Pap day of surgery    [] Bring urine specimen day of surgery    [x] Shower or bath with soap, lather and rinse well, AM of Surgery, no lotion, powders or creams to surgical site    [x] Follow bowel prep as instructed per surgeon    [x] No tobacco products within 24 hours of surgery     [x] No alcohol or illegal drug use within 24 hours of surgery.     [x] Jewelry, body piercing's, eyeglasses, contact lenses and dentures are not permitted into surgery (bring cases)      [] Please do not wear any nail polish, make up or hair products on the day of surgery    [x] If not already done, you can expect a call from registration    [x] You can expect a call the business day prior to procedure to notify you if your arrival time changes    [x] If you receive a survey after surgery we would greatly appreciate your comments    [] Parent/guardian of a minor must accompany their child and remain on the premises  the entire time they are under our care     [] Pediatric patients may bring favorite toy, blanket or comfort item with them    [] A caregiver or family member must remain with the patient during their stay if they are mentally handicapped, have dementia, disoriented or unable to use a call light or would be a safety concern if left unattended    [x] Please notify surgeon if you develop any illness between now and time of surgery (cold, cough, sore throat, fever, nausea, vomiting) or any signs of infections  including skin, wounds, and dental.    [x]  The Outpatient Pharmacy is available to fill your prescription here on your day of surgery, ask your preop nurse for details    [x] Other instructions  EDUCATIONAL MATERIALS PROVIDED:    [] PAT Preoperative Education Packet/Booklet     [] Medication List    [] Fluoroscopy Information Pamphlet    [] Transfusion bracelet applied with instructions    [] Joint replacement video reviewed    [] Shower with soap, lather and rinse well, and use CHG wipes provided the evening before surgery as instructed

## 2020-07-18 LAB
SARS-COV-2: NOT DETECTED
SOURCE: NORMAL

## 2020-07-20 ENCOUNTER — HOSPITAL ENCOUNTER (OUTPATIENT)
Age: 50
Setting detail: OUTPATIENT SURGERY
Discharge: HOME | End: 2020-07-20
Attending: SURGERY | Admitting: SURGERY
Payer: COMMERCIAL

## 2020-07-20 ENCOUNTER — ANESTHESIA (OUTPATIENT)
Dept: ENDOSCOPY | Age: 50
End: 2020-07-20
Payer: COMMERCIAL

## 2020-07-20 ENCOUNTER — ANESTHESIA EVENT (OUTPATIENT)
Dept: ENDOSCOPY | Age: 50
End: 2020-07-20
Payer: COMMERCIAL

## 2020-07-20 VITALS
DIASTOLIC BLOOD PRESSURE: 74 MMHG | SYSTOLIC BLOOD PRESSURE: 115 MMHG | HEART RATE: 74 BPM | RESPIRATION RATE: 16 BRPM | WEIGHT: 262 LBS | HEIGHT: 64 IN | TEMPERATURE: 97.1 F | OXYGEN SATURATION: 96 % | BODY MASS INDEX: 44.73 KG/M2

## 2020-07-20 VITALS
SYSTOLIC BLOOD PRESSURE: 118 MMHG | DIASTOLIC BLOOD PRESSURE: 58 MMHG | RESPIRATION RATE: 20 BRPM | OXYGEN SATURATION: 100 %

## 2020-07-20 LAB — METER GLUCOSE: 149 MG/DL (ref 74–99)

## 2020-07-20 PROCEDURE — 88305 TISSUE EXAM BY PATHOLOGIST: CPT

## 2020-07-20 PROCEDURE — 3609012400 HC EGD TRANSORAL BIOPSY SINGLE/MULTIPLE: Performed by: SURGERY

## 2020-07-20 PROCEDURE — 6360000002 HC RX W HCPCS: Performed by: NURSE ANESTHETIST, CERTIFIED REGISTERED

## 2020-07-20 PROCEDURE — 2709999900 HC NON-CHARGEABLE SUPPLY: Performed by: SURGERY

## 2020-07-20 PROCEDURE — 3609010600 HC COLONOSCOPY POLYPECTOMY SNARE/COLD BIOPSY: Performed by: SURGERY

## 2020-07-20 PROCEDURE — 7100000010 HC PHASE II RECOVERY - FIRST 15 MIN: Performed by: SURGERY

## 2020-07-20 PROCEDURE — 3700000001 HC ADD 15 MINUTES (ANESTHESIA): Performed by: SURGERY

## 2020-07-20 PROCEDURE — 43239 EGD BIOPSY SINGLE/MULTIPLE: CPT | Performed by: SURGERY

## 2020-07-20 PROCEDURE — 2500000003 HC RX 250 WO HCPCS: Performed by: NURSE ANESTHETIST, CERTIFIED REGISTERED

## 2020-07-20 PROCEDURE — 7100000011 HC PHASE II RECOVERY - ADDTL 15 MIN: Performed by: SURGERY

## 2020-07-20 PROCEDURE — 3700000000 HC ANESTHESIA ATTENDED CARE: Performed by: SURGERY

## 2020-07-20 PROCEDURE — 2580000003 HC RX 258: Performed by: SURGERY

## 2020-07-20 PROCEDURE — 45385 COLONOSCOPY W/LESION REMOVAL: CPT | Performed by: SURGERY

## 2020-07-20 PROCEDURE — 82962 GLUCOSE BLOOD TEST: CPT

## 2020-07-20 PROCEDURE — 45380 COLONOSCOPY AND BIOPSY: CPT | Performed by: SURGERY

## 2020-07-20 RX ORDER — PROPOFOL 10 MG/ML
INJECTION, EMULSION INTRAVENOUS PRN
Status: DISCONTINUED | OUTPATIENT
Start: 2020-07-20 | End: 2020-07-20 | Stop reason: SDUPTHER

## 2020-07-20 RX ORDER — SODIUM CHLORIDE 0.9 % (FLUSH) 0.9 %
10 SYRINGE (ML) INJECTION EVERY 12 HOURS SCHEDULED
Status: DISCONTINUED | OUTPATIENT
Start: 2020-07-20 | End: 2020-07-20 | Stop reason: HOSPADM

## 2020-07-20 RX ORDER — SODIUM CHLORIDE 0.9 % (FLUSH) 0.9 %
10 SYRINGE (ML) INJECTION PRN
Status: DISCONTINUED | OUTPATIENT
Start: 2020-07-20 | End: 2020-07-20 | Stop reason: HOSPADM

## 2020-07-20 RX ORDER — SODIUM CHLORIDE 9 MG/ML
INJECTION, SOLUTION INTRAVENOUS CONTINUOUS
Status: DISCONTINUED | OUTPATIENT
Start: 2020-07-20 | End: 2020-07-20 | Stop reason: HOSPADM

## 2020-07-20 RX ORDER — LIDOCAINE HYDROCHLORIDE 10 MG/ML
INJECTION, SOLUTION INFILTRATION; PERINEURAL PRN
Status: DISCONTINUED | OUTPATIENT
Start: 2020-07-20 | End: 2020-07-20 | Stop reason: SDUPTHER

## 2020-07-20 RX ORDER — GLYCOPYRROLATE 0.2 MG/ML
INJECTION INTRAMUSCULAR; INTRAVENOUS PRN
Status: DISCONTINUED | OUTPATIENT
Start: 2020-07-20 | End: 2020-07-20 | Stop reason: SDUPTHER

## 2020-07-20 RX ADMIN — PROPOFOL 50 MG: 10 INJECTION, EMULSION INTRAVENOUS at 07:53

## 2020-07-20 RX ADMIN — PROPOFOL 50 MG: 10 INJECTION, EMULSION INTRAVENOUS at 07:50

## 2020-07-20 RX ADMIN — PROPOFOL 200 MG: 10 INJECTION, EMULSION INTRAVENOUS at 07:35

## 2020-07-20 RX ADMIN — LIDOCAINE HYDROCHLORIDE 20 MG: 10 INJECTION, SOLUTION INFILTRATION; PERINEURAL at 07:35

## 2020-07-20 RX ADMIN — SODIUM CHLORIDE: 9 INJECTION, SOLUTION INTRAVENOUS at 07:35

## 2020-07-20 RX ADMIN — PROPOFOL 50 MG: 10 INJECTION, EMULSION INTRAVENOUS at 07:42

## 2020-07-20 RX ADMIN — PROPOFOL 100 MG: 10 INJECTION, EMULSION INTRAVENOUS at 07:57

## 2020-07-20 RX ADMIN — SODIUM CHLORIDE: 9 INJECTION, SOLUTION INTRAVENOUS at 07:28

## 2020-07-20 RX ADMIN — PROPOFOL 100 MG: 10 INJECTION, EMULSION INTRAVENOUS at 08:04

## 2020-07-20 RX ADMIN — PROPOFOL 100 MG: 10 INJECTION, EMULSION INTRAVENOUS at 07:47

## 2020-07-20 RX ADMIN — GLYCOPYRROLATE 0.2 MG: 0.2 INJECTION INTRAMUSCULAR; INTRAVENOUS at 07:35

## 2020-07-20 ASSESSMENT — PAIN SCALES - GENERAL
PAINLEVEL_OUTOF10: 0

## 2020-07-20 ASSESSMENT — LIFESTYLE VARIABLES: SMOKING_STATUS: 1

## 2020-07-20 NOTE — ANESTHESIA PRE PROCEDURE
Department of Anesthesiology  Preprocedure Note       Name:  Chris Schmidt   Age:  48 y.o.  :  1970                                          MRN:  [id number]         Date:  2020      Surgeon: Salma Degree):  Mylene Gray MD    Procedure: EGD ESOPHAGOGASTRODUODENOSCOPY (N/A )  COLONOSCOPY DIAGNOSTIC (N/A )    Medications prior to admission:   Prior to Admission medications    Medication Sig Start Date End Date Taking? Authorizing Provider   chlorhexidine (PERIDEX) 0.12 % solution Rinse with 1/2 CAPFUL by mouth for 3 MINUTES then spit out.  use twice a day 20  Yes Historical Provider, MD   HYDROcodone-acetaminophen (Ami Fortis) 5-325 MG per tablet take 1 tablet by mouth every 4 hours if needed for pain 20  Yes Historical Provider, MD   lisinopril (PRINIVIL;ZESTRIL) 40 MG tablet take 1 tablet by mouth once daily 20  Yes Marry Rome MD   atorvastatin (LIPITOR) 40 MG tablet TAKE 1 TABLET BY MOUTH DAILY 20  Yes Marry Rome MD   Multiple Vitamins-Minerals (THERAPEUTIC MULTIVITAMIN-MINERALS) tablet Take 1 tablet by mouth daily   Yes Historical Provider, MD   FLUoxetine (PROZAC) 20 MG capsule Daily with lunch  5/15/20  Yes Historical Provider, MD   RA MELATONIN 5 MG TABS tablet take 1 tablet by mouth at bedtime 20  Yes Historical Provider, MD   metFORMIN (GLUCOPHAGE) 500 MG tablet Take 2 tablets by mouth daily (with breakfast) 20  Yes Marry Rome MD   tamsulosin (FLOMAX) 0.4 MG capsule Take 1 capsule by mouth daily  Patient taking differently: Take 0.8 mg by mouth daily  20  Yes Marry Rome MD   STEGLATRO 15 MG TABS take 1 tablet by mouth once daily 20  Yes Marry Rome MD   omeprazole (PRILOSEC) 40 MG delayed release capsule Take 1 capsule by mouth daily  Patient taking differently: Take 40 mg by mouth Daily with lunch  3/24/20  Yes Chi Allen MD   mometasone-formoterol (DULERA) 200-5 MCG/ACT inhaler Inhale 1 puff into the lungs 2 times daily 3/24/20  Yes Toni Quijano MD   albuterol sulfate  (90 Base) MCG/ACT inhaler Inhale 2 puffs into the lungs every 6 hours as needed for Wheezing 3/24/20  Yes Toni Quijano MD   glucose monitoring kit (FREESTYLE) monitoring kit 1 kit by Does not apply route daily Dx E11.9 11/22/19  Yes Toni Quijano MD   Lancets MISC 1 each by Does not apply route daily 11/22/19  Yes Toni Quijano MD   blood glucose monitor strips Test twice daily & as needed for symptoms of irregular blood glucose.  Dx: E11.9 11/22/19  Yes Toni Quijano MD       Current medications:    Current Facility-Administered Medications   Medication Dose Route Frequency Provider Last Rate Last Dose    0.9 % sodium chloride infusion   Intravenous Continuous Karena Hyatt MD        sodium chloride flush 0.9 % injection 10 mL  10 mL Intravenous 2 times per day Karena Hyatt MD        sodium chloride flush 0.9 % injection 10 mL  10 mL Intravenous PRN Karena Hyatt MD           Allergies:  No Known Allergies    Problem List:    Patient Active Problem List   Diagnosis Code    COPD (chronic obstructive pulmonary disease) ([facility] J44.9    Smoker F17.200    SERGEY (obstructive sleep apnea) G47.33    Asthma J45.909    Morbid obesity with BMI of 45.0-49.9, adult ([facility]) E66.01, Z68.42    Diverticulitis of colon K57.32    Abscess of back L02.212    DM2 (diabetes mellitus, type 2) ([facility]) E11.9    Sepsis ([facility] A41.9    MSSA (methicillin susceptible Staphylococcus aureus) infection A49.01    Necrotizing soft tissue infection M79.89    HANS (acute kidney injury) ([facility] N17.9    Acute blood loss anemia D62    Primary osteoarthritis of left knee M17.12    Impingement syndrome of right shoulder M75.41       Past Medical History:        Diagnosis Date    Allergic rhinitis     Anxiety     Asthma     stable    COPD (chronic obstructive pulmonary disease) ([facility]     controlled with inhalers    Depression     no meds    Diabetes mellitus ([facility]     Encounter for screening colonoscopy     and EGD 7-20-20     GERD (gastroesophageal reflux disease)     Hyperlipidemia     Hypertension     Obesity     Osteoarthritis     Sleep apnea     BMS CPAP 7, not using       Past Surgical History:        Procedure Laterality Date    ABDOMEN SURGERY N/A 6/24/2019    INCISION AND DRAINAGE PERINEAL ABSCESS, INCISIONAL DEBRIDEMENT performed by Candy Blue MD at [facility] COLONOSCOPY  06/08/2017    Dr. Alverto Pérez polyp, diverticulitis    ENDOSCOPY, COLON, DIAGNOSTIC      EXCISION HIDRADENITIS OF INGUINAL / UMBILICAL AREA N/A 9/55/1421    DEBRIDEMENT PERINEAL WOUND performed by Candy Blue MD at [facility]  2012    Dr. Guanako Stephens N/A 9/19/2018    I AND D BACK ABCESS performed by Dilma Ayala MD at [address]         Social History:    Social History     Tobacco Use    Smoking status: Current Every Day Smoker     Packs/day: 1.00     Years: 30.00     Pack years: 30.00     Types: Cigarettes    Smokeless tobacco: Never Used   Substance Use Topics    Alcohol use: No     Alcohol/week: 0.0 standard drinks                                Ready to quit: Not Answered  Counseling given: Not Answered      Vital Signs (Current):   Vitals:    07/17/20 1000 07/20/20 0648   BP:  118/83   Pulse:  82   Resp:  18   Temp:  97 °F (36.1 °C)   SpO2:  97%   Weight: 262 lb (118.8 kg) 262 lb (118.8 kg)   Height: 5' 4\" (1.626 m) 5' 4\" (1.626 m)                                              BP Readings from Last 3 Encounters:   07/20/20 118/83   07/20/20 109/65   06/17/20 131/85       NPO Status:greater than 8 hours          BMI:   Wt Readings from Last 3 Encounters:   07/20/20 262 lb (118.8 kg)   06/17/20 262 lb (118.8 kg)   06/04/20 262 lb 3.2 oz (118.9 kg)     Body mass index is 44.97 kg/m².     CBC:   Lab Results   Component Value Date Prominent reactive left   inguinal lymph nodes are observed the.       There is no extension of the inflammatory process in the pelvic   sidewall but there are presence of a left side pelvic sidewall   adenopathy and a lesser degree on the right side.       Bladder, prostate gland seminal vesicles appear unremarkable.       No acute inflammatory changes seen in the omental mesenteric fat   planes or free intraperitoneal air or ascites. There is no   inflammatory changes in the retroperitoneal space       There are normal size and enhancement for the liver is spleen and   pancreas. The gallbladder is nondistended. BX and pancreatic ductal   systems are not dilated. Slightly nodular appearance for the adrenal   glands are seen more likely represent a form off nodular hyperplasia. There is a hiatal hernia with a wide open hiatus of the esophagus. Areas of small size.       Kidneys have preserved size and cortical thickness are. A simple cyst   is seen in the mid third of the left kidney. No obstructive uropathy   seen.       Aorta and IVC appear unremarkable.       Lower lung bases demonstrate no significant findings.       Degenerative changes are seen in the lower thoracic spine.           Impression   1. Extensive severe cellulitis spreading through fat planes along the   left scrotal region into the left inguinoscrotal area and into the   left para gluteal region where an abscess formation is seen more   posteriorly.       2.Presently there is no soft tissue air air associated with this   inflammatory process to full characterize necrotizing fasciitis which   is a potential development.       Preliminary report given to Dr. Tammy Rahman at the time of the   interpretation.       ALERT:  ABNORMAL REPORT.         6/25/19 CXR    Findings: There is a stable, enlarged cardiomediastinal silhouette   with underlying central pulmonary vascular congestion. Cheli Simpler  No   pneumothorax, pleural effusion or focal areas of airspace Endo/Other:    (+) Diabetes (metformin @ home)Type II DM, using insulin, : arthritis: OA., . Pt had no PAT visit        ROS comment: Hx: Sepsis  MSSA (methicillin susceptible Staphylococcus aureus) infection  Necrotizing soft tissue infection    Had I&D perineal abscess in OR 6/24/19    For debridement perineal wound 6/26/19 in OR Abdominal:   (+) obese,     Abdomen: soft. Vascular: negative vascular ROS. Anesthesia Plan      MAC     ASA 4     (  )  Induction: intravenous. Anesthetic plan and risks discussed with patient. Plan discussed with CRNA.           ARTHUR SHERWOOD  7/20/2020  7:39 AM

## 2020-07-20 NOTE — OP NOTE
[address] [facility]                                OPERATIVE REPORT    PATIENT NAME: Polina Billingsley                      :        1970  MED REC NO:   [id number]                            ROOM:  ACCOUNT NO:   [de-identified]                           ADMIT DATE: 2020  PROVIDER:     Ronda Kincaid MD    DATE OF PROCEDURE:  2020    PREOPERATIVE DIAGNOSES:  1. Dysphagia. 2.  History of colon polyps. POSTOPERATIVE DIAGNOSES:  1. Gastroesophageal reflux disease. 2.  Hiatal hernia. 3.  Duodenal polyp. 4.  Polyp, mid transverse colon. 5.  Sigmoid diverticulosis. EBL ZERO    PROCEDURES PERFORMED:  1. Esophagogastroduodenoscopy with duodenal biopsies. 2.  Total colonoscopy with snare polypectomy, transverse colon polyp and  biopsy of rectosigmoid polyp. DESCRIPTION OF PROCEDURE:  With the patient in the left lateral position  on the operating table, adequate sedation was obtained by Anesthesia,  standard Olympus gastroscope was introduced through the mouth and  advanced into the esophagus. Free gastroesophageal reflux was noted. No esophageal ulcers, no stricture. GE junction was normal.  A 5-cm  type 2 sliding hiatal hernia was noted. Stomach revealed normal gastric  folds. No gastritis, no ulcers, no bleeding. Pyloric opening was  normal.  Visualization of first part of the duodenum showed evidence of  a benign-looking hyperplastic polyp, which was biopsied. Second, third  and fourth part of duodenum were unremarkable. Scope was slowly  withdrawn and totally removed. The patient tolerated the procedure  well. Digital rectal examination and dilatation was performed, showed evidence  of good sphincter tone. There were no palpable masses and no blood on  examining finger. A standard Olympus colonoscope was introduced through  the anal opening and advanced into the rectosigmoid.   The anus

## 2020-07-20 NOTE — BRIEF OP NOTE
Brief Postoperative Note      Patient: Cynthia Smith  YOB: 1970  MRN: [id number]    Date of Procedure: 7/20/2020    Pre-Op Diagnosis: DYSPHAGIA, HISTORY OF COLON POLYPS    Post-Op Diagnosis: Same       Procedure(s):  EGD BIOPSY  COLONOSCOPY POLYPECTOMY SNARE/COLD BIOPSY    Surgeon(s):  Leti Ahn MD    Assistant:  * No surgical staff found *    Anesthesia: Monitor Anesthesia Care    Estimated Blood Loss (mL): Minimal    Complications: None    Specimens:   ID Type Source Tests Collected by Time Destination   A : duodenal bx Tissue Duodenum SURGICAL PATHOLOGY Leti Ahn MD 7/20/2020 0745    B : cold snare polyp transverse colon Tissue Colon SURGICAL PATHOLOGY Leti Ahn MD 7/20/2020 0756    C : bx polyp 20 cm Tissue Colon SURGICAL PATHOLOGY Leti Ahn MD 7/20/2020 9489        Implants:  * No implants in log *      Drains: * No LDAs found *    Findings:     Electronically signed by Leti Ahn MD on 7/20/2020 at 8:08 AM

## 2020-07-20 NOTE — H&P
Patient's Name/Date of Birth: Cr Ni / 1970    Date: 7/20/2020    PCP: Rajesh Del Rio MD    No chief complaint on file. HPI:  Patient admitted for panendoscopy for dysphagia and hcx of colon polyps    Patient's medications, allergies, past medical, surgical, social and family histories were reviewed and updated as appropriate.     No Known Allergies    Past Medical History:   Diagnosis Date    Allergic rhinitis     Anxiety     Asthma     stable    COPD [facility]chronic obstructive [facility] disease) [facility]     controlled with inhalers    Depression     no meds    Diabetes mellitus ([facility]     Encounter for screening colonoscopy     and EGD 7-20-20     GERD (gastroesophageal reflux disease)     Hyperlipidemia     Hypertension     Obesity     Osteoarthritis     Sleep apnea     BMS CPAP 7, not using        Past Surgical History:   Procedure Laterality Date    ABDOMEN SURGERY N/A 6/24/2019    INCISION AND DRAINAGE PERINEAL ABSCESS, INCISIONAL DEBRIDEMENT performed by Sherlyn Vera MD at [facility]  06/08/2017    Dr. Holly Olivo polyp, diverticulitis    ENDOSCOPY, COLON, DIAGNOSTIC      EXCISION HIDRADENITIS OF INGUINAL / UMBILICAL AREA N/A 1/66/5426    DEBRIDEMENT PERINEAL WOUND performed by Sherlyn Vera MD at [address] [facility]  2012    Dr. Janice Sawant N/A 9/19/2018    I AND D BACK ABCESS performed by Marya De Leon MD at [address]          Social History     Tobacco Use    Smoking status: Current Every Day Smoker     Packs/day: 1.00     Years: 30.00     Pack years: 30.00     Types: Cigarettes    Smokeless tobacco: Never Used   Substance Use Topics    Alcohol use: No     Alcohol/week: 0.0 standard drinks       Current Facility-Administered Medications   Medication Dose Route Frequency Provider Last Rate Last Dose    0.9 % sodium chloride infusion Intravenous Continuous Holly Gillis MD        sodium chloride flush 0.9 % injection 10 mL  10 mL Intravenous 2 times per day Holly Gillis MD        sodium chloride flush 0.9 % injection 10 mL  10 mL Intravenous PRN Holly Gillis MD           Review of Systems  Constitutional: negative  Eyes: negative  Ears, nose, mouth, throat, and face: negative  Respiratory: negative  Cardiovascular: negative  Gastrointestinal: negative  Genitourinary:negative  Integument/breast: negative  Hematologic/lymphatic: negative  Musculoskeletal:negative  Neurological: negative  Allergic/Immunologic: negative    Physical exam:  /83   Pulse 82   Temp 97 °F (36.1 °C)   Resp 18   Ht 5' 4\" (1.626 m)   Wt 262 lb (118.8 kg)   SpO2 97%   BMI 44.97 kg/m²   General appearance: no acute distress  Head:NCAT, EOMI, PERRLA, conjunctiva pink  Neck: no masses, supple  Lungs: CTABL  Heart: RRR  Abdomen: soft, nondistended, nontender, no guarding, no peritoneal signs, normoactive bowel sounds  Extremities:no edema    Assessment/Plan:  .proceed with panendoscopy  The procedure risks, benfits, possible complications and alternative options where explained to the patient, he understands and agrees to proceed with surgery. No follow-ups on file. No name on file.       Send copy of H&P to PCP, Sandra Holm MD

## 2020-08-03 RX ORDER — OMEPRAZOLE 40 MG/1
CAPSULE, DELAYED RELEASE ORAL
Qty: 90 CAPSULE | Refills: 3 | Status: SHIPPED
Start: 2020-08-03 | End: 2022-03-31 | Stop reason: SDUPTHER

## 2020-09-08 RX ORDER — LISINOPRIL 40 MG/1
TABLET ORAL
Qty: 30 TABLET | Refills: 1 | Status: SHIPPED
Start: 2020-09-08 | End: 2020-11-09

## 2020-09-14 RX ORDER — ALBUTEROL SULFATE 90 UG/1
AEROSOL, METERED RESPIRATORY (INHALATION)
Qty: 8.5 G | Refills: 5 | Status: SHIPPED
Start: 2020-09-14 | End: 2021-02-02

## 2020-09-15 ENCOUNTER — HOSPITAL ENCOUNTER (OUTPATIENT)
Age: 50
Discharge: HOME | End: 2020-09-17
Payer: COMMERCIAL

## 2020-09-15 ENCOUNTER — OFFICE VISIT (OUTPATIENT)
Dept: FAMILY MEDICINE CLINIC | Age: 50
End: 2020-09-15
Payer: COMMERCIAL

## 2020-09-15 VITALS
HEART RATE: 88 BPM | DIASTOLIC BLOOD PRESSURE: 82 MMHG | OXYGEN SATURATION: 98 % | SYSTOLIC BLOOD PRESSURE: 125 MMHG | WEIGHT: 262 LBS | TEMPERATURE: 97.3 F | BODY MASS INDEX: 44.73 KG/M2 | HEIGHT: 64 IN | RESPIRATION RATE: 16 BRPM

## 2020-09-15 PROBLEM — E78.2 MIXED HYPERLIPIDEMIA: Status: ACTIVE | Noted: 2020-09-15

## 2020-09-15 LAB
ALBUMIN SERPL-MCNC: 4.1 G/DL (ref 3.5–5.2)
ALP BLD-CCNC: 89 U/L (ref 40–129)
ALT SERPL-CCNC: 23 U/L (ref 0–40)
ANION GAP SERPL CALCULATED.3IONS-SCNC: 15 MMOL/L (ref 7–16)
ANISOCYTOSIS: ABNORMAL
AST SERPL-CCNC: 18 U/L (ref 0–39)
BASOPHILS ABSOLUTE: 0.07 E9/L (ref 0–0.2)
BASOPHILS RELATIVE PERCENT: 0.6 % (ref 0–2)
BILIRUB SERPL-MCNC: 0.4 MG/DL (ref 0–1.2)
BUN BLDV-MCNC: 12 MG/DL (ref 6–20)
BURR CELLS: ABNORMAL
CALCIUM SERPL-MCNC: 9.7 MG/DL (ref 8.6–10.2)
CHLORIDE BLD-SCNC: 99 MMOL/L (ref 98–107)
CHOLESTEROL, TOTAL: 151 MG/DL (ref 0–199)
CHP ED QC CHECK: NORMAL
CO2: 24 MMOL/L (ref 22–29)
CREAT SERPL-MCNC: 0.9 MG/DL (ref 0.7–1.2)
EOSINOPHILS ABSOLUTE: 0.12 E9/L (ref 0.05–0.5)
EOSINOPHILS RELATIVE PERCENT: 1 % (ref 0–6)
GFR AFRICAN AMERICAN: >60
GFR NON-AFRICAN AMERICAN: >60 ML/MIN/1.73
GLUCOSE BLD-MCNC: 128 MG/DL (ref 74–99)
GLUCOSE BLD-MCNC: 141 MG/DL
HBA1C MFR BLD: 7.2 %
HCT VFR BLD CALC: 51.7 % (ref 37–54)
HDLC SERPL-MCNC: 41 MG/DL
HEMOGLOBIN: 15.8 G/DL (ref 12.5–16.5)
IMMATURE GRANULOCYTES #: 0.1 E9/L
IMMATURE GRANULOCYTES %: 0.8 % (ref 0–5)
LDL CHOLESTEROL CALCULATED: 80 MG/DL (ref 0–99)
LYMPHOCYTES ABSOLUTE: 1.94 E9/L (ref 1.5–4)
LYMPHOCYTES RELATIVE PERCENT: 15.7 % (ref 20–42)
MCH RBC QN AUTO: 27.6 PG (ref 26–35)
MCHC RBC AUTO-ENTMCNC: 30.6 % (ref 32–34.5)
MCV RBC AUTO: 90.4 FL (ref 80–99.9)
MONOCYTES ABSOLUTE: 0.79 E9/L (ref 0.1–0.95)
MONOCYTES RELATIVE PERCENT: 6.4 % (ref 2–12)
NEUTROPHILS ABSOLUTE: 9.36 E9/L (ref 1.8–7.3)
NEUTROPHILS RELATIVE PERCENT: 75.5 % (ref 43–80)
PDW BLD-RTO: 16.9 FL (ref 11.5–15)
PLATELET # BLD: 226 E9/L (ref 130–450)
PMV BLD AUTO: 11.2 FL (ref 7–12)
POIKILOCYTES: ABNORMAL
POLYCHROMASIA: ABNORMAL
POTASSIUM SERPL-SCNC: 4.7 MMOL/L (ref 3.5–5)
RBC # BLD: 5.72 E12/L (ref 3.8–5.8)
SODIUM BLD-SCNC: 138 MMOL/L (ref 132–146)
TOTAL PROTEIN: 7.1 G/DL (ref 6.4–8.3)
TRIGL SERPL-MCNC: 149 MG/DL (ref 0–149)
VLDLC SERPL CALC-MCNC: 30 MG/DL
WBC # BLD: 12.4 E9/L (ref 4.5–11.5)

## 2020-09-15 PROCEDURE — 83036 HEMOGLOBIN GLYCOSYLATED A1C: CPT | Performed by: FAMILY MEDICINE

## 2020-09-15 PROCEDURE — 3017F COLORECTAL CA SCREEN DOC REV: CPT | Performed by: FAMILY MEDICINE

## 2020-09-15 PROCEDURE — G8427 DOCREV CUR MEDS BY ELIG CLIN: HCPCS | Performed by: FAMILY MEDICINE

## 2020-09-15 PROCEDURE — G8417 CALC BMI ABV UP PARAM F/U: HCPCS | Performed by: FAMILY MEDICINE

## 2020-09-15 PROCEDURE — 2022F DILAT RTA XM EVC RTNOPTHY: CPT | Performed by: FAMILY MEDICINE

## 2020-09-15 PROCEDURE — 90471 IMMUNIZATION ADMIN: CPT | Performed by: FAMILY MEDICINE

## 2020-09-15 PROCEDURE — 85025 COMPLETE CBC W/AUTO DIFF WBC: CPT

## 2020-09-15 PROCEDURE — 90694 VACC AIIV4 NO PRSRV 0.5ML IM: CPT | Performed by: FAMILY MEDICINE

## 2020-09-15 PROCEDURE — 80061 LIPID PANEL: CPT

## 2020-09-15 PROCEDURE — 99214 OFFICE O/P EST MOD 30 MIN: CPT | Performed by: FAMILY MEDICINE

## 2020-09-15 PROCEDURE — 80053 COMPREHEN METABOLIC PANEL: CPT

## 2020-09-15 PROCEDURE — 82962 GLUCOSE BLOOD TEST: CPT | Performed by: FAMILY MEDICINE

## 2020-09-15 PROCEDURE — 3051F HG A1C>EQUAL 7.0%<8.0%: CPT | Performed by: FAMILY MEDICINE

## 2020-09-15 PROCEDURE — 4004F PT TOBACCO SCREEN RCVD TLK: CPT | Performed by: FAMILY MEDICINE

## 2020-09-15 RX ORDER — GLUCOSAMINE HCL/CHONDROITIN SU 500-400 MG
CAPSULE ORAL
Qty: 100 STRIP | Refills: 3 | Status: SHIPPED | OUTPATIENT
Start: 2020-09-15

## 2020-09-15 RX ORDER — ERTUGLIFLOZIN 15 MG/1
TABLET, FILM COATED ORAL
Qty: 30 TABLET | Refills: 5 | Status: SHIPPED
Start: 2020-09-15 | End: 2021-07-22

## 2020-09-15 RX ORDER — MINOCYCLINE HYDROCHLORIDE 100 MG/1
100 CAPSULE ORAL 2 TIMES DAILY
Qty: 60 CAPSULE | Refills: 1 | Status: SHIPPED
Start: 2020-09-15 | End: 2021-03-17 | Stop reason: SDUPTHER

## 2020-09-15 SDOH — ECONOMIC STABILITY: TRANSPORTATION INSECURITY
IN THE PAST 12 MONTHS, HAS THE LACK OF TRANSPORTATION KEPT YOU FROM MEDICAL APPOINTMENTS OR FROM GETTING MEDICATIONS?: PATIENT DECLINED

## 2020-09-15 SDOH — ECONOMIC STABILITY: TRANSPORTATION INSECURITY
IN THE PAST 12 MONTHS, HAS LACK OF TRANSPORTATION KEPT YOU FROM MEETINGS, WORK, OR FROM GETTING THINGS NEEDED FOR DAILY LIVING?: PATIENT DECLINED

## 2020-09-15 SDOH — ECONOMIC STABILITY: FOOD INSECURITY: WITHIN THE PAST 12 MONTHS, THE FOOD YOU BOUGHT JUST DIDN'T LAST AND YOU DIDN'T HAVE MONEY TO GET MORE.: PATIENT DECLINED

## 2020-09-15 SDOH — ECONOMIC STABILITY: FOOD INSECURITY: WITHIN THE PAST 12 MONTHS, YOU WORRIED THAT YOUR FOOD WOULD RUN OUT BEFORE YOU GOT MONEY TO BUY MORE.: PATIENT DECLINED

## 2020-09-15 SDOH — ECONOMIC STABILITY: INCOME INSECURITY: HOW HARD IS IT FOR YOU TO PAY FOR THE VERY BASICS LIKE FOOD, HOUSING, MEDICAL CARE, AND HEATING?: PATIENT DECLINED

## 2020-09-15 NOTE — PROGRESS NOTES
DM2:   Patient is here to fu regarding DM2. Patient is not controlled. Taking all medications and tolerating well. Fasting sugars are running . Patient is taking ASA and Ace Inhibitor/ARB. Patient is  on appropriately-dosed statin. LDL is not at goal.  BP is  controlled. No hypoglycemic episodes. Patient does not see Podiatry regularly. Saw an Eye Dr yearly. Patient is aware that it is necessary to see an Eye Dr yearly. Patient does smoke. Most recent labs reviewed with patient. Patient does not have complaints or concerns today. Lab Results   Component Value Date    LABA1C 7.2 09/15/2020       Lab Results   Component Value Date    LDLCALC 177 (H) 06/04/2020        Patient's past medical, surgical, social and/or family history reviewed, updated in chart, and are non-contributory (unless otherwise stated). Medications and allergies also reviewed and updated in chart.       Review of Systems:  Constitutional:  No fever, no fatigue, no chills, no headaches, no weight change  Dermatology:  No rash, no mole, no dry or sensitive skin  ENT:  No cough, no sore throat, no sinus pain, no runny nose, no ear pain  Cardiology:  No chest pain, no palpitations, no leg edema, no shortness of breath, no PND  Gastroenterology:  No dysphagia, no abdominal pain, no nausea, no vomiting, no constipation, no diarrhea, no heartburn  Musculoskeletal:  No joint pain, no leg cramps, no back pain, no muscle aches  Respiratory:  No shortness of breath, no orthopnea, no wheezing, no OSORIO, no hemoptysis  Urology:  No blood in the urine, no urinary frequency, no urinary incontinence, no urinary urgency, no nocturia, no dysuria  Vitals:    09/15/20 0910   BP: 125/82   Pulse: 88   Resp: 16   Temp: 97.3 °F (36.3 °C)   TempSrc: Temporal   SpO2: 98%   Weight: 262 lb (118.8 kg)   Height: 5' 4\" (1.626 m)       General:  Patient alert and oriented x 3, NAD, pleasant  HEENT:  Atraumatic, normocephalic, PERRLA, EOMI, clear conjunctiva, TMs clear, nose-clear, throat - no erythema  Neck:  Supple, no goiter, no carotid bruits, no lymphadenopathy  Lungs:  CTA B  Heart:  RRR, no murmurs, gallops or rubs  Abdomen:  Soft/nt/nd, + bowel sounds  Extremities:  No clubbing, cyanosis or edema  Skin: unremarkable    Assessment/Plan:      Bienvenido Erickson was seen today for diabetes. Diagnoses and all orders for this visit:    Type 2 diabetes mellitus without complication, without long-term current use of insulin (HCC)  -     blood glucose monitor strips; Test twice daily & as needed for symptoms of irregular blood glucose. Dx: E11.9  -     POCT Glucose  -     POCT glycosylated hemoglobin (Hb A1C)  -     Comprehensive Metabolic Panel; Future  -     Lipid Panel; Future  -     CBC Auto Differential; Future    Need for influenza vaccination  -     INFLUENZA, QUADV, ADJUVANTED, 72 YRS =, IM, PF, PREFILL SYR, 0.5ML (FLUAD)    SERGEY (obstructive sleep apnea)    Essential hypertension    Mixed hyperlipidemia    Other orders  -     metFORMIN (GLUCOPHAGE) 500 MG tablet; Take 2 tablets by mouth daily (with breakfast)  -     STEGLATRO 15 MG TABS; take 1 tablet by mouth once daily  -     mometasone-formoterol (DULERA) 200-5 MCG/ACT inhaler; Inhale 1 puff into the lungs 2 times daily  -     minocycline (MINOCIN;DYNACIN) 100 MG capsule; Take 1 capsule by mouth 2 times daily      As above. Call or go to ED immediately if symptoms worsen or persist.  Return in about 6 months (around 3/15/2021). , or sooner if necessary. Educational materials and/or home exercises printed for patient's review and were included in patient instructions on his/her After Visit Summary and given to patient at the end of visit. Counseled regarding above diagnosis, including possible risks and complications,  especially if left uncontrolled.     Counseled regarding the possible side effects, risks, benefits and alternatives to treatment; patient and/or guardian verbalizes understanding, agrees, feels comfortable with and wishes to proceed with above treatment plan. Advised patient to call with any new medication issues, and read all Rx info from pharmacy to assure aware of all possible risks and side effects of medication before taking. Reviewed age and gender appropriate health screening exams and vaccinations. Advised patient regarding importance of keeping up with recommended health maintenance and to schedule as soon as possible if overdue, as this is important in assessing for undiagnosed pathology, especially cancer, as well as protecting against potentially harmful/life threatening disease. Patient and/or guardian verbalizes understanding and agrees with above counseling, assessment and plan. All questions answered. Bee Guidry MD  9/15/2020    I have personally reviewed and updated the chief complaint, HPI, Past Medical, Family and Social History, as well as the above Review of Systems.

## 2020-10-05 RX ORDER — ATORVASTATIN CALCIUM 40 MG/1
TABLET, FILM COATED ORAL
Qty: 90 TABLET | Refills: 1 | Status: SHIPPED
Start: 2020-10-05 | End: 2021-03-26

## 2020-10-27 ENCOUNTER — TELEPHONE (OUTPATIENT)
Dept: FAMILY MEDICINE CLINIC | Age: 50
End: 2020-10-27

## 2020-10-27 NOTE — TELEPHONE ENCOUNTER
PT WIFE CALLED, PT HAS BENE USING THE DULERA 2 PUFFS BID, THEY CURRENT SIG IS 1 PUFF BID. WANTING A REFILL AT THE PHARM BUT TOO SOON,  OKAY TO INCREASE TO 2 PUFFS BID?

## 2020-11-09 RX ORDER — LISINOPRIL 40 MG/1
TABLET ORAL
Qty: 30 TABLET | Refills: 1 | Status: SHIPPED
Start: 2020-11-09 | End: 2021-01-07

## 2020-12-05 NOTE — ED PROVIDER NOTES
Patient is a 50year old male presenting to the ED for an abscess. He has noticed an area of swelling and redness on his left upper back for the past few days. It has been getting worse. He also has had a fever and chills. He denies any drainage from the site. He does admit the having diabetes and HTN and has been out of his medications for the past few weeks. The history is provided by the patient. Review of Systems   Constitutional: Positive for chills and fever. Negative for diaphoresis and fatigue. HENT: Negative for congestion, rhinorrhea and sore throat. Respiratory: Negative for cough and shortness of breath. Cardiovascular: Negative for chest pain, palpitations and leg swelling. Gastrointestinal: Negative for abdominal pain, diarrhea, nausea and vomiting. Genitourinary: Negative for dysuria and hematuria. Musculoskeletal: Positive for back pain. Negative for neck pain and neck stiffness. Skin: Positive for color change and wound. Negative for pallor and rash. Neurological: Negative for dizziness, syncope, weakness, light-headedness and headaches. Physical Exam   Constitutional: He is oriented to person, place, and time. He appears well-developed and well-nourished. No distress. Age appropriate male laying in bed, alert and interactive, in no acute distress but does appear uncomfortable, non-diaphoretic, non-toxic. HENT:   Head: Normocephalic and atraumatic. Mouth/Throat: Oropharynx is clear and moist. No oropharyngeal exudate. Eyes: Pupils are equal, round, and reactive to light. Conjunctivae and EOM are normal.   Neck: Normal range of motion. Neck supple. No JVD present. No tracheal deviation present. Cardiovascular: Normal rate, regular rhythm and normal heart sounds. No murmur heard. Pulmonary/Chest: Effort normal and breath sounds normal. No respiratory distress. He has no wheezes. He has no rales. Abdominal: Soft.  Bowel sounds are normal. There is no tenderness. There is no rebound and no guarding. Musculoskeletal: He exhibits no edema, tenderness or deformity. Neurological: He is alert and oriented to person, place, and time. Skin: Skin is warm and dry. No rash noted. He is not diaphoretic. There is erythema. No pallor. Nursing note and vitals reviewed. Procedures    MDM  Number of Diagnoses or Management Options  Cellulitis and abscess of trunk:   Sepsis, due to unspecified organism [facility]):   Diagnosis management comments: Patient presents with a large abscess on his left upper back with signs of sepsis. He will need admission for IV antibiotics and surgical consult. He appears uncomfortable but is in no acute distress and is hemodynamically stable. SEPSIS EVALUATION TOOL Time of presentation: 9/18/2018  8:12 PM    SIRS CRITERIA: (2 required)  Temperature <36 or >38  No  Heart rate >90    Yes  RR >20 or PCO2 <32   No  WBC >12 or <4 or >10% bands Yes    SEPSIS CRITERIA: (Both required)  Two or more of the above  Yes  Suspected source(s) of infection Abscess    ANTIBIOTIC SELECTION RATIONAL  Coverage of suspected multiple concurrent sources    ANTIBIOTIC SELECTION LIMITATIONS  None    LACTIC ACID    Initial     2.3  Follow up - if initial abnormal (>2) 1.0    SEVERE SEPSIS CRITERIA: (All 3 of the following criteria needed)  1. SIRS Criteria met   Yes  2. Sepsis Criteria met   Yes  3. Organ dysfunction as evidenced by any one of the following Yes   A. Systolic NP<56 or MAP< 65 or SBP decrease by >40 from baseline   B. Creatinine >2.0, or urine output <0.5 mL/kg/hr for 2 hours   C. Bilirubin > 2 mg/dL   D. Platelet count < 696,019   E. INR > 1.5 or aPTT > 60 sec   F. Lactate > 2 mmol/L    Section requirements: To be done within 3 hours of presentation (2012 + 3 hours):  1. Initial lactate measured  2. Broad spectrum antibiotics administered  3. Blood cultures drawn prior to antibiotics  4.  Repeat lactate if initial level >2 (6 hour requirement)    SEPTIC SHOCK CRITERIA: (Both of the following must be met)  1. Severe sepsis criteria met   No   AND either of the followin. Lactate > 4 mmol/ L on any reading No    OR      Tissue hypoperfusion after crystalloids as evidenced by either: No   A. SBP <90 or MAP <65                        Or   B. Decrease in SBP by > 40 points from baseline         Section requirements: To be done within 3 hours of presentation (registration( + 3 hours):  1. Requirements as in Severe Sepsis  2. Resuscitation with 30 ml/kg crystalloid fluid. Maintain MAP >65  3. Vasopressors if hypotension persists (6 hour requirement)  4. Repeat volume status and tissue perfusion assessment  (ErRiskSepsisReEvaluation)     FLUIDS  Saline 30 ml/kg given (over 30 min) Yes    FLUID ADMINISTRATION BARRIERS  None     OTHER TREATMENT BARRIERS  None    CENTRAL LINE INSERTED? No    Sepsis Re-assessment of Volume Status and Tissue Perfusion  Required for septic shock (persistent hypotension after fluids OR lactic acid  > or = 4.0)    18   10:05 PM          Vital Signs:   Vitals:    18 1928 180   BP: (!) 190/110 134/80   Pulse: 113 98   Resp: 18    Temp: 97 °F (36.1 °C) 98.7 °F (37.1 °C)   TempSrc:  Oral   SpO2: 95% 92%   Weight: 280 lb (127 kg)    Height: 5' 5\" (1.651 m)      Card/Pulm:  Rhythm: rapid rate. Heart Sounds: Normal S1, S2 and no murmurs, gallops, or rubs. clear to auscultation, no wheezes or rales and unlabored breathing. Capillary Refill: normal.  Radial Pulse:  present 2+. Skin:  Dry and Warm. OR    Any two of the following:  - Measure CVP  - Measure ScvO2  - Bedside cardiovascular ultrasound  - Dynamic assessment of fluid responsiveness with passive leg raise or fluid   challenge      ED Course as of Sep 18 2226   Tue Sep 18, 2018   2137 Patient reassessed. He feels somewhat better after IVF and Percocet.  Explained that he does have evidence of sepsis and will need admission for IV antibiotics and surgical consult. He is agreeable with admission. His vitals have improved and he remains afebrile. [BM]   2157 Spoke with Dr. Georgina Huitron who will admit the patient for sepsis secondary to abscess. [BM]      ED Course User Index  [BM] Jonas Garcia, DO       --------------------------------------------- PAST HISTORY ---------------------------------------------  Past Medical History:  has a past medical history of Abdominal pain; Allergic rhinitis; Anxiety; Asthma; COPD (chronic obstructive pulmonary disease) ([facility] [address] Depression; Diabetes mellitus ([facility] [address] GERD (gastroesophageal reflux disease); Headache(784.0); Hyperlipidemia; Hypertension; Obesity; Osteoarthritis; and Sleep apnea. Past Surgical History:  has a past surgical history that includes Tonsillectomy (1983); hiatal hernia repair (2012); Tympanoplasty; Colonoscopy (06/08/2017); hernia repair (2012); and Endoscopy, colon, diagnostic. Social History:  reports that he has been smoking Cigarettes. He has a 45.00 pack-year smoking history. He has never used smokeless tobacco. He reports that he uses drugs, including Marijuana. He reports that he does not drink alcohol. Family History: family history includes Asthma in his sister; COPD in his brother and mother; Cancer in his maternal grandmother, mother, and paternal grandmother; Diabetes in his brother, father, and sister; Heart Disease in his maternal grandmother and paternal grandfather; Heart Disease (age of onset: 43) in his brother; Heart Disease (age of onset: 48) in his brother; Heart Disease (age of onset: 46) in his father. The patients home medications have been reviewed. Allergies: Patient has no known allergies.     -------------------------------------------------- RESULTS -------------------------------------------------    LABS:  Results for orders placed or performed during the hospital encounter of 09/18/18   CBC auto differential   Result Value Ref Range    WBC vital signs as below have been reviewed. Patient Vitals for the past 24 hrs:   BP Temp Temp src Pulse Resp SpO2 Height Weight   09/18/18 2050 134/80 98.7 °F (37.1 °C) Oral 98 - 92 % - -   09/18/18 1928 (!) 190/110 97 °F (36.1 °C) - 113 18 95 % 5' 5\" (1.651 m) 280 lb (127 kg)       Oxygen Saturation Interpretation: Normal    ------------------------------------------ PROGRESS NOTES ------------------------------------------  Re-evaluation(s):  Time: 2137  Patients symptoms are improving  Repeat physical examination is not changed    Counseling:  I have spoken with the patient and discussed todays results, in addition to providing specific details for the plan of care and counseling regarding the diagnosis and prognosis. Their questions are answered at this time and they are agreeable with the plan of admission.    --------------------------------- ADDITIONAL PROVIDER NOTES ---------------------------------  Consultations:  Time: 2157. Spoke with Dr. Morrow Courser. Discussed case. They will admit the patient. This patient's ED course included: a personal history and physicial examination, re-evaluation prior to disposition, multiple bedside re-evaluations, IV medications, cardiac monitoring and continuous pulse oximetry    This patient has remained hemodynamically stable during their ED course. Diagnosis:  1. Sepsis, due to unspecified organism ([address]    2. Cellulitis and abscess of trunk        Disposition:  Patient's disposition: Admit to telemetry  Patient's condition is stable.            Thomas Cleaning DO  Resident  09/18/18 5304 Resolved  - last BM yesterday, Pt notes decrease abdominal pain today.  - c/w Miralax and senna, dulcolax BID, s/p lactulose x1, mag citrate x1.   - fleet enema PRN  - GI Dr. Lewis following

## 2021-01-07 RX ORDER — LISINOPRIL 40 MG/1
TABLET ORAL
Qty: 30 TABLET | Refills: 1 | Status: SHIPPED
Start: 2021-01-07 | End: 2021-03-08

## 2021-02-02 RX ORDER — ALBUTEROL SULFATE 90 UG/1
AEROSOL, METERED RESPIRATORY (INHALATION)
Qty: 8.5 G | Refills: 5 | Status: SHIPPED
Start: 2021-02-02 | End: 2021-06-01

## 2021-03-08 RX ORDER — LISINOPRIL 40 MG/1
TABLET ORAL
Qty: 30 TABLET | Refills: 1 | Status: SHIPPED
Start: 2021-03-08 | End: 2021-05-07

## 2021-03-10 ENCOUNTER — NURSE ONLY (OUTPATIENT)
Dept: FAMILY MEDICINE CLINIC | Age: 51
End: 2021-03-10
Payer: COMMERCIAL

## 2021-03-10 DIAGNOSIS — Z00.00 ANNUAL PHYSICAL EXAM: ICD-10-CM

## 2021-03-10 DIAGNOSIS — E03.8 TSH (THYROID-STIMULATING HORMONE DEFICIENCY): Primary | ICD-10-CM

## 2021-03-10 LAB
BASOPHILS ABSOLUTE: 0.11 E9/L (ref 0–0.2)
BASOPHILS RELATIVE PERCENT: 0.8 % (ref 0–2)
EOSINOPHILS ABSOLUTE: 0.29 E9/L (ref 0.05–0.5)
EOSINOPHILS RELATIVE PERCENT: 2.1 % (ref 0–6)
HCT VFR BLD CALC: 53.3 % (ref 37–54)
HEMOGLOBIN: 16.3 G/DL (ref 12.5–16.5)
IMMATURE GRANULOCYTES #: 0.13 E9/L
IMMATURE GRANULOCYTES %: 0.9 % (ref 0–5)
LYMPHOCYTES ABSOLUTE: 2.92 E9/L (ref 1.5–4)
LYMPHOCYTES RELATIVE PERCENT: 21 % (ref 20–42)
MCH RBC QN AUTO: 29.2 PG (ref 26–35)
MCHC RBC AUTO-ENTMCNC: 30.6 % (ref 32–34.5)
MCV RBC AUTO: 95.5 FL (ref 80–99.9)
MONOCYTES ABSOLUTE: 1.14 E9/L (ref 0.1–0.95)
MONOCYTES RELATIVE PERCENT: 8.2 % (ref 2–12)
NEUTROPHILS ABSOLUTE: 9.33 E9/L (ref 1.8–7.3)
NEUTROPHILS RELATIVE PERCENT: 67 % (ref 43–80)
PDW BLD-RTO: 16 FL (ref 11.5–15)
PLATELET # BLD: 247 E9/L (ref 130–450)
PMV BLD AUTO: 11.4 FL (ref 7–12)
RBC # BLD: 5.58 E12/L (ref 3.8–5.8)
WBC # BLD: 13.9 E9/L (ref 4.5–11.5)

## 2021-03-10 PROCEDURE — 36415 COLL VENOUS BLD VENIPUNCTURE: CPT | Performed by: FAMILY MEDICINE

## 2021-03-11 LAB
ALBUMIN SERPL-MCNC: 4.6 G/DL (ref 3.5–5.2)
ALP BLD-CCNC: 80 U/L (ref 40–129)
ALT SERPL-CCNC: 16 U/L (ref 0–40)
ANION GAP SERPL CALCULATED.3IONS-SCNC: 23 MMOL/L (ref 7–16)
AST SERPL-CCNC: 24 U/L (ref 0–39)
BILIRUB SERPL-MCNC: 0.5 MG/DL (ref 0–1.2)
BUN BLDV-MCNC: 11 MG/DL (ref 6–20)
CALCIUM SERPL-MCNC: 10 MG/DL (ref 8.6–10.2)
CHLORIDE BLD-SCNC: 102 MMOL/L (ref 98–107)
CHOLESTEROL, TOTAL: 152 MG/DL (ref 0–199)
CO2: 17 MMOL/L (ref 22–29)
CREAT SERPL-MCNC: 0.9 MG/DL (ref 0.7–1.2)
GFR AFRICAN AMERICAN: >60
GFR NON-AFRICAN AMERICAN: >60 ML/MIN/1.73
GLUCOSE BLD-MCNC: 87 MG/DL (ref 74–99)
HDLC SERPL-MCNC: 39 MG/DL
LDL CHOLESTEROL CALCULATED: 86 MG/DL (ref 0–99)
POTASSIUM SERPL-SCNC: 4.8 MMOL/L (ref 3.5–5)
SODIUM BLD-SCNC: 142 MMOL/L (ref 132–146)
TOTAL PROTEIN: 7.8 G/DL (ref 6.4–8.3)
TRIGL SERPL-MCNC: 135 MG/DL (ref 0–149)
TSH SERPL DL<=0.05 MIU/L-ACNC: 2.28 UIU/ML (ref 0.27–4.2)
VLDLC SERPL CALC-MCNC: 27 MG/DL

## 2021-03-17 ENCOUNTER — OFFICE VISIT (OUTPATIENT)
Dept: FAMILY MEDICINE CLINIC | Age: 51
End: 2021-03-17
Payer: COMMERCIAL

## 2021-03-17 VITALS
BODY MASS INDEX: 42.57 KG/M2 | RESPIRATION RATE: 18 BRPM | OXYGEN SATURATION: 98 % | TEMPERATURE: 97.2 F | HEART RATE: 75 BPM | WEIGHT: 255.5 LBS | HEIGHT: 65 IN | SYSTOLIC BLOOD PRESSURE: 113 MMHG | DIASTOLIC BLOOD PRESSURE: 78 MMHG

## 2021-03-17 DIAGNOSIS — I10 ESSENTIAL HYPERTENSION: ICD-10-CM

## 2021-03-17 DIAGNOSIS — E11.9 TYPE 2 DIABETES MELLITUS WITHOUT COMPLICATION, WITHOUT LONG-TERM CURRENT USE OF INSULIN (HCC): Primary | ICD-10-CM

## 2021-03-17 DIAGNOSIS — E78.2 MIXED HYPERLIPIDEMIA: ICD-10-CM

## 2021-03-17 DIAGNOSIS — Z00.00 ANNUAL PHYSICAL EXAM: ICD-10-CM

## 2021-03-17 DIAGNOSIS — G47.33 OSA (OBSTRUCTIVE SLEEP APNEA): ICD-10-CM

## 2021-03-17 LAB
CREATININE URINE POCT: 100
HBA1C MFR BLD: 6.8 %
MICROALBUMIN/CREAT 24H UR: NORMAL MG/G{CREAT}
MICROALBUMIN/CREAT UR-RTO: 30

## 2021-03-17 PROCEDURE — 83036 HEMOGLOBIN GLYCOSYLATED A1C: CPT | Performed by: FAMILY MEDICINE

## 2021-03-17 PROCEDURE — 82044 UR ALBUMIN SEMIQUANTITATIVE: CPT | Performed by: FAMILY MEDICINE

## 2021-03-17 PROCEDURE — G8484 FLU IMMUNIZE NO ADMIN: HCPCS | Performed by: FAMILY MEDICINE

## 2021-03-17 PROCEDURE — 99396 PREV VISIT EST AGE 40-64: CPT | Performed by: FAMILY MEDICINE

## 2021-03-17 RX ORDER — BUSPIRONE HYDROCHLORIDE 10 MG/1
10 TABLET ORAL
COMMUNITY
Start: 2021-03-16 | End: 2022-06-12 | Stop reason: ALTCHOICE

## 2021-03-17 RX ORDER — FLUOXETINE HYDROCHLORIDE 40 MG/1
80 CAPSULE ORAL NIGHTLY
Status: ON HOLD | COMMUNITY
Start: 2021-03-16 | End: 2022-08-11

## 2021-03-17 RX ORDER — MINOCYCLINE HYDROCHLORIDE 100 MG/1
100 CAPSULE ORAL 2 TIMES DAILY
Qty: 60 CAPSULE | Refills: 1 | Status: ON HOLD
Start: 2021-03-17 | End: 2021-07-11 | Stop reason: HOSPADM

## 2021-03-17 ASSESSMENT — PATIENT HEALTH QUESTIONNAIRE - PHQ9
2. FEELING DOWN, DEPRESSED OR HOPELESS: 0
SUM OF ALL RESPONSES TO PHQ QUESTIONS 1-9: 0
SUM OF ALL RESPONSES TO PHQ QUESTIONS 1-9: 0

## 2021-03-17 NOTE — PROGRESS NOTES
Annual exam:  Patient is here for routine yearly physical/preventative visit. Patient has no complaints or concerns today. Patient is  generally healthy. Chronic medical conditions are generally controlled. Most recent labs reviewed with patient and  are not remarkable. Health maintenance reviewed with patient and is  up to date. Overall doing well. He is seeing [facility] and is doing well on Prozac and Buspar. He feels much better.      Past Medical History:   Diagnosis Date    Allergic rhinitis     Anxiety     Asthma     stable    COPD (chronic obstructive pulmonary disease) ([facility])     controlled with inhalers    Depression     no meds    Diabetes mellitus ([facility] [address]     Encounter for screening colonoscopy     and EGD 7-20-20     GERD (gastroesophageal reflux disease)     Hyperlipidemia     Hypertension     Obesity     Osteoarthritis     Sleep apnea     BMS CPAP 7, not using      Past Surgical History:   Procedure Laterality Date    ABDOMEN SURGERY N/A 6/24/2019    INCISION AND DRAINAGE PERINEAL ABSCESS, INCISIONAL DEBRIDEMENT performed by Ines Ortega MD at [facility] COLONOSCOPY  06/08/2017    Dr. Roberto Ramos polyp, diverticulitis    COLONOSCOPY N/A 7/20/2020    COLONOSCOPY POLYPECTOMY SNARE/COLD BIOPSY performed by Leti Cruz MD at [address] [place] [address], COLON, DIAGNOSTIC      EXCISION HIDRADENITIS OF INGUINAL / UMBILICAL AREA N/A 0/11/3925    DEBRIDEMENT PERINEAL WOUND performed by Ines Ortega MD at [facility]  2012    Dr. Karon Neal N/A 9/19/2018    I AND D BACK ABCESS performed by Wanda Jeronimo MD at [facility]      UPPER GASTROINTESTINAL ENDOSCOPY N/A 7/20/2020    EGD BIOPSY performed by Leti Cruz MD at [facility] ENDOSCOPY      Family History   Problem Relation Age of Onset    COPD Mother     Cancer Mother     Heart Disease Father 46        CABG    Diabetes Father     Diabetes Sister     Diabetes Brother     Heart Disease Brother 43        MI    Heart Disease Maternal Grandmother     Cancer Maternal Grandmother     Cancer Paternal Grandmother         colon    Heart Disease Paternal Grandfather     Asthma Sister     COPD Brother     Heart Disease Brother 48        CABG     Social History     Socioeconomic History    Marital status:      Spouse name: Not on file    Number of children: Not on file    Years of education: Not on file    Highest education level: Not on file   Occupational History    Occupation: sales   Social Needs    Financial resource strain: Patient refused    Food insecurity     Worry: Patient refused     Inability: Patient refused    Transportation needs     Medical: Patient refused     Non-medical: Patient refused   Tobacco Use    Smoking status: Current Every Day Smoker     Packs/day: 1.00     Years: 30.00     Pack years: 30.00     Types: Cigarettes    Smokeless tobacco: Never Used   Substance and Sexual Activity    Alcohol use: No     Alcohol/week: 0.0 standard drinks    Drug use: Yes     Types: Marijuana     Comment: occassional. 1-2x per week    Sexual activity: Yes     Partners: Female   Lifestyle    Physical activity     Days per week: Not on file     Minutes per session: Not on file    Stress: Not on file   Relationships    Social connections     Talks on phone: Not on file     Gets together: Not on file     Attends [employer] service: Not on file     Active member of club or organization: Not on file     Attends meetings of clubs or organizations: Not on file     Relationship status: Not on file    Intimate partner violence     Fear of current or ex partner: Not on file     Emotionally abused: Not on file     Physically abused: Not on file     Forced sexual activity: Not on file   Other Topics Concern    Not on file   Social History Narrative    Not on file      No Known Allergies     Review of Systems:  Constitutional:  No fever, no fatigue, no chills, no headaches, no weight change  Dermatology:  No rash, no mole, no dry or sensitive skin  ENT:  No cough, no sore throat, no sinus pain, no runny nose, no ear pain  Cardiology:  No chest pain, no palpitations, no leg edema, no shortness of breath, no PND  Endocrinology:  No polydipsia, no polyuria, no cold intolerance, no heat intolerance, no polyphagia, no hair changes  Gastroenterology:  No dysphagia, no abdominal pain, no nausea, no vomiting, no constipation, no diarrhea, no heartburn  Female Reproductive:  No hot flashes, no abnormal vaginal discharge, no pain with menstruation, no pelvic pain  Musculoskeletal:  No joint pain, no leg cramps, no back pain, no muscle aches  Respiratory:  No shortness of breath, no orthopnea, no wheezing, no OSORIO, no hemoptysis  Urology:  No blood in the urine, no urinary frequency, no urinary incontinence, no urinary urgency, no nocturia, no dysuria  Neurology:  No numbness/tingling, no dizziness, no weakness  Psychology:  No depression, no sleep disturbances, no suicidal ideation, no anxiety  Physical Exam:  Vitals:    03/17/21 1046   BP: 113/78   Pulse: 75   Resp: 18   Temp: 97.2 °F (36.2 °C)   TempSrc: Temporal   SpO2: 98%   Weight: 255 lb 8 oz (115.9 kg)   Height: 5' 5\" (1.651 m)     General:  Patient alert and oriented x 3, NAD, pleasant  HEENT:  Atraumatic, normocephalic, PERRLA, EOMI, clear conjunctiva, TMs clear, nose-clear, throat - no erythema, tonsils- wnl  Neck:  Supple, no goiter, no carotid bruits, no lymphadenopathy  Lungs:  CTA B  Heart:  RRR, no murmurs, gallops or rubs  Abdomen:  Soft, NTND, + bowel sounds  Back: full ROM, no CVA tenderness  Extremities:  No clubbing, cyanosis or edema  Neuro:  CN II-XII grossly intact, 5/5 strength in bilateral upper and lower extremities, 2 + reflexes. Skin: unremarkable    Assessment/Plan:  Misty Romero was seen today for annual exam and discuss labs.     Diagnoses and all assessment and plan. All questions answered. Pawel Back MD  3/19/2021    I have personally reviewed and updated the chief complaint, HPI, Past Medical, Family and Social History, as well as the above Review of Systems.

## 2021-03-26 RX ORDER — ATORVASTATIN CALCIUM 40 MG/1
TABLET, FILM COATED ORAL
Qty: 90 TABLET | Refills: 1 | Status: SHIPPED
Start: 2021-03-26 | End: 2022-03-31 | Stop reason: SDUPTHER

## 2021-04-01 ENCOUNTER — IMMUNIZATION (OUTPATIENT)
Dept: PRIMARY CARE CLINIC | Age: 51
End: 2021-04-01
Payer: COMMERCIAL

## 2021-04-01 PROCEDURE — 91301 COVID-19, MODERNA VACCINE 100MCG/0.5ML DOSE: CPT | Performed by: NURSE PRACTITIONER

## 2021-04-01 PROCEDURE — 0011A COVID-19, MODERNA VACCINE 100MCG/0.5ML DOSE: CPT | Performed by: NURSE PRACTITIONER

## 2021-04-29 ENCOUNTER — IMMUNIZATION (OUTPATIENT)
Dept: PRIMARY CARE CLINIC | Age: 51
End: 2021-04-29
Payer: COMMERCIAL

## 2021-04-29 PROCEDURE — 91301 COVID-19, MODERNA VACCINE 100MCG/0.5ML DOSE: CPT | Performed by: NURSE PRACTITIONER

## 2021-04-29 PROCEDURE — 0012A COVID-19, MODERNA VACCINE 100MCG/0.5ML DOSE: CPT | Performed by: NURSE PRACTITIONER

## 2021-05-07 RX ORDER — LISINOPRIL 40 MG/1
TABLET ORAL
Qty: 30 TABLET | Refills: 1 | Status: SHIPPED
Start: 2021-05-07 | End: 2021-07-07

## 2021-06-01 RX ORDER — ALBUTEROL SULFATE 90 UG/1
AEROSOL, METERED RESPIRATORY (INHALATION)
Qty: 8.5 G | Refills: 5 | Status: SHIPPED
Start: 2021-06-01 | End: 2021-07-22 | Stop reason: SDUPTHER

## 2021-06-10 RX ORDER — MOMETASONE FUROATE AND FORMOTEROL FUMARATE DIHYDRATE 200; 5 UG/1; UG/1
AEROSOL RESPIRATORY (INHALATION)
Qty: 26 G | Refills: 5 | Status: SHIPPED
Start: 2021-06-10 | End: 2022-03-31 | Stop reason: SDUPTHER

## 2021-07-07 RX ORDER — LISINOPRIL 40 MG/1
TABLET ORAL
Qty: 30 TABLET | Refills: 1 | Status: SHIPPED
Start: 2021-07-07 | End: 2022-03-31 | Stop reason: SDUPTHER

## 2021-07-09 ENCOUNTER — APPOINTMENT (OUTPATIENT)
Dept: CT IMAGING | Age: 51
End: 2021-07-09
Payer: COMMERCIAL

## 2021-07-09 ENCOUNTER — HOSPITAL ENCOUNTER (EMERGENCY)
Age: 51
Discharge: ANOTHER HOSPITAL | End: 2021-07-10
Attending: EMERGENCY MEDICINE
Payer: COMMERCIAL

## 2021-07-09 ENCOUNTER — APPOINTMENT (OUTPATIENT)
Dept: GENERAL RADIOLOGY | Age: 51
End: 2021-07-09
Payer: COMMERCIAL

## 2021-07-09 DIAGNOSIS — K85.90 ACUTE PANCREATITIS, UNSPECIFIED COMPLICATION STATUS, UNSPECIFIED PANCREATITIS TYPE: Primary | ICD-10-CM

## 2021-07-09 DIAGNOSIS — R10.13 ABDOMINAL PAIN, EPIGASTRIC: ICD-10-CM

## 2021-07-09 DIAGNOSIS — R10.11 ABDOMINAL PAIN, RIGHT UPPER QUADRANT: ICD-10-CM

## 2021-07-09 DIAGNOSIS — R11.2 INTRACTABLE VOMITING WITH NAUSEA, UNSPECIFIED VOMITING TYPE: ICD-10-CM

## 2021-07-09 LAB
ANION GAP SERPL CALCULATED.3IONS-SCNC: 10 MMOL/L (ref 7–16)
APTT: 34.4 SEC (ref 24.5–35.1)
BASOPHILS ABSOLUTE: 0.13 E9/L (ref 0–0.2)
BASOPHILS RELATIVE PERCENT: 0.9 % (ref 0–2)
BUN BLDV-MCNC: 11 MG/DL (ref 6–20)
CALCIUM SERPL-MCNC: 9.8 MG/DL (ref 8.6–10.2)
CHLORIDE BLD-SCNC: 101 MMOL/L (ref 98–107)
CO2: 27 MMOL/L (ref 22–29)
CREAT SERPL-MCNC: 0.9 MG/DL (ref 0.7–1.2)
EOSINOPHILS ABSOLUTE: 0.34 E9/L (ref 0.05–0.5)
EOSINOPHILS RELATIVE PERCENT: 2.3 % (ref 0–6)
GFR AFRICAN AMERICAN: >60
GFR NON-AFRICAN AMERICAN: >60 ML/MIN/1.73
GLUCOSE BLD-MCNC: 116 MG/DL (ref 74–99)
HCT VFR BLD CALC: 46.5 % (ref 37–54)
HEMOGLOBIN: 15.7 G/DL (ref 12.5–16.5)
IMMATURE GRANULOCYTES #: 0.09 E9/L
IMMATURE GRANULOCYTES %: 0.6 % (ref 0–5)
INR BLD: 1.1
LYMPHOCYTES ABSOLUTE: 3.11 E9/L (ref 1.5–4)
LYMPHOCYTES RELATIVE PERCENT: 20.9 % (ref 20–42)
MCH RBC QN AUTO: 30 PG (ref 26–35)
MCHC RBC AUTO-ENTMCNC: 33.8 % (ref 32–34.5)
MCV RBC AUTO: 88.7 FL (ref 80–99.9)
MONOCYTES ABSOLUTE: 1.23 E9/L (ref 0.1–0.95)
MONOCYTES RELATIVE PERCENT: 8.3 % (ref 2–12)
NEUTROPHILS ABSOLUTE: 9.97 E9/L (ref 1.8–7.3)
NEUTROPHILS RELATIVE PERCENT: 67 % (ref 43–80)
PDW BLD-RTO: 14.8 FL (ref 11.5–15)
PLATELET # BLD: 224 E9/L (ref 130–450)
PMV BLD AUTO: 10.5 FL (ref 7–12)
POTASSIUM SERPL-SCNC: 4.2 MMOL/L (ref 3.5–5)
PROTHROMBIN TIME: 11.7 SEC (ref 9.3–12.4)
RBC # BLD: 5.24 E12/L (ref 3.8–5.8)
SODIUM BLD-SCNC: 138 MMOL/L (ref 132–146)
WBC # BLD: 14.9 E9/L (ref 4.5–11.5)

## 2021-07-09 PROCEDURE — 71046 X-RAY EXAM CHEST 2 VIEWS: CPT

## 2021-07-09 PROCEDURE — 83605 ASSAY OF LACTIC ACID: CPT

## 2021-07-09 PROCEDURE — 2500000003 HC RX 250 WO HCPCS: Performed by: EMERGENCY MEDICINE

## 2021-07-09 PROCEDURE — 85730 THROMBOPLASTIN TIME PARTIAL: CPT

## 2021-07-09 PROCEDURE — 99285 EMERGENCY DEPT VISIT HI MDM: CPT

## 2021-07-09 PROCEDURE — 74177 CT ABD & PELVIS W/CONTRAST: CPT

## 2021-07-09 PROCEDURE — 2580000003 HC RX 258: Performed by: EMERGENCY MEDICINE

## 2021-07-09 PROCEDURE — 93005 ELECTROCARDIOGRAM TRACING: CPT | Performed by: EMERGENCY MEDICINE

## 2021-07-09 PROCEDURE — 6360000002 HC RX W HCPCS: Performed by: EMERGENCY MEDICINE

## 2021-07-09 PROCEDURE — 84484 ASSAY OF TROPONIN QUANT: CPT

## 2021-07-09 PROCEDURE — 83690 ASSAY OF LIPASE: CPT

## 2021-07-09 PROCEDURE — 36415 COLL VENOUS BLD VENIPUNCTURE: CPT

## 2021-07-09 PROCEDURE — 80048 BASIC METABOLIC PNL TOTAL CA: CPT

## 2021-07-09 PROCEDURE — 80076 HEPATIC FUNCTION PANEL: CPT

## 2021-07-09 PROCEDURE — 85610 PROTHROMBIN TIME: CPT

## 2021-07-09 PROCEDURE — 85025 COMPLETE CBC W/AUTO DIFF WBC: CPT

## 2021-07-09 RX ORDER — ONDANSETRON 2 MG/ML
4 INJECTION INTRAMUSCULAR; INTRAVENOUS ONCE
Status: COMPLETED | OUTPATIENT
Start: 2021-07-09 | End: 2021-07-09

## 2021-07-09 RX ORDER — MORPHINE SULFATE 4 MG/ML
4 INJECTION, SOLUTION INTRAMUSCULAR; INTRAVENOUS ONCE
Status: COMPLETED | OUTPATIENT
Start: 2021-07-09 | End: 2021-07-09

## 2021-07-09 RX ORDER — 0.9 % SODIUM CHLORIDE 0.9 %
1000 INTRAVENOUS SOLUTION INTRAVENOUS ONCE
Status: COMPLETED | OUTPATIENT
Start: 2021-07-09 | End: 2021-07-10

## 2021-07-09 RX ADMIN — MORPHINE SULFATE 4 MG: 4 INJECTION, SOLUTION INTRAMUSCULAR; INTRAVENOUS at 23:49

## 2021-07-09 RX ADMIN — FAMOTIDINE 20 MG: 10 INJECTION INTRAVENOUS at 23:49

## 2021-07-09 RX ADMIN — SODIUM CHLORIDE 1000 ML: 9 INJECTION, SOLUTION INTRAVENOUS at 23:48

## 2021-07-09 RX ADMIN — ONDANSETRON 4 MG: 2 INJECTION INTRAMUSCULAR; INTRAVENOUS at 23:49

## 2021-07-09 ASSESSMENT — PAIN DESCRIPTION - LOCATION: LOCATION: ABDOMEN;CHEST

## 2021-07-09 ASSESSMENT — PAIN SCALES - GENERAL
PAINLEVEL_OUTOF10: 7
PAINLEVEL_OUTOF10: 7

## 2021-07-10 ENCOUNTER — HOSPITAL ENCOUNTER (INPATIENT)
Age: 51
LOS: 1 days | Discharge: HOME | DRG: 282 | End: 2021-07-12
Attending: INTERNAL MEDICINE | Admitting: INTERNAL MEDICINE
Payer: COMMERCIAL

## 2021-07-10 ENCOUNTER — APPOINTMENT (OUTPATIENT)
Dept: ULTRASOUND IMAGING | Age: 51
DRG: 282 | End: 2021-07-10
Attending: INTERNAL MEDICINE
Payer: COMMERCIAL

## 2021-07-10 VITALS
WEIGHT: 261 LBS | DIASTOLIC BLOOD PRESSURE: 77 MMHG | TEMPERATURE: 98.5 F | HEART RATE: 67 BPM | BODY MASS INDEX: 43.43 KG/M2 | SYSTOLIC BLOOD PRESSURE: 143 MMHG | OXYGEN SATURATION: 96 % | RESPIRATION RATE: 16 BRPM

## 2021-07-10 DIAGNOSIS — K85.30 DRUG-INDUCED ACUTE PANCREATITIS WITHOUT INFECTION OR NECROSIS: Primary | ICD-10-CM

## 2021-07-10 PROBLEM — K85.90 ACUTE PANCREATITIS: Status: ACTIVE | Noted: 2021-07-10

## 2021-07-10 LAB
ALBUMIN SERPL-MCNC: 4.2 G/DL (ref 3.5–5.2)
ALP BLD-CCNC: 89 U/L (ref 40–129)
ALT SERPL-CCNC: 19 U/L (ref 0–40)
AST SERPL-CCNC: 12 U/L (ref 0–39)
BILIRUB SERPL-MCNC: 0.3 MG/DL (ref 0–1.2)
BILIRUBIN DIRECT: 0.2 MG/DL (ref 0–0.3)
BILIRUBIN, INDIRECT: 0.1 MG/DL (ref 0–1)
EKG ATRIAL RATE: 79 BPM
EKG P AXIS: 24 DEGREES
EKG P-R INTERVAL: 138 MS
EKG Q-T INTERVAL: 350 MS
EKG QRS DURATION: 90 MS
EKG QTC CALCULATION (BAZETT): 401 MS
EKG R AXIS: 50 DEGREES
EKG T AXIS: 35 DEGREES
EKG VENTRICULAR RATE: 79 BPM
LACTIC ACID: 1 MMOL/L (ref 0.5–2.2)
LIPASE: 87 U/L (ref 13–60)
METER GLUCOSE: 104 MG/DL (ref 74–99)
METER GLUCOSE: 109 MG/DL (ref 74–99)
METER GLUCOSE: 129 MG/DL (ref 74–99)
TOTAL PROTEIN: 7.1 G/DL (ref 6.4–8.3)
TRIGL SERPL-MCNC: 170 MG/DL (ref 0–149)
TROPONIN, HIGH SENSITIVITY: 8 NG/L (ref 0–11)

## 2021-07-10 PROCEDURE — 96375 TX/PRO/DX INJ NEW DRUG ADDON: CPT

## 2021-07-10 PROCEDURE — 94660 CPAP INITIATION&MGMT: CPT

## 2021-07-10 PROCEDURE — 96374 THER/PROPH/DIAG INJ IV PUSH: CPT

## 2021-07-10 PROCEDURE — 76705 ECHO EXAM OF ABDOMEN: CPT

## 2021-07-10 PROCEDURE — G0378 HOSPITAL OBSERVATION PER HR: HCPCS

## 2021-07-10 PROCEDURE — 93010 ELECTROCARDIOGRAM REPORT: CPT | Performed by: INTERNAL MEDICINE

## 2021-07-10 PROCEDURE — 6370000000 HC RX 637 (ALT 250 FOR IP): Performed by: STUDENT IN AN ORGANIZED HEALTH CARE EDUCATION/TRAINING PROGRAM

## 2021-07-10 PROCEDURE — 74177 CT ABD & PELVIS W/CONTRAST: CPT

## 2021-07-10 PROCEDURE — 6360000002 HC RX W HCPCS: Performed by: INTERNAL MEDICINE

## 2021-07-10 PROCEDURE — 6360000002 HC RX W HCPCS: Performed by: EMERGENCY MEDICINE

## 2021-07-10 PROCEDURE — 36415 COLL VENOUS BLD VENIPUNCTURE: CPT

## 2021-07-10 PROCEDURE — 94664 DEMO&/EVAL PT USE INHALER: CPT

## 2021-07-10 PROCEDURE — 2580000003 HC RX 258: Performed by: INTERNAL MEDICINE

## 2021-07-10 PROCEDURE — 6370000000 HC RX 637 (ALT 250 FOR IP): Performed by: INTERNAL MEDICINE

## 2021-07-10 PROCEDURE — 6360000002 HC RX W HCPCS: Performed by: STUDENT IN AN ORGANIZED HEALTH CARE EDUCATION/TRAINING PROGRAM

## 2021-07-10 PROCEDURE — G0379 DIRECT REFER HOSPITAL OBSERV: HCPCS

## 2021-07-10 PROCEDURE — 2580000003 HC RX 258: Performed by: EMERGENCY MEDICINE

## 2021-07-10 PROCEDURE — 82962 GLUCOSE BLOOD TEST: CPT

## 2021-07-10 PROCEDURE — 84478 ASSAY OF TRIGLYCERIDES: CPT

## 2021-07-10 PROCEDURE — 99220 PR INITIAL OBSERVATION CARE/DAY 70 MINUTES: CPT | Performed by: INTERNAL MEDICINE

## 2021-07-10 PROCEDURE — 2580000003 HC RX 258: Performed by: RADIOLOGY

## 2021-07-10 PROCEDURE — 94640 AIRWAY INHALATION TREATMENT: CPT

## 2021-07-10 PROCEDURE — 6360000004 HC RX CONTRAST MEDICATION: Performed by: RADIOLOGY

## 2021-07-10 PROCEDURE — 96376 TX/PRO/DX INJ SAME DRUG ADON: CPT

## 2021-07-10 RX ORDER — TAMSULOSIN HYDROCHLORIDE 0.4 MG/1
0.4 CAPSULE ORAL DAILY
Status: DISCONTINUED | OUTPATIENT
Start: 2021-07-10 | End: 2021-07-12 | Stop reason: HOSPADM

## 2021-07-10 RX ORDER — HYDROCODONE BITARTRATE AND ACETAMINOPHEN 5; 325 MG/1; MG/1
1 TABLET ORAL EVERY 4 HOURS PRN
Status: DISCONTINUED | OUTPATIENT
Start: 2021-07-10 | End: 2021-07-12 | Stop reason: HOSPADM

## 2021-07-10 RX ORDER — SODIUM CHLORIDE 0.9 % (FLUSH) 0.9 %
5-40 SYRINGE (ML) INJECTION PRN
Status: DISCONTINUED | OUTPATIENT
Start: 2021-07-10 | End: 2021-07-12 | Stop reason: HOSPADM

## 2021-07-10 RX ORDER — ALBUTEROL SULFATE 2.5 MG/3ML
2.5 SOLUTION RESPIRATORY (INHALATION) EVERY 6 HOURS PRN
Status: DISCONTINUED | OUTPATIENT
Start: 2021-07-10 | End: 2021-07-12 | Stop reason: HOSPADM

## 2021-07-10 RX ORDER — MORPHINE SULFATE 4 MG/ML
4 INJECTION, SOLUTION INTRAMUSCULAR; INTRAVENOUS ONCE
Status: COMPLETED | OUTPATIENT
Start: 2021-07-10 | End: 2021-07-10

## 2021-07-10 RX ORDER — LANCETS 30 GAUGE
1 EACH MISCELLANEOUS DAILY
Status: DISCONTINUED | OUTPATIENT
Start: 2021-07-10 | End: 2021-07-10

## 2021-07-10 RX ORDER — BLOOD-GLUCOSE METER
1 KIT MISCELLANEOUS DAILY
Status: DISCONTINUED | OUTPATIENT
Start: 2021-07-10 | End: 2021-07-10

## 2021-07-10 RX ORDER — NICOTINE POLACRILEX 4 MG
15 LOZENGE BUCCAL PRN
Status: DISCONTINUED | OUTPATIENT
Start: 2021-07-10 | End: 2021-07-12 | Stop reason: HOSPADM

## 2021-07-10 RX ORDER — BUSPIRONE HYDROCHLORIDE 10 MG/1
10 TABLET ORAL DAILY
Status: DISCONTINUED | OUTPATIENT
Start: 2021-07-10 | End: 2021-07-12 | Stop reason: HOSPADM

## 2021-07-10 RX ORDER — PANTOPRAZOLE SODIUM 40 MG/1
40 TABLET, DELAYED RELEASE ORAL
Status: DISCONTINUED | OUTPATIENT
Start: 2021-07-11 | End: 2021-07-12

## 2021-07-10 RX ORDER — ONDANSETRON 2 MG/ML
4 INJECTION INTRAMUSCULAR; INTRAVENOUS ONCE
Status: COMPLETED | OUTPATIENT
Start: 2021-07-10 | End: 2021-07-10

## 2021-07-10 RX ORDER — SODIUM CHLORIDE, SODIUM LACTATE, POTASSIUM CHLORIDE, CALCIUM CHLORIDE 600; 310; 30; 20 MG/100ML; MG/100ML; MG/100ML; MG/100ML
INJECTION, SOLUTION INTRAVENOUS CONTINUOUS
Status: DISCONTINUED | OUTPATIENT
Start: 2021-07-10 | End: 2021-07-10 | Stop reason: HOSPADM

## 2021-07-10 RX ORDER — ONDANSETRON 4 MG/1
4 TABLET, ORALLY DISINTEGRATING ORAL EVERY 8 HOURS PRN
Status: DISCONTINUED | OUTPATIENT
Start: 2021-07-10 | End: 2021-07-12 | Stop reason: HOSPADM

## 2021-07-10 RX ORDER — MORPHINE SULFATE 4 MG/ML
4 INJECTION, SOLUTION INTRAMUSCULAR; INTRAVENOUS
Status: DISCONTINUED | OUTPATIENT
Start: 2021-07-10 | End: 2021-07-12 | Stop reason: HOSPADM

## 2021-07-10 RX ORDER — NICOTINE 21 MG/24HR
1 PATCH, TRANSDERMAL 24 HOURS TRANSDERMAL DAILY
Status: DISCONTINUED | OUTPATIENT
Start: 2021-07-10 | End: 2021-07-12 | Stop reason: HOSPADM

## 2021-07-10 RX ORDER — ONDANSETRON 2 MG/ML
4 INJECTION INTRAMUSCULAR; INTRAVENOUS EVERY 6 HOURS PRN
Status: DISCONTINUED | OUTPATIENT
Start: 2021-07-10 | End: 2021-07-12 | Stop reason: HOSPADM

## 2021-07-10 RX ORDER — BUDESONIDE AND FORMOTEROL FUMARATE DIHYDRATE 160; 4.5 UG/1; UG/1
2 AEROSOL RESPIRATORY (INHALATION) 2 TIMES DAILY
Refills: 5 | Status: DISCONTINUED | OUTPATIENT
Start: 2021-07-10 | End: 2021-07-10 | Stop reason: CLARIF

## 2021-07-10 RX ORDER — HYDROCODONE BITARTRATE AND ACETAMINOPHEN 5; 325 MG/1; MG/1
2 TABLET ORAL EVERY 4 HOURS PRN
Status: DISCONTINUED | OUTPATIENT
Start: 2021-07-10 | End: 2021-07-12 | Stop reason: HOSPADM

## 2021-07-10 RX ORDER — ALBUTEROL SULFATE 90 UG/1
2 AEROSOL, METERED RESPIRATORY (INHALATION) EVERY 6 HOURS PRN
Status: DISCONTINUED | OUTPATIENT
Start: 2021-07-10 | End: 2021-07-10 | Stop reason: CLARIF

## 2021-07-10 RX ORDER — ATORVASTATIN CALCIUM 40 MG/1
40 TABLET, FILM COATED ORAL DAILY
Status: DISCONTINUED | OUTPATIENT
Start: 2021-07-10 | End: 2021-07-12 | Stop reason: HOSPADM

## 2021-07-10 RX ORDER — ARFORMOTEROL TARTRATE 15 UG/2ML
15 SOLUTION RESPIRATORY (INHALATION) 2 TIMES DAILY
Status: DISCONTINUED | OUTPATIENT
Start: 2021-07-10 | End: 2021-07-12 | Stop reason: HOSPADM

## 2021-07-10 RX ORDER — DEXTROSE MONOHYDRATE 25 G/50ML
12.5 INJECTION, SOLUTION INTRAVENOUS PRN
Status: DISCONTINUED | OUTPATIENT
Start: 2021-07-10 | End: 2021-07-12 | Stop reason: HOSPADM

## 2021-07-10 RX ORDER — MORPHINE SULFATE 2 MG/ML
2 INJECTION, SOLUTION INTRAMUSCULAR; INTRAVENOUS
Status: DISCONTINUED | OUTPATIENT
Start: 2021-07-10 | End: 2021-07-12 | Stop reason: HOSPADM

## 2021-07-10 RX ORDER — DEXTROSE MONOHYDRATE 50 MG/ML
100 INJECTION, SOLUTION INTRAVENOUS PRN
Status: DISCONTINUED | OUTPATIENT
Start: 2021-07-10 | End: 2021-07-12 | Stop reason: HOSPADM

## 2021-07-10 RX ORDER — FLUOXETINE HYDROCHLORIDE 20 MG/1
80 CAPSULE ORAL DAILY
Status: DISCONTINUED | OUTPATIENT
Start: 2021-07-10 | End: 2021-07-12 | Stop reason: HOSPADM

## 2021-07-10 RX ORDER — LISINOPRIL 20 MG/1
40 TABLET ORAL DAILY
Status: DISCONTINUED | OUTPATIENT
Start: 2021-07-10 | End: 2021-07-12 | Stop reason: HOSPADM

## 2021-07-10 RX ORDER — BUDESONIDE 0.5 MG/2ML
0.5 INHALANT ORAL 2 TIMES DAILY
Status: DISCONTINUED | OUTPATIENT
Start: 2021-07-10 | End: 2021-07-12 | Stop reason: HOSPADM

## 2021-07-10 RX ORDER — SODIUM CHLORIDE 0.9 % (FLUSH) 0.9 %
5-40 SYRINGE (ML) INJECTION EVERY 12 HOURS SCHEDULED
Status: DISCONTINUED | OUTPATIENT
Start: 2021-07-10 | End: 2021-07-12 | Stop reason: HOSPADM

## 2021-07-10 RX ORDER — SODIUM CHLORIDE 9 MG/ML
25 INJECTION, SOLUTION INTRAVENOUS PRN
Status: DISCONTINUED | OUTPATIENT
Start: 2021-07-10 | End: 2021-07-12 | Stop reason: HOSPADM

## 2021-07-10 RX ORDER — ACETAMINOPHEN 325 MG/1
650 TABLET ORAL EVERY 4 HOURS PRN
Status: DISCONTINUED | OUTPATIENT
Start: 2021-07-10 | End: 2021-07-12 | Stop reason: HOSPADM

## 2021-07-10 RX ORDER — SODIUM CHLORIDE 9 MG/ML
INJECTION, SOLUTION INTRAVENOUS CONTINUOUS
Status: DISCONTINUED | OUTPATIENT
Start: 2021-07-10 | End: 2021-07-12 | Stop reason: HOSPADM

## 2021-07-10 RX ORDER — SODIUM CHLORIDE 0.9 % (FLUSH) 0.9 %
10 SYRINGE (ML) INJECTION PRN
Status: COMPLETED | OUTPATIENT
Start: 2021-07-10 | End: 2021-07-10

## 2021-07-10 RX ADMIN — SODIUM CHLORIDE: 9 INJECTION, SOLUTION INTRAVENOUS at 14:19

## 2021-07-10 RX ADMIN — ATORVASTATIN CALCIUM 40 MG: 40 TABLET, FILM COATED ORAL at 14:05

## 2021-07-10 RX ADMIN — HYDROCODONE BITARTRATE AND ACETAMINOPHEN 2 TABLET: 5; 325 TABLET ORAL at 11:39

## 2021-07-10 RX ADMIN — ONDANSETRON 4 MG: 2 INJECTION INTRAMUSCULAR; INTRAVENOUS at 11:39

## 2021-07-10 RX ADMIN — SODIUM CHLORIDE, POTASSIUM CHLORIDE, SODIUM LACTATE AND CALCIUM CHLORIDE: 600; 310; 30; 20 INJECTION, SOLUTION INTRAVENOUS at 01:51

## 2021-07-10 RX ADMIN — SODIUM CHLORIDE, PRESERVATIVE FREE 10 ML: 5 INJECTION INTRAVENOUS at 00:27

## 2021-07-10 RX ADMIN — BUDESONIDE 500 MCG: 0.5 SUSPENSION RESPIRATORY (INHALATION) at 19:47

## 2021-07-10 RX ADMIN — ONDANSETRON 4 MG: 2 INJECTION INTRAMUSCULAR; INTRAVENOUS at 02:59

## 2021-07-10 RX ADMIN — HYDROCODONE BITARTRATE AND ACETAMINOPHEN 2 TABLET: 5; 325 TABLET ORAL at 06:51

## 2021-07-10 RX ADMIN — TAMSULOSIN HYDROCHLORIDE 0.4 MG: 0.4 CAPSULE ORAL at 14:04

## 2021-07-10 RX ADMIN — MORPHINE SULFATE 4 MG: 4 INJECTION, SOLUTION INTRAMUSCULAR; INTRAVENOUS at 02:59

## 2021-07-10 RX ADMIN — LISINOPRIL 40 MG: 20 TABLET ORAL at 14:07

## 2021-07-10 RX ADMIN — MORPHINE SULFATE 2 MG: 2 INJECTION, SOLUTION INTRAMUSCULAR; INTRAVENOUS at 21:00

## 2021-07-10 RX ADMIN — HYDROCODONE BITARTRATE AND ACETAMINOPHEN 2 TABLET: 5; 325 TABLET ORAL at 17:47

## 2021-07-10 RX ADMIN — FLUOXETINE HYDROCHLORIDE 80 MG: 20 CAPSULE ORAL at 14:05

## 2021-07-10 RX ADMIN — IOPAMIDOL 75 ML: 755 INJECTION, SOLUTION INTRAVENOUS at 00:27

## 2021-07-10 RX ADMIN — ARFORMOTEROL TARTRATE 15 MCG: 15 SOLUTION RESPIRATORY (INHALATION) at 19:47

## 2021-07-10 RX ADMIN — SODIUM CHLORIDE, PRESERVATIVE FREE 10 ML: 5 INJECTION INTRAVENOUS at 14:18

## 2021-07-10 RX ADMIN — SODIUM CHLORIDE, PRESERVATIVE FREE 10 ML: 5 INJECTION INTRAVENOUS at 11:39

## 2021-07-10 RX ADMIN — MORPHINE SULFATE 4 MG: 4 INJECTION, SOLUTION INTRAMUSCULAR; INTRAVENOUS at 01:51

## 2021-07-10 RX ADMIN — SODIUM CHLORIDE: 9 INJECTION, SOLUTION INTRAVENOUS at 06:52

## 2021-07-10 ASSESSMENT — PAIN SCALES - GENERAL
PAINLEVEL_OUTOF10: 6
PAINLEVEL_OUTOF10: 8
PAINLEVEL_OUTOF10: 3
PAINLEVEL_OUTOF10: 7
PAINLEVEL_OUTOF10: 7
PAINLEVEL_OUTOF10: 6
PAINLEVEL_OUTOF10: 3
PAINLEVEL_OUTOF10: 6
PAINLEVEL_OUTOF10: 4
PAINLEVEL_OUTOF10: 3
PAINLEVEL_OUTOF10: 0
PAINLEVEL_OUTOF10: 7
PAINLEVEL_OUTOF10: 5

## 2021-07-10 ASSESSMENT — PAIN DESCRIPTION - PROGRESSION: CLINICAL_PROGRESSION: GRADUALLY WORSENING

## 2021-07-10 ASSESSMENT — PAIN DESCRIPTION - FREQUENCY: FREQUENCY: INTERMITTENT

## 2021-07-10 ASSESSMENT — PAIN - FUNCTIONAL ASSESSMENT: PAIN_FUNCTIONAL_ASSESSMENT: ACTIVITIES ARE NOT PREVENTED

## 2021-07-10 ASSESSMENT — PAIN DESCRIPTION - DESCRIPTORS
DESCRIPTORS: ACHING
DESCRIPTORS: DULL;CONSTANT;ACHING

## 2021-07-10 ASSESSMENT — PAIN DESCRIPTION - LOCATION
LOCATION: ABDOMEN
LOCATION: ABDOMEN
LOCATION: ABDOMEN;CHEST

## 2021-07-10 ASSESSMENT — PAIN DESCRIPTION - PAIN TYPE
TYPE: ACUTE PAIN

## 2021-07-10 ASSESSMENT — PAIN DESCRIPTION - ONSET: ONSET: ON-GOING

## 2021-07-10 NOTE — ED PROVIDER NOTES
Department of Emergency Medicine   ED  Provider Note  Admit Date/RoomTime: 7/9/2021 10:45 PM  ED Room: 05/05          History of Present Illness:  7/9/21, Time: 11:35 PM EDT  Chief Complaint   Patient presents with    Abdominal Pain     started 7/8/    Chest Pain     intermittently all day                Tamera Baker is a 46 y.o. male presenting to the ED for right lower chest pain, right upper quadrant pain nausea vomiting, beginning a few days. The complaint has been persistent, moderate in severity, and worsened by eating and drinking . Patient presents for right upper quadrant pain right chest pain nausea vomiting. States right upper quadrant pain began a few days ago. States he time he eats he vomits. Denies bloody or bilious emesis. He is unsure the last time he moved his bowels. He has been unable to take his medications in at least 24 hours secondary to vomiting anytime he eats anything. Review of Systems:   Pertinent positives and negatives are stated within HPI, all other systems reviewed and are negative.        --------------------------------------------- PAST HISTORY ---------------------------------------------  Past Medical History:  has a past medical history of Allergic rhinitis, Anxiety, Asthma, COPD (chronic obstructive pulmonary disease) ([facility] [address] Depression, Diabetes mellitus ([facility] Encounter for screening colonoscopy, GERD (gastroesophageal reflux disease), Hyperlipidemia, Hypertension, Obesity, Osteoarthritis, and Sleep apnea. Past Surgical History:  has a past surgical history that includes Tonsillectomy (1983); hiatal hernia repair (2012); Tympanoplasty; Colonoscopy (06/08/2017); hernia repair (2012); Endoscopy, colon, diagnostic; pr drain skin abscess simple (N/A, 9/19/2018); EXCISION HIDRADENITIS OF INGUINAL / UMBILICAL AREA (N/A, 0/84/3282); Abdomen surgery (N/A, 6/24/2019); Upper gastrointestinal endoscopy (N/A, 7/20/2020); and Colonoscopy (N/A, 7/20/2020).     Social History:  reports that he has been smoking cigarettes. He has a 30.00 pack-year smoking history. He has never used smokeless tobacco. He reports current drug use. Drug: Marijuana. He reports that he does not drink alcohol. Family History: family history includes Asthma in his sister; COPD in his brother and mother; Cancer in his maternal grandmother, mother, and paternal grandmother; Diabetes in his brother, father, and sister; Heart Disease in his maternal grandmother and paternal grandfather; Heart Disease (age of onset: 43) in his brother; Heart Disease (age of onset: 48) in his brother; Heart Disease (age of onset: 46) in his father. . Unless otherwise noted, family history is non contributory    The patients home medications have been reviewed. Allergies: Patient has no known allergies. ---------------------------------------------------PHYSICAL EXAM--------------------------------------     Constitutional/General: Alert and oriented x3 BMI of 43 in distress 2/2 pain  Head: Normocephalic and atraumatic  Eyes: PERRL, EOMI, sclera non icteric  Mouth: Oropharynx clear, handling secretions, no trismus, dry MM   Neck: Supple, full ROM, no stridor, no meningeal signs  Respiratory: Lungs clear to auscultation bilaterally,Not in respiratory distress  Cardiovascular:  Regular rate. Regular rhythm. 2+ distal pulses. Equal extremity pulses. Chest: No chest wall tenderness  GI:  Abdomen Soft, BMI of 43, diffusely tender worse in the epigastric and right upper quadrant. Decreased bowel sounds. Musculoskeletal: Moves all extremities x 4. Warm and well perfused,   Capillary refill <3 seconds  Integument: skin warm and dry. No rashes. Neurologic: GCS 15, no focal deficits,   Psychiatric: Normal Affect      EKG: Interpreted by emergency department physician, Dr. Laci Bethea   This EKG is signed and interpreted by me.     Rate: 79  Rhythm: Sinus  Interpretation: Sinus rhythm, normal axis, AR is 138, QRS is 9 0, QTc is 401, no other acute findings are stable compared to 9/18/2020  Comparison: stable as compared to patient's most recent EKG      -------------------------------------------------- RESULTS -------------------------------------------------  I have personally reviewed all laboratory and imaging results for this patient. Results are listed below.      LABS: (Lab results interpreted by me)  Results for orders placed or performed during the hospital encounter of 07/09/21   APTT   Result Value Ref Range    aPTT 34.4 24.5 - 35.1 sec   Basic Metabolic Panel   Result Value Ref Range    Sodium 138 132 - 146 mmol/L    Potassium 4.2 3.5 - 5.0 mmol/L    Chloride 101 98 - 107 mmol/L    CO2 27 22 - 29 mmol/L    Anion Gap 10 7 - 16 mmol/L    Glucose 116 (H) 74 - 99 mg/dL    BUN 11 6 - 20 mg/dL    CREATININE 0.9 0.7 - 1.2 mg/dL    GFR Non-African American >60 >=60 mL/min/1.73    GFR African American >60     Calcium 9.8 8.6 - 10.2 mg/dL   CBC Auto Differential   Result Value Ref Range    WBC 14.9 (H) 4.5 - 11.5 E9/L    RBC 5.24 3.80 - 5.80 E12/L    Hemoglobin 15.7 12.5 - 16.5 g/dL    Hematocrit 46.5 37.0 - 54.0 %    MCV 88.7 80.0 - 99.9 fL    MCH 30.0 26.0 - 35.0 pg    MCHC 33.8 32.0 - 34.5 %    RDW 14.8 11.5 - 15.0 fL    Platelets 355 630 - 031 E9/L    MPV 10.5 7.0 - 12.0 fL    Neutrophils % 67.0 43.0 - 80.0 %    Immature Granulocytes % 0.6 0.0 - 5.0 %    Lymphocytes % 20.9 20.0 - 42.0 %    Monocytes % 8.3 2.0 - 12.0 %    Eosinophils % 2.3 0.0 - 6.0 %    Basophils % 0.9 0.0 - 2.0 %    Neutrophils Absolute 9.97 (H) 1.80 - 7.30 E9/L    Immature Granulocytes # 0.09 E9/L    Lymphocytes Absolute 3.11 1.50 - 4.00 E9/L    Monocytes Absolute 1.23 (H) 0.10 - 0.95 E9/L    Eosinophils Absolute 0.34 0.05 - 0.50 E9/L    Basophils Absolute 0.13 0.00 - 0.20 E9/L   Protime-INR   Result Value Ref Range    Protime 11.7 9.3 - 12.4 sec    INR 1.1    Troponin   Result Value Ref Range    Troponin, High Sensitivity 8 0 - 11 ng/L   Hepatic function panel   Result Value Ref Range    Total Protein 7.1 6.4 - 8.3 g/dL    Albumin 4.2 3.5 - 5.2 g/dL    Alkaline Phosphatase 89 40 - 129 U/L    ALT 19 0 - 40 U/L    AST 12 0 - 39 U/L    Total Bilirubin 0.3 0.0 - 1.2 mg/dL    Bilirubin, Direct 0.2 0.0 - 0.3 mg/dL    Bilirubin, Indirect 0.1 0.0 - 1.0 mg/dL   Lipase   Result Value Ref Range    Lipase 87 (H) 13 - 60 U/L   Lactic Acid, Plasma   Result Value Ref Range    Lactic Acid 1.0 0.5 - 2.2 mmol/L   ,       RADIOLOGY:  Interpreted by Radiologist unless otherwise specified  CT ABDOMEN PELVIS W IV CONTRAST Additional Contrast? None   Final Result   Findings suspicious for early/mild acute pancreatitis involving the   pancreatic head and uncinate process. Correlation with serum amylase and   lipase levels recommended. Normal appendix. Distal colonic diverticulosis. No acute diverticulitis. XR CHEST (2 VW)   Final Result   Retrocardiac hiatal hernia. Atelectatic changes in the left lung base. No other radiographic evidence of acute cardiopulmonary disease.                          ------------------------- NURSING NOTES AND VITALS REVIEWED ---------------------------   The nursing notes within the ED encounter and vital signs as below have been reviewed by myself  BP (!) 143/77   Pulse 67   Temp 98.5 °F (36.9 °C)   Resp 16   Wt 261 lb (118.4 kg)   SpO2 96%   BMI 43.43 kg/m²     Oxygen Saturation Interpretation: Normal    The cardiac monitor revealed NSR with a heart rate in the 70s as interpreted by me. The cardiac monitor was ordered secondary to the patient's heart rate and to monitor the patient for dysrhythmia. CPT 92690    The patients available past medical records and past encounters were reviewed.         ------------------------------ ED COURSE/MEDICAL DECISION MAKING----------------------  Medications   lactated ringers infusion ( Intravenous New Bag 7/10/21 0151)   0.9 % sodium chloride bolus (0 mLs Intravenous Stopped 7/10/21 0052)   ondansetron (ZOFRAN) injection 4 mg (4 mg Intravenous Given 7/9/21 2349)   famotidine (PEPCID) injection 20 mg (20 mg Intravenous Given 7/9/21 2349)   morphine sulfate (PF) injection 4 mg (4 mg Intravenous Given 7/9/21 2349)   iopamidol (ISOVUE-370) 76 % injection 75 mL (75 mLs Intravenous Given 7/10/21 0027)   sodium chloride flush 0.9 % injection 10 mL (10 mLs Intravenous Given 7/10/21 0027)   morphine sulfate (PF) injection 4 mg (4 mg Intravenous Given 7/10/21 0151)   morphine sulfate (PF) injection 4 mg (4 mg Intravenous Given 7/10/21 0259)   ondansetron (ZOFRAN) injection 4 mg (4 mg Intravenous Given 7/10/21 0259)                    Medical Decision Making:     I, Dr. Breezy Mcgraw am the primary provider of record    Work-up undertaken, leukocytosis but no lactic acidosis. Ultrasound unavailable, contrasted CT showed early/mild pancreatitis but no evidence of gallstones or cholecystitis. His pain and nausea are poorly controlled. Still unable to tolerate p.o. Clinically he appears worse, spoke with medicine at [facility] patiently kept for further observation and care      Re-Evaluations:      Re-evaluation. Patients symptoms show no change  Repeat physical examination is not changed     Re-evaluation. Patients symptoms show no change  Repeat physical examination is not changed        This patient's ED course included: a personal history and physicial examination, multiple bedside re-evaluations, IV medications, cardiac monitoring, continuous pulse oximetry and complex medical decision making and emergency management    This patient has been closely monitored during their ED course. Consultations:  Internal Medicine @ [facility] (Pt request) - They will admit       Critical Care:         Counseling:    The emergency provider has spoken with the patient and discussed todays results, in addition to providing specific details for the plan of care and counseling regarding the diagnosis and prognosis. Questions are answered at this time and they are agreeable with the plan.       --------------------------------- IMPRESSION AND DISPOSITION ---------------------------------    IMPRESSION  1. Acute pancreatitis, unspecified complication status, unspecified pancreatitis type    2. Abdominal pain, epigastric    3. Abdominal pain, right upper quadrant    4. Intractable vomiting with nausea, unspecified vomiting type        DISPOSITION  Disposition: Transfer to [facility]  Patient condition is stable        NOTE: This report was transcribed using voice recognition software.  Every effort was made to ensure accuracy; however, inadvertent computerized transcription errors may be present       Yousif Vasquez DO  07/10/21 9545

## 2021-07-10 NOTE — PLAN OF CARE
Problem: Pain:  Goal: Pain level will decrease  Description: Pain level will decrease  7/10/2021 1554 by Brigitte Hudson RN  Outcome: Met This Shift  7/10/2021 0528 by Sulma Gonzalez RN  Outcome: Ongoing  Goal: Control of acute pain  Description: Control of acute pain  7/10/2021 1554 by Brigitte Hudson RN  Outcome: Met This Shift  7/10/2021 0528 by Sulma Gonzalez RN  Outcome: Ongoing  Goal: Control of chronic pain  Description: Control of chronic pain  7/10/2021 1554 by Brigitte Hudson RN  Outcome: Met This Shift  7/10/2021 0528 by Sulma Gonzalez RN  Outcome: Ongoing     Problem: Nausea/Vomiting:  Goal: Absence of nausea/vomiting  Description: Absence of nausea/vomiting  Outcome: Met This Shift  Goal: Able to drink  Description: Able to drink  Outcome: Met This Shift  Goal: Able to eat  Description: Able to eat  Outcome: Met This Shift  Goal: Ability to achieve adequate nutritional intake will improve  Description: Ability to achieve adequate nutritional intake will improve  Outcome: Met This Shift

## 2021-07-10 NOTE — PROGRESS NOTES
Limited Brief Communication  -      Patient was admitted this a.m. by the night physician. On examination - AOx3, CTA bilaterally, S1-S2 normal, obese protuberant abdomen with RUQ &  midepigastric tenderness    1. Acute pancreatitis -patient nonalcoholic, CT abdomen suspicious for pancreatitis, lipase elevated, first episode of pancreatitis, will follow up ultrasound abdomen, elevated triglycerides - 170-on statin, patient on CLD will advance as tolerated, analgesics, IV fluids. 2.  Diabetes mellitus -patient on Steglatro. 3.  COPD stable -continue nebulization. 4.  Morbid obesity  5. Hypertension -continue ACE inhibitor  7. Sleep apnea -CPAP at night  8.   Tobacco use -nicotine patch, smoking cessation      Code Status: Full code  DVT prophylaxis: Lovenox

## 2021-07-10 NOTE — H&P
[facility] History and Physical      CHIEF COMPLAINT: Abdominal pain    History of Present Illness:    42-year-old man with history of diabetes mellitus, hypertension, obesity, was transferred from [facility] emergency department with abdominal pain, lower chest pain, nausea and vomiting. He had had epigastric and right upper quadrant pain for few days prior to presentation. Pain is worsened by meals. No diarrhea, no fever, no chills. He had a CT scan of abdomen and pelvis with findings suspicious for early/mild acute pancreatitis involving the pancreatic head and uncinate process. Lipase level was 87. Patient was then transferred here to [facility] for further evaluation and management. He denies alcohol use. Still has his gallbladder.     Informant(s) for H&P: Patient    REVIEW OF SYSTEMS:  A comprehensive review of systems was negative except for: what is in the HPI      PMH:  Past Medical History:   Diagnosis Date    Allergic rhinitis     Anxiety     Asthma     stable    COPD (chronic obstructive pulmonary disease) ([facility] [address]     controlled with inhalers    Depression     no meds    Diabetes mellitus ([facility] [address]     Encounter for screening colonoscopy     and EGD 7-20-20     GERD (gastroesophageal reflux disease)     Hyperlipidemia     Hypertension     Obesity     Osteoarthritis     Sleep apnea     BMS CPAP 7, not using       Surgical History:  Past Surgical History:   Procedure Laterality Date    ABDOMEN SURGERY N/A 6/24/2019    INCISION AND DRAINAGE PERINEAL ABSCESS, INCISIONAL DEBRIDEMENT performed by Slime Irizarry MD at [facility] COLONOSCOPY  06/08/2017    Dr. Gio Vega polyp, diverticulitis    COLONOSCOPY N/A 7/20/2020    COLONOSCOPY POLYPECTOMY SNARE/COLD BIOPSY performed by Jackelin Griffin MD at [address] [facility] ([place], [place]) ENDOSCOPY, COLON, DIAGNOSTIC      EXCISION HIDRADENITIS OF INGUINAL / UMBILICAL AREA N/A 0/17/0595    DEBRIDEMENT PERINEAL WOUND performed by Nitin Fountain MD at [facility]  2012    Dr. Rogelio Avalos N/A 9/19/2018    I AND D BACK ABCESS performed by Rehana Saldaña MD at [address] [place] [address] ENDOSCOPY N/A 7/20/2020    EGD BIOPSY performed by Manju Quijano MD at [facility] ENDOSCOPY       Medications Prior to Admission:    Prior to Admission medications    Medication Sig Start Date End Date Taking?  Authorizing Provider   lisinopril (PRINIVIL;ZESTRIL) 40 MG tablet take 1 tablet by mouth once daily 7/7/21  Yes Isabell Peres MD   DULERA 200-5 MCG/ACT inhaler inhale 2 puffs by mouth and INTO THE LUNGS twice a day 6/10/21  Yes Isabell Peres MD   albuterol sulfate  (90 Base) MCG/ACT inhaler inhale 2 puffs by mouth and INTO THE LUNGS every 6 hours if needed 6/1/21  Yes Isabell Peres MD   atorvastatin (LIPITOR) 40 MG tablet take 1 tablet by mouth once daily 3/26/21  Yes Isabell Peres MD   busPIRone (BUSPAR) 10 MG tablet Take 10 mg by mouth daily  3/16/21  Yes Historical Provider, MD   FLUoxetine (PROZAC) 40 MG capsule 80 mg daily  3/16/21  Yes Historical Provider, MD   minocycline (MINOCIN;DYNACIN) 100 MG capsule Take 1 capsule by mouth 2 times daily 3/17/21  Yes Isabell Peres MD   metFORMIN (GLUCOPHAGE) 500 MG tablet take 2 tablets by mouth once daily with breakfast 2/12/21  Yes Isabell Peres MD   STEGLATRO 15 MG TABS take 1 tablet by mouth once daily 9/15/20  Yes Isabell Peres MD   omeprazole (PRILOSEC) 40 MG delayed release capsule take 1 capsule by mouth once daily 8/3/20  Yes Annabelle Omalley MD   Multiple Vitamins-Minerals (THERAPEUTIC MULTIVITAMIN-MINERALS) tablet Take 1 tablet by mouth daily   Yes Historical Provider, MD   tamsulosin (FLOMAX) 0.4 MG capsule Take 1 capsule by mouth daily  Patient taking differently: Take 0.8 mg by mouth daily  6/4/20  Yes Kit Landa Lee Quiles MD   blood glucose monitor strips Test twice daily & as needed for symptoms of irregular blood glucose. Dx: E11.9 9/15/20   Osmin Torres MD   glucose monitoring kit (FREESTYLE) monitoring kit 1 kit by Does not apply route daily Dx E11.9 11/22/19   Osmin Torres MD   Lancets MISC 1 each by Does not apply route daily 11/22/19   Osmin Torres MD       Allergies:    Patient has no known allergies. Social History:    reports that he has been smoking cigarettes. He has a 35.00 pack-year smoking history. He has never used smokeless tobacco. He reports current drug use. Frequency: 7.00 times per week. Drug: Marijuana. He reports that he does not drink alcohol. Family History:   family history includes Asthma in his sister; COPD in his brother and mother; Cancer in his maternal grandmother, mother, and paternal grandmother; Diabetes in his brother, father, and sister; Heart Disease in his maternal grandmother and paternal grandfather; Heart Disease (age of onset: 43) in his brother; Heart Disease (age of onset: 48) in his brother; Heart Disease (age of onset: 46) in his father.        PHYSICAL EXAM:  Vitals:  /72   Pulse 66   Temp 98 °F (36.7 °C) (Oral)   Resp 18   Ht 5' 5\" (1.651 m)   Wt 250 lb (113.4 kg)   SpO2 94%   BMI 41.60 kg/m²     General Appearance: alert and oriented to person, place and time and in no acute distress  Skin: warm and dry  Head: normocephalic and atraumatic  Eyes: pupils equal, round, and reactive to light, extraocular eye movements intact, conjunctivae normal  Neck: neck supple and non tender without mass   Pulmonary/Chest: clear to auscultation bilaterally- no wheezes, rales or rhonchi, normal air movement, no respiratory distress  Cardiovascular: normal rate, normal S1 and S2 and no carotid bruits  Abdomen: soft, protuberant, mild epigastric tenderness, non-distended, normal bowel sounds, no masses or organomegaly  Extremities: no cyanosis, no clubbing and no edema  Neurologic: no cranial nerve deficit and speech normal        LABS:  Recent Labs     07/09/21  2312      K 4.2      CO2 27   BUN 11   CREATININE 0.9   GLUCOSE 116*   CALCIUM 9.8       Recent Labs     07/09/21  2312   WBC 14.9*   RBC 5.24   HGB 15.7   HCT 46.5   MCV 88.7   MCH 30.0   MCHC 33.8   RDW 14.8      MPV 10.5       No results for input(s): POCGLU in the last 72 hours. Radiology:   US GALLBLADDER RUQ    (Results Pending)       EKG:     ASSESSMENT:    1. Acute pancreatitis  2. Diabetes mellitus  3. COPD stable  4. Morbid obesity  5. Hypertension  7. Sleep apnea  8. Tobacco use    Active Problems:    Acute pancreatitis  Resolved Problems:    * No resolved hospital problems. *      PLAN:    1.  N.p.o.  2.  IV fluids  3. Pain control, IV and p.o.  4.  Recheck lipase in the morning  5. Gallbladder ultrasound  6. Check triglycerides  7. Monitor glucose, sliding scale insulin  8. Continue antihypertensive meds  9. CPAP at night  10. Smoking cessation    Code Status: Full code  DVT prophylaxis: Lovenox      NOTE: This report was transcribed using voice recognition software. Every effort was made to ensure accuracy; however, inadvertent computerized transcription errors may be present.   Electronically signed by Otis Guillen MD on 7/10/2021 at 8:38 AM

## 2021-07-10 NOTE — ED NOTES
Patient hooked back up to monitor after going to restroom.   NACL was finished running, D/C'd and flushed line     Perla Sanchez  07/10/21 0110

## 2021-07-10 NOTE — PROGRESS NOTES
[facility] Progress Note    Admitting Date and Time: 7/10/2021  4:49 AM  Admit Dx: Acute pancreatitis, unspecified complication status, unspecified pancreatitis type [K85.90]    Subjective:  Patient is being followed for Acute pancreatitis, unspecified complication status, unspecified pancreatitis type [K85.90]     Transferred from [facility] with acute abdominal pain, N/V for several days prior to admission. CT indicating findings suggestive of acute pancreatitis. IV fluids, NPO, IV opiate analgesia required for pain control. He is still nauseated, IV Zofran helping. C/o SOB and wants his home ICS/LABA ordered - formulary equivalent offered. ROS: denies fever, chills, cp, sob, n/v, HA unless stated above.      sodium chloride flush  5-40 mL Intravenous 2 times per day    enoxaparin  40 mg Subcutaneous Daily    nicotine  1 patch Transdermal Daily    atorvastatin  40 mg Oral Daily    busPIRone  10 mg Oral Daily    FLUoxetine  80 mg Oral Daily    lisinopril  40 mg Oral Daily    [START ON 7/11/2021] pantoprazole  40 mg Oral QAM AC    tamsulosin  0.4 mg Oral Daily    budesonide  0.5 mg Nebulization BID    Arformoterol Tartrate  15 mcg Nebulization BID    insulin lispro  0-12 Units Subcutaneous TID WC    insulin lispro  0-6 Units Subcutaneous Nightly     sodium chloride flush, 5-40 mL, PRN  sodium chloride, 25 mL, PRN  acetaminophen, 650 mg, Q4H PRN  ondansetron, 4 mg, Q8H PRN   Or  ondansetron, 4 mg, Q6H PRN  HYDROcodone 5 mg - acetaminophen, 1 tablet, Q4H PRN   Or  HYDROcodone 5 mg - acetaminophen, 2 tablet, Q4H PRN  morphine, 2 mg, Q2H PRN   Or  morphine, 4 mg, Q2H PRN  albuterol, 2.5 mg, Q6H PRN  glucose, 15 g, PRN  dextrose, 12.5 g, PRN  glucagon (rDNA), 1 mg, PRN  dextrose, 100 mL/hr, PRN         Objective:    /72   Pulse 66   Temp 98 °F (36.7 °C) (Oral)   Resp 18   Ht 5' 5\" (1.651 m)   Wt 250 lb (113.4 kg)   SpO2 94%   BMI 41.60 kg/m²     General Appearance: alert and oriented to person, place and time. Moderate pain  Skin: warm and dry  Head: normocephalic and atraumatic  Eyes: pupils equal, round, and reactive to light, extraocular eye movements intact, conjunctivae normal  Neck: neck supple and non tender without mass   Pulmonary/Chest: scant wheezing noted, no rhonchi or crackles. Cardiovascular: normal rate, normal S1 and S2 and no carotid bruits  Abdomen: obese, non distended, tender epigastric and RUQ  Extremities: no cyanosis, no clubbing and no edema  Neurologic: no cranial nerve deficit and speech normal        Recent Labs     07/09/21  2312      K 4.2      CO2 27   BUN 11   CREATININE 0.9   GLUCOSE 116*   CALCIUM 9.8       Recent Labs     07/09/21  2312   WBC 14.9*   RBC 5.24   HGB 15.7   HCT 46.5   MCV 88.7   MCH 30.0   MCHC 33.8   RDW 14.8      MPV 10.5           Assessment:    Active Problems:    Acute pancreatitis  Resolved Problems:    * No resolved hospital problems. *      Plan:    1. Acute pancreatitis  - per CT Findings suspicious for early/mild acute pancreatitis involving the pancreatic head and uncinate process. Lipase only mildly elevated, will follow up. - no history of ETOH  - US GB pending  - IV opiate analgesia, IV antiemetics. _ NPO and IV hydration continue    2. DM  - hold metformin  - Insulin per sliding scale    3. COPD  - Dulera at home  - Formulary equivalent nebs available    4. GERD  - PPI    5. Depression, anxiety  - mood stable on Prozac and Buspar    6. BPH  - flomax          NOTE: This report was transcribed using voice recognition software. Every effort was made to ensure accuracy; however, inadvertent computerized transcription errors may be present.   Electronically signed by ANNABEL Villatoro - CNP on 7/10/2021 at 2:47 PM

## 2021-07-11 PROBLEM — K85.90 ACUTE PANCREATITIS WITHOUT INFECTION OR NECROSIS: Status: ACTIVE | Noted: 2021-07-11

## 2021-07-11 LAB
ALBUMIN SERPL-MCNC: 3.6 G/DL (ref 3.5–5.2)
ALP BLD-CCNC: 79 U/L (ref 40–129)
ALT SERPL-CCNC: 11 U/L (ref 0–40)
ANION GAP SERPL CALCULATED.3IONS-SCNC: 7 MMOL/L (ref 7–16)
AST SERPL-CCNC: 12 U/L (ref 0–39)
BILIRUB SERPL-MCNC: 0.4 MG/DL (ref 0–1.2)
BUN BLDV-MCNC: 6 MG/DL (ref 6–20)
CALCIUM SERPL-MCNC: 8.8 MG/DL (ref 8.6–10.2)
CHLORIDE BLD-SCNC: 107 MMOL/L (ref 98–107)
CO2: 25 MMOL/L (ref 22–29)
CREAT SERPL-MCNC: 0.7 MG/DL (ref 0.7–1.2)
GFR AFRICAN AMERICAN: >60
GFR NON-AFRICAN AMERICAN: >60 ML/MIN/1.73
GLUCOSE BLD-MCNC: 105 MG/DL (ref 74–99)
HCT VFR BLD CALC: 43.9 % (ref 37–54)
HEMOGLOBIN: 14.1 G/DL (ref 12.5–16.5)
LIPASE: 35 U/L (ref 13–60)
MCH RBC QN AUTO: 29.7 PG (ref 26–35)
MCHC RBC AUTO-ENTMCNC: 32.1 % (ref 32–34.5)
MCV RBC AUTO: 92.6 FL (ref 80–99.9)
METER GLUCOSE: 106 MG/DL (ref 74–99)
METER GLUCOSE: 112 MG/DL (ref 74–99)
METER GLUCOSE: 94 MG/DL (ref 74–99)
METER GLUCOSE: 94 MG/DL (ref 74–99)
PDW BLD-RTO: 14.7 FL (ref 11.5–15)
PLATELET # BLD: 177 E9/L (ref 130–450)
PMV BLD AUTO: 10.3 FL (ref 7–12)
POTASSIUM SERPL-SCNC: 4.4 MMOL/L (ref 3.5–5)
RBC # BLD: 4.74 E12/L (ref 3.8–5.8)
SODIUM BLD-SCNC: 139 MMOL/L (ref 132–146)
TOTAL PROTEIN: 6.2 G/DL (ref 6.4–8.3)
WBC # BLD: 9.8 E9/L (ref 4.5–11.5)

## 2021-07-11 PROCEDURE — 6360000002 HC RX W HCPCS: Performed by: STUDENT IN AN ORGANIZED HEALTH CARE EDUCATION/TRAINING PROGRAM

## 2021-07-11 PROCEDURE — 6370000000 HC RX 637 (ALT 250 FOR IP): Performed by: INTERNAL MEDICINE

## 2021-07-11 PROCEDURE — G0378 HOSPITAL OBSERVATION PER HR: HCPCS

## 2021-07-11 PROCEDURE — 6370000000 HC RX 637 (ALT 250 FOR IP): Performed by: STUDENT IN AN ORGANIZED HEALTH CARE EDUCATION/TRAINING PROGRAM

## 2021-07-11 PROCEDURE — 36415 COLL VENOUS BLD VENIPUNCTURE: CPT

## 2021-07-11 PROCEDURE — 80053 COMPREHEN METABOLIC PANEL: CPT

## 2021-07-11 PROCEDURE — 6360000002 HC RX W HCPCS: Performed by: INTERNAL MEDICINE

## 2021-07-11 PROCEDURE — APPSS45 APP SPLIT SHARED TIME 31-45 MINUTES: Performed by: NURSE PRACTITIONER

## 2021-07-11 PROCEDURE — 82962 GLUCOSE BLOOD TEST: CPT

## 2021-07-11 PROCEDURE — 1200000000 HC SEMI PRIVATE

## 2021-07-11 PROCEDURE — 96376 TX/PRO/DX INJ SAME DRUG ADON: CPT

## 2021-07-11 PROCEDURE — 85027 COMPLETE CBC AUTOMATED: CPT

## 2021-07-11 PROCEDURE — 99232 SBSQ HOSP IP/OBS MODERATE 35: CPT | Performed by: STUDENT IN AN ORGANIZED HEALTH CARE EDUCATION/TRAINING PROGRAM

## 2021-07-11 PROCEDURE — 94640 AIRWAY INHALATION TREATMENT: CPT

## 2021-07-11 PROCEDURE — 2580000003 HC RX 258: Performed by: INTERNAL MEDICINE

## 2021-07-11 PROCEDURE — 94660 CPAP INITIATION&MGMT: CPT

## 2021-07-11 PROCEDURE — 83690 ASSAY OF LIPASE: CPT

## 2021-07-11 RX ORDER — SUCRALFATE 1 G/1
1 TABLET ORAL DAILY
Status: DISCONTINUED | OUTPATIENT
Start: 2021-07-11 | End: 2021-07-12 | Stop reason: HOSPADM

## 2021-07-11 RX ORDER — HYDROCODONE BITARTRATE AND ACETAMINOPHEN 5; 325 MG/1; MG/1
1 TABLET ORAL EVERY 8 HOURS PRN
Qty: 9 TABLET | Refills: 0 | Status: SHIPPED | OUTPATIENT
Start: 2021-07-11 | End: 2021-07-14

## 2021-07-11 RX ADMIN — ARFORMOTEROL TARTRATE 15 MCG: 15 SOLUTION RESPIRATORY (INHALATION) at 19:40

## 2021-07-11 RX ADMIN — FLUOXETINE HYDROCHLORIDE 80 MG: 20 CAPSULE ORAL at 09:23

## 2021-07-11 RX ADMIN — ALBUTEROL SULFATE 2.5 MG: 2.5 SOLUTION RESPIRATORY (INHALATION) at 02:50

## 2021-07-11 RX ADMIN — PANTOPRAZOLE SODIUM 40 MG: 40 TABLET, DELAYED RELEASE ORAL at 06:24

## 2021-07-11 RX ADMIN — SODIUM CHLORIDE: 9 INJECTION, SOLUTION INTRAVENOUS at 10:07

## 2021-07-11 RX ADMIN — LISINOPRIL 40 MG: 20 TABLET ORAL at 08:26

## 2021-07-11 RX ADMIN — BUDESONIDE 500 MCG: 0.5 SUSPENSION RESPIRATORY (INHALATION) at 19:40

## 2021-07-11 RX ADMIN — ARFORMOTEROL TARTRATE 15 MCG: 15 SOLUTION RESPIRATORY (INHALATION) at 07:25

## 2021-07-11 RX ADMIN — ONDANSETRON 4 MG: 2 INJECTION INTRAMUSCULAR; INTRAVENOUS at 18:13

## 2021-07-11 RX ADMIN — SUCRALFATE 1 G: 1 TABLET ORAL at 11:44

## 2021-07-11 RX ADMIN — SODIUM CHLORIDE: 9 INJECTION, SOLUTION INTRAVENOUS at 23:07

## 2021-07-11 RX ADMIN — SODIUM CHLORIDE, PRESERVATIVE FREE 10 ML: 5 INJECTION INTRAVENOUS at 16:09

## 2021-07-11 RX ADMIN — HYDROCODONE BITARTRATE AND ACETAMINOPHEN 2 TABLET: 5; 325 TABLET ORAL at 02:48

## 2021-07-11 RX ADMIN — ONDANSETRON 4 MG: 2 INJECTION INTRAMUSCULAR; INTRAVENOUS at 02:53

## 2021-07-11 RX ADMIN — ALBUTEROL SULFATE 2.5 MG: 2.5 SOLUTION RESPIRATORY (INHALATION) at 18:18

## 2021-07-11 RX ADMIN — HYDROCODONE BITARTRATE AND ACETAMINOPHEN 2 TABLET: 5; 325 TABLET ORAL at 07:45

## 2021-07-11 RX ADMIN — SODIUM CHLORIDE: 9 INJECTION, SOLUTION INTRAVENOUS at 16:10

## 2021-07-11 RX ADMIN — ATORVASTATIN CALCIUM 40 MG: 40 TABLET, FILM COATED ORAL at 08:26

## 2021-07-11 RX ADMIN — HYDROCODONE BITARTRATE AND ACETAMINOPHEN 2 TABLET: 5; 325 TABLET ORAL at 23:10

## 2021-07-11 RX ADMIN — TAMSULOSIN HYDROCHLORIDE 0.4 MG: 0.4 CAPSULE ORAL at 08:27

## 2021-07-11 RX ADMIN — HYDROCODONE BITARTRATE AND ACETAMINOPHEN 2 TABLET: 5; 325 TABLET ORAL at 18:13

## 2021-07-11 RX ADMIN — BUDESONIDE 500 MCG: 0.5 SUSPENSION RESPIRATORY (INHALATION) at 07:25

## 2021-07-11 ASSESSMENT — PAIN SCALES - GENERAL
PAINLEVEL_OUTOF10: 8
PAINLEVEL_OUTOF10: 3
PAINLEVEL_OUTOF10: 7
PAINLEVEL_OUTOF10: 7
PAINLEVEL_OUTOF10: 9

## 2021-07-11 ASSESSMENT — PAIN DESCRIPTION - DESCRIPTORS: DESCRIPTORS: DULL;ACHING;RADIATING

## 2021-07-11 ASSESSMENT — PAIN DESCRIPTION - FREQUENCY: FREQUENCY: CONTINUOUS

## 2021-07-11 ASSESSMENT — PAIN DESCRIPTION - LOCATION: LOCATION: ABDOMEN

## 2021-07-11 ASSESSMENT — PAIN DESCRIPTION - ONSET: ONSET: ON-GOING

## 2021-07-11 ASSESSMENT — PAIN DESCRIPTION - ORIENTATION: ORIENTATION: RIGHT;MID

## 2021-07-11 ASSESSMENT — PAIN - FUNCTIONAL ASSESSMENT: PAIN_FUNCTIONAL_ASSESSMENT: ACTIVITIES ARE NOT PREVENTED

## 2021-07-11 ASSESSMENT — PAIN DESCRIPTION - PAIN TYPE: TYPE: ACUTE PAIN

## 2021-07-11 ASSESSMENT — PAIN DESCRIPTION - PROGRESSION: CLINICAL_PROGRESSION: GRADUALLY WORSENING

## 2021-07-11 NOTE — PROGRESS NOTES
[facility] Progress Note    Admitting Date and Time: 7/10/2021  4:49 AM  Admit Dx: Acute pancreatitis, unspecified complication status, unspecified pancreatitis type [K85.90]  Acute pancreatitis without infection or necrosis [K85.90]    Subjective:  Patient is being followed for Acute pancreatitis, unspecified complication status, unspecified pancreatitis type [K85.90]  Acute pancreatitis without infection or necrosis [K85.90]     Was comfortable this morning, tolerated lunch - full liquid diet started. Had anticipated DC home but he then had recurrence of pain > 1 hour after oral intake and did not feel comfortable going home. Given his description of pain, location of pain and mildly elevated lipase - this could more likely indicate PUD or duodenitis. Will ask for GI consult     ROS: denies fever, chills, cp, sob, n/v, HA unless stated above.       sucralfate  1 g Oral Daily    sodium chloride flush  5-40 mL Intravenous 2 times per day    enoxaparin  40 mg Subcutaneous Daily    nicotine  1 patch Transdermal Daily    atorvastatin  40 mg Oral Daily    busPIRone  10 mg Oral Daily    FLUoxetine  80 mg Oral Daily    lisinopril  40 mg Oral Daily    pantoprazole  40 mg Oral QAM AC    tamsulosin  0.4 mg Oral Daily    budesonide  0.5 mg Nebulization BID    Arformoterol Tartrate  15 mcg Nebulization BID    insulin lispro  0-12 Units Subcutaneous TID WC    insulin lispro  0-6 Units Subcutaneous Nightly     sodium chloride flush, 5-40 mL, PRN  sodium chloride, 25 mL, PRN  acetaminophen, 650 mg, Q4H PRN  ondansetron, 4 mg, Q8H PRN   Or  ondansetron, 4 mg, Q6H PRN  HYDROcodone 5 mg - acetaminophen, 1 tablet, Q4H PRN   Or  HYDROcodone 5 mg - acetaminophen, 2 tablet, Q4H PRN  morphine, 2 mg, Q2H PRN   Or  morphine, 4 mg, Q2H PRN  albuterol, 2.5 mg, Q6H PRN  glucose, 15 g, PRN  dextrose, 12.5 g, PRN  glucagon (rDNA), 1 mg, PRN  dextrose, 100 mL/hr, PRN         Objective:    /76   Pulse 67 Temp 97.8 °F (36.6 °C) (Oral)   Resp 18   Ht 5' 5\" (1.651 m)   Wt 254 lb (115.2 kg)   SpO2 95%   BMI 42.27 kg/m²     General Appearance: alert and oriented to person, place and time and in no acute distress  Skin: warm and dry  Head: normocephalic and atraumatic  Eyes: pupils equal, round, and reactive to light, extraocular eye movements intact, conjunctivae normal  Neck: neck supple and non tender without mass   Pulmonary/Chest: clear to auscultation bilaterally- no wheezes, rales or rhonchi, normal air movement, no respiratory distress  Cardiovascular: normal rate, normal S1 and S2 and no carotid bruits  Abdomen: soft, non-tender, non-distended, normal bowel sounds, no masses or organomegaly  Extremities: no cyanosis, no clubbing and no edema  Neurologic: no cranial nerve deficit and speech normal        Recent Labs     07/09/21 2312 07/11/21  0750    139   K 4.2 4.4    107   CO2 27 25   BUN 11 6   CREATININE 0.9 0.7   GLUCOSE 116* 105*   CALCIUM 9.8 8.8       Recent Labs     07/09/21 2312 07/11/21  0750   WBC 14.9* 9.8   RBC 5.24 4.74   HGB 15.7 14.1   HCT 46.5 43.9   MCV 88.7 92.6   MCH 30.0 29.7   MCHC 33.8 32.1   RDW 14.8 14.7    177   MPV 10.5 10.3       Radiology:     Assessment:    Active Problems:    Acute pancreatitis    Acute pancreatitis without infection or necrosis  Resolved Problems:    * No resolved hospital problems. *      Plan:    1. Acute pancreatitis  - per CT Findings suspicious for early/mild acute pancreatitis involving the pancreatic head and uncinate process. Lipase only mildly elevated, and quickly resolved  - no history of ETOH  - US GB negative for distention/thickening, stones or CBD dilatation  - IV opiate analgesia, IV antiemetics. - Oral intake initiated and he did not tolerate, developed increased pain hour after eating. With mild elevation in lipase, and location/description of pain could indicate PUD.  Will ask for GI consult, make NPO after midnight and back to clear liquids today  - in regards to possible pancreatitis, given no other identified etiology would recommend holding minocycline and metformin as they can be implicated in drug induced pancreatitis     2. DM  - hold metformin  - Insulin per sliding scale     3. COPD  - Dulera at home  - Formulary equivalent nebs available     4. GERD  - PPI     5. Depression, anxiety  - mood stable on Prozac and Buspar     6. BPH  - flomax    NOTE: This report was transcribed using voice recognition software. Every effort was made to ensure accuracy; however, inadvertent computerized transcription errors may be present.   Electronically signed by ANNABEL Carreno CNP on 7/11/2021 at 3:13 PM  ,pro

## 2021-07-11 NOTE — DISCHARGE SUMMARY
[facility] Physician Discharge Summary       Luciana Aktinson MD  [address] [place] [address], [address]  [address] [id number]            Activity level: As tolerated     Dispo: home     Condition on discharge: Stable    Patient ID:  Khadar Fuller  [id number]  30 y.o.  1970    Admit date: 7/10/2021    Discharge date and time:  7/11/2021  1:40 PM    Admission Diagnoses: Active Problems:    Acute pancreatitis    Acute pancreatitis without infection or necrosis  Resolved Problems:    * No resolved hospital problems. *      Discharge Diagnoses: Active Problems:    Acute pancreatitis    Acute pancreatitis without infection or necrosis  Resolved Problems:    * No resolved hospital problems. *      Consults:  None    Procedures: none    Hospital Course:   Patient Khadar Fuller is a 46 y.o. presented with right abdominal and right lower chest discomfort associated with nausea/vomiting for several days prior to admission. Work up in ED revealed CT findings of early mild pancreatitis involving pancreatic head and uncinate process. He was transferred to [facility] from [facility] for further management. He was provided IV fluids and IV opiate analgesia for symptom management. Follow up abdominal sonogram was unremarkable and no evidence for gallbladder distention, wall thickening stones or bile duct dilatation. He had marked improvement in pain and was able to tolerate oral intake on day of discharge without exacerbating pain. In review of home medications, he is on Minocycline and Metformin both of which implicated in drug induced pancreatitis, would recommend holding until follow up with PCP. Note he does not consume alcohol and no other etiology for pancreatitis identified.      His other comorbid conditions remained stable, and no further changes recommended to home medications      Discharge Exam:    General Appearance: alert and oriented to person, place and time and in no acute distress  Skin: warm and dry  Head: normocephalic and atraumatic  Eyes: pupils equal, round, and reactive to light, extraocular eye movements intact, conjunctivae normal  Neck: neck supple and non tender without mass   Pulmonary/Chest: clear to auscultation bilaterally- no wheezes, rales or rhonchi, normal air movement, no respiratory distress  Cardiovascular: normal rate, normal S1 and S2 and no carotid bruits  Abdomen: soft, non-tender, non-distended, normal bowel sounds, no masses or organomegaly  Extremities: no cyanosis, no clubbing and no edema  Neurologic: no cranial nerve deficit and speech normal    I/O last 3 completed shifts: In: 1774 [P.O.:240; I.V.:1558]  Out: 2000 [Urine:2000]  I/O this shift:  In: 600 [P.O.:600]  Out: -       LABS:  Recent Labs     07/09/21 2312 07/11/21  0750    139   K 4.2 4.4    107   CO2 27 25   BUN 11 6   CREATININE 0.9 0.7   GLUCOSE 116* 105*   CALCIUM 9.8 8.8       Recent Labs     07/09/21 2312 07/11/21  0750   WBC 14.9* 9.8   RBC 5.24 4.74   HGB 15.7 14.1   HCT 46.5 43.9   MCV 88.7 92.6   MCH 30.0 29.7   MCHC 33.8 32.1   RDW 14.8 14.7    177   MPV 10.5 10.3       No results for input(s): POCGLU in the last 72 hours. Imaging:  CT ABDOMEN PELVIS W IV CONTRAST Additional Contrast? None    Result Date: 7/10/2021  EXAMINATION: CT OF THE ABDOMEN AND PELVIS WITH CONTRAST 7/10/2021 12:35 am TECHNIQUE: CT of the abdomen and pelvis was performed with the administration of intravenous contrast. Multiplanar reformatted images are provided for review. Dose modulation, iterative reconstruction, and/or weight based adjustment of the mA/kV was utilized to reduce the radiation dose to as low as reasonably achievable. COMPARISON: None.  HISTORY: ORDERING SYSTEM PROVIDED HISTORY: Vomiting, epigastric and right upper quadrant pain TECHNOLOGIST PROVIDED HISTORY: Reason for exam:->Vomiting, epigastric and right upper quadrant pain Additional Contrast?->None Decision Support Exception - unselect if not a suspected or confirmed emergency medical condition->Emergency Medical Condition (MA) FINDINGS: Lower Chest: Lung bases clear. Organs: Unremarkable liver, spleen, and right kidney. 1.9 cm left renal cyst.  Thickened left adrenal.  No subcentimeter bilateral adrenal adenomas. Minimal fat stranding around pancreatic head and uncinate process, suspicious for acute pancreatitis. GI/Bowel: No definite cholelithiasis. Normal appendix. Distal colonic diverticulosis. No acute diverticulitis. Bowel loops nonobstructed. Pelvis: Prostate top-normal in size. Urinary bladder grossly unremarkable. Peritoneum/Retroperitoneum: No free air or free fluid. No adenopathy. Vascular calcification with no abdominal aortic aneurysm. Bones/Soft Tissues: Mild multilevel thoracolumbar spondylosis     Findings suspicious for early/mild acute pancreatitis involving the pancreatic head and uncinate process. Correlation with serum amylase and lipase levels recommended. Normal appendix. Distal colonic diverticulosis. No acute diverticulitis. US GALLBLADDER RUQ    Result Date: 7/10/2021  EXAMINATION: RIGHT UPPER QUADRANT ULTRASOUND 7/10/2021 10:27 am COMPARISON: None. HISTORY: ORDERING SYSTEM PROVIDED HISTORY: pancreatitis TECHNOLOGIST PROVIDED HISTORY: Reason for exam:->pancreatitis What reading provider will be dictating this exam?->CRC FINDINGS: LIVER:  The liver demonstrates normal echogenicity without evidence of intrahepatic biliary ductal dilatation. BILIARY SYSTEM:  Gallbladder is unremarkable without evidence of pericholecystic fluid, wall thickening or stones. Negative sonographic Cr's sign. Common bile duct is within normal limits measuring 5 mm. RIGHT KIDNEY: The right kidney is grossly unremarkable without evidence of hydronephrosis. PANCREAS:  Poorly visualized. OTHER: No evidence of right upper quadrant ascites. Unremarkable right upper quadrant ultrasound.  Poorly visualized pancreas. Recommend contrast enhanced abdominal CT if clinically appropriate. Patient Instructions:      Medication List      START taking these medications    HYDROcodone-acetaminophen 5-325 MG per tablet  Commonly known as: NORCO  Take 1 tablet by mouth every 8 hours as needed for Pain for up to 3 days. CHANGE how you take these medications    tamsulosin 0.4 MG capsule  Commonly known as: FLOMAX  Take 1 capsule by mouth daily  What changed: how much to take        CONTINUE taking these medications    albuterol sulfate  (90 Base) MCG/ACT inhaler  inhale 2 puffs by mouth and INTO THE LUNGS every 6 hours if needed     atorvastatin 40 MG tablet  Commonly known as: LIPITOR  take 1 tablet by mouth once daily     blood glucose test strips  Test twice daily & as needed for symptoms of irregular blood glucose.  Dx: E11.9     busPIRone 10 MG tablet  Commonly known as: BUSPAR     Dulera 200-5 MCG/ACT inhaler  Generic drug: mometasone-formoterol  inhale 2 puffs by mouth and INTO THE LUNGS twice a day     FLUoxetine 40 MG capsule  Commonly known as: PROZAC     glucose monitoring kit  1 kit by Does not apply route daily Dx E11.9     Lancets Misc  1 each by Does not apply route daily     lisinopril 40 MG tablet  Commonly known as: PRINIVIL;ZESTRIL  take 1 tablet by mouth once daily     omeprazole 40 MG delayed release capsule  Commonly known as: PRILOSEC  take 1 capsule by mouth once daily     Steglatro 15 MG Tabs  Generic drug: Ertugliflozin L-PyroglutamicAc  take 1 tablet by mouth once daily     therapeutic multivitamin-minerals tablet        STOP taking these medications    metFORMIN 500 MG tablet  Commonly known as: GLUCOPHAGE     minocycline 100 MG capsule  Commonly known as: MINOCIN;DYNACIN           Where to Get Your Medications      You can get these medications from any pharmacy    Bring a paper prescription for each of these medications  · HYDROcodone-acetaminophen 5-325 MG per tablet           Note that more than 30 minutes was spent in preparing discharge papers, discussing discharge with patient, medication review, etc.    Signed:  Electronically signed by ANNABEL Foley CNP on 7/11/2021 at 1:40 PM

## 2021-07-12 ENCOUNTER — ANESTHESIA EVENT (OUTPATIENT)
Dept: ENDOSCOPY | Age: 51
End: 2021-07-12
Payer: COMMERCIAL

## 2021-07-12 VITALS
OXYGEN SATURATION: 98 % | HEART RATE: 60 BPM | RESPIRATION RATE: 16 BRPM | WEIGHT: 254 LBS | TEMPERATURE: 97.8 F | BODY MASS INDEX: 42.32 KG/M2 | HEIGHT: 65 IN | DIASTOLIC BLOOD PRESSURE: 82 MMHG | SYSTOLIC BLOOD PRESSURE: 160 MMHG

## 2021-07-12 LAB
ALBUMIN SERPL-MCNC: 3.3 G/DL (ref 3.5–5.2)
ALP BLD-CCNC: 70 U/L (ref 40–129)
ALT SERPL-CCNC: 11 U/L (ref 0–40)
ANION GAP SERPL CALCULATED.3IONS-SCNC: 8 MMOL/L (ref 7–16)
AST SERPL-CCNC: 12 U/L (ref 0–39)
BASOPHILS ABSOLUTE: 0.06 E9/L (ref 0–0.2)
BASOPHILS RELATIVE PERCENT: 0.7 % (ref 0–2)
BILIRUB SERPL-MCNC: 0.4 MG/DL (ref 0–1.2)
BUN BLDV-MCNC: 5 MG/DL (ref 6–20)
CALCIUM SERPL-MCNC: 8.5 MG/DL (ref 8.6–10.2)
CHLORIDE BLD-SCNC: 109 MMOL/L (ref 98–107)
CO2: 25 MMOL/L (ref 22–29)
CREAT SERPL-MCNC: 0.8 MG/DL (ref 0.7–1.2)
EOSINOPHILS ABSOLUTE: 0.28 E9/L (ref 0.05–0.5)
EOSINOPHILS RELATIVE PERCENT: 3.3 % (ref 0–6)
GFR AFRICAN AMERICAN: >60
GFR NON-AFRICAN AMERICAN: >60 ML/MIN/1.73
GLUCOSE BLD-MCNC: 109 MG/DL (ref 74–99)
HCT VFR BLD CALC: 41.4 % (ref 37–54)
HEMOGLOBIN: 13.1 G/DL (ref 12.5–16.5)
IMMATURE GRANULOCYTES #: 0.06 E9/L
IMMATURE GRANULOCYTES %: 0.7 % (ref 0–5)
LYMPHOCYTES ABSOLUTE: 2.32 E9/L (ref 1.5–4)
LYMPHOCYTES RELATIVE PERCENT: 27.3 % (ref 20–42)
MCH RBC QN AUTO: 29.4 PG (ref 26–35)
MCHC RBC AUTO-ENTMCNC: 31.6 % (ref 32–34.5)
MCV RBC AUTO: 92.8 FL (ref 80–99.9)
METER GLUCOSE: 101 MG/DL (ref 74–99)
METER GLUCOSE: 113 MG/DL (ref 74–99)
MONOCYTES ABSOLUTE: 0.65 E9/L (ref 0.1–0.95)
MONOCYTES RELATIVE PERCENT: 7.6 % (ref 2–12)
NEUTROPHILS ABSOLUTE: 5.14 E9/L (ref 1.8–7.3)
NEUTROPHILS RELATIVE PERCENT: 60.4 % (ref 43–80)
PDW BLD-RTO: 14.7 FL (ref 11.5–15)
PLATELET # BLD: 193 E9/L (ref 130–450)
PMV BLD AUTO: 10.8 FL (ref 7–12)
POTASSIUM SERPL-SCNC: 4.3 MMOL/L (ref 3.5–5)
RBC # BLD: 4.46 E12/L (ref 3.8–5.8)
SODIUM BLD-SCNC: 142 MMOL/L (ref 132–146)
TOTAL PROTEIN: 5.8 G/DL (ref 6.4–8.3)
WBC # BLD: 8.5 E9/L (ref 4.5–11.5)

## 2021-07-12 PROCEDURE — 6370000000 HC RX 637 (ALT 250 FOR IP): Performed by: STUDENT IN AN ORGANIZED HEALTH CARE EDUCATION/TRAINING PROGRAM

## 2021-07-12 PROCEDURE — 94640 AIRWAY INHALATION TREATMENT: CPT

## 2021-07-12 PROCEDURE — 99239 HOSP IP/OBS DSCHRG MGMT >30: CPT | Performed by: FAMILY MEDICINE

## 2021-07-12 PROCEDURE — 36415 COLL VENOUS BLD VENIPUNCTURE: CPT

## 2021-07-12 PROCEDURE — 2580000003 HC RX 258: Performed by: INTERNAL MEDICINE

## 2021-07-12 PROCEDURE — 82962 GLUCOSE BLOOD TEST: CPT

## 2021-07-12 PROCEDURE — 6370000000 HC RX 637 (ALT 250 FOR IP): Performed by: SURGERY

## 2021-07-12 PROCEDURE — G0378 HOSPITAL OBSERVATION PER HR: HCPCS

## 2021-07-12 PROCEDURE — 80053 COMPREHEN METABOLIC PANEL: CPT

## 2021-07-12 PROCEDURE — 6360000002 HC RX W HCPCS: Performed by: STUDENT IN AN ORGANIZED HEALTH CARE EDUCATION/TRAINING PROGRAM

## 2021-07-12 PROCEDURE — 85025 COMPLETE CBC W/AUTO DIFF WBC: CPT

## 2021-07-12 PROCEDURE — 94660 CPAP INITIATION&MGMT: CPT

## 2021-07-12 PROCEDURE — 99253 IP/OBS CNSLTJ NEW/EST LOW 45: CPT | Performed by: SURGERY

## 2021-07-12 PROCEDURE — APPSS45 APP SPLIT SHARED TIME 31-45 MINUTES: Performed by: NURSE PRACTITIONER

## 2021-07-12 RX ORDER — PANTOPRAZOLE SODIUM 40 MG/1
40 TABLET, DELAYED RELEASE ORAL
Status: DISCONTINUED | OUTPATIENT
Start: 2021-07-12 | End: 2021-07-12 | Stop reason: HOSPADM

## 2021-07-12 RX ADMIN — BUSPIRONE HYDROCHLORIDE 10 MG: 10 TABLET ORAL at 08:26

## 2021-07-12 RX ADMIN — PANTOPRAZOLE SODIUM 40 MG: 40 TABLET, DELAYED RELEASE ORAL at 06:50

## 2021-07-12 RX ADMIN — SUCRALFATE 1 G: 1 TABLET ORAL at 08:26

## 2021-07-12 RX ADMIN — BUDESONIDE 500 MCG: 0.5 SUSPENSION RESPIRATORY (INHALATION) at 08:18

## 2021-07-12 RX ADMIN — TAMSULOSIN HYDROCHLORIDE 0.4 MG: 0.4 CAPSULE ORAL at 08:26

## 2021-07-12 RX ADMIN — ARFORMOTEROL TARTRATE 15 MCG: 15 SOLUTION RESPIRATORY (INHALATION) at 08:18

## 2021-07-12 RX ADMIN — LISINOPRIL 40 MG: 20 TABLET ORAL at 08:26

## 2021-07-12 RX ADMIN — SODIUM CHLORIDE, PRESERVATIVE FREE 10 ML: 5 INJECTION INTRAVENOUS at 08:25

## 2021-07-12 RX ADMIN — ATORVASTATIN CALCIUM 40 MG: 40 TABLET, FILM COATED ORAL at 08:26

## 2021-07-12 RX ADMIN — FLUOXETINE HYDROCHLORIDE 80 MG: 20 CAPSULE ORAL at 08:25

## 2021-07-12 RX ADMIN — SODIUM CHLORIDE: 9 INJECTION, SOLUTION INTRAVENOUS at 05:22

## 2021-07-12 ASSESSMENT — PAIN SCALES - GENERAL: PAINLEVEL_OUTOF10: 0

## 2021-07-12 ASSESSMENT — LIFESTYLE VARIABLES: SMOKING_STATUS: 1

## 2021-07-12 NOTE — PROGRESS NOTES
Educated pt. That both HIDA and EGD are scheduled for first thing tomorrow morning. Pt. Is not willing to stay. He stated, \" I don't care if it takes a month or a year\" to have procedures rescheduled.     Electronically signed by Steve Benoit RN on 7/12/2021 at 1:44 PM

## 2021-07-12 NOTE — PATIENT CARE CONFERENCE
[facility] Quality Flow/Interdisciplinary Rounds Progress Note        Quality Flow Rounds held on July 12, 2021    Disciplines Attending:  Bedside Nurse, ,  and Nursing Unit [facility] [place] Phan Car was admitted on 7/10/2021  4:49 AM    Anticipated Discharge Date:  Expected Discharge Date: 07/13/21    Disposition:    Edinson Score:  Edinson Scale Score: 21    Readmission Risk              Risk of Unplanned Readmission:  13           Discussed patient goal for the day, patient clinical progression, and barriers to discharge.   The following Goal(s) of the Day/Commitment(s) have been identified:  Diagnostics - Report Results- EGD today      Ashanti Dupont RN  July 12, 2021

## 2021-07-12 NOTE — ANESTHESIA PRE PROCEDURE
Department of Anesthesiology  Preprocedure Note       Name:  Isis Rodriguez   Age:  46 y.o.  :  1970                                          MRN:  [id number]         Date:  2021      Surgeon: Sheryl Calhoun):  Kelsie Ramirez MD    Procedure: Procedure(s):  EGD ESOPHAGOGASTRODUODENOSCOPY    Medications prior to admission:   Prior to Admission medications    Medication Sig Start Date End Date Taking? Authorizing Provider   HYDROcodone-acetaminophen (NORCO) 5-325 MG per tablet Take 1 tablet by mouth every 8 hours as needed for Pain for up to 3 days. 21 Yes ANNABEL Mccain - CNP   lisinopril (PRINIVIL;ZESTRIL) 40 MG tablet take 1 tablet by mouth once daily 21  Yes Peter Silver MD   DULERA 200-5 MCG/ACT inhaler inhale 2 puffs by mouth and INTO THE LUNGS twice a day 6/10/21  Yes Peter Silver MD   albuterol sulfate  (90 Base) MCG/ACT inhaler inhale 2 puffs by mouth and INTO THE LUNGS every 6 hours if needed 21  Yes Peter Silver MD   atorvastatin (LIPITOR) 40 MG tablet take 1 tablet by mouth once daily 3/26/21  Yes Peter Silver MD   busPIRone (BUSPAR) 10 MG tablet Take 10 mg by mouth daily  3/16/21  Yes Historical Provider, MD   FLUoxetine (PROZAC) 40 MG capsule 80 mg daily  3/16/21  Yes Historical Provider, MD   STEGLATRO 15 MG TABS take 1 tablet by mouth once daily 9/15/20  Yes Peter Silver MD   omeprazole (PRILOSEC) 40 MG delayed release capsule take 1 capsule by mouth once daily 8/3/20  Yes Lamont Hollis MD   Multiple Vitamins-Minerals (THERAPEUTIC MULTIVITAMIN-MINERALS) tablet Take 1 tablet by mouth daily   Yes Historical Provider, MD   tamsulosin (FLOMAX) 0.4 MG capsule Take 1 capsule by mouth daily  Patient taking differently: Take 0.8 mg by mouth daily  20  Yes Peter Silver MD   blood glucose monitor strips Test twice daily & as needed for symptoms of irregular blood glucose.  Dx: E11.9 9/15/20   Tru Cornelius Needs thyroid labs for further refills   Patrick Puente MD   glucose monitoring kit (FREESTYLE) monitoring kit 1 kit by Does not apply route daily Dx E11.9 11/22/19   Cindie Habermann, MD   Lancets MISC 1 each by Does not apply route daily 11/22/19   Cindie Habermann, MD       Current medications:    Current Facility-Administered Medications   Medication Dose Route Frequency Provider Last Rate Last Admin    pantoprazole (PROTONIX) tablet 40 mg  40 mg Oral BID AC Jose Boyle MD   40 mg at 07/12/21 0650    sucralfate (CARAFATE) tablet 1 g  1 g Oral Daily Aravind Roger MD   1 g at 07/12/21 0826    sodium chloride flush 0.9 % injection 5-40 mL  5-40 mL Intravenous 2 times per day Dionna Danielle MD   10 mL at 07/12/21 0825    sodium chloride flush 0.9 % injection 5-40 mL  5-40 mL Intravenous PRN Dionna Danielle MD   10 mL at 07/11/21 1609    0.9 % sodium chloride infusion  25 mL Intravenous PRN Dionna Danielle MD        acetaminophen (TYLENOL) tablet 650 mg  650 mg Oral Q4H PRN Dionna Danielle MD        ondansetron (ZOFRAN-ODT) disintegrating tablet 4 mg  4 mg Oral Q8H PRN Dionna Danielle MD        Or    ondansetron [facility]) injection 4 mg  4 mg Intravenous Q6H PRN Dionna Danielle MD   4 mg at 07/11/21 1813    enoxaparin (LOVENOX) injection 40 mg  40 mg Subcutaneous Daily Dionna Danielle MD        0.9 % sodium chloride infusion   Intravenous Continuous Dionna Danielle  mL/hr at 07/12/21 0522 New Bag at 07/12/21 0522    HYDROcodone-acetaminophen (NORCO) 5-325 MG per tablet 1 tablet  1 tablet Oral Q4H PRN Dionna Danielle MD        Or    HYDROcodone-acetaminophen [facility]) 5-325 MG per tablet 2 tablet  2 tablet Oral Q4H PRN Dionna Danielle MD   2 tablet at 07/11/21 2310    morphine (PF) injection 2 mg  2 mg Intravenous Q2H PRN Dionna Danielle MD   2 mg at 07/10/21 2100    Or    morphine sulfate (PF) injection 4 mg  4 mg Intravenous Q2H PRN Dionna Danielle MD        nicotine ([place]) 21 MG/24HR 1 patch  1 patch Transdermal Daily Sherif Gatica MD   1 patch at 07/12/21 0826    atorvastatin (LIPITOR) tablet 40 mg  40 mg Oral Daily Sherif Gatica MD   40 mg at 07/12/21 0826    busPIRone (BUSPAR) tablet 10 mg  10 mg Oral Daily Sherif Gatica MD   10 mg at 07/12/21 0826    FLUoxetine (PROZAC) capsule 80 mg  80 mg Oral Daily Sherif Gatica MD   80 mg at 07/12/21 0825    lisinopril (PRINIVIL;ZESTRIL) tablet 40 mg  40 mg Oral Daily Sherif Gatica MD   40 mg at 07/12/21 0826    tamsulosin (FLOMAX) capsule 0.4 mg  0.4 mg Oral Daily Sherif Gatica MD   0.4 mg at 07/12/21 0826    budesonide (PULMICORT) nebulizer suspension 500 mcg  0.5 mg Nebulization BID Sherif Gatica MD   500 mcg at 07/12/21 0818    Arformoterol Tartrate (BROVANA) nebulizer solution 15 mcg  15 mcg Nebulization BID Sherif Gatica MD   15 mcg at 07/12/21 0818    albuterol (PROVENTIL) nebulizer solution 2.5 mg  2.5 mg Nebulization Q6H PRN Sherif Gatica MD   2.5 mg at 07/11/21 1818    insulin lispro (HUMALOG) injection vial 0-12 Units  0-12 Units Subcutaneous TID WC Sherif Gatica MD        insulin lispro (HUMALOG) injection vial 0-6 Units  0-6 Units Subcutaneous Nightly Sherif Gatica MD        glucose (GLUTOSE) 40 % oral gel 15 g  15 g Oral PRN Sherif Gatica MD        dextrose 50 % IV solution  12.5 g Intravenous PRN Sherif Gatica MD        glucagon (rDNA) injection 1 mg  1 mg Intramuscular PRN Sherif Gatica MD        dextrose 5 % solution  100 mL/hr Intravenous PRN Sherif Gatica MD           Allergies:  No Known Allergies    Problem List:    Patient Active Problem List   Diagnosis Code    [facility] obstructive [facility] disease) [facility] J44.9    Smoker F17.200    SERGEY (obstructive sleep apnea) G47.33    Asthma J45.909    Morbid obesity with BMI of 45.0-49.9, adult ([facility] E66.01, Z68.42    Diverticulitis of colon K57.32    Abscess of back L02.212    DM2 (diabetes mellitus, type 2) ([facility] E11.9    Sepsis ([facility] A41.9    MSSA (methicillin susceptible Staphylococcus aureus) infection A49.01    Necrotizing soft tissue infection M79.89    HANS (acute kidney injury) ([facility] N17.9    Acute blood loss anemia D62    Primary osteoarthritis of left knee M17.12    Impingement syndrome of right shoulder M75.41    Mixed hyperlipidemia E78.2    Acute pancreatitis K85.90    Acute pancreatitis without infection or necrosis K85.90       Past Medical History:        Diagnosis Date    Allergic rhinitis     Anxiety     Asthma     stable    COPD (chronic obstructive pulmonary disease) ([facility])     controlled with inhalers    Depression     no meds    Diabetes mellitus ([facility]     Encounter for screening colonoscopy     and EGD 7-20-20     GERD (gastroesophageal reflux disease)     Hyperlipidemia     Hypertension     Obesity     Osteoarthritis     Sleep apnea     BMS CPAP 7, not using       Past Surgical History:        Procedure Laterality Date    ABDOMEN SURGERY N/A 6/24/2019    INCISION AND DRAINAGE PERINEAL ABSCESS, INCISIONAL DEBRIDEMENT performed by Tj Cheng MD at [place] [address] COLONOSCOPY  06/08/2017    Dr. Hardeep Small polyp, diverticulitis    COLONOSCOPY N/A 7/20/2020    COLONOSCOPY POLYPECTOMY SNARE/COLD BIOPSY performed by Guillermo Herrera MD at [facility], COLON, DIAGNOSTIC      EXCISION HIDRADENITIS OF INGUINAL / UMBILICAL AREA N/A 0/86/3972    DEBRIDEMENT PERINEAL WOUND performed by Tj Cheng MD at [address]  2012    Dr. Bill Membreno N/A 9/19/2018    I AND D BACK ABCESS performed by Clemencia Clemens MD at [address] [facility] ENDOSCOPY N/A 7/20/2020    EGD BIOPSY performed by Guillermo Herrera MD at [facility] ENDOSCOPY       Social History:    Social History     Tobacco Use    Smoking status: Current Every Day Smoker     Packs/day: 1.00     Years: 35.00     Pack years: 35.00 Types: Cigarettes    Smokeless tobacco: Never Used   Substance Use Topics    Alcohol use: No     Alcohol/week: 0.0 standard drinks                                Ready to quit: Not Answered  Counseling given: Not Answered      Vital Signs (Current):   Vitals:    07/11/21 0815 07/11/21 1941 07/11/21 2010 07/12/21 0730   BP: 114/76  121/88 (!) 160/82   Pulse: 67  72 60   Resp: 18 16 18 16   Temp: 36.6 °C (97.8 °F)  36.6 °C (97.9 °F) 36.6 °C (97.8 °F)   TempSrc: Oral  Oral Oral   SpO2: 95% 96% 97% 98%   Weight:       Height:                                                  BP Readings from Last 3 Encounters:   07/12/21 (!) 160/82   07/10/21 (!) 143/77   03/17/21 113/78       NPO Status: Time of last liquid consumption: 0800                        Time of last solid consumption: 1630                        Date of last liquid consumption: 07/12/21                        Date of last solid food consumption: 07/11/21    BMI:   Wt Readings from Last 3 Encounters:   07/11/21 254 lb (115.2 kg)   07/09/21 261 lb (118.4 kg)   03/17/21 255 lb 8 oz (115.9 kg)     Body mass index is 42.27 kg/m². CBC:   Lab Results   Component Value Date    WBC 8.5 07/12/2021    RBC 4.46 07/12/2021    HGB 13.1 07/12/2021    HCT 41.4 07/12/2021    MCV 92.8 07/12/2021    RDW 14.7 07/12/2021     07/12/2021       CMP:   Lab Results   Component Value Date     07/12/2021    K 4.3 07/12/2021    K 3.5 06/27/2019     07/12/2021    CO2 25 07/12/2021    BUN 5 07/12/2021    CREATININE 0.8 07/12/2021    GFRAA >60 07/12/2021    LABGLOM >60 07/12/2021    GLUCOSE 109 07/12/2021    PROT 5.8 07/12/2021    CALCIUM 8.5 07/12/2021    BILITOT 0.4 07/12/2021    ALKPHOS 70 07/12/2021    AST 12 07/12/2021    ALT 11 07/12/2021       POC Tests: No results for input(s): POCGLU, POCNA, POCK, POCCL, POCBUN, POCHEMO, POCHCT in the last 72 hours.     Coags:   Lab Results   Component Value Date    PROTIME 11.7 07/09/2021    INR 1.1 07/09/2021    APTT 34.4 07/09/2021       HCG (If Applicable): No results found for: PREGTESTUR, PREGSERUM, HCG, HCGQUANT     ABGs:   Lab Results   Component Value Date    B3KRGCVK 96.4 02/17/2012        Type & Screen (If Applicable):  No results found for: LABABO, [address]    Drug/Infectious Status (If Applicable):  No results found for: HIV, HEPCAB    COVID-19 Screening (If Applicable):   Lab Results   Component Value Date    COVID19 Not Detected 07/15/2020     ECG 7/9/21    Narrative & Impression    Normal sinus rhythm  Normal ECG  When compared with ECG of 18-SEP-2018 21:02,  No significant change was found  Confirmed by Ute Penaloza ([id number]) on 7/10/2021 8:27:49 PM         Anesthesia Evaluation  Patient summary reviewed and Nursing notes reviewed no history of anesthetic complications:   Airway: Mallampati: II  TM distance: >3 FB   Neck ROM: full  Mouth opening: > = 3 FB Dental:      Comment: Poor dentition     Pulmonary:normal exam  breath sounds clear to auscultation  (+) COPD:  sleep apnea: on noncompliant,  asthma: current smoker          Patient did not smoke on day of surgery. Cardiovascular:  Exercise tolerance: poor (<4 METS),   (+) hypertension:, hyperlipidemia      ECG reviewed  Rhythm: regular  Rate: normal           Beta Blocker:  Not on Beta Blocker         Neuro/Psych:   (+) depression/anxiety             GI/Hepatic/Renal:   (+) hiatal hernia, GERD: poorly controlled, morbid obesity          Endo/Other:    (+) DiabetesType II DM, well controlled, , .                 Abdominal:   (+) obese,           Vascular: negative vascular ROS. Other Findings:             Anesthesia Plan      MAC     ASA 3     (IV right FA 20)  Induction: intravenous. MIPS: Postoperative opioids intended and Prophylactic antiemetics administered. Anesthetic plan and risks discussed with patient. Use of blood products discussed with patient whom consented to blood products.    Plan discussed with CRNA and attending.                   Latisha Winslow, CHET   7/12/2021

## 2021-07-12 NOTE — PROGRESS NOTES
Pt. Made NPO after breakfast tray was delivered. Pt. Did not eat breakfast - he had a cup of coffee. Education provided on NPO status.     Electronically signed by Kel Dias RN on 7/12/2021 at 8:34 AM

## 2021-07-12 NOTE — DISCHARGE SUMMARY
[facility] Physician Discharge Summary       Gilson Hernandez MD  [address], [address], [place]  [facility]  [address] 101 12 [phone]    Call        Activity level: As tolerated     Dispo: home    Condition on discharge: Stable     Patient ID:  Brock Tsai  [id number]  19 y.o.  1970    Admit date: 7/10/2021    Discharge date and time:  7/12/2021  3:41 PM    Admission Diagnoses: Active Problems:    Acute pancreatitis    Acute pancreatitis without infection or necrosis    Gastroesophageal reflux disease    Essential hypertension    Anxiety  Resolved Problems:    * No resolved hospital problems. *      Discharge Diagnoses: Active Problems:    Acute pancreatitis    Acute pancreatitis without infection or necrosis    Gastroesophageal reflux disease    Essential hypertension    Anxiety  Resolved Problems:    * No resolved hospital problems. *      Consults:  IP CONSULT TO GENERAL SURGERY    Procedures:     Hospital Course:   Patient Brock Tsai is a 46 y.o. presented with Acute pancreatitis, unspecified complication status, unspecified pancreatitis type [K85.90]  Acute pancreatitis without infection or necrosis [K85.90]     Patient Brock Tsai is a 46 y. o.with hx of DM, obesity, COPD, Hiatal hernia s/p fundoplasty, SERGEY uses CPAP, GERD - he presented to [facility] ED with right abdominal and right lower chest discomfort associated with nausea/vomiting for several days prior to admission.      Work up in ED revealed CT findings suggestive of early mild pancreatitis involving pancreatic head and uncinate process. Lipase was only mildly elevated at 87. He was transferred to [facility] from [facility] for further management. He was provided IV fluids and IV opiate analgesia for symptom management.    Follow up abdominal sonogram was unremarkable and no evidence for gallbladder distention, wall thickening stones or bile duct dilatation. Note he does not consume alcohol, lipid studies without significant elevation to suggest etiology of pancreatitis. Lipase promptly down to 37 with IV fluids. He was treated with PPI and carafate and permitted oral intake. He noted rather quick recurrence of epigastric abdominal pain, raising suspicion of gastritis or PUD. GI/surgery consult, had anticipated endoscopy, patient felt well enough to go home however and preferred not another night in hospital. He was encouraged to follow up with Dr Javier Rodríguez within next several weeks to arrange EGD and possible HIDA scan. Discharge Exam:    General Appearance: alert and oriented to person, place and time and in no acute distress  Skin: warm and dry  Head: normocephalic and atraumatic  Eyes: pupils equal, round, and reactive to light, extraocular eye movements intact, conjunctivae normal  Neck: neck supple and non tender without mass   Pulmonary/Chest: clear to auscultation bilaterally- no wheezes, rales or rhonchi, normal air movement, no respiratory distress  Cardiovascular: normal rate, normal S1 and S2 and no carotid bruits  Abdomen: soft, non-tender, non-distended, normal bowel sounds, no masses or organomegaly  Extremities: no cyanosis, no clubbing and no edema  Neurologic: no cranial nerve deficit and speech normal    I/O last 3 completed shifts: In: 3777 [I.V.:3387]  Out: 600 [Urine:600]  No intake/output data recorded.       LABS:  Recent Labs     07/09/21 2312 07/11/21 0750 07/12/21  0651    139 142   K 4.2 4.4 4.3    107 109*   CO2 27 25 25   BUN 11 6 5*   CREATININE 0.9 0.7 0.8   GLUCOSE 116* 105* 109*   CALCIUM 9.8 8.8 8.5*       Recent Labs     07/09/21 2312 07/11/21  0750 07/12/21  0651   WBC 14.9* 9.8 8.5   RBC 5.24 4.74 4.46   HGB 15.7 14.1 13.1   HCT 46.5 43.9 41.4   MCV 88.7 92.6 92.8   MCH 30.0 29.7 29.4   MCHC 33.8 32.1 31.6*   RDW 14.8 14.7 14.7    177 193   MPV 10.5 10.3 10.8       No results for input(s): POCGLU in the last 72 hours. Imaging:  CT ABDOMEN PELVIS W IV CONTRAST Additional Contrast? None    Result Date: 7/10/2021  EXAMINATION: CT OF THE ABDOMEN AND PELVIS WITH CONTRAST 7/10/2021 12:35 am TECHNIQUE: CT of the abdomen and pelvis was performed with the administration of intravenous contrast. Multiplanar reformatted images are provided for review. Dose modulation, iterative reconstruction, and/or weight based adjustment of the mA/kV was utilized to reduce the radiation dose to as low as reasonably achievable. COMPARISON: None. HISTORY: ORDERING SYSTEM PROVIDED HISTORY: Vomiting, epigastric and right upper quadrant pain TECHNOLOGIST PROVIDED HISTORY: Reason for exam:->Vomiting, epigastric and right upper quadrant pain Additional Contrast?->None Decision Support Exception - unselect if not a suspected or confirmed emergency medical condition->Emergency Medical Condition (MA) FINDINGS: Lower Chest: Lung bases clear. Organs: Unremarkable liver, spleen, and right kidney. 1.9 cm left renal cyst.  Thickened left adrenal.  No subcentimeter bilateral adrenal adenomas. Minimal fat stranding around pancreatic head and uncinate process, suspicious for acute pancreatitis. GI/Bowel: No definite cholelithiasis. Normal appendix. Distal colonic diverticulosis. No acute diverticulitis. Bowel loops nonobstructed. Pelvis: Prostate top-normal in size. Urinary bladder grossly unremarkable. Peritoneum/Retroperitoneum: No free air or free fluid. No adenopathy. Vascular calcification with no abdominal aortic aneurysm. Bones/Soft Tissues: Mild multilevel thoracolumbar spondylosis     Findings suspicious for early/mild acute pancreatitis involving the pancreatic head and uncinate process. Correlation with serum amylase and lipase levels recommended. Normal appendix. Distal colonic diverticulosis. No acute diverticulitis.      US GALLBLADDER RUQ    Result Date: 7/10/2021  EXAMINATION: RIGHT UPPER QUADRANT ULTRASOUND 7/10/2021 10:27 am COMPARISON: None. HISTORY: ORDERING SYSTEM PROVIDED HISTORY: pancreatitis TECHNOLOGIST PROVIDED HISTORY: Reason for exam:->pancreatitis What reading provider will be dictating this exam?->CRC FINDINGS: LIVER:  The liver demonstrates normal echogenicity without evidence of intrahepatic biliary ductal dilatation. BILIARY SYSTEM:  Gallbladder is unremarkable without evidence of pericholecystic fluid, wall thickening or stones. Negative sonographic Cr's sign. Common bile duct is within normal limits measuring 5 mm. RIGHT KIDNEY: The right kidney is grossly unremarkable without evidence of hydronephrosis. PANCREAS:  Poorly visualized. OTHER: No evidence of right upper quadrant ascites. Unremarkable right upper quadrant ultrasound. Poorly visualized pancreas. Recommend contrast enhanced abdominal CT if clinically appropriate. Patient Instructions:      Medication List      START taking these medications    HYDROcodone-acetaminophen 5-325 MG per tablet  Commonly known as: NORCO  Take 1 tablet by mouth every 8 hours as needed for Pain for up to 3 days. CHANGE how you take these medications    tamsulosin 0.4 MG capsule  Commonly known as: FLOMAX  Take 1 capsule by mouth daily  What changed: how much to take        CONTINUE taking these medications    albuterol sulfate  (90 Base) MCG/ACT inhaler  inhale 2 puffs by mouth and INTO THE LUNGS every 6 hours if needed     atorvastatin 40 MG tablet  Commonly known as: LIPITOR  take 1 tablet by mouth once daily     blood glucose test strips  Test twice daily & as needed for symptoms of irregular blood glucose.  Dx: E11.9     busPIRone 10 MG tablet  Commonly known as: BUSPAR     Dulera 200-5 MCG/ACT inhaler  Generic drug: mometasone-formoterol  inhale 2 puffs by mouth and INTO THE LUNGS twice a day     FLUoxetine 40 MG capsule  Commonly known as: PROZAC     glucose monitoring kit  1 kit by Does not apply route daily Dx E11.9     Lancets Misc  1 each by Does not apply route daily     lisinopril 40 MG tablet  Commonly known as: PRINIVIL;ZESTRIL  take 1 tablet by mouth once daily     omeprazole 40 MG delayed release capsule  Commonly known as: PRILOSEC  take 1 capsule by mouth once daily     Steglatro 15 MG Tabs  Generic drug: Ertugliflozin L-PyroglutamicAc  take 1 tablet by mouth once daily     therapeutic multivitamin-minerals tablet        STOP taking these medications    metFORMIN 500 MG tablet  Commonly known as: GLUCOPHAGE     minocycline 100 MG capsule  Commonly known as: MINOCIN;DYNACIN           Where to Get Your Medications      You can get these medications from any pharmacy    Bring a paper prescription for each of these medications  · HYDROcodone-acetaminophen 5-325 MG per tablet           Note that more than 30 minutes was spent in preparing discharge papers, discussing discharge with patient, medication review, etc.    Signed:  Electronically signed by ANNABEL Thomas CNP on 7/12/2021 at 3:41 PM

## 2021-07-12 NOTE — CONSULTS
GENERAL SURGERY  CONSULT NOTE            Date: 7/12/2021        Patient Name: Brock Tsai     YOB: 1970      Age:  46 y.o. Consult by: Theresa JIN  Consult to: Dr Nasima Blake   Reason: Epigastric pain    Chief Complaint   Epigastric pain    History Obtained From   patient    History of Present Illness   Brock Tsai is a 46 y.o. male with GERD, COPD, SERGEY, DM, HHR in 2012, and EGD/CS in 7/2020 showing [facility] and duodenal polyp who presents for right flank pain. When it becomes severe, it spreads over his whole abdomen. It hurts with any type of food intake. Never had an issue like this before. He had a little bit of nausea, and did have vomiting before admission. His last bowel movement was a couple days ago and had been normal prior to that. He is a smoker.  Saturday's RUQ US was negative for stones or other signs of cholecystitis    Past Medical History     Past Medical History:   Diagnosis Date    Allergic rhinitis     Anxiety     Asthma     stable    COPD (chronic obstructive pulmonary disease) ([address]     controlled with inhalers    Depression     no meds    Diabetes mellitus ([address]     Encounter for screening colonoscopy     and EGD 7-20-20     GERD (gastroesophageal reflux disease)     Hyperlipidemia     Hypertension     Obesity     Osteoarthritis     Sleep apnea     BMS CPAP 7, not using        Past Surgical History     Past Surgical History:   Procedure Laterality Date    ABDOMEN SURGERY N/A 6/24/2019    INCISION AND DRAINAGE PERINEAL ABSCESS, INCISIONAL DEBRIDEMENT performed by Slime Irizarry MD at [place] [address] COLONOSCOPY  06/08/2017    Dr. Gio Vega polyp, diverticulitis    COLONOSCOPY N/A 7/20/2020    COLONOSCOPY POLYPECTOMY SNARE/COLD BIOPSY performed by Jackelin Griffin MD at [address] [place] [address], DIAGNOSTIC      EXCISION HIDRADENITIS OF INGUINAL / UMBILICAL AREA N/A 6/32/6905    DEBRIDEMENT PERINEAL WOUND performed by Slime Irizarry MD at [address] REPAIR  2012    Dr. Dylan Yuen N/A 9/19/2018    I AND D BACK ABCESS performed by August Alexander MD at [address] [place] [address] ENDOSCOPY N/A 7/20/2020    EGD BIOPSY performed by David Woods MD at [facility] ENDOSCOPY        Medications - Prior to Admission and Current Meds     Prior to Admission medications    Medication Sig Start Date End Date Taking? Authorizing Provider   HYDROcodone-acetaminophen (NORCO) 5-325 MG per tablet Take 1 tablet by mouth every 8 hours as needed for Pain for up to 3 days.  7/11/21 7/14/21 Yes ANNABEL Garcia - CNP   lisinopril (PRINIVIL;ZESTRIL) 40 MG tablet take 1 tablet by mouth once daily 7/7/21  Yes Yomi Thornton MD   DULERA 200-5 MCG/ACT inhaler inhale 2 puffs by mouth and INTO THE LUNGS twice a day 6/10/21  Yes Yomi Thornton MD   albuterol sulfate  (90 Base) MCG/ACT inhaler inhale 2 puffs by mouth and INTO THE LUNGS every 6 hours if needed 6/1/21  Yes Yomi Thornton MD   atorvastatin (LIPITOR) 40 MG tablet take 1 tablet by mouth once daily 3/26/21  Yes Yomi Thornton MD   busPIRone (BUSPAR) 10 MG tablet Take 10 mg by mouth daily  3/16/21  Yes Historical Provider, MD   FLUoxetine (PROZAC) 40 MG capsule 80 mg daily  3/16/21  Yes Historical Provider, MD   STEGLATRO 15 MG TABS take 1 tablet by mouth once daily 9/15/20  Yes Yomi Thornton MD   omeprazole (PRILOSEC) 40 MG delayed release capsule take 1 capsule by mouth once daily 8/3/20  Yes Suzy Aly MD   Multiple Vitamins-Minerals (THERAPEUTIC MULTIVITAMIN-MINERALS) tablet Take 1 tablet by mouth daily   Yes Historical Provider, MD   tamsulosin (FLOMAX) 0.4 MG capsule Take 1 capsule by mouth daily  Patient taking differently: Take 0.8 mg by mouth daily  6/4/20  Yes Yomi Thornton MD   blood glucose monitor strips Test twice daily & as needed for symptoms of irregular blood glucose. Dx: E11.9 9/15/20   Raegan Rodriguez MD   glucose monitoring kit (FREESTYLE) monitoring kit 1 kit by Does not apply route daily Dx E11.9 11/22/19   Raegan Rodriguez MD   Lancets MISC 1 each by Does not apply route daily 11/22/19   Raegan Rodriguez MD        Inpatient:  pantoprazole (PROTONIX) tablet 40 mg, BID AC  sucralfate (CARAFATE) tablet 1 g, Daily  sodium chloride flush 0.9 % injection 5-40 mL, 2 times per day  sodium chloride flush 0.9 % injection 5-40 mL, PRN  0.9 % sodium chloride infusion, PRN  acetaminophen (TYLENOL) tablet 650 mg, Q4H PRN  ondansetron (ZOFRAN-ODT) disintegrating tablet 4 mg, Q8H PRN   Or  ondansetron (ZOFRAN) injection 4 mg, Q6H PRN  enoxaparin (LOVENOX) injection 40 mg, Daily  0.9 % sodium chloride infusion, Continuous  HYDROcodone-acetaminophen (NORCO) 5-325 MG per tablet 1 tablet, Q4H PRN   Or  HYDROcodone-acetaminophen (NORCO) 5-325 MG per tablet 2 tablet, Q4H PRN  morphine (PF) injection 2 mg, Q2H PRN   Or  morphine sulfate (PF) injection 4 mg, Q2H PRN  nicotine (NICODERM CQ) 21 MG/24HR 1 patch, Daily  atorvastatin (LIPITOR) tablet 40 mg, Daily  busPIRone (BUSPAR) tablet 10 mg, Daily  FLUoxetine (PROZAC) capsule 80 mg, Daily  lisinopril (PRINIVIL;ZESTRIL) tablet 40 mg, Daily  tamsulosin (FLOMAX) capsule 0.4 mg, Daily  budesonide (PULMICORT) nebulizer suspension 500 mcg, BID  Arformoterol Tartrate (BROVANA) nebulizer solution 15 mcg, BID  albuterol (PROVENTIL) nebulizer solution 2.5 mg, Q6H PRN  insulin lispro (HUMALOG) injection vial 0-12 Units, TID WC  insulin lispro (HUMALOG) injection vial 0-6 Units, Nightly  glucose (GLUTOSE) 40 % oral gel 15 g, PRN  dextrose 50 % IV solution, PRN  glucagon (rDNA) injection 1 mg, PRN  dextrose 5 % solution, PRN        Allergies   Patient has no known allergies.     Social History     Social History     Tobacco History     Smoking Status  Current Every Day Smoker Smoking Frequency  1 pack/day for 35 years (35 pk yrs) Smoking Tobacco Type  Cigarettes    Smokeless Tobacco Use  Never Used          Alcohol History     Alcohol Use Status  No          Drug Use     Drug Use Status  Yes Types  Marijuana Frequency   7 times/week          Sexual Activity     Sexually Active  Yes Partners  Female                Family History     Family History   Problem Relation Age of Onset   [place] Self COPD Mother     Cancer Mother     Heart Disease Father 46        CABG    Diabetes Father     Diabetes Sister     Diabetes Brother     Heart Disease Brother 43        MI    Heart Disease Maternal Grandmother     Cancer Maternal Grandmother     Cancer Paternal Grandmother         colon    Heart Disease Paternal Grandfather     Asthma Sister     COPD Brother     Heart Disease Brother 48        CABG       Review of Systems   Pertinent ROS listed in HPI, all others negative    Physical Exam   /88   Pulse 72   Temp 97.9 °F (36.6 °C) (Oral)   Resp 18   Ht 5' 5\" (1.651 m)   Wt 254 lb (115.2 kg)   SpO2 97%   BMI 42.27 kg/m²     GENERAL:  No acute distress. Alert and oriented. HEAD:  Normocephalic, atraumatic. EYES:  No scleral icterus. Conjugate gaze. ENT/NECK:  Trachea midline, mucous membranes dry. LUNGS:  No cough. Nonlabored breathing on room air. CARDIOVASC:  Normal rate, no cyanosis. ABDOMEN:  Soft, obese but non-distended, non-tender. No guarding / rigidity / rebound. LIMBS:  No deformities, no UE edema. NEURO:  Face symmetric, moves all extremities  SKIN:  Warm, dry.      Labs    CBC  Recent Labs     07/11/21  0750   WBC 9.8   HGB 14.1   HCT 43.9        BMP  Recent Labs     07/11/21  0750      K 4.4      CO2 25   BUN 6   CREATININE 0.7   CALCIUM 8.8     Liver Function  Recent Labs     07/09/21  2339 07/09/21  2339 07/11/21  0750   LIPASE 87*   < > 35   BILITOT 0.3   < > 0.4   BILIDIR 0.2  --   --    AST 12   < > 12   ALT 19   < > 11   ALKPHOS 89   < > 79   PROT 7.1   < > 6.2*   LABALBU 4.2   < > 3.6    < > = values in this interval not displayed. No results for input(s): LACTATE in the last 72 hours. Recent Labs     07/09/21  2312   INR 1.1       Imaging/Diagnostics Last 24 Hours   CT ABDOMEN PELVIS W IV CONTRAST Additional Contrast? None    Result Date: 7/10/2021  EXAMINATION: CT OF THE ABDOMEN AND PELVIS WITH CONTRAST 7/10/2021 12:35 am TECHNIQUE: CT of the abdomen and pelvis was performed with the administration of intravenous contrast. Multiplanar reformatted images are provided for review. Dose modulation, iterative reconstruction, and/or weight based adjustment of the mA/kV was utilized to reduce the radiation dose to as low as reasonably achievable. COMPARISON: None. HISTORY: ORDERING SYSTEM PROVIDED HISTORY: Vomiting, epigastric and right upper quadrant pain TECHNOLOGIST PROVIDED HISTORY: Reason for exam:->Vomiting, epigastric and right upper quadrant pain Additional Contrast?->None Decision Support Exception - unselect if not a suspected or confirmed emergency medical condition->Emergency Medical Condition (MA) FINDINGS: Lower Chest: Lung bases clear. Organs: Unremarkable liver, spleen, and right kidney. 1.9 cm left renal cyst.  Thickened left adrenal.  No subcentimeter bilateral adrenal adenomas. Minimal fat stranding around pancreatic head and uncinate process, suspicious for acute pancreatitis. GI/Bowel: No definite cholelithiasis. Normal appendix. Distal colonic diverticulosis. No acute diverticulitis. Bowel loops nonobstructed. Pelvis: Prostate top-normal in size. Urinary bladder grossly unremarkable. Peritoneum/Retroperitoneum: No free air or free fluid. No adenopathy. Vascular calcification with no abdominal aortic aneurysm. Bones/Soft Tissues: Mild multilevel thoracolumbar spondylosis     Findings suspicious for early/mild acute pancreatitis involving the pancreatic head and uncinate process. Correlation with serum amylase and lipase levels recommended. Normal appendix.  Distal colonic diverticulosis. No acute diverticulitis. US GALLBLADDER RUQ    Result Date: 7/10/2021  EXAMINATION: RIGHT UPPER QUADRANT ULTRASOUND 7/10/2021 10:27 am COMPARISON: None. HISTORY: ORDERING SYSTEM PROVIDED HISTORY: pancreatitis TECHNOLOGIST PROVIDED HISTORY: Reason for exam:->pancreatitis What reading provider will be dictating this exam?->CRC FINDINGS: LIVER:  The liver demonstrates normal echogenicity without evidence of intrahepatic biliary ductal dilatation. BILIARY SYSTEM:  Gallbladder is unremarkable without evidence of pericholecystic fluid, wall thickening or stones. Negative sonographic Cr's sign. Common bile duct is within normal limits measuring 5 mm. RIGHT KIDNEY: The right kidney is grossly unremarkable without evidence of hydronephrosis. PANCREAS:  Poorly visualized. OTHER: No evidence of right upper quadrant ascites. Unremarkable right upper quadrant ultrasound. Poorly visualized pancreas. Recommend contrast enhanced abdominal CT if clinically appropriate. Assessment      Hospital Problems         Last Modified POA    Acute pancreatitis 7/10/2021 Yes    Acute pancreatitis without infection or necrosis 7/11/2021 Yes          46 y.o. male with possible peptic ulcer disease, clinically insignificant pancreatitis    Plan   -EGD tomorrow (7/13/21)  -Encourage smoking cessation  -Low-fat diet as tolerated  -Continue PPI and Carafate    Plan was discussed with Dr. Anise Phalen. Electronically signed by Rashid Amin MD on 7/12/21 at 5:01 AM EDT    I saw and examined the patient. I reviewed the above resident's note. I agree with the assessment and plan as outlined. The patient is unhappy because his EGD was cancelled. I told him I have him on for 730 tomorrow morning. I also ordered a HIDA for today. The patient is agreeable to stick around to get these done.     Ghada Fierro MD  General Surgery

## 2021-07-12 NOTE — PROGRESS NOTES
and oriented to person, place and time and in no acute distress  Skin: warm and dry, acneiform lesions back and torso  Head: normocephalic and atraumatic  Eyes: pupils equal, round, and reactive to light, extraocular eye movements intact, conjunctivae normal  Neck: neck supple and non tender without mass   Pulmonary/Chest: clear to auscultation bilaterally- no wheezes, rales or rhonchi, normal air movement, no respiratory distress  Cardiovascular: normal rate, normal S1 and S2 and no carotid bruits  Abdomen: soft, non-tender, non-distended, normal bowel sounds, no masses or organomegaly  Extremities: no cyanosis, no clubbing and no edema  Neurologic: no cranial nerve deficit and speech normal        Recent Labs     07/09/21 2312 07/11/21  0750 07/12/21  0651    139 142   K 4.2 4.4 4.3    107 109*   CO2 27 25 25   BUN 11 6 5*   CREATININE 0.9 0.7 0.8   GLUCOSE 116* 105* 109*   CALCIUM 9.8 8.8 8.5*       Recent Labs     07/09/21 2312 07/11/21  0750 07/12/21  0651   WBC 14.9* 9.8 8.5   RBC 5.24 4.74 4.46   HGB 15.7 14.1 13.1   HCT 46.5 43.9 41.4   MCV 88.7 92.6 92.8   MCH 30.0 29.7 29.4   MCHC 33.8 32.1 31.6*   RDW 14.8 14.7 14.7    177 193   MPV 10.5 10.3 10.8       Radiology:     Assessment:    Active Problems:    Acute pancreatitis    Acute pancreatitis without infection or necrosis  Resolved Problems:    * No resolved hospital problems. *      Plan:  1. Acute pancreatitis vs PUD  - per CT Findings suspicious for early/mild acute pancreatitis involving the pancreatic head and uncinate process. Lipase only mildly elevated, and quickly resolved  - no history of ETOH, US GB negative for distention/thickening, stones or CBD dilatation. He states he has not taken Minocycline in over 1 month, drug induced pancreatitis unlikely  - He does have history of hiatal hernia, states is s/p fundoplasty.  Chest imaging revealing large retrocardiac hiatal hernia  - it is more likely his mildly elevated lipase could be due to PUD, and GI consulted. Plan for EGD today.     2. DM  - hold metformin  - Insulin per sliding scale     3. COPD  - Dulera at home  - Formulary equivalent nebs available     4. GERD, hx of HH  - PPI  - EGD today     5. Depression, anxiety  - mood stable on Prozac and Buspar     6. BPH  - flomax     NOTE: This report was transcribed using voice recognition software. Every effort was made to ensure accuracy; however, inadvertent computerized transcription errors may be present.   Electronically signed by ANNABEL Todd CNP on 7/12/2021 at 12:13 PM

## 2021-07-13 ENCOUNTER — ANESTHESIA (OUTPATIENT)
Dept: ENDOSCOPY | Age: 51
End: 2021-07-13
Payer: COMMERCIAL

## 2021-07-13 ENCOUNTER — TELEPHONE (OUTPATIENT)
Dept: SURGERY | Age: 51
End: 2021-07-13

## 2021-07-13 ENCOUNTER — TELEPHONE (OUTPATIENT)
Dept: FAMILY MEDICINE CLINIC | Age: 51
End: 2021-07-13

## 2021-07-13 DIAGNOSIS — R10.11 RIGHT UPPER QUADRANT ABDOMINAL PAIN: Primary | ICD-10-CM

## 2021-07-13 NOTE — TELEPHONE ENCOUNTER
[facility] Transitions Initial Follow Up Call    Outreach made within 2 business days of discharge: Yes    Patient: Brenda Jordan Patient : 1970   MRN: [id number]  Reason for Admission: There are no discharge diagnoses documented for the most recent discharge. Discharge Date: 21       Spoke with: Thelma Gonzales     Discharge department/facility: home     TCM Interactive Patient Contact:  Was patient able to fill all prescriptions: Yes  Was patient instructed to bring all medications to the follow-up visit: Yes  Is patient taking all medications as directed in the discharge summary?  Yes  Does patient understand their discharge instructions: Yes  Does patient have questions or concerns that need addressed prior to 7-14 day follow up office visit: no    Scheduled appointment with PCP within 7-14 days    Follow Up  Future Appointments   Date Time Provider Inocencio Schmidt   2021  8:40 AM SCHEDULE, COMPLETE [facility] MED Raydell Goldberg [facility]   2021  8:15 AM MD Evelyne Salazar

## 2021-07-13 NOTE — TELEPHONE ENCOUNTER
EGD RS for 7/23 and HIDA scan scheduled for 8/6 at [facility] arrive at 7:30. Patient has been notified of both times.

## 2021-07-13 NOTE — TELEPHONE ENCOUNTER
Prior Authorization Form:      DEMOGRAPHICS:                     Patient Name:  Gerald Miller  Patient :  1970            Insurance:  Payor: Sharyle King / Plan: Jordan Dom / Product Type: *No Product type* /   Insurance ID Number:    Payor/Plan Subscr  Sex Relation Sub.  Ins. ID Effective Group Num   [facility] [place] [facility] [facility] 1970 Male Self [id number] 10/1/19 [facility] [address]         DIAGNOSIS & PROCEDURE:                       Procedure/Operation: EGD          CPT Code: 70035    Diagnosis:  Abdominal Pain     ICD10 Code: R10.9    Location:  [facility]    Surgeon: Roldan Gil    SCHEDULING INFORMATION:                          Date: 21  Time: 9:30A            Anesthesia:  MAC/TIVA                                                       Status:  Outpatient        Special Comments:         Electronically signed by Ignacio Ho MA on 2021 at 8:21 AM

## 2021-07-14 NOTE — ADT AUTH CERT
Utilization Reviews       Pancreatitis - Care Day 2 (7/11/2021) by Jose Rodriguez RN       Review Status Review Entered   Completed 7/12/2021 14:02      Criteria Review      Care Day: 2 Care Date: 7/11/2021 Level of Care: Inpatient Floor    Guideline Day 2    Clinical Status    (X) * Hemodynamic stability    7/12/2021 2:02 PM EDT by Ewelina Madera      HR 72 /88    (X) * Pain absent or reduced    Routes    ( ) Possible oral hydration    7/12/2021 2:02 PM EDT by Ewelina Madera      NS IV @150ml/hr    ( ) Possible oral medications    7/12/2021 2:02 PM EDT by Ewelina Madera      zofran 4mg IV PRN x2    Interventions    (X) * NG tube absent    (X) Laboratory tests    * Milestone   Additional Notes   07/11/21 upgrade to inpatient   VS T 97.9 P 72 R 18 /76 spo2 95% RA      Labs   Glucose: 105 (H)   Total Protein: 6.2 (L)            Meds: brovana 15mcg nebulized twice daily, lipitor 40mg po daily, pulmicort 500mcg nebulized twice daily, prozac 80mg po daily, lisinopril 40mg po daily, protonix 40mg po daily, carafate 1g po daily, flomax 0.4mg po daily, NS IV @150ml/hr, albuterol 2.5mg nebulized prn x2, norco 5/325mg po prn x4, zofran 4mg IV PRN x2,          IM NOTE   Assessment:       Active Problems:     Acute pancreatitis     Acute pancreatitis without infection or necrosis   Resolved Problems:     * No resolved hospital problems. *           Plan:       1. Acute pancreatitis   - per CT Findings suspicious for early/mild acute pancreatitis involving the pancreatic head and uncinate process. Lipase only mildly elevated, and quickly resolved   - no history of ETOH   - US GB negative for distention/thickening, stones or CBD dilatation   - IV opiate analgesia, IV antiemetics. - Oral intake initiated and he did not tolerate, developed increased pain hour after eating. With mild elevation in lipase, and location/description of pain could indicate PUD.  Will ask for GI consult, make NPO after midnight and back to clear liquids today   - in regards to possible pancreatitis, given no other identified etiology would recommend holding minocycline and metformin as they can be implicated in drug induced pancreatitis       2. DM   - hold metformin   - Insulin per sliding scale       3. COPD   - Dulera at home   - Formulary equivalent nebs available       4. GERD   - PPI       5. Depression, anxiety   - mood stable on Prozac and Buspar       6. BPH   - flomax       NOTE: This report was transcribed using voice recognition software. Every effort was made to ensure accuracy; however, inadvertent computerized transcription errors may be present. Electronically signed by ANNABEL Courtney - CNP on 7/11/2021 at 3:13 PM         Attestation signed by Betsey Ellsworth MD at 7/11/2021 3:25 PM   I have participated in the history, physical examination and medical decision making of the patient both independently and with the NP.       On examination - AOx3, CTA bilaterally, S1-S2 normal, obese protuberant abdomen with RUQ &  midepigastric tenderness       1.  Acute pancreatitis -patient nonalcoholic, CT abdomen suspicious for pancreatitis, lipase elevated, first episode of pancreatitis, will follow up ultrasound abdomen, elevated triglycerides - 170-on statin, patient on CLD will advance as tolerated, analgesics, IV fluids.    Consult gastroenterology       2.  Diabetes mellitus -patient on Steglatro.  Continuing on ISS   3.  COPD stable -continue nebulization.    4.  Morbid obesity   5.  Hypertension -continue ACE inhibitor   7.  Sleep apnea -CPAP at night   8.  Tobacco use -nicotine patch, smoking cessation           Code Status: Full code   DVT prophylaxis: Lovenox          Attestation signed by Betsey Ellsworth MD at 7/11/2021 3:25 PM   I have participated in the history, physical examination and medical decision making of the patient both independently and with the NP.       On examination - AOx3, CTA bilaterally, S1-S2 normal, obese protuberant abdomen with RUQ &  midepigastric tenderness       1.  Acute pancreatitis -patient nonalcoholic, CT abdomen suspicious for pancreatitis, lipase elevated, first episode of pancreatitis, will follow up ultrasound abdomen, elevated triglycerides - 170-on statin, patient on CLD will advance as tolerated, analgesics, IV fluids. Consult gastroenterology       2.  Diabetes mellitus -patient on Steglatro.  Continuing on ISS   3.  COPD stable -continue nebulization.    4.  Morbid obesity   5.  Hypertension -continue ACE inhibitor   7.  Sleep apnea -CPAP at night   8.  Tobacco use -nicotine patch, smoking cessation           Code Status: Full code   DVT prophylaxis: Lovenox                Physician advisor IP recs by Lucero Jasmine RN       Review Status Review Entered   In Primary 2021 12:11      Criteria Review   obs list upgrade  We recommend that the following pt's current hospitalization under OBSERVATION status is upgraded to INPATIENT; if you agree, please place a new ADMIT order in [facility] as recommended. .       Name: Luberta Libman   : 1970   CSN: [id number]   INSURANCE: etrigg        Clinical summary Acute pancreatitis  - per CT Findings suspicious for early/mild acute pancreatitis involving the pancreatic head and uncinate process. Lipase only mildly elevated, will follow up. - no history of ETOH  - US GB pending  - IV opiate analgesia, IV antiemetics.   _ NPO and IV hydration continue  Vitals vss  Labs and Imaging reviewed  MCG criteria applies yes,   Comments Rec upgrade to inpt      This chart was reviewed at 9:18 AM 2021    [address] [place] [address]   CELL : [phone]      Pancreatitis - Care Day 1 (7/10/2021) by Lucero Jasmine RN       Review Status Review Entered   Completed 2021 08:49      Criteria Review      Care Day: 1 Care Date: 7/10/2021 Level of Care: Inpatient Floor    Guideline Day 1    Level Of Care (X) ICU or floor    7/11/2021 8:49 AM EDT by Rj Correa      m/s floor    Clinical Status    (X) * Clinical Indications met    (X) Pain, fever, or vomiting    7/11/2021 8:49 AM EDT by Rj Correa      + abd pain    Routes    (X) NPO, enteral, or oral feeds    7/11/2021 8:49 AM EDT by Rj Correa      cl diet    (X) IV fluids    7/11/2021 8:49 AM EDT by Rj Correa      ivf at 150/hr    (X) IV medications    7/11/2021 8:49 AM EDT by Rj Correa      iv zofran x 1 and prn    Interventions    (X) Abdominal imaging    7/11/2021 8:49 AM EDT by Miguel Rodriguez abd= Findings suspicious for early/mild acute pancreatitis involving the pancreatic head and uncinate process. (X) CXR, ECG    7/11/2021 8:49 AM EDT by Rj Correa      Cxr= Retrocardiac hiatal hernia. Atelectatic changes in the left lung base. Ekg= Normal sinus rhythm at 79. (X) Laboratory tests    7/11/2021 8:49 AM EDT by Rj Correa      wbc 14.9  lipase 87    Medications    (X) Parenteral analgesics    7/11/2021 8:49 AM EDT by Lyftdidi Correa      iv mso4 x 1    * Milestone   Additional Notes   Obs m/s            Dx: Acute pancreatitis, DM, morbid obesity               Meds:   Scheduled Meds:sodium chloride flush, 5-40 mL, Intravenous, 2 times per day   enoxaparin, 40 mg, Subcutaneous, Daily   nicotine, 1 patch, Transdermal, Daily   atorvastatin, 40 mg, Oral, Daily   busPIRone, 10 mg, Oral, Daily   FLUoxetine, 80 mg, Oral, Daily   lisinopril, 40 mg, Oral, Daily   pantoprazole, 40 mg, Oral, QAM AC   tamsulosin, 0.4 mg, Oral, Daily   budesonide, 0.5 mg, Nebulization, BID   Arformoterol Tartrate, 15 mcg, Nebulization, BID   insulin lispro, 0-12 Units, Subcutaneous, TID WC   insulin lispro, 0-6 Units, Subcutaneous, Nightly         Continuous Infusions:   sodium chloride Last Rate: 150 mL/hr             Md notes:   7/10 int med:   1. Acute pancreatitis- first episode of pancreatitis   2.  Diabetes mellitus   3.  COPD stable   4.  Morbid obesity   5.  Hypertension   7.  Sleep apnea   8.  Tobacco use      PLAN:       1.  CLD will advance as tolerated   2.  IV fluids   3.  Pain control, IV and p.o. 4.  Recheck lipase in the morning   5.  Gallbladder ultrasound   6.  Check triglycerides   7.  Monitor glucose, sliding scale insulin   8.  Continue antihypertensive meds   9.  CPAP at night   10.  Smoking cessation         Orders:   7/10 ivf at 150/hr, norco po x 3 and prn, iv mso4 x 1 and prn, iv zofran x 1 and prn, above meds, fsbs w md ssi, gb u/s, cl diet      Pancreatitis - Clinical Indications for Admission to Inpatient Care by Raimundo Lance RN       Review Status Review Entered   Completed 7/11/2021 08:42      Criteria Review      Clinical Indications for Admission to Inpatient Care    Most Recent : Naima Fleming Most Recent Date: 7/11/2021 8:42 AM EDT    (X) Admission is indicated for  1 or more  of the following  (1) (2) (3) (4) (5) (6) (7):       (X) Acute pancreatitis, [A] as indicated by  2 or more  of the following :          (X) Abdominal pain (eg, epigastric, left upper quadrant)          7/11/2021 8:42 AM EDT by Naima Fleming            Patient presents for right upper quadrant pain right chest pain nausea vomiting.          (X) Characteristic findings from abdominal imaging (eg, pancreatic inflammation, pancreatic necrosis,          peripancreatic fluid collection) [C]          7/11/2021 8:42 AM EDT by Naima Fleming            ct abd=Findings suspicious for early/mild acute pancreatitis involving thepancreatic head and uncinate process. Additional Notes   Direct admit from outside ED.    28-year-old man with history of diabetes mellitus, hypertension, obesity, was transferred from [facility] emergency department with abdominal pain, lower chest pain, nausea and vomiting.  He had had epigastric and right upper quadrant pain for few days prior to presentation. Pasqual Juventino is worsened by meals.  No diarrhea, no fever, no chills.  He had a CT scan of abdomen and pelvis with findings suspicious for early/mild acute pancreatitis involving the pancreatic head and uncinate process.  Lipase level was 87.  Patient was then transferred here to [facility] for further evaluation and management. PE:   General Appearance: alert and oriented to person, place and time and in no acute distress   Skin: warm and dry   Head: normocephalic and atraumatic   Eyes: pupils equal, round, and reactive to light, extraocular eye movements intact, conjunctivae normal   Neck: neck supple and non tender without mass    Pulmonary/Chest: clear to auscultation bilaterally- no wheezes, rales or rhonchi, normal air movement, no respiratory distress   Cardiovascular: normal rate, normal S1 and S2 and no carotid bruits   Abdomen: soft, protuberant, mild epigastric tenderness, non-distended, normal bowel sounds, no masses or organomegaly   Extremities: no cyanosis, no clubbing and no edema   Neurologic: no cranial nerve deficit and speech normal                has a past medical history of Allergic rhinitis, Anxiety, Asthma, COPD (chronic obstructive pulmonary disease) ([address] Depression, Diabetes mellitus ([facility] [address] Encounter for screening colonoscopy, GERD (gastroesophageal reflux disease), Hyperlipidemia, Hypertension, Obesity, Osteoarthritis, and Sleep apnea. 97.8 (36.6)  16  68  152/85    94% on ra         Gb u/s= Unremarkable right upper quadrant ultrasound. Poorly visualized pancreas.  Recommend contrast enhanced abdominal CT if clinically appropriate. Triglycerides 170 mg/dL             Labs/ testing at outside ED:   Ekg= Normal sinus rhythm at 79. Cxr= Retrocardiac hiatal hernia. Atelectatic changes in the left lung base. No other radiographic evidence of acute cardiopulmonary disease. Ct abd= Findings suspicious for early/mild acute pancreatitis involving the pancreatic head and uncinate process.  Correlation with serum amylase and lipase levels recommended. Normal appendix.    Distal colonic diverticulosis.  No acute diverticulitis.          WBC 4.5 - 11.5 E9/L 14.9High     RBC 3.80 - 5.80 E12/L 5.24    Hemoglobin 12.5 - 16.5 g/dL 15.7    Hematocrit 37.0 - 54.0 % 46.5       Lipase 13 - 60 U/L 87        Sodium 132 - 146 mmol/L 138    Potassium 3.5 - 5.0 mmol/L 4.2    Chloride 98 - 107 mmol/L 101    CO2 22 - 29 mmol/L 27    Anion Gap 7 - 16 mmol/L 10    Glucose 74 - 99 mg/dL 116High     BUN 6 - 20 mg/dL 11    CREATININE 0.7 - 1.2 mg/dL 0.9

## 2021-07-20 RX ORDER — MULTIVITAMIN WITH FOLIC ACID 400 MCG
TABLET ORAL
COMMUNITY
Start: 2021-06-14 | End: 2021-07-20

## 2021-07-20 NOTE — PROGRESS NOTES
[facility] PRE-ADMISSION TESTING INSTRUCTIONS    The Preadmission Testing patient is instructed accordingly using the following criteria (check applicable):    ARRIVAL INSTRUCTIONS:  [x] Parking the day of Surgery is located in the Main Entrance lot. Upon entering the door, make an immediate right to the surgery reception desk    [x] Bring photo ID and insurance card    [] Bring in a copy of Living will or Durable Power of  papers. [x] Please be sure to arrange for responsible adult to provide transportation to and from the hospital    [] Please arrange for responsible adult to be with you for the 24 hour period post procedure due to having anesthesia      GENERAL INSTRUCTIONS:    [x] Nothing by mouth after midnight, including gum, candy, mints or water    [x] You may brush your teeth, but do not swallow any water    [x] Take medications as instructed with 1-2 oz of water    [x] Stop herbal supplements and vitamins 5 days prior to procedure    [] Follow preop dosing of blood thinners per physician instructions    [] Take 1/2 dose of evening insulin, but no insulin after midnight    [x] No oral diabetic medications after midnight    [x] If diabetic and have low blood sugar or feel symptomatic, take 1-2oz apple juice only    [x] Bring inhalers day of surgery    [] Bring C-PAP/ Bi-Pap day of surgery    [] Bring urine specimen day of surgery    [x] Shower or bath with soap, lather and rinse well, AM of Surgery, no lotion, powders or creams to surgical site    [] Follow bowel prep as instructed per surgeon    [x] No tobacco products within 24 hours of surgery     [x] No alcohol or illegal drug use within 24 hours of surgery.     [x] Jewelry, body piercing's, eyeglasses, contact lenses and dentures are not permitted into surgery (bring cases)      [] Please do not wear any nail polish, make up or hair products on the day of surgery    [x] You can expect a call the business day prior to procedure to notify you if your arrival time changes    [] If you receive a survey after surgery we would greatly appreciate your comments    [] Parent/guardian of a minor must accompany their child and remain on the premises  the entire time they are under our care     [] Pediatric patients may bring favorite toy, blanket or comfort item with them    [] A caregiver or family member must remain with the patient during their stay if they are mentally handicapped, have dementia, disoriented or unable to use a call light or would be a safety concern if left unattended    [x] Please notify surgeon if you develop any illness between now and time of surgery (cold, cough, sore throat, fever, nausea, vomiting) or any signs of infections  including skin, wounds, and dental.    []  The Outpatient Pharmacy is available to fill your prescription here on your day of surgery, ask your preop nurse for details    [] Other instructions    EDUCATIONAL MATERIALS PROVIDED:    [] PAT Preoperative Education Packet/Booklet     [] Medication List    [] Transfusion bracelet applied with instructions    [] Shower with soap, lather and rinse well, and use CHG wipes provided the evening before surgery as instructed    [] Incentive spirometer with instructions        Have you been tested for COVID  Yes           Have you been told you were positive for COVID No  Have you had any known exposure to someone that is positive for COVID No  Do you have a cough                   No              Do you have shortness of breath No                 Do you have a sore throat            No                Are you having chills                    No                Are you having muscle aches. No                    Please come to the hospital wearing a mask and have your significant other wear a mask as well. Both of you should check your temperature before leaving to come here,  if it is 100 or higher please call [phone] for instruction.

## 2021-07-22 ENCOUNTER — OFFICE VISIT (OUTPATIENT)
Dept: FAMILY MEDICINE CLINIC | Age: 51
End: 2021-07-22
Payer: COMMERCIAL

## 2021-07-22 VITALS
HEIGHT: 65 IN | HEART RATE: 80 BPM | TEMPERATURE: 98.1 F | OXYGEN SATURATION: 97 % | BODY MASS INDEX: 42.63 KG/M2 | RESPIRATION RATE: 18 BRPM | WEIGHT: 255.9 LBS | DIASTOLIC BLOOD PRESSURE: 83 MMHG | SYSTOLIC BLOOD PRESSURE: 116 MMHG

## 2021-07-22 DIAGNOSIS — K85.90 ACUTE PANCREATITIS WITHOUT INFECTION OR NECROSIS, UNSPECIFIED PANCREATITIS TYPE: Primary | ICD-10-CM

## 2021-07-22 PROCEDURE — 99214 OFFICE O/P EST MOD 30 MIN: CPT | Performed by: FAMILY MEDICINE

## 2021-07-22 PROCEDURE — 1111F DSCHRG MED/CURRENT MED MERGE: CPT | Performed by: FAMILY MEDICINE

## 2021-07-22 RX ORDER — ONDANSETRON 4 MG/1
4 TABLET, ORALLY DISINTEGRATING ORAL 3 TIMES DAILY PRN
Qty: 21 TABLET | Refills: 0 | Status: SHIPPED
Start: 2021-07-22 | End: 2021-09-24

## 2021-07-22 RX ORDER — ALBUTEROL SULFATE 90 UG/1
2 AEROSOL, METERED RESPIRATORY (INHALATION) EVERY 6 HOURS PRN
Qty: 8.5 G | Refills: 3 | Status: SHIPPED
Start: 2021-07-22 | End: 2021-09-24 | Stop reason: SDUPTHER

## 2021-07-22 NOTE — PROGRESS NOTES
DULERA 200-5 MCG/ACT inhaler  inhale 2 puffs by mouth and INTO THE LUNGS twice a day             FLUoxetine (PROZAC) 40 MG capsule  80 mg daily              glucose monitoring kit (FREESTYLE) monitoring kit  1 kit by Does not apply route daily Dx E11.9             Lancets MISC  1 each by Does not apply route daily             lisinopril (PRINIVIL;ZESTRIL) 40 MG tablet  take 1 tablet by mouth once daily             metFORMIN (GLUCOPHAGE) 500 MG tablet  Take 1,000 mg by mouth daily (with breakfast)             Multiple Vitamins-Minerals (THERAPEUTIC MULTIVITAMIN-MINERALS) tablet  Take 1 tablet by mouth daily             omeprazole (PRILOSEC) 40 MG delayed release capsule  take 1 capsule by mouth once daily             ondansetron (ZOFRAN-ODT) 4 MG disintegrating tablet  Take 1 tablet by mouth 3 times daily as needed for Nausea or Vomiting             tamsulosin (FLOMAX) 0.4 MG capsule  Take 1 capsule by mouth daily                   Medications marked \"taking\" at this time  Outpatient Medications Marked as Taking for the 7/22/21 encounter (Office Visit) with Ishmael Wilder MD   Medication Sig Dispense Refill    albuterol sulfate  (90 Base) MCG/ACT inhaler Inhale 2 puffs into the lungs every 6 hours as needed for Wheezing 8.5 g 3    ondansetron (ZOFRAN-ODT) 4 MG disintegrating tablet Take 1 tablet by mouth 3 times daily as needed for Nausea or Vomiting 21 tablet 0    lisinopril (PRINIVIL;ZESTRIL) 40 MG tablet take 1 tablet by mouth once daily 30 tablet 1    DULERA 200-5 MCG/ACT inhaler inhale 2 puffs by mouth and INTO THE LUNGS twice a day 26 g 5    atorvastatin (LIPITOR) 40 MG tablet take 1 tablet by mouth once daily 90 tablet 1    busPIRone (BUSPAR) 10 MG tablet Take 10 mg by mouth daily       FLUoxetine (PROZAC) 40 MG capsule 80 mg daily       blood glucose monitor strips Test twice daily & as needed for symptoms of irregular blood glucose.  Dx: E11.9 100 strip 3    omeprazole (PRILOSEC) 40 MG delayed release capsule take 1 capsule by mouth once daily 90 capsule 3    Multiple Vitamins-Minerals (THERAPEUTIC MULTIVITAMIN-MINERALS) tablet Take 1 tablet by mouth daily      tamsulosin (FLOMAX) 0.4 MG capsule Take 1 capsule by mouth daily (Patient taking differently: Take 0.8 mg by mouth daily ) 30 capsule 5    glucose monitoring kit (FREESTYLE) monitoring kit 1 kit by Does not apply route daily Dx E11.9 1 kit 0    Lancets MISC 1 each by Does not apply route daily 100 each 3        Medications patient taking as of now reconciled against medications ordered at time of hospital discharge: Yes    Chief Complaint   Patient presents with    Follow-Up from [address] [place] [address]       History of Present illness - Follow up of Hospital diagnosis(es): acute pancreatitis    Inpatient course: Discharge summary reviewed- see chart. Interval history/Current status: pt with some improvement in pain but still has RUQ pain with eating and severe nausea. He is scheduled for EGD tomorrow am. He has HIDA scan in two weeks. A comprehensive review of systems was negative except for what was noted in the HPI. Vitals:    07/22/21 1236   BP: 116/83   Site: Left Upper Arm   Position: Sitting   Cuff Size: Large Adult   Pulse: 80   Resp: 18   Temp: 98.1 °F (36.7 °C)   TempSrc: Infrared   SpO2: 97%   Weight: 255 lb 14.4 oz (116.1 kg)   Height: 5' 5\" (1.651 m)     Body mass index is 42.58 kg/m².    Wt Readings from Last 3 Encounters:   07/22/21 255 lb 14.4 oz (116.1 kg)   07/11/21 254 lb (115.2 kg)   07/09/21 261 lb (118.4 kg)     BP Readings from Last 3 Encounters:   07/22/21 116/83   07/12/21 (!) 160/82   07/10/21 (!) 143/77        Physical Exam:  General Appearance: alert and oriented to person, place and time, well developed and well- nourished, in no acute distress  Skin: warm and dry, no rash or erythema  Head: normocephalic and atraumatic  Eyes: pupils equal, round, and reactive to light, extraocular eye movements intact, conjunctivae normal  ENT: tympanic membrane, external ear and ear canal normal bilaterally, nose without deformity, nasal mucosa and turbinates normal without polyps  Neck: supple and non-tender without mass, no thyromegaly or thyroid nodules, no cervical lymphadenopathy  Pulmonary/Chest: clear to auscultation bilaterally- no wheezes, rales or rhonchi, normal air movement, no respiratory distress  Cardiovascular: normal rate, regular rhythm, normal S1 and S2, no murmurs, rubs, clicks, or gallops, distal pulses intact, no carotid bruits  Abdomen: soft, mild tenderness over RUQ with no rebound, non-distended, normal bowel sounds, no masses or organomegaly  Extremities: no cyanosis, clubbing or edema  Musculoskeletal: normal range of motion, no joint swelling, deformity or tenderness  Neurologic: reflexes normal and symmetric, no cranial nerve deficit, gait, coordination and speech normal    Assessment/Plan:  1.  Acute pancreatitis without infection or necrosis, unspecified pancreatitis type  improving        Medical Decision Making: moderate complexity

## 2021-07-23 ENCOUNTER — HOSPITAL ENCOUNTER (OUTPATIENT)
Age: 51
Setting detail: OUTPATIENT SURGERY
Discharge: HOME | End: 2021-07-23
Attending: SURGERY | Admitting: SURGERY
Payer: COMMERCIAL

## 2021-07-23 VITALS
WEIGHT: 250 LBS | HEIGHT: 65 IN | SYSTOLIC BLOOD PRESSURE: 150 MMHG | OXYGEN SATURATION: 96 % | RESPIRATION RATE: 20 BRPM | DIASTOLIC BLOOD PRESSURE: 87 MMHG | TEMPERATURE: 97.6 F | BODY MASS INDEX: 41.65 KG/M2 | HEART RATE: 74 BPM

## 2021-07-23 VITALS — OXYGEN SATURATION: 100 % | DIASTOLIC BLOOD PRESSURE: 73 MMHG | SYSTOLIC BLOOD PRESSURE: 154 MMHG

## 2021-07-23 PROCEDURE — 2709999900 HC NON-CHARGEABLE SUPPLY: Performed by: SURGERY

## 2021-07-23 PROCEDURE — 2580000003 HC RX 258: Performed by: NURSE ANESTHETIST, CERTIFIED REGISTERED

## 2021-07-23 PROCEDURE — 3700000000 HC ANESTHESIA ATTENDED CARE: Performed by: SURGERY

## 2021-07-23 PROCEDURE — 3700000001 HC ADD 15 MINUTES (ANESTHESIA): Performed by: SURGERY

## 2021-07-23 PROCEDURE — 88342 IMHCHEM/IMCYTCHM 1ST ANTB: CPT

## 2021-07-23 PROCEDURE — 7100000011 HC PHASE II RECOVERY - ADDTL 15 MIN: Performed by: SURGERY

## 2021-07-23 PROCEDURE — 7100000010 HC PHASE II RECOVERY - FIRST 15 MIN: Performed by: SURGERY

## 2021-07-23 PROCEDURE — 3609012400 HC EGD TRANSORAL BIOPSY SINGLE/MULTIPLE: Performed by: SURGERY

## 2021-07-23 PROCEDURE — 43239 EGD BIOPSY SINGLE/MULTIPLE: CPT | Performed by: SURGERY

## 2021-07-23 PROCEDURE — 6360000002 HC RX W HCPCS: Performed by: NURSE ANESTHETIST, CERTIFIED REGISTERED

## 2021-07-23 PROCEDURE — 88305 TISSUE EXAM BY PATHOLOGIST: CPT

## 2021-07-23 RX ORDER — SODIUM CHLORIDE 9 MG/ML
INJECTION, SOLUTION INTRAVENOUS CONTINUOUS PRN
Status: DISCONTINUED | OUTPATIENT
Start: 2021-07-23 | End: 2021-07-23 | Stop reason: SDUPTHER

## 2021-07-23 RX ORDER — PROPOFOL 10 MG/ML
INJECTION, EMULSION INTRAVENOUS PRN
Status: DISCONTINUED | OUTPATIENT
Start: 2021-07-23 | End: 2021-07-23 | Stop reason: SDUPTHER

## 2021-07-23 RX ADMIN — SODIUM CHLORIDE: 9 INJECTION, SOLUTION INTRAVENOUS at 09:09

## 2021-07-23 RX ADMIN — PROPOFOL 280 MG: 10 INJECTION, EMULSION INTRAVENOUS at 09:11

## 2021-07-23 ASSESSMENT — PAIN SCALES - GENERAL: PAINLEVEL_OUTOF10: 0

## 2021-07-23 ASSESSMENT — LIFESTYLE VARIABLES: SMOKING_STATUS: 1

## 2021-07-23 NOTE — H&P
Patient's office history and physical was reviewed. Patient examined. There has been no change in the patient's history and physical.      Physician Signature: Electronically signed by Dr. Monisha Shea             Date: 7/23/2021        Patient Name: Angelic Pena     YOB: 1970      Age:  46 y.o. Consult by: Pauly JIN  Consult to: Dr Colin Perez   Reason: Epigastric pain    Chief Complaint   Epigastric pain    History Obtained From   patient    History of Present Illness   Angelic Pena is a 46 y.o. male with GERD, COPD, SERGEY, DM, HHR in 2012, and EGD/CS in 7/2020 showing [place] and duodenal polyp who presents for right flank pain. When it becomes severe, it spreads over his whole abdomen. It hurts with any type of food intake. Never had an issue like this before. He had a little bit of nausea, and did have vomiting before admission. His last bowel movement was a couple days ago and had been normal prior to that. He is a smoker.  Saturday's RUQ US was negative for stones or other signs of cholecystitis    Past Medical History     Past Medical History:   Diagnosis Date    Allergic rhinitis     Anxiety     Asthma     stable    COPD (chronic obstructive pulmonary disease) ([address]     controlled with inhalers    Depression     no meds    Diabetes mellitus ([address]     Encounter for screening colonoscopy     and EGD 7-23-21    GERD (gastroesophageal reflux disease)     Hyperlipidemia     Hypertension     Obesity     Osteoarthritis     Sleep apnea     BMS CPAP 7, not using        Past Surgical History     Past Surgical History:   Procedure Laterality Date    ABDOMEN SURGERY N/A 6/24/2019    INCISION AND DRAINAGE PERINEAL ABSCESS, INCISIONAL DEBRIDEMENT performed by Surinder Herndon MD at [place] [address] COLONOSCOPY  06/08/2017    Dr. Severo Peek polyp, diverticulitis    COLONOSCOPY N/A 7/20/2020    COLONOSCOPY POLYPECTOMY SNARE/COLD BIOPSY performed by Nena Conklin MD at [facility], COLON, DIAGNOSTIC      EXCISION HIDRADENITIS OF INGUINAL / UMBILICAL AREA N/A 9/49/1524    DEBRIDEMENT PERINEAL WOUND performed by Zan Evans MD at [facility]  2012    Dr. Pedro Carpio N/A 9/19/2018    I AND D BACK ABCESS performed by Della Kent MD at [address] [facility] ENDOSCOPY N/A 7/20/2020    EGD BIOPSY performed by Nena Conklin MD at [facility] ENDOSCOPY        Medications - Prior to Admission and Current Meds     Prior to Admission medications    Medication Sig Start Date End Date Taking? Authorizing Provider   albuterol sulfate  (90 Base) MCG/ACT inhaler Inhale 2 puffs into the lungs every 6 hours as needed for Wheezing 7/22/21 8/21/21 Yes Jordyn Fowler MD   metFORMIN (GLUCOPHAGE) 500 MG tablet Take 1,000 mg by mouth daily (with breakfast)  Patient not taking: Reported on 7/22/2021   Yes Historical Provider, MD   lisinopril (PRINIVIL;ZESTRIL) 40 MG tablet take 1 tablet by mouth once daily 7/7/21  Yes Jordyn Fowler MD   DULERA 200-5 MCG/ACT inhaler inhale 2 puffs by mouth and INTO THE LUNGS twice a day 6/10/21  Yes Jordyn Folwer MD   atorvastatin (LIPITOR) 40 MG tablet take 1 tablet by mouth once daily 3/26/21  Yes Jordyn Fowler MD   busPIRone (BUSPAR) 10 MG tablet Take 10 mg by mouth daily  3/16/21  Yes Historical Provider, MD   FLUoxetine (PROZAC) 40 MG capsule 80 mg daily  3/16/21  Yes Historical Provider, MD   blood glucose monitor strips Test twice daily & as needed for symptoms of irregular blood glucose.  Dx: E11.9 9/15/20  Yes Jordyn Fowler MD   omeprazole (PRILOSEC) 40 MG delayed release capsule take 1 capsule by mouth once daily 8/3/20  Yes Kim So MD   Multiple Vitamins-Minerals (THERAPEUTIC MULTIVITAMIN-MINERALS) tablet Take 1 tablet by mouth daily Yes Historical Provider, MD   tamsulosin (FLOMAX) 0.4 MG capsule Take 1 capsule by mouth daily  Patient taking differently: Take 0.8 mg by mouth daily  6/4/20  Yes Isabell Peres MD   glucose monitoring kit (FREESTYLE) monitoring kit 1 kit by Does not apply route daily Dx E11.9 11/22/19  Yes Isabell Peres MD   Lancets MISC 1 each by Does not apply route daily 11/22/19  Yes Isabell Peres MD   ondansetron (ZOFRAN-ODT) 4 MG disintegrating tablet Take 1 tablet by mouth 3 times daily as needed for Nausea or Vomiting 7/22/21   Isabell Peres MD        Inpatient:  No current facility-administered medications for this encounter. Allergies   Patient has no known allergies. Social History     Social History     Tobacco History     Smoking Status  Current Every Day Smoker Smoking Frequency  1 pack/day for 35 years (35 pk yrs) Smoking Tobacco Type  Cigarettes    Smokeless Tobacco Use  Never Used          Alcohol History     Alcohol Use Status  No          Drug Use     Drug Use Status  Yes Types  Marijuana Frequency   7 times/week          Sexual Activity     Sexually Active  Yes Partners  Female                Family History     Family History   Problem Relation Age of Onset    COPD Mother     Cancer Mother     Heart Disease Father 46        CABG    Diabetes Father     Diabetes Sister     Diabetes Brother     Heart Disease Brother 43        MI    Heart Disease Maternal Grandmother     Cancer Maternal Grandmother     Cancer Paternal Grandmother         colon    Heart Disease Paternal Grandfather     Asthma Sister     COPD Brother     Heart Disease Brother 48        CABG       Review of Systems   Pertinent ROS listed in HPI, all others negative    Physical Exam   /74   Pulse 76   Resp 16   Ht 5' 5\" (1.651 m)   Wt 250 lb (113.4 kg)   SpO2 94%   BMI 41.60 kg/m²     GENERAL:  No acute distress. Alert and oriented. HEAD:  Normocephalic, atraumatic.    EYES:  No scleral icterus. Conjugate gaze. ENT/NECK:  Trachea midline, mucous membranes dry. LUNGS:  No cough. Nonlabored breathing on room air. CARDIOVASC:  Normal rate, no cyanosis. ABDOMEN:  Soft, obese but non-distended, non-tender. No guarding / rigidity / rebound. LIMBS:  No deformities, no UE edema. NEURO:  Face symmetric, moves all extremities  SKIN:  Warm, dry. Labs    CBC  No results for input(s): WBC, HGB, HCT, PLT in the last 72 hours. BMP  No results for input(s): NA, K, CL, CO2, BUN, CREATININE, CALCIUM in the last 72 hours. Invalid input(s): GLU  Liver Function  No results for input(s): AMYLASE, LIPASE, BILITOT, BILIDIR, AST, ALT, ALKPHOS, PROT, LABALBU in the last 72 hours. No results for input(s): LACTATE in the last 72 hours. No results for input(s): INR, PTT in the last 72 hours. Invalid input(s): PT    Imaging/Diagnostics Last 24 Hours   CT ABDOMEN PELVIS W IV CONTRAST Additional Contrast? None    Result Date: 7/10/2021  EXAMINATION: CT OF THE ABDOMEN AND PELVIS WITH CONTRAST 7/10/2021 12:35 am TECHNIQUE: CT of the abdomen and pelvis was performed with the administration of intravenous contrast. Multiplanar reformatted images are provided for review. Dose modulation, iterative reconstruction, and/or weight based adjustment of the mA/kV was utilized to reduce the radiation dose to as low as reasonably achievable. COMPARISON: None. HISTORY: ORDERING SYSTEM PROVIDED HISTORY: Vomiting, epigastric and right upper quadrant pain TECHNOLOGIST PROVIDED HISTORY: Reason for exam:->Vomiting, epigastric and right upper quadrant pain Additional Contrast?->None Decision Support Exception - unselect if not a suspected or confirmed emergency medical condition->Emergency Medical Condition (MA) FINDINGS: Lower Chest: Lung bases clear. Organs: Unremarkable liver, spleen, and right kidney. 1.9 cm left renal cyst.  Thickened left adrenal.  No subcentimeter bilateral adrenal adenomas.  Minimal fat stranding around pancreatic head and uncinate process, suspicious for acute pancreatitis. GI/Bowel: No definite cholelithiasis. Normal appendix. Distal colonic diverticulosis. No acute diverticulitis. Bowel loops nonobstructed. Pelvis: Prostate top-normal in size. Urinary bladder grossly unremarkable. Peritoneum/Retroperitoneum: No free air or free fluid. No adenopathy. Vascular calcification with no abdominal aortic aneurysm. Bones/Soft Tissues: Mild multilevel thoracolumbar spondylosis     Findings suspicious for early/mild acute pancreatitis involving the pancreatic head and uncinate process. Correlation with serum amylase and lipase levels recommended. Normal appendix. Distal colonic diverticulosis. No acute diverticulitis. US GALLBLADDER RUQ    Result Date: 7/10/2021  EXAMINATION: RIGHT UPPER QUADRANT ULTRASOUND 7/10/2021 10:27 am COMPARISON: None. HISTORY: ORDERING SYSTEM PROVIDED HISTORY: pancreatitis TECHNOLOGIST PROVIDED HISTORY: Reason for exam:->pancreatitis What reading provider will be dictating this exam?->CRC FINDINGS: LIVER:  The liver demonstrates normal echogenicity without evidence of intrahepatic biliary ductal dilatation. BILIARY SYSTEM:  Gallbladder is unremarkable without evidence of pericholecystic fluid, wall thickening or stones. Negative sonographic Cr's sign. Common bile duct is within normal limits measuring 5 mm. RIGHT KIDNEY: The right kidney is grossly unremarkable without evidence of hydronephrosis. PANCREAS:  Poorly visualized. OTHER: No evidence of right upper quadrant ascites. Unremarkable right upper quadrant ultrasound. Poorly visualized pancreas. Recommend contrast enhanced abdominal CT if clinically appropriate.        Assessment          46 y.o. male with possible peptic ulcer disease, clinically insignificant pancreatitis    Plan   -EGD tomorrow (7/13/21)  -Encourage smoking cessation  -Low-fat diet as tolerated  -Continue PPI and Carafate    Plan was discussed with  Carla Rivera. Electronically signed by Cheko Boyle MD on 7/12/21 at 5:01 AM EDT    I saw and examined the patient. I reviewed the above resident's note. I agree with the assessment and plan as outlined. The patient is unhappy because his EGD was cancelled. I told him I have him on for 730 tomorrow morning. I also ordered a HIDA for today. The patient is agreeable to stick around to get these done.     Trish Bustillo MD  General Surgery

## 2021-07-23 NOTE — ANESTHESIA POSTPROCEDURE EVALUATION
Department of Anesthesiology  Postprocedure Note    Patient: Lilly Alejandra  MRN: [id number]  YOB: 1970  Date of evaluation: 7/23/2021  Time:  12:24 PM     Procedure Summary     Date: 07/23/21 Room / Location: [facility] / [facility]    Anesthesia Start: 7095 Anesthesia Stop: 3125    Procedure: EGD BIOPSY (N/A ) Diagnosis: (unk)    Surgeons: Hussein Milian MD Responsible Provider: Chirag Tolentino MD    Anesthesia Type: MAC ASA Status: 3          Anesthesia Type: MAC    Regine Phase I: Regine Score: 10    Regine Phase II: Regine Score: 10    Last vitals: Reviewed and per EMR flowsheets.        Anesthesia Post Evaluation    Patient location during evaluation: PACU  Patient participation: complete - patient participated  Level of consciousness: awake and alert  Airway patency: patent  Nausea & Vomiting: no vomiting and no nausea  Complications: no  Cardiovascular status: blood pressure returned to baseline  Respiratory status: acceptable  Hydration status: euvolemic

## 2021-07-23 NOTE — ANESTHESIA PRE PROCEDURE
Department of Anesthesiology  Preprocedure Note       Name:  Gerald Miller   Age:  46 y.o.  :  1970                                          MRN:  [id number]         Date:  2021      Surgeon: Lakesha Dimas):  Jaleesa Bee MD    Procedure: Procedure(s):  EGD ESOPHAGOGASTRODUODENOSCOPY    Medications prior to admission:   Prior to Admission medications    Medication Sig Start Date End Date Taking? Authorizing Provider   metFORMIN (GLUCOPHAGE) 500 MG tablet Take 1,000 mg by mouth daily (with breakfast)  Patient not taking: Reported on 2021   Yes Historical Provider, MD   lisinopril (PRINIVIL;ZESTRIL) 40 MG tablet take 1 tablet by mouth once daily 21  Yes Ishmael Wilder MD   DULERA 200-5 MCG/ACT inhaler inhale 2 puffs by mouth and INTO THE LUNGS twice a day 6/10/21  Yes Ishmael Wilder MD   atorvastatin (LIPITOR) 40 MG tablet take 1 tablet by mouth once daily 3/26/21  Yes Ishmael Wilder MD   busPIRone (BUSPAR) 10 MG tablet Take 10 mg by mouth daily  3/16/21  Yes Historical Provider, MD   FLUoxetine (PROZAC) 40 MG capsule 80 mg daily  3/16/21  Yes Historical Provider, MD   blood glucose monitor strips Test twice daily & as needed for symptoms of irregular blood glucose.  Dx: E11.9 9/15/20  Yes Ishmael Wilder MD   omeprazole (PRILOSEC) 40 MG delayed release capsule take 1 capsule by mouth once daily 8/3/20  Yes Marjorie Rivera MD   Multiple Vitamins-Minerals (THERAPEUTIC MULTIVITAMIN-MINERALS) tablet Take 1 tablet by mouth daily   Yes Historical Provider, MD   tamsulosin (FLOMAX) 0.4 MG capsule Take 1 capsule by mouth daily  Patient taking differently: Take 0.8 mg by mouth daily  20  Yes Ishmael Wilder MD   glucose monitoring kit (FREESTYLE) monitoring kit 1 kit by Does not apply route daily Dx E11.9 19  Yes Ishmael Wilder MD   Lancets MISC 1 each by Does not apply route daily 19  Yes Ishmael Wilder MD   albuterol sulfate  (90 Base) MCG/ACT inhaler Inhale 2 puffs into the lungs every 6 hours as needed for Wheezing 7/22/21 8/21/21  Carlie Alvarado MD   ondansetron (ZOFRAN-ODT) 4 MG disintegrating tablet Take 1 tablet by mouth 3 times daily as needed for Nausea or Vomiting 7/22/21   Carlie Alvarado MD       Current medications:    No current facility-administered medications for this encounter.        Allergies:  No Known Allergies    Problem List:    Patient Active Problem List   Diagnosis Code    COPD (chronic obstructive pulmonary disease) ([facility] J44.9    Smoker F17.200    SERGEY (obstructive sleep apnea) G47.33    Asthma J45.909    Morbid obesity with BMI of 45.0-49.9, adult ([facility]) E66.01, Z68.42    Diverticulitis of colon K57.32    Abscess of back L02.212    DM2 (diabetes mellitus, type 2) ([facility] E11.9    Sepsis ([facility] A41.9    MSSA (methicillin susceptible Staphylococcus aureus) infection A49.01    Necrotizing soft tissue infection M79.89    HANS (acute kidney injury) ([facility] N17.9    Acute blood loss anemia D62    Primary osteoarthritis of left knee M17.12    Impingement syndrome of right shoulder M75.41    Mixed hyperlipidemia E78.2    Acute pancreatitis K85.90    Acute pancreatitis without infection or necrosis K85.90    Gastroesophageal reflux disease K21.9    Essential hypertension I10    Anxiety F41.9       Past Medical History:        Diagnosis Date    Allergic rhinitis     Anxiety     Asthma     stable    COPD (chronic obstructive pulmonary disease) ([facility])     controlled with inhalers    Depression     no meds    Diabetes mellitus ([facility]     Encounter for screening colonoscopy     and EGD 7-23-21    GERD (gastroesophageal reflux disease)     Hyperlipidemia     Hypertension     Obesity     Osteoarthritis     Sleep apnea     BMS CPAP 7, not using       Past Surgical History:        Procedure Laterality Date    ABDOMEN SURGERY N/A 6/24/2019    INCISION AND DRAINAGE PERINEAL ABSCESS, INCISIONAL DEBRIDEMENT performed by Chelo Joiner MD at [facility] COLONOSCOPY  06/08/2017    Dr. Garrett Zamora polyp, diverticulitis    COLONOSCOPY N/A 7/20/2020    COLONOSCOPY POLYPECTOMY SNARE/COLD BIOPSY performed by Bruce Amador MD at [facility], COLON, DIAGNOSTIC      EXCISION HIDRADENITIS OF INGUINAL / UMBILICAL AREA N/A 9/38/8940    DEBRIDEMENT PERINEAL WOUND performed by Chelo Joiner MD at [address] [place] [address]    Dr. Micaela Miner N/A 9/19/2018    I AND D BACK ABCESS performed by Albert Monzon MD at [facility] ENDOSCOPY N/A 7/20/2020    EGD BIOPSY performed by Bruce Amador MD at [facility] History:    Social History     Tobacco Use    Smoking status: Current Every Day Smoker     Packs/day: 1.00     Years: 35.00     Pack years: 35.00     Types: Cigarettes    Smokeless tobacco: Never Used   Substance Use Topics    Alcohol use: No     Alcohol/week: 0.0 standard drinks                                Ready to quit: Not Answered  Counseling given: Not Answered      Vital Signs (Current):   Vitals:    07/20/21 1122   Weight: 250 lb (113.4 kg)   Height: 5' 5\" (1.651 m)                                              BP Readings from Last 3 Encounters:   07/22/21 116/83   07/12/21 (!) 160/82   07/10/21 (!) 143/77       NPO Status:                                                                                 BMI:   Wt Readings from Last 3 Encounters:   07/20/21 250 lb (113.4 kg)   07/22/21 255 lb 14.4 oz (116.1 kg)   07/11/21 254 lb (115.2 kg)     Body mass index is 41.6 kg/m².     CBC:   Lab Results   Component Value Date    WBC 8.5 07/12/2021    RBC 4.46 07/12/2021    HGB 13.1 07/12/2021    HCT 41.4 07/12/2021    MCV 92.8 07/12/2021    RDW 14.7 07/12/2021     07/12/2021       CMP:   Lab Results   Component Value Date     07/12/2021 patient and spouse.                       Karlie Morales MD   7/23/2021

## 2021-07-23 NOTE — OP NOTE
Operative Note: EGD    Malia Romo     DATE OF PROCEDURE: 7/23/2021  SURGEON: Dr. Ziggy Winkler MD, M.D. PREOPERATIVE DIAGNOSES:   Epigastric pain    POSTOPERATIVE DIAGNOSES:  Moderate gastritis  GERD      OPERATION:    EGD esophagogastroduodenoscopy with antral, duodenal, distal esophageal biopsies    SPECIMENS:  ID Type Source Tests Collected by Time Destination   A : r/o celiac Tissue Duodenum SURGICAL PATHOLOGY Jennifer Serrano MD 7/23/2021 0914    B : antral biopsy r/o hpylori Tissue Stomach SURGICAL PATHOLOGY Jennifer Serrano MD 7/23/2021 3862    C : distal esophagus biopsy Tissue Esophagus SURGICAL PATHOLOGY Jennifer Serrano MD 7/23/2021 8449        ANESTHESIA: LMAC    BLOOD LOSS: Minimal    CONSENT AND INDICATIONS:  This is a 46y.o. year old male who is having the above issues. I have discussed with the patient and/or the patient representative the indication, alternatives, and the possible risks and/or complications of the planned procedure and the anesthesia methods. The patient and/or patient representative appear to understand and agree to proceed. OPERATIONS: The patient was placed on the table and sedated by anesthesia. Bite block was placed. A lubricated scope was easily passed into the upper esophagus which looked normal. The distal esophagus looked abnormal: GERD and biopsies were taken. The gastroesophageal junction was at 40 cm at the lips cm. The scope was passed into the stomach and retroflexed. There was no  hiatal hernia. The scope was passed down toward the pylorus. The antral mucosa all looked abnormal: moderate gastritis. Biopsy was taken to check for H. pylori. The scope was then passed through the pylorus into the duodenal bulb which looked normal, then around to the distal duodenum which looked normal and biopsies were taken, and the scope was then withdrawn. The patient tolerated the procedure well.     PLAN: Follow up biopsies. HIDA. Physician Signature: Electronically signed by Dr. Cathy Sheppard MD MFREDY. FACS    Send copy of H&P to PCP, Ishmael Wilder MD and referring physician, No ref.  provider found

## 2021-07-23 NOTE — CONSULTS
Patient's office history and physical was reviewed. Patient examined. There has been no change in the patient's history and physical.      Physician Signature: Electronically signed by Dr. Indira Warner             Date: 7/23/2021        Patient Name: Екатерина Maradiaga     YOB: 1970      Age:  46 y.o. Consult by: Maranda JIN  Consult to: Dr Carla Rivera   Reason: Epigastric pain    Chief Complaint   Epigastric pain    History Obtained From   patient    History of Present Illness   Екатерина Maradiaga is a 46 y.o. male with GERD, COPD, SERGEY, DM, HHR in 2012, and EGD/CS in 7/2020 showing [place] and duodenal polyp who presents for right flank pain. When it becomes severe, it spreads over his whole abdomen. It hurts with any type of food intake. Never had an issue like this before. He had a little bit of nausea, and did have vomiting before admission. His last bowel movement was a couple days ago and had been normal prior to that. He is a smoker.  Saturday's RUQ US was negative for stones or other signs of cholecystitis    Past Medical History     Past Medical History:   Diagnosis Date    Allergic rhinitis     Anxiety     Asthma     stable    COPD (chronic obstructive pulmonary disease) ([address]     controlled with inhalers    Depression     no meds    Diabetes mellitus ([address]     Encounter for screening colonoscopy     and EGD 7-23-21    GERD (gastroesophageal reflux disease)     Hyperlipidemia     Hypertension     Obesity     Osteoarthritis     Sleep apnea     BMS CPAP 7, not using        Past Surgical History     Past Surgical History:   Procedure Laterality Date    ABDOMEN SURGERY N/A 6/24/2019    INCISION AND DRAINAGE PERINEAL ABSCESS, INCISIONAL DEBRIDEMENT performed by Tuyet Garcia MD at [place] [address] COLONOSCOPY  06/08/2017    Dr. Rogelio oSuth polyp, diverticulitis    COLONOSCOPY N/A 7/20/2020    COLONOSCOPY POLYPECTOMY SNARE/COLD BIOPSY performed by Sheri Morrissey MD at [facility], COLON, DIAGNOSTIC      EXCISION HIDRADENITIS OF INGUINAL / UMBILICAL AREA N/A 2/74/4037    DEBRIDEMENT PERINEAL WOUND performed by Negrito Medeiros MD at [facility]  2012    Dr. Charis Iglesias N/A 9/19/2018    I AND D BACK ABCESS performed by Dilma Shannon MD at [facility] ENDOSCOPY N/A 7/20/2020    EGD BIOPSY performed by Sheri Morrissey MD at [facility] ENDOSCOPY        Medications - Prior to Admission and Current Meds     Prior to Admission medications    Medication Sig Start Date End Date Taking? Authorizing Provider   albuterol sulfate  (90 Base) MCG/ACT inhaler Inhale 2 puffs into the lungs every 6 hours as needed for Wheezing 7/22/21 8/21/21 Yes Rocky Lopez MD   metFORMIN (GLUCOPHAGE) 500 MG tablet Take 1,000 mg by mouth daily (with breakfast)  Patient not taking: Reported on 7/22/2021   Yes Historical Provider, MD   lisinopril (PRINIVIL;ZESTRIL) 40 MG tablet take 1 tablet by mouth once daily 7/7/21  Yes Rocky Lopez MD   DULERA 200-5 MCG/ACT inhaler inhale 2 puffs by mouth and INTO THE LUNGS twice a day 6/10/21  Yes Rocky Lopez MD   atorvastatin (LIPITOR) 40 MG tablet take 1 tablet by mouth once daily 3/26/21  Yes Rocky Lopez MD   busPIRone (BUSPAR) 10 MG tablet Take 10 mg by mouth daily  3/16/21  Yes Historical Provider, MD   FLUoxetine (PROZAC) 40 MG capsule 80 mg daily  3/16/21  Yes Historical Provider, MD   blood glucose monitor strips Test twice daily & as needed for symptoms of irregular blood glucose.  Dx: E11.9 9/15/20  Yes Rocky Lopez MD   omeprazole (PRILOSEC) 40 MG delayed release capsule take 1 capsule by mouth once daily 8/3/20  Yes Mg Shirley MD   Multiple Vitamins-Minerals (THERAPEUTIC MULTIVITAMIN-MINERALS) tablet Take 1 tablet by mouth daily Yes Historical Provider, MD   tamsulosin (FLOMAX) 0.4 MG capsule Take 1 capsule by mouth daily  Patient taking differently: Take 0.8 mg by mouth daily  6/4/20  Yes Ishmael Wilder MD   glucose monitoring kit (FREESTYLE) monitoring kit 1 kit by Does not apply route daily Dx E11.9 11/22/19  Yes Ishmael Wilder MD   Lancets MISC 1 each by Does not apply route daily 11/22/19  Yes Ishmael Wilder MD   ondansetron (ZOFRAN-ODT) 4 MG disintegrating tablet Take 1 tablet by mouth 3 times daily as needed for Nausea or Vomiting 7/22/21   Ishmael Wilder MD        Inpatient:  No current facility-administered medications for this encounter. Allergies   Patient has no known allergies. Social History     Social History     Tobacco History     Smoking Status  Current Every Day Smoker Smoking Frequency  1 pack/day for 35 years (35 pk yrs) Smoking Tobacco Type  Cigarettes    Smokeless Tobacco Use  Never Used          Alcohol History     Alcohol Use Status  No          Drug Use     Drug Use Status  Yes Types  Marijuana Frequency   7 times/week          Sexual Activity     Sexually Active  Yes Partners  Female                Family History     Family History   Problem Relation Age of Onset    COPD Mother     Cancer Mother     Heart Disease Father 46        CABG    Diabetes Father     Diabetes Sister     Diabetes Brother     Heart Disease Brother 43        MI    Heart Disease Maternal Grandmother     Cancer Maternal Grandmother     Cancer Paternal Grandmother         colon    Heart Disease Paternal Grandfather     Asthma Sister     COPD Brother     Heart Disease Brother 48        CABG       Review of Systems   Pertinent ROS listed in HPI, all others negative    Physical Exam   /74   Pulse 76   Resp 16   Ht 5' 5\" (1.651 m)   Wt 250 lb (113.4 kg)   SpO2 94%   BMI 41.60 kg/m²     GENERAL:  No acute distress. Alert and oriented. HEAD:  Normocephalic, atraumatic.    EYES:  No scleral icterus. Conjugate gaze. ENT/NECK:  Trachea midline, mucous membranes dry. LUNGS:  No cough. Nonlabored breathing on room air. CARDIOVASC:  Normal rate, no cyanosis. ABDOMEN:  Soft, obese but non-distended, non-tender. No guarding / rigidity / rebound. LIMBS:  No deformities, no UE edema. NEURO:  Face symmetric, moves all extremities  SKIN:  Warm, dry. Labs    CBC  No results for input(s): WBC, HGB, HCT, PLT in the last 72 hours. BMP  No results for input(s): NA, K, CL, CO2, BUN, CREATININE, CALCIUM in the last 72 hours. Invalid input(s): GLU  Liver Function  No results for input(s): AMYLASE, LIPASE, BILITOT, BILIDIR, AST, ALT, ALKPHOS, PROT, LABALBU in the last 72 hours. No results for input(s): LACTATE in the last 72 hours. No results for input(s): INR, PTT in the last 72 hours. Invalid input(s): PT    Imaging/Diagnostics Last 24 Hours   CT ABDOMEN PELVIS W IV CONTRAST Additional Contrast? None    Result Date: 7/10/2021  EXAMINATION: CT OF THE ABDOMEN AND PELVIS WITH CONTRAST 7/10/2021 12:35 am TECHNIQUE: CT of the abdomen and pelvis was performed with the administration of intravenous contrast. Multiplanar reformatted images are provided for review. Dose modulation, iterative reconstruction, and/or weight based adjustment of the mA/kV was utilized to reduce the radiation dose to as low as reasonably achievable. COMPARISON: None. HISTORY: ORDERING SYSTEM PROVIDED HISTORY: Vomiting, epigastric and right upper quadrant pain TECHNOLOGIST PROVIDED HISTORY: Reason for exam:->Vomiting, epigastric and right upper quadrant pain Additional Contrast?->None Decision Support Exception - unselect if not a suspected or confirmed emergency medical condition->Emergency Medical Condition (MA) FINDINGS: Lower Chest: Lung bases clear. Organs: Unremarkable liver, spleen, and right kidney. 1.9 cm left renal cyst.  Thickened left adrenal.  No subcentimeter bilateral adrenal adenomas.  Minimal fat stranding around Amarilys Leggett. Electronically signed by Dee Hawthorne MD on 7/12/21 at 5:01 AM EDT    I saw and examined the patient. I reviewed the above resident's note. I agree with the assessment and plan as outlined. The patient is unhappy because his EGD was cancelled. I told him I have him on for 730 tomorrow morning. I also ordered a HIDA for today. The patient is agreeable to stick around to get these done.     Zaira Muñiz MD  General Surgery

## 2021-08-06 ENCOUNTER — HOSPITAL ENCOUNTER (OUTPATIENT)
Dept: NUCLEAR MEDICINE | Age: 51
Discharge: HOME | End: 2021-08-06
Payer: COMMERCIAL

## 2021-08-06 VITALS — WEIGHT: 250 LBS | BODY MASS INDEX: 41.6 KG/M2

## 2021-08-06 DIAGNOSIS — R10.11 RIGHT UPPER QUADRANT ABDOMINAL PAIN: ICD-10-CM

## 2021-08-06 PROCEDURE — A9537 TC99M MEBROFENIN: HCPCS | Performed by: RADIOLOGY

## 2021-08-06 PROCEDURE — 6360000002 HC RX W HCPCS: Performed by: RADIOLOGY

## 2021-08-06 PROCEDURE — 3430000000 HC RX DIAGNOSTIC RADIOPHARMACEUTICAL: Performed by: RADIOLOGY

## 2021-08-06 PROCEDURE — 78227 HEPATOBIL SYST IMAGE W/DRUG: CPT

## 2021-08-06 PROCEDURE — 2580000003 HC RX 258: Performed by: RADIOLOGY

## 2021-08-06 RX ADMIN — SODIUM CHLORIDE 2.27 MCG: 9 INJECTION, SOLUTION INTRAVENOUS at 08:53

## 2021-08-06 RX ADMIN — Medication 6 MILLICURIE: at 07:37

## 2021-08-09 ENCOUNTER — OFFICE VISIT (OUTPATIENT)
Dept: SURGERY | Age: 51
End: 2021-08-09
Payer: COMMERCIAL

## 2021-08-09 ENCOUNTER — TELEPHONE (OUTPATIENT)
Dept: SURGERY | Age: 51
End: 2021-08-09

## 2021-08-09 VITALS
BODY MASS INDEX: 43.49 KG/M2 | WEIGHT: 261 LBS | HEART RATE: 83 BPM | RESPIRATION RATE: 16 BRPM | DIASTOLIC BLOOD PRESSURE: 92 MMHG | SYSTOLIC BLOOD PRESSURE: 130 MMHG | HEIGHT: 65 IN

## 2021-08-09 DIAGNOSIS — K29.00 ACUTE SUPERFICIAL GASTRITIS WITHOUT HEMORRHAGE: ICD-10-CM

## 2021-08-09 DIAGNOSIS — K80.50 BILIARY COLIC: Primary | ICD-10-CM

## 2021-08-09 PROCEDURE — 99214 OFFICE O/P EST MOD 30 MIN: CPT | Performed by: SURGERY

## 2021-08-09 PROCEDURE — 3017F COLORECTAL CA SCREEN DOC REV: CPT | Performed by: SURGERY

## 2021-08-09 PROCEDURE — G8417 CALC BMI ABV UP PARAM F/U: HCPCS | Performed by: SURGERY

## 2021-08-09 PROCEDURE — 1111F DSCHRG MED/CURRENT MED MERGE: CPT | Performed by: SURGERY

## 2021-08-09 PROCEDURE — G8427 DOCREV CUR MEDS BY ELIG CLIN: HCPCS | Performed by: SURGERY

## 2021-08-09 PROCEDURE — 4004F PT TOBACCO SCREEN RCVD TLK: CPT | Performed by: SURGERY

## 2021-08-09 NOTE — TELEPHONE ENCOUNTER
Patient had EGD BIOPSY done 7/23/21, Elian Mutton scan 8/6/21. Patient scheduled for follow up results on 8/16/21.  Please advise patient if need to be seen sooner [phone]

## 2021-08-09 NOTE — TELEPHONE ENCOUNTER
Prior Authorization Form:      DEMOGRAPHICS:                     Patient Name:  Khadar Fuller  Patient :  1970            Insurance:  Payor: [facility] / Plan: Laya Hash / Product Type: *No Product type* /   Insurance ID Number:    Payor/Plan Subscr  Sex Relation Sub.  Ins. ID Effective Group Num   1. [employer] - * ARYA VELEZ 1970 Male Self [id number] 10/1/19 [facility] [address]         DIAGNOSIS & PROCEDURE:                       Procedure/Operation: LAP ROBOTIC CHOLECYSTECTOMY           CPT Code: 05651    Diagnosis:  HYPERKINETIC GALLBLADDER    ICD10 Code: K82.8    Location:  [place]    Surgeon:  Samantha Love INFORMATION:                          Date: 2021   Time: 9:30              Anesthesia:  MAC/TIVA                                                       Status:  Outpatient        Special Comments:         Electronically signed by Helen Pires MA on 2021 at 3:56 PM

## 2021-08-09 NOTE — PROGRESS NOTES
Progress Note - Follow up    Patient's Name/Date of Birth: Sachi Dunn / 1970    Date: 8/9/2021    PCP: Bar Dorman MD    Referring Physician:   No ref. provider found  N/A    Chief Complaint   Patient presents with    Results     HIDA Scan/EGD       HPI:    The patient has worse pain with his HIDA. The patient has n/v/d with fatty foods. He said these symptoms have been going on for a while and continue to worsen. He said he needs to take Zofran frequently. Patient's medications, allergies, past medical, surgical, social and family histories were reviewed and updated as appropriate. Review of Systems  Constitutional: negative  Respiratory: negative  Cardiovascular: negative  Gastrointestinal: as in hpi  Genitourinary:negative  Integument/breast: negative    Physical Exam:  BP (!) 130/92   Pulse 83   Resp 16   Ht 5' 5\" (1.651 m)   Wt 261 lb (118.4 kg)   BMI 43.43 kg/m²   General appearance: alert, cooperative and in no acute distress. Lungs: normal work of breathing  Heart: regular rate  Abdomen: RUQ tenderness, soft, nondistended  Musculoskeletal: symmetrical without edema. Skin: normal      Data Reviewed:   Pathology: Diagnosis:   A.  Duodenum, biopsy: No pathologic alterations     B.  Stomach, biopsy: Mild chronic gastritis; negative for intestinal   metaplasia   Immunostain negative for Helicobacter pylori organisms     C.  Distal esophagus, biopsy: Squamous mucosa with no pathologic   alterations     HIDA:          Impression   No acute cholecystitis.       Gallbladder ejection fraction is approximately 96%. Impression/Plan:  46y.o. year old male with:    Moderate gastritis  GERD      Continue PPI    Hyperkinetic gallbladder:    Robotic assisted Laparoscopic possible open cholecystectomy possible intraoperative cholangiogram  Risks of cholecystectomy were discussed with the patient.  These include but are not limited to common bile duct injury, bile leak, liver injury, damage to blood vessels and bleeding, injury to bowel with port placement, as well as infection in the abdomen or of the incisions. I explained all other risks, benefits, alternatives, and potential complications associated with the procedure to be performed and transfusions when applicable with the patient/responsible person prior to the procedure. All of the patient's questions were answered. The patient understands and agrees to surgery. Electronically by Amarilys Leggett MD, General Surgery  on 8/9/2021 at 3:23 PM      Send copy of H&P to PCP, Ed Clinton MD and referring physician, No ref.  provider found      8/9/2021

## 2021-08-12 RX ORDER — SODIUM CHLORIDE 9 MG/ML
25 INJECTION, SOLUTION INTRAVENOUS PRN
Status: CANCELLED | OUTPATIENT
Start: 2021-08-12

## 2021-08-12 RX ORDER — SODIUM CHLORIDE 0.9 % (FLUSH) 0.9 %
10 SYRINGE (ML) INJECTION PRN
Status: CANCELLED | OUTPATIENT
Start: 2021-08-12

## 2021-08-12 RX ORDER — SODIUM CHLORIDE 0.9 % (FLUSH) 0.9 %
10 SYRINGE (ML) INJECTION EVERY 12 HOURS SCHEDULED
Status: CANCELLED | OUTPATIENT
Start: 2021-08-12

## 2021-08-12 RX ORDER — INDOCYANINE GREEN AND WATER 25 MG
2.5 KIT INJECTION ONCE
Status: CANCELLED | OUTPATIENT
Start: 2021-08-12 | End: 2021-08-12

## 2021-08-20 NOTE — PROGRESS NOTES
Pt states he was taking Metformin but has not taken it for 1 month d/t abd pain. Checks glucose regularly at home. He states his PCP is aware.

## 2021-08-20 NOTE — PROGRESS NOTES
[facility] PRE-ADMISSION TESTING INSTRUCTIONS    The Preadmission Testing patient is instructed accordingly using the following criteria (check applicable):    ARRIVAL INSTRUCTIONS:  [x] Parking the day of Surgery is located in the Main Entrance lot. Upon entering the door, make an immediate right to the surgery reception desk    [x] Bring photo ID and insurance card    [] Bring in a copy of Living will or Durable Power of  papers. [x] Please be sure to arrange for responsible adult to provide transportation to and from the hospital    [x] Please arrange for responsible adult to be with you for the 24 hour period post procedure due to having anesthesia      GENERAL INSTRUCTIONS:    [x] Nothing by mouth after midnight, including gum, candy, mints or water    [x] You may brush your teeth, but do not swallow any water    [x] Take medications as instructed with 1-2 oz of water    [x] Stop herbal supplements and vitamins 5 days prior to procedure    [] Follow preop dosing of blood thinners per physician instructions    [] Take 1/2 dose of evening insulin, but no insulin after midnight    [x] No oral diabetic medications after midnight    [x] If diabetic and have low blood sugar or feel symptomatic, take 1-2oz apple juice only    [x] Bring inhalers day of surgery    [] Bring C-PAP/ Bi-Pap day of surgery    [] Bring urine specimen day of surgery    [x] Shower or bath with soap, lather and rinse well, AM of Surgery, no lotion, powders or creams     [] Follow bowel prep as instructed per surgeon    [x] No tobacco, marijuana products within 24 hours of surgery     [x] No alcohol or illegal drug use within 24 hours of surgery.     [x] Jewelry, body piercing's, eyeglasses, contact lenses and dentures are not permitted into surgery (bring cases)      [] Please do not wear any nail polish, make up or hair products on the day of surgery    [x] You can expect a call the business day prior to procedure to notify you if your arrival time changes    [x] If you receive a survey after surgery we would greatly appreciate your comments    [] Parent/guardian of a minor must accompany their child and remain on the premises  the entire time they are under our care     [] Pediatric patients may bring favorite toy, blanket or comfort item with them    [] A caregiver or family member must remain with the patient during their stay if they are mentally handicapped, have dementia, disoriented or unable to use a call light or would be a safety concern if left unattended    [x] Please notify surgeon if you develop any illness between now and time of surgery (cold, cough, sore throat, fever, nausea, vomiting) or any signs of infections  including skin, wounds, and dental.    [x]  The Outpatient Pharmacy is available to fill your prescription here on your day of surgery, ask your preop nurse for details    [x] Other instructions: wear loose, comfortable clothing  EDUCATIONAL MATERIALS PROVIDED:    [] PAT Preoperative Education Packet/Booklet     [] Medication List    [] Transfusion bracelet applied with instructions    [] Shower with soap, lather and rinse well, and use CHG wipes provided the evening before surgery as instructed    [] Incentive spirometer with instructions

## 2021-08-20 NOTE — PROGRESS NOTES
Have you been tested for COVID  No    vaccinated       Have you been told you were positive for COVID No  Have you had any known exposure to someone that is positive for COVID No  Do you have a cough                   No              Do you have shortness of breath No                 Do you have a sore throat            No                Are you having chills                    No                Are you having muscle aches. No                    Please come to the hospital wearing a mask and have your significant other wear a mask as well. Both of you should check your temperature before leaving to come here,  if it is 100 or higher please call [phone] for instruction.

## 2021-08-23 ENCOUNTER — ANESTHESIA EVENT (OUTPATIENT)
Dept: OPERATING ROOM | Age: 51
End: 2021-08-23
Payer: COMMERCIAL

## 2021-08-23 ENCOUNTER — HOSPITAL ENCOUNTER (OUTPATIENT)
Dept: PREADMISSION TESTING | Age: 51
Discharge: HOME | End: 2021-08-23
Payer: COMMERCIAL

## 2021-08-23 LAB
ALBUMIN SERPL-MCNC: 3.9 G/DL (ref 3.5–5.2)
ALP BLD-CCNC: 92 U/L (ref 40–129)
ALT SERPL-CCNC: 15 U/L (ref 0–40)
ANION GAP SERPL CALCULATED.3IONS-SCNC: 10 MMOL/L (ref 7–16)
AST SERPL-CCNC: 10 U/L (ref 0–39)
BILIRUB SERPL-MCNC: 0.3 MG/DL (ref 0–1.2)
BILIRUBIN DIRECT: <0.2 MG/DL (ref 0–0.3)
BILIRUBIN, INDIRECT: NORMAL MG/DL (ref 0–1)
BUN BLDV-MCNC: 14 MG/DL (ref 6–20)
CALCIUM SERPL-MCNC: 9 MG/DL (ref 8.6–10.2)
CHLORIDE BLD-SCNC: 102 MMOL/L (ref 98–107)
CO2: 25 MMOL/L (ref 22–29)
CREAT SERPL-MCNC: 0.7 MG/DL (ref 0.7–1.2)
GFR AFRICAN AMERICAN: >60
GFR NON-AFRICAN AMERICAN: >60 ML/MIN/1.73
GLUCOSE BLD-MCNC: 180 MG/DL (ref 74–99)
HCT VFR BLD CALC: 47.9 % (ref 37–54)
HEMOGLOBIN: 15.5 G/DL (ref 12.5–16.5)
MCH RBC QN AUTO: 29.4 PG (ref 26–35)
MCHC RBC AUTO-ENTMCNC: 32.4 % (ref 32–34.5)
MCV RBC AUTO: 90.9 FL (ref 80–99.9)
PDW BLD-RTO: 13.9 FL (ref 11.5–15)
PLATELET # BLD: 217 E9/L (ref 130–450)
PMV BLD AUTO: 10.9 FL (ref 7–12)
POTASSIUM REFLEX MAGNESIUM: 4.7 MMOL/L (ref 3.5–5)
RBC # BLD: 5.27 E12/L (ref 3.8–5.8)
SODIUM BLD-SCNC: 137 MMOL/L (ref 132–146)
TOTAL PROTEIN: 6.9 G/DL (ref 6.4–8.3)
WBC # BLD: 10.3 E9/L (ref 4.5–11.5)

## 2021-08-23 PROCEDURE — 36415 COLL VENOUS BLD VENIPUNCTURE: CPT

## 2021-08-23 PROCEDURE — 80053 COMPREHEN METABOLIC PANEL: CPT

## 2021-08-23 PROCEDURE — 85027 COMPLETE CBC AUTOMATED: CPT

## 2021-08-23 PROCEDURE — 80076 HEPATIC FUNCTION PANEL: CPT

## 2021-08-24 ENCOUNTER — ANESTHESIA (OUTPATIENT)
Dept: OPERATING ROOM | Age: 51
End: 2021-08-24
Payer: COMMERCIAL

## 2021-08-24 ENCOUNTER — HOSPITAL ENCOUNTER (OUTPATIENT)
Age: 51
Setting detail: OUTPATIENT SURGERY
Discharge: HOME | End: 2021-08-24
Attending: SURGERY | Admitting: SURGERY
Payer: COMMERCIAL

## 2021-08-24 VITALS
TEMPERATURE: 97.4 F | DIASTOLIC BLOOD PRESSURE: 75 MMHG | OXYGEN SATURATION: 93 % | HEIGHT: 65 IN | BODY MASS INDEX: 41.65 KG/M2 | RESPIRATION RATE: 18 BRPM | WEIGHT: 250 LBS | SYSTOLIC BLOOD PRESSURE: 146 MMHG | HEART RATE: 82 BPM

## 2021-08-24 VITALS
OXYGEN SATURATION: 94 % | DIASTOLIC BLOOD PRESSURE: 88 MMHG | SYSTOLIC BLOOD PRESSURE: 165 MMHG | RESPIRATION RATE: 2 BRPM

## 2021-08-24 DIAGNOSIS — G89.18 POSTOPERATIVE PAIN: Primary | ICD-10-CM

## 2021-08-24 LAB — METER GLUCOSE: 146 MG/DL (ref 74–99)

## 2021-08-24 PROCEDURE — 3700000000 HC ANESTHESIA ATTENDED CARE: Performed by: SURGERY

## 2021-08-24 PROCEDURE — 2500000003 HC RX 250 WO HCPCS: Performed by: SURGERY

## 2021-08-24 PROCEDURE — 6360000002 HC RX W HCPCS: Performed by: NURSE ANESTHETIST, CERTIFIED REGISTERED

## 2021-08-24 PROCEDURE — 3600000009 HC SURGERY ROBOT BASE: Performed by: SURGERY

## 2021-08-24 PROCEDURE — 6370000000 HC RX 637 (ALT 250 FOR IP): Performed by: ANESTHESIOLOGY

## 2021-08-24 PROCEDURE — 7100000010 HC PHASE II RECOVERY - FIRST 15 MIN: Performed by: SURGERY

## 2021-08-24 PROCEDURE — 6360000002 HC RX W HCPCS: Performed by: ANESTHESIOLOGY

## 2021-08-24 PROCEDURE — 7100000000 HC PACU RECOVERY - FIRST 15 MIN: Performed by: SURGERY

## 2021-08-24 PROCEDURE — 7100000011 HC PHASE II RECOVERY - ADDTL 15 MIN: Performed by: SURGERY

## 2021-08-24 PROCEDURE — 2500000003 HC RX 250 WO HCPCS: Performed by: NURSE ANESTHETIST, CERTIFIED REGISTERED

## 2021-08-24 PROCEDURE — 2580000003 HC RX 258: Performed by: SURGERY

## 2021-08-24 PROCEDURE — 88304 TISSUE EXAM BY PATHOLOGIST: CPT

## 2021-08-24 PROCEDURE — 82962 GLUCOSE BLOOD TEST: CPT

## 2021-08-24 PROCEDURE — 3700000001 HC ADD 15 MINUTES (ANESTHESIA): Performed by: SURGERY

## 2021-08-24 PROCEDURE — S2900 ROBOTIC SURGICAL SYSTEM: HCPCS | Performed by: SURGERY

## 2021-08-24 PROCEDURE — 7100000001 HC PACU RECOVERY - ADDTL 15 MIN: Performed by: SURGERY

## 2021-08-24 PROCEDURE — 3600000019 HC SURGERY ROBOT ADDTL 15MIN: Performed by: SURGERY

## 2021-08-24 PROCEDURE — 2709999900 HC NON-CHARGEABLE SUPPLY: Performed by: SURGERY

## 2021-08-24 PROCEDURE — 6360000002 HC RX W HCPCS: Performed by: SURGERY

## 2021-08-24 PROCEDURE — 47562 LAPAROSCOPIC CHOLECYSTECTOMY: CPT | Performed by: SURGERY

## 2021-08-24 RX ORDER — MEPERIDINE HYDROCHLORIDE 25 MG/ML
12.5 INJECTION INTRAMUSCULAR; INTRAVENOUS; SUBCUTANEOUS EVERY 10 MIN PRN
Status: DISCONTINUED | OUTPATIENT
Start: 2021-08-24 | End: 2021-08-24 | Stop reason: HOSPADM

## 2021-08-24 RX ORDER — SODIUM CHLORIDE 0.9 % (FLUSH) 0.9 %
10 SYRINGE (ML) INJECTION EVERY 12 HOURS SCHEDULED
Status: DISCONTINUED | OUTPATIENT
Start: 2021-08-24 | End: 2021-08-24 | Stop reason: HOSPADM

## 2021-08-24 RX ORDER — ACETAMINOPHEN 500 MG
500 TABLET ORAL ONCE
Status: COMPLETED | OUTPATIENT
Start: 2021-08-24 | End: 2021-08-24

## 2021-08-24 RX ORDER — LIDOCAINE HYDROCHLORIDE 20 MG/ML
INJECTION, SOLUTION EPIDURAL; INFILTRATION; INTRACAUDAL; PERINEURAL PRN
Status: DISCONTINUED | OUTPATIENT
Start: 2021-08-24 | End: 2021-08-24 | Stop reason: SDUPTHER

## 2021-08-24 RX ORDER — MIDAZOLAM HYDROCHLORIDE 1 MG/ML
INJECTION INTRAMUSCULAR; INTRAVENOUS PRN
Status: DISCONTINUED | OUTPATIENT
Start: 2021-08-24 | End: 2021-08-24 | Stop reason: SDUPTHER

## 2021-08-24 RX ORDER — DIPHENHYDRAMINE HYDROCHLORIDE 50 MG/ML
12.5 INJECTION INTRAMUSCULAR; INTRAVENOUS
Status: DISCONTINUED | OUTPATIENT
Start: 2021-08-24 | End: 2021-08-24 | Stop reason: HOSPADM

## 2021-08-24 RX ORDER — KETOROLAC TROMETHAMINE 30 MG/ML
INJECTION, SOLUTION INTRAMUSCULAR; INTRAVENOUS PRN
Status: DISCONTINUED | OUTPATIENT
Start: 2021-08-24 | End: 2021-08-24 | Stop reason: SDUPTHER

## 2021-08-24 RX ORDER — OXYCODONE HYDROCHLORIDE AND ACETAMINOPHEN 5; 325 MG/1; MG/1
1 TABLET ORAL PRN
Status: COMPLETED | OUTPATIENT
Start: 2021-08-24 | End: 2021-08-24

## 2021-08-24 RX ORDER — ROCURONIUM BROMIDE 10 MG/ML
INJECTION, SOLUTION INTRAVENOUS PRN
Status: DISCONTINUED | OUTPATIENT
Start: 2021-08-24 | End: 2021-08-24 | Stop reason: SDUPTHER

## 2021-08-24 RX ORDER — INDOCYANINE GREEN AND WATER 25 MG
2.5 KIT INJECTION ONCE
Status: COMPLETED | OUTPATIENT
Start: 2021-08-24 | End: 2021-08-24

## 2021-08-24 RX ORDER — PROPOFOL 10 MG/ML
INJECTION, EMULSION INTRAVENOUS PRN
Status: DISCONTINUED | OUTPATIENT
Start: 2021-08-24 | End: 2021-08-24 | Stop reason: SDUPTHER

## 2021-08-24 RX ORDER — ONDANSETRON 2 MG/ML
INJECTION INTRAMUSCULAR; INTRAVENOUS PRN
Status: DISCONTINUED | OUTPATIENT
Start: 2021-08-24 | End: 2021-08-24 | Stop reason: SDUPTHER

## 2021-08-24 RX ORDER — MIDAZOLAM HYDROCHLORIDE 2 MG/2ML
1 INJECTION, SOLUTION INTRAMUSCULAR; INTRAVENOUS PRN
Status: CANCELLED | OUTPATIENT
Start: 2021-08-24

## 2021-08-24 RX ORDER — SODIUM CHLORIDE 9 MG/ML
25 INJECTION, SOLUTION INTRAVENOUS PRN
Status: DISCONTINUED | OUTPATIENT
Start: 2021-08-24 | End: 2021-08-24 | Stop reason: HOSPADM

## 2021-08-24 RX ORDER — SODIUM CHLORIDE 0.9 % (FLUSH) 0.9 %
10 SYRINGE (ML) INJECTION PRN
Status: DISCONTINUED | OUTPATIENT
Start: 2021-08-24 | End: 2021-08-24 | Stop reason: HOSPADM

## 2021-08-24 RX ORDER — FENTANYL CITRATE 50 UG/ML
INJECTION, SOLUTION INTRAMUSCULAR; INTRAVENOUS PRN
Status: DISCONTINUED | OUTPATIENT
Start: 2021-08-24 | End: 2021-08-24 | Stop reason: SDUPTHER

## 2021-08-24 RX ORDER — FENTANYL CITRATE 50 UG/ML
25 INJECTION, SOLUTION INTRAMUSCULAR; INTRAVENOUS EVERY 5 MIN PRN
Status: DISCONTINUED | OUTPATIENT
Start: 2021-08-24 | End: 2021-08-24 | Stop reason: HOSPADM

## 2021-08-24 RX ORDER — PROMETHAZINE HYDROCHLORIDE 25 MG/ML
6.25 INJECTION, SOLUTION INTRAMUSCULAR; INTRAVENOUS PRN
Status: DISCONTINUED | OUTPATIENT
Start: 2021-08-24 | End: 2021-08-24 | Stop reason: HOSPADM

## 2021-08-24 RX ORDER — HYDROCODONE BITARTRATE AND ACETAMINOPHEN 5; 325 MG/1; MG/1
1 TABLET ORAL EVERY 4 HOURS PRN
Qty: 18 TABLET | Refills: 0 | Status: SHIPPED | OUTPATIENT
Start: 2021-08-24 | End: 2021-08-27

## 2021-08-24 RX ADMIN — PHENYLEPHRINE HYDROCHLORIDE 100 MCG: 10 INJECTION INTRAVENOUS at 10:36

## 2021-08-24 RX ADMIN — ACETAMINOPHEN 500 MG: 500 TABLET ORAL at 08:37

## 2021-08-24 RX ADMIN — Medication 2000 MG: at 10:25

## 2021-08-24 RX ADMIN — INDOCYANINE GREEN AND WATER 2.5 MG: KIT at 08:33

## 2021-08-24 RX ADMIN — PHENYLEPHRINE HYDROCHLORIDE 100 MCG: 10 INJECTION INTRAVENOUS at 10:44

## 2021-08-24 RX ADMIN — ROCURONIUM BROMIDE 50 MG: 10 INJECTION, SOLUTION INTRAVENOUS at 10:25

## 2021-08-24 RX ADMIN — SODIUM CHLORIDE: 9 INJECTION, SOLUTION INTRAVENOUS at 10:18

## 2021-08-24 RX ADMIN — FENTANYL CITRATE 50 MCG: 50 INJECTION, SOLUTION INTRAMUSCULAR; INTRAVENOUS at 10:53

## 2021-08-24 RX ADMIN — OXYCODONE HYDROCHLORIDE AND ACETAMINOPHEN 1 TABLET: 5; 325 TABLET ORAL at 12:18

## 2021-08-24 RX ADMIN — FENTANYL CITRATE 100 MCG: 50 INJECTION, SOLUTION INTRAMUSCULAR; INTRAVENOUS at 10:25

## 2021-08-24 RX ADMIN — FENTANYL CITRATE 50 MCG: 50 INJECTION, SOLUTION INTRAMUSCULAR; INTRAVENOUS at 11:25

## 2021-08-24 RX ADMIN — LIDOCAINE HYDROCHLORIDE 60 MG: 20 INJECTION, SOLUTION EPIDURAL; INFILTRATION; INTRACAUDAL; PERINEURAL at 10:25

## 2021-08-24 RX ADMIN — ONDANSETRON 4 MG: 2 INJECTION INTRAMUSCULAR; INTRAVENOUS at 11:17

## 2021-08-24 RX ADMIN — HYDROMORPHONE HYDROCHLORIDE 0.5 MG: 1 INJECTION, SOLUTION INTRAMUSCULAR; INTRAVENOUS; SUBCUTANEOUS at 11:53

## 2021-08-24 RX ADMIN — MIDAZOLAM 2 MG: 1 INJECTION INTRAMUSCULAR; INTRAVENOUS at 10:14

## 2021-08-24 RX ADMIN — SODIUM CHLORIDE: 9 INJECTION, SOLUTION INTRAVENOUS at 10:14

## 2021-08-24 RX ADMIN — SODIUM CHLORIDE: 9 INJECTION, SOLUTION INTRAVENOUS at 10:50

## 2021-08-24 RX ADMIN — SUGAMMADEX 250 MG: 100 INJECTION, SOLUTION INTRAVENOUS at 11:18

## 2021-08-24 RX ADMIN — FENTANYL CITRATE 50 MCG: 50 INJECTION, SOLUTION INTRAMUSCULAR; INTRAVENOUS at 11:30

## 2021-08-24 RX ADMIN — PROPOFOL 200 MG: 10 INJECTION, EMULSION INTRAVENOUS at 10:25

## 2021-08-24 RX ADMIN — HYDROMORPHONE HYDROCHLORIDE 0.5 MG: 1 INJECTION, SOLUTION INTRAMUSCULAR; INTRAVENOUS; SUBCUTANEOUS at 11:43

## 2021-08-24 RX ADMIN — KETOROLAC TROMETHAMINE 30 MG: 30 INJECTION, SOLUTION INTRAMUSCULAR; INTRAVENOUS at 11:18

## 2021-08-24 ASSESSMENT — PULMONARY FUNCTION TESTS
PIF_VALUE: 22
PIF_VALUE: 9
PIF_VALUE: 28
PIF_VALUE: 31
PIF_VALUE: 31
PIF_VALUE: 29
PIF_VALUE: 1
PIF_VALUE: 1
PIF_VALUE: 19
PIF_VALUE: 29
PIF_VALUE: 24
PIF_VALUE: 25
PIF_VALUE: 19
PIF_VALUE: 0
PIF_VALUE: 30
PIF_VALUE: 26
PIF_VALUE: 22
PIF_VALUE: 22
PIF_VALUE: 0
PIF_VALUE: 30
PIF_VALUE: 19
PIF_VALUE: 32
PIF_VALUE: 19
PIF_VALUE: 26
PIF_VALUE: 19
PIF_VALUE: 29
PIF_VALUE: 26
PIF_VALUE: 1
PIF_VALUE: 31
PIF_VALUE: 25
PIF_VALUE: 1
PIF_VALUE: 1
PIF_VALUE: 30
PIF_VALUE: 19
PIF_VALUE: 19
PIF_VALUE: 1
PIF_VALUE: 19
PIF_VALUE: 24
PIF_VALUE: 25
PIF_VALUE: 19
PIF_VALUE: 24
PIF_VALUE: 1
PIF_VALUE: 25
PIF_VALUE: 28
PIF_VALUE: 34
PIF_VALUE: 29
PIF_VALUE: 1
PIF_VALUE: 1
PIF_VALUE: 19
PIF_VALUE: 19
PIF_VALUE: 21
PIF_VALUE: 30
PIF_VALUE: 7
PIF_VALUE: 30
PIF_VALUE: 29
PIF_VALUE: 19
PIF_VALUE: 28
PIF_VALUE: 19
PIF_VALUE: 27
PIF_VALUE: 19
PIF_VALUE: 28
PIF_VALUE: 0
PIF_VALUE: 29
PIF_VALUE: 25
PIF_VALUE: 19
PIF_VALUE: 18
PIF_VALUE: 30

## 2021-08-24 ASSESSMENT — PAIN SCALES - GENERAL
PAINLEVEL_OUTOF10: 9
PAINLEVEL_OUTOF10: 6
PAINLEVEL_OUTOF10: 0
PAINLEVEL_OUTOF10: 4
PAINLEVEL_OUTOF10: 9
PAINLEVEL_OUTOF10: 5
PAINLEVEL_OUTOF10: 8

## 2021-08-24 ASSESSMENT — LIFESTYLE VARIABLES: SMOKING_STATUS: 1

## 2021-08-24 ASSESSMENT — PAIN DESCRIPTION - LOCATION
LOCATION: ABDOMEN
LOCATION: ABDOMEN

## 2021-08-24 ASSESSMENT — PAIN DESCRIPTION - PAIN TYPE
TYPE: SURGICAL PAIN
TYPE: ACUTE PAIN

## 2021-08-24 ASSESSMENT — PAIN DESCRIPTION - DESCRIPTORS: DESCRIPTORS: ACHING

## 2021-08-24 NOTE — OP NOTE
Operative Note - Robotic Assisted Laparoscopic Cholecystectomy    Gerald Miller     DATE OF PROCEDURE: 8/24/2021    SURGEON: Surgeon(s):  Jaleesa Bee MD    PREOPERATIVE DIAGNOSIS: Biliary colic, hyperkinetic gallbladder    POSTOPERATIVE DIAGNOSIS: Chronic cholecystitis    OPERATION: Robotic Assisted Laparoscopic cholecystectomy. ANESTHESIA: General endotracheal.     ESTIMATED BLOOD LOSS: less than 50ml    SPECIMEN: Gallbladder    COMPLICATIONS: None. BRIEF HISTORY: Gerald Miller is a 46 y.o.  male who presented with RUQ pain. I discussed the procedure with the patient, including the procedure, benefits, risks, and alternatives. He agreed. ICG was given in preoperative holding prior to the procedure. PROCEDURE: The patient was taken to the operative suite and was placed on the table in the supine position. General endotracheal anesthesia was administered. An orogastric tube was placed to decompress the stomach. The abdomen was then prepped with Chloraprep and draped in a sterile fashion. An 8 mm incision was made at Stephens's point with an 11-blade scalpel, and then while tenting the abdominal wall upward, a Veress needle was easily inserted through the abdominal cavity. After confirmation of its placement using the saline drop test, a total of approximately 3 L of carbon dioxide was insufflated, creating a pneumoperitoneum. The Veress needle was removed, and an 8 mm trocar was inserted while tenting the abdominal wall upward. A prepared laparoscope was inserted, and the right upper quadrant was visualized. An 8-mm port was placed in the supraumbilical position , another two 8 mm ports were placed in the RLQ. There was no injury from port placement. The robot was then placed over the patient and the ports docked. I then broke scrub and began the case at the surgeon console.  The robotic scope was introduced into the abdomen and two Cadiere graspers were placed in arms 1 and 2 under direct vision. A hook was then placed in arm 4 also under direct vision. The gallbladder was grasped at the fundus and was inverted toward the left shoulder. The Cadiere was then used to grasp the infundibulum. Any adhesions between the duodenum and the surface of the gallbladder were dissected with the hook to expose the structures in the triangle of Calot and mobilize the duodenum away to protect it from injury. The gallbladder appeared chronically inflamed. The peritoneum over the triangle of Calot was opened using hook cautery until all fat and fibrous tissue was cleared from the hepatocystic triangle. The cystic duct and artery were dissected free and identified. The lower 1/3 of the gallbladder was also  from the liver to expose the cystic plate and only two structures were seen entering the gallbladder. The critical view of safety was obtained at this point with the cystic duct and artery identified as well as the inferior edge of the liver. The anatomy was also verified using firefly. The cystic duct was then clipped proximally with thre clips and singly clipped distally, and divided sharply with scissors between the clips. The cystic artery was identified immediately posteromedial to the duct and was singly clipped proximally, and divided with the hook cautery. The hook cautery was then used to excise the gallbladder from the liver surface all the way up to the fundus until it was completely removed from the liver bed. No bleeding was seen. The gallbladder was placed in an EndoCatch bag passed through the LUQ port, it was delivered through the incision and off the field. The right upper quadrant was visualized. There was good hemostasis of the liver bed. The LUQ port site was closed with a 2-0 Vicryl stitch using the [employer] device. All ports were then removed under direct visualization with no bleeding noted, and the pneumoperitoneum was allowed to escape.  All skin incisions were irrigated with saline and dried, and any bleeding points were cauterized. All skin incisions were closed with 4-0 Monocryl subcuticular sutures. Skin glue was placed over all incisions. The patient tolerated the procedure well. Sponge, needle, and instrument counts were correct. The orogastric tube was replaced, and the patient was extubated and sent to the postanesthesia care unit in stable condition. Librado Brambila MD, MD, FACS 8/24/2021 11:27 AM    Send copy of H&P to PCP, Favio Moore MD and referring physician, No ref.  provider found

## 2021-08-24 NOTE — PROGRESS NOTES
CLINICAL PHARMACY NOTE: MEDS TO BEDS    Total # of Prescriptions Filled: 1   The following medications were delivered to the patient:  · norco 5  ·     Additional Documentation:

## 2021-08-24 NOTE — H&P
Patient's office history and physical was reviewed. Patient examined. There has been no change in the patient's history and physical.      Physician Signature: Electronically signed by Dr. Dahlia Thakur    Patient's Name/Date of Birth: Evan Gates / 1970    Date: 8/9/2021    PCP: Cindie Habermann, MD    Referring Physician:   No ref. provider found  N/A    No chief complaint on file. HPI:    The patient has worse pain with his HIDA. The patient has n/v/d with fatty foods. He said these symptoms have been going on for a while and continue to worsen. He said he needs to take Zofran frequently. Patient's medications, allergies, past medical, surgical, social and family histories were reviewed and updated as appropriate. Review of Systems  Constitutional: negative  Respiratory: negative  Cardiovascular: negative  Gastrointestinal: as in hpi  Genitourinary:negative  Integument/breast: negative    Physical Exam:  /86   Pulse 85   Temp 98 °F (36.7 °C) (Temporal)   Resp 16   Ht 5' 5\" (1.651 m)   Wt 250 lb (113.4 kg)   SpO2 95%   BMI 41.60 kg/m²   General appearance: alert, cooperative and in no acute distress. Lungs: normal work of breathing  Heart: regular rate  Abdomen: RUQ tenderness, soft, nondistended  Musculoskeletal: symmetrical without edema. Skin: normal      Data Reviewed:   Pathology: Diagnosis:   A.  Duodenum, biopsy: No pathologic alterations     B.  Stomach, biopsy: Mild chronic gastritis; negative for intestinal   metaplasia   Immunostain negative for Helicobacter pylori organisms     C.  Distal esophagus, biopsy: Squamous mucosa with no pathologic   alterations     HIDA:          Impression   No acute cholecystitis.       Gallbladder ejection fraction is approximately 96%.          Impression/Plan:  46y.o. year old male with:    Moderate gastritis  GERD      Continue PPI    Hyperkinetic gallbladder:    Robotic assisted Laparoscopic possible open cholecystectomy possible intraoperative cholangiogram  Risks of cholecystectomy were discussed with the patient. These include but are not limited to common bile duct injury, bile leak, liver injury, damage to blood vessels and bleeding, injury to bowel with port placement, as well as infection in the abdomen or of the incisions. I explained all other risks, benefits, alternatives, and potential complications associated with the procedure to be performed and transfusions when applicable with the patient/responsible person prior to the procedure. All of the patient's questions were answered. The patient understands and agrees to surgery. Electronically by Mireille Moulton MD, General Surgery  on 8/24/2021 at 9:48 AM      Send copy of H&P to PCP, Leatha Fair MD and referring physician, No ref.  provider found      8/9/2021

## 2021-08-24 NOTE — ANESTHESIA POSTPROCEDURE EVALUATION
Department of Anesthesiology  Postprocedure Note    Patient: Malia Romo  MRN: [id number]  YOB: 1970  Date of evaluation: 8/24/2021  Time:  2:02 PM     Procedure Summary     Date: 08/24/21 Room / Location: [facility] OR 09 / [facility]    Anesthesia Start: 1224 Anesthesia Stop: 9719    Procedure: LAPAROSCOPIC ROBOTIC ASISTED CHOLECYSTECTOMY (N/A ) Diagnosis: (HYPERKINETIC GALLBLADDER)    Surgeons: Cathy Huitron MD Responsible Provider: Kellee Cabrales MD    Anesthesia Type: general ASA Status: 3          Anesthesia Type: general    Regine Phase I: Regine Score: 10    Regine Phase II: Regine Score: 10    Last vitals: Reviewed and per EMR flowsheets.        Anesthesia Post Evaluation    Patient location during evaluation: PACU  Patient participation: complete - patient participated  Level of consciousness: awake and alert  Airway patency: patent  Nausea & Vomiting: no vomiting and no nausea  Complications: no  Cardiovascular status: blood pressure returned to baseline  Respiratory status: acceptable  Hydration status: euvolemic

## 2021-08-24 NOTE — ANESTHESIA PRE PROCEDURE
Department of Anesthesiology  Preprocedure Note       Name:  Radha Allen   Age:  46 y.o.  :  1970                                          MRN:  [id number]         Date:  2021      Surgeon: Tracee Reardon):  Josh Mcfarland MD    Procedure: Procedure(s):  LAPAROSCOPIC ROBOTIC ASISTED CHOLECYSTECTOMY POSSIBLE OPEN POSSIBLE GRAM    Medications prior to admission:   Prior to Admission medications    Medication Sig Start Date End Date Taking? Authorizing Provider   albuterol sulfate  (90 Base) MCG/ACT inhaler Inhale 2 puffs into the lungs every 6 hours as needed for Wheezing 21  Trace Olvera MD   ondansetron (ZOFRAN-ODT) 4 MG disintegrating tablet Take 1 tablet by mouth 3 times daily as needed for Nausea or Vomiting 21   Trace Olvera MD   lisinopril (PRINIVIL;ZESTRIL) 40 MG tablet take 1 tablet by mouth once daily 21   Trace Olvera MD   DULERA 200-5 MCG/ACT inhaler inhale 2 puffs by mouth and INTO THE LUNGS twice a day 6/10/21   Trace Olvera MD   atorvastatin (LIPITOR) 40 MG tablet take 1 tablet by mouth once daily 3/26/21   Trace Olvera MD   busPIRone (BUSPAR) 10 MG tablet Take 10 mg by mouth Daily with lunch  3/16/21   Historical Provider, MD   FLUoxetine (PROZAC) 40 MG capsule 80 mg nightly  3/16/21   Historical Provider, MD   blood glucose monitor strips Test twice daily & as needed for symptoms of irregular blood glucose.  Dx: E11.9 9/15/20   Trace Olvera MD   omeprazole (PRILOSEC) 40 MG delayed release capsule take 1 capsule by mouth once daily 8/3/20   Leopold Shaper, MD   Multiple Vitamins-Minerals (THERAPEUTIC MULTIVITAMIN-MINERALS) tablet Take 1 tablet by mouth daily    Historical Provider, MD   tamsulosin [facility]) 0.4 MG capsule Take 1 capsule by mouth daily 20   Trace Olvera MD   glucose monitoring kit (FREESTYLE) monitoring kit 1 kit by Does not apply route daily Dx E11.9 19   Trace Olvera MD   Lancets MISC 1 each by Does not apply route daily 11/22/19   Tyson Mckeon MD       Current medications:    No current outpatient medications on file. No current facility-administered medications for this visit.        Allergies:  No Known Allergies    Problem List:    Patient Active Problem List   Diagnosis Code    COPD (chronic obstructive pulmonary disease) ([facility] J44.9    Smoker F17.200    SERGEY (obstructive sleep apnea) G47.33    Asthma J45.909    Morbid obesity with BMI of 45.0-49.9, adult ([facility]) E66.01, Z68.42    Diverticulitis of colon K57.32    Abscess of back L02.212    DM2 (diabetes mellitus, type 2) ([facility] E11.9    Sepsis ([facility] A41.9    MSSA (methicillin susceptible Staphylococcus aureus) infection A49.01    Necrotizing soft tissue infection M79.89    HANS (acute kidney injury) ([facility] N17.9    Acute blood loss anemia D62    Primary osteoarthritis of left knee M17.12    Impingement syndrome of right shoulder M75.41    Mixed hyperlipidemia E78.2    Acute pancreatitis K85.90    Acute pancreatitis without infection or necrosis K85.90    Gastroesophageal reflux disease K21.9    Essential hypertension I10    Anxiety F41.9       Past Medical History:        Diagnosis Date    Abdominal pain     nausea    Allergic rhinitis     Anxiety     Asthma     stable    COPD (chronic obstructive pulmonary disease) ([facility])     controlled with inhalers    Depression     no meds    Diabetes mellitus ([facility]     Encounter for screening colonoscopy     and EGD 7-23-21    GERD (gastroesophageal reflux disease)     Hyperlipidemia     Hypertension     Obesity     Osteoarthritis     Sleep apnea     BMS CPAP 7, not using       Past Surgical History:        Procedure Laterality Date    ABDOMEN SURGERY N/A 6/24/2019    INCISION AND DRAINAGE PERINEAL ABSCESS, INCISIONAL DEBRIDEMENT performed by Aneudy Santana MD at [facility] COLONOSCOPY  06/08/2017    Dr. Junior Dao polyp, diverticulitis  COLONOSCOPY N/A 7/20/2020    COLONOSCOPY POLYPECTOMY SNARE/COLD BIOPSY performed by Andree Zarate MD at [address] [facility], COLON, DIAGNOSTIC      EXCISION HIDRADENITIS OF INGUINAL / UMBILICAL AREA N/A 5/66/0961    DEBRIDEMENT PERINEAL WOUND performed by Filippo Sanchez MD at [address] [address]    Dr. Garth Crane N/A 9/19/2018    I AND D BACK ABCESS performed by Zach Rodas MD at [address] [facility] ENDOSCOPY N/A 7/20/2020    EGD BIOPSY performed by Andree Zarate MD at [facility] N/A 7/23/2021    EGD BIOPSY performed by Kiersten Nava MD at [address] [place] [address] History:    Social History     Tobacco Use    Smoking status: Current Every Day Smoker     Packs/day: 1.00     Years: 35.00     Pack years: 35.00     Types: Cigarettes    Smokeless tobacco: Never Used   Substance Use Topics    Alcohol use: No     Alcohol/week: 0.0 standard drinks                                Ready to quit: Not Answered  Counseling given: Not Answered      Vital Signs (Current): There were no vitals filed for this visit.                                            BP Readings from Last 3 Encounters:   08/09/21 (!) 130/92   07/23/21 (!) 150/87   07/22/21 116/83       NPO Status:                                                                                 BMI:   Wt Readings from Last 3 Encounters:   08/20/21 250 lb (113.4 kg)   08/09/21 261 lb (118.4 kg)   08/06/21 250 lb (113.4 kg)     There is no height or weight on file to calculate BMI.    CBC:   Lab Results   Component Value Date    WBC 10.3 08/23/2021    RBC 5.27 08/23/2021    HGB 15.5 08/23/2021    HCT 47.9 08/23/2021    MCV 90.9 08/23/2021    RDW 13.9 08/23/2021     08/23/2021       CMP:   Lab Results   Component Value Date     08/23/2021    K 4.7 08/23/2021    CL 102 08/23/2021    CO2 25 08/23/2021    BUN 14 08/23/2021    CREATININE 0.7 08/23/2021    GFRAA >60 08/23/2021    LABGLOM >60 08/23/2021    GLUCOSE 180 08/23/2021    PROT 6.9 08/23/2021    CALCIUM 9.0 08/23/2021    BILITOT 0.3 08/23/2021    ALKPHOS 92 08/23/2021    AST 10 08/23/2021    ALT 15 08/23/2021       POC Tests: No results for input(s): POCGLU, POCNA, POCK, POCCL, POCBUN, POCHEMO, POCHCT in the last 72 hours. Coags:   Lab Results   Component Value Date    PROTIME 11.7 07/09/2021    INR 1.1 07/09/2021    APTT 34.4 07/09/2021       HCG (If Applicable): No results found for: PREGTESTUR, PREGSERUM, HCG, HCGQUANT     ABGs:   Lab Results   Component Value Date    I7LCVRYA 96.4 02/17/2012        Type & Screen (If Applicable):  No results found for: LABABO, LABRH    Drug/Infectious Status (If Applicable):  No results found for: HIV, HEPCAB    COVID-19 Screening (If Applicable):   Lab Results   Component Value Date    COVID19 Not Detected 07/15/2020           Anesthesia Evaluation  Patient summary reviewed and Nursing notes reviewed no history of anesthetic complications:   Airway: Mallampati: III  TM distance: >3 FB   Neck ROM: full  Mouth opening: > = 3 FB Dental:      Comment: Many are damaged    Pulmonary:normal exam    (+) COPD:  sleep apnea: on CPAP,  asthma: current smoker          Patient smoked on day of surgery. Cardiovascular:  Exercise tolerance: good (>4 METS),   (+) hypertension:, hyperlipidemia        Rhythm: regular  Rate: normal           Beta Blocker:  Not on Beta Blocker         Neuro/Psych:   (+) depression/anxiety             GI/Hepatic/Renal:   (+) GERD: poorly controlled,          ROS comment: bph  cholelithiasis. Endo/Other:    (+) DiabetesType II DM, , : arthritis: OA., .                 Abdominal:             Vascular: negative vascular ROS. Other Findings:             Anesthesia Plan      general     ASA 3       Induction: intravenous.       Anesthetic plan and

## 2021-09-09 ENCOUNTER — OFFICE VISIT (OUTPATIENT)
Dept: SURGERY | Age: 51
End: 2021-09-09
Payer: COMMERCIAL

## 2021-09-09 VITALS
WEIGHT: 260 LBS | DIASTOLIC BLOOD PRESSURE: 92 MMHG | RESPIRATION RATE: 18 BRPM | HEART RATE: 87 BPM | SYSTOLIC BLOOD PRESSURE: 127 MMHG | TEMPERATURE: 97 F | HEIGHT: 65 IN | BODY MASS INDEX: 43.32 KG/M2

## 2021-09-09 DIAGNOSIS — G89.18 POST-OP PAIN: Primary | ICD-10-CM

## 2021-09-09 PROCEDURE — G8427 DOCREV CUR MEDS BY ELIG CLIN: HCPCS | Performed by: SURGERY

## 2021-09-09 PROCEDURE — 99213 OFFICE O/P EST LOW 20 MIN: CPT | Performed by: SURGERY

## 2021-09-09 PROCEDURE — 4004F PT TOBACCO SCREEN RCVD TLK: CPT | Performed by: SURGERY

## 2021-09-09 PROCEDURE — G8417 CALC BMI ABV UP PARAM F/U: HCPCS | Performed by: SURGERY

## 2021-09-09 PROCEDURE — 3017F COLORECTAL CA SCREEN DOC REV: CPT | Performed by: SURGERY

## 2021-09-09 NOTE — PROGRESS NOTES
Progress Note - Post-op follow up     Patient's Name/Date of Birth: Evan Gates / 1970    Date: 9/9/2021    PCP: Cindie Habermann, MD    Referring Physician:   Aruna Escobedo MD  [phone]    Chief Complaint   Patient presents with    Post-Op Check     lap radha       Subjective:  Patient presents to the office following surgery. The patient is tolerating a regular diet. Denies n/v/d/c. Pain controlled with pain medication. Patient's medications, allergies, past medical, surgical, social and family histories were reviewed and updated as appropriate. Physical Exam:  BP (!) 127/92   Pulse 87   Temp 97 °F (36.1 °C)   Resp 18   Ht 5' 5\" (1.651 m)   Wt 260 lb (117.9 kg)   BMI 43.27 kg/m²   General Appearance: NAD  Abdomen: soft, non-distended mild incisional tenderness, no rebound or guarding, incision C/D/I    Data Reviewed: Pathology reviewed with patient  Diagnosis:   Gallbladder, cholecystectomy:   Adenomyoma, involved by mild chronic inflammation.      Assessment/Plan:    46y.o. year old male s/p robotic assisted laparoscopic cholecystectomy     Patient recovering well from surgery  Wound care discussed  Follow up PRN     Stephanie Garcia MD 9/9/2021 9:33 AM     Send copy of H&P to PCP, Cindie Habermann, MD and referring physician, Aruna Escobedo MD

## 2021-09-17 ENCOUNTER — NURSE ONLY (OUTPATIENT)
Dept: FAMILY MEDICINE CLINIC | Age: 51
End: 2021-09-17
Payer: COMMERCIAL

## 2021-09-17 DIAGNOSIS — E11.9 TYPE 2 DIABETES MELLITUS WITHOUT COMPLICATION, WITHOUT LONG-TERM CURRENT USE OF INSULIN (HCC): ICD-10-CM

## 2021-09-17 DIAGNOSIS — D62 ACUTE BLOOD LOSS ANEMIA: ICD-10-CM

## 2021-09-17 DIAGNOSIS — E03.8 TSH (THYROID-STIMULATING HORMONE DEFICIENCY): Primary | ICD-10-CM

## 2021-09-17 DIAGNOSIS — E78.2 MIXED HYPERLIPIDEMIA: ICD-10-CM

## 2021-09-17 LAB
ALBUMIN SERPL-MCNC: 3.8 G/DL (ref 3.5–5.2)
ALP BLD-CCNC: 82 U/L (ref 40–129)
ALT SERPL-CCNC: 19 U/L (ref 0–40)
ANION GAP SERPL CALCULATED.3IONS-SCNC: 13 MMOL/L (ref 7–16)
AST SERPL-CCNC: 15 U/L (ref 0–39)
BASOPHILS ABSOLUTE: 0.12 E9/L (ref 0–0.2)
BASOPHILS RELATIVE PERCENT: 0.9 % (ref 0–2)
BILIRUB SERPL-MCNC: 0.3 MG/DL (ref 0–1.2)
BUN BLDV-MCNC: 15 MG/DL (ref 6–20)
CALCIUM SERPL-MCNC: 9.1 MG/DL (ref 8.6–10.2)
CHLORIDE BLD-SCNC: 103 MMOL/L (ref 98–107)
CHOLESTEROL, TOTAL: 149 MG/DL (ref 0–199)
CO2: 22 MMOL/L (ref 22–29)
CREAT SERPL-MCNC: 0.8 MG/DL (ref 0.7–1.2)
EOSINOPHILS ABSOLUTE: 0.72 E9/L (ref 0.05–0.5)
EOSINOPHILS RELATIVE PERCENT: 5.6 % (ref 0–6)
GFR AFRICAN AMERICAN: >60
GFR NON-AFRICAN AMERICAN: >60 ML/MIN/1.73
GLUCOSE BLD-MCNC: 147 MG/DL (ref 74–99)
HBA1C MFR BLD: 7.2 % (ref 4–5.6)
HCT VFR BLD CALC: 48.5 % (ref 37–54)
HDLC SERPL-MCNC: 37 MG/DL
HEMOGLOBIN: 15.2 G/DL (ref 12.5–16.5)
IMMATURE GRANULOCYTES #: 0.09 E9/L
IMMATURE GRANULOCYTES %: 0.7 % (ref 0–5)
LDL CHOLESTEROL CALCULATED: 73 MG/DL (ref 0–99)
LYMPHOCYTES ABSOLUTE: 3.29 E9/L (ref 1.5–4)
LYMPHOCYTES RELATIVE PERCENT: 25.7 % (ref 20–42)
MCH RBC QN AUTO: 29.5 PG (ref 26–35)
MCHC RBC AUTO-ENTMCNC: 31.3 % (ref 32–34.5)
MCV RBC AUTO: 94 FL (ref 80–99.9)
MONOCYTES ABSOLUTE: 0.91 E9/L (ref 0.1–0.95)
MONOCYTES RELATIVE PERCENT: 7.1 % (ref 2–12)
NEUTROPHILS ABSOLUTE: 7.69 E9/L (ref 1.8–7.3)
NEUTROPHILS RELATIVE PERCENT: 60 % (ref 43–80)
PDW BLD-RTO: 14.3 FL (ref 11.5–15)
PLATELET # BLD: 206 E9/L (ref 130–450)
PMV BLD AUTO: 11.3 FL (ref 7–12)
POTASSIUM SERPL-SCNC: 4.6 MMOL/L (ref 3.5–5)
RBC # BLD: 5.16 E12/L (ref 3.8–5.8)
SODIUM BLD-SCNC: 138 MMOL/L (ref 132–146)
TOTAL PROTEIN: 6.7 G/DL (ref 6.4–8.3)
TRIGL SERPL-MCNC: 193 MG/DL (ref 0–149)
TSH SERPL DL<=0.05 MIU/L-ACNC: 1.25 UIU/ML (ref 0.27–4.2)
VLDLC SERPL CALC-MCNC: 39 MG/DL
WBC # BLD: 12.8 E9/L (ref 4.5–11.5)

## 2021-09-17 PROCEDURE — 36415 COLL VENOUS BLD VENIPUNCTURE: CPT | Performed by: FAMILY MEDICINE

## 2021-09-24 ENCOUNTER — OFFICE VISIT (OUTPATIENT)
Dept: FAMILY MEDICINE CLINIC | Age: 51
End: 2021-09-24
Payer: COMMERCIAL

## 2021-09-24 VITALS
WEIGHT: 261.3 LBS | DIASTOLIC BLOOD PRESSURE: 88 MMHG | RESPIRATION RATE: 16 BRPM | TEMPERATURE: 97 F | HEIGHT: 65 IN | BODY MASS INDEX: 43.53 KG/M2 | OXYGEN SATURATION: 98 % | HEART RATE: 71 BPM | SYSTOLIC BLOOD PRESSURE: 123 MMHG

## 2021-09-24 DIAGNOSIS — Z23 NEED FOR INFLUENZA VACCINATION: ICD-10-CM

## 2021-09-24 DIAGNOSIS — J44.9 CHRONIC OBSTRUCTIVE PULMONARY DISEASE, UNSPECIFIED COPD TYPE (HCC): ICD-10-CM

## 2021-09-24 DIAGNOSIS — E11.9 TYPE 2 DIABETES MELLITUS WITHOUT COMPLICATION, WITHOUT LONG-TERM CURRENT USE OF INSULIN (HCC): Primary | ICD-10-CM

## 2021-09-24 DIAGNOSIS — E78.2 MIXED HYPERLIPIDEMIA: ICD-10-CM

## 2021-09-24 PROCEDURE — 3017F COLORECTAL CA SCREEN DOC REV: CPT | Performed by: FAMILY MEDICINE

## 2021-09-24 PROCEDURE — 90471 IMMUNIZATION ADMIN: CPT | Performed by: FAMILY MEDICINE

## 2021-09-24 PROCEDURE — G8926 SPIRO NO PERF OR DOC: HCPCS | Performed by: FAMILY MEDICINE

## 2021-09-24 PROCEDURE — G8417 CALC BMI ABV UP PARAM F/U: HCPCS | Performed by: FAMILY MEDICINE

## 2021-09-24 PROCEDURE — G8427 DOCREV CUR MEDS BY ELIG CLIN: HCPCS | Performed by: FAMILY MEDICINE

## 2021-09-24 PROCEDURE — 3023F SPIROM DOC REV: CPT | Performed by: FAMILY MEDICINE

## 2021-09-24 PROCEDURE — 2022F DILAT RTA XM EVC RTNOPTHY: CPT | Performed by: FAMILY MEDICINE

## 2021-09-24 PROCEDURE — 99214 OFFICE O/P EST MOD 30 MIN: CPT | Performed by: FAMILY MEDICINE

## 2021-09-24 PROCEDURE — 90674 CCIIV4 VAC NO PRSV 0.5 ML IM: CPT | Performed by: FAMILY MEDICINE

## 2021-09-24 PROCEDURE — 3051F HG A1C>EQUAL 7.0%<8.0%: CPT | Performed by: FAMILY MEDICINE

## 2021-09-24 PROCEDURE — 4004F PT TOBACCO SCREEN RCVD TLK: CPT | Performed by: FAMILY MEDICINE

## 2021-09-24 RX ORDER — ALBUTEROL SULFATE 90 UG/1
2 AEROSOL, METERED RESPIRATORY (INHALATION) EVERY 6 HOURS PRN
Qty: 8.5 G | Refills: 3 | Status: SHIPPED
Start: 2021-09-24 | End: 2022-01-06

## 2021-09-24 SDOH — ECONOMIC STABILITY: FOOD INSECURITY: WITHIN THE PAST 12 MONTHS, THE FOOD YOU BOUGHT JUST DIDN'T LAST AND YOU DIDN'T HAVE MONEY TO GET MORE.: NEVER TRUE

## 2021-09-24 SDOH — ECONOMIC STABILITY: FOOD INSECURITY: WITHIN THE PAST 12 MONTHS, YOU WORRIED THAT YOUR FOOD WOULD RUN OUT BEFORE YOU GOT MONEY TO BUY MORE.: NEVER TRUE

## 2021-09-24 ASSESSMENT — SOCIAL DETERMINANTS OF HEALTH (SDOH): HOW HARD IS IT FOR YOU TO PAY FOR THE VERY BASICS LIKE FOOD, HOUSING, MEDICAL CARE, AND HEATING?: NOT VERY HARD

## 2021-09-24 NOTE — PROGRESS NOTES
Hypertension:  Patient is here for follow up chronic hypertension. This is  generally controlled on current medication regimen. Takes meds as directed and tolerates them well. Most recent labs reviewed with patient and are remarkable. No symptoms from htn standpoint per ROS. Patient is not compliant with lifestyle modifications. Patient does smoke. Comorbid conditions include DM2. Patient's past medical, surgical, social and/or family history reviewed, updated in chart, and are non-contributory (unless otherwise stated). Medications and allergies also reviewed and updated in chart.         Review of Systems:  Constitutional:  No fever, no fatigue, no chills, no headaches, no weight change  Dermatology:  No rash, no mole, no dry or sensitive skin  ENT:  No cough, no sore throat, no sinus pain, no runny nose, no ear pain  Cardiology:  No chest pain, no palpitations, no leg edema, no shortness of breath, no PND  Gastroenterology:  No dysphagia, no abdominal pain, no nausea, no vomiting, no constipation, no diarrhea, no heartburn  Musculoskeletal:  No joint pain, no leg cramps, no back pain, no muscle aches  Respiratory:  No shortness of breath, no orthopnea, no wheezing, no OSORIO, no hemoptysis  Urology:  No blood in the urine, no urinary frequency, no urinary incontinence, no urinary urgency, no nocturia, no dysuria  Vitals:    09/24/21 0812   BP: 123/88   Pulse: 71   Resp: 16   Temp: 97 °F (36.1 °C)   TempSrc: Temporal   SpO2: 98%   Weight: 261 lb 4.8 oz (118.5 kg)   Height: 5' 5\" (1.651 m)       General:  Patient alert and oriented x 3, NAD, pleasant  HEENT:  Atraumatic, normocephalic, PERRLA, EOMI, clear conjunctiva, TMs clear, nose-clear, throat - no erythema  Neck:  Supple, no goiter, no carotid bruits, no lymphadenopathy  Lungs:  CTA B  Heart:  RRR, no murmurs, gallops or rubs  Abdomen:  Soft/nt/nd, + bowel sounds  Extremities:  No clubbing, cyanosis or edema  Skin: unremarkable    Assessment/Plan:      Tawny Mitchell was seen today for hypertension and discuss labs. Diagnoses and all orders for this visit:    Type 2 diabetes mellitus without complication, without long-term current use of insulin (HCC)  -     Comprehensive Metabolic Panel; Future  -     Hemoglobin A1C; Future  -     CBC Auto Differential; Future  -     Lipid Panel; Future    Need for influenza vaccination  -     INFLUENZA, MDCK QUADV, 2 YRS AND OLDER, IM, PF, PREFILL SYR OR SDV, 0.5ML (FLUCELVAX QUADV, PF)    Mixed hyperlipidemia    Chronic obstructive pulmonary disease, unspecified COPD type ([facility] [facility]    Other orders  -     albuterol sulfate  (90 Base) MCG/ACT inhaler; Inhale 2 puffs into the lungs every 6 hours as needed for Wheezing      As above. Call or go to ED immediately if symptoms worsen or persist.  Return in about 6 months (around 3/24/2022). , or sooner if necessary. Educational materials and/or home exercises printed for patient's review and were included in patient instructions on his/her After Visit Summary and given to patient at the end of visit. Counseled regarding above diagnosis, including possible risks and complications,  especially if left uncontrolled. Counseled regarding the possible side effects, risks, benefits and alternatives to treatment; patient and/or guardian verbalizes understanding, agrees, feels comfortable with and wishes to proceed with above treatment plan. Advised patient to call with any new medication issues, and read all Rx info from pharmacy to assure aware of all possible risks and side effects of medication before taking. Reviewed age and gender appropriate health screening exams and vaccinations.   Advised patient regarding importance of keeping up with recommended health maintenance and to schedule as soon as possible if overdue, as this is important in assessing for undiagnosed pathology, especially cancer, as well as protecting against potentially harmful/life threatening disease. Patient and/or guardian verbalizes understanding and agrees with above counseling, assessment and plan. All questions answered. Antonia Sauceda MD  9/24/2021    I have personally reviewed and updated the chief complaint, HPI, Past Medical, Family and Social History, as well as the above Review of Systems.

## 2022-01-01 PROCEDURE — 96372 THER/PROPH/DIAG INJ SC/IM: CPT

## 2022-01-01 PROCEDURE — 99284 EMERGENCY DEPT VISIT MOD MDM: CPT

## 2022-01-02 ENCOUNTER — APPOINTMENT (OUTPATIENT)
Dept: GENERAL RADIOLOGY | Age: 52
End: 2022-01-02
Payer: COMMERCIAL

## 2022-01-02 ENCOUNTER — HOSPITAL ENCOUNTER (EMERGENCY)
Age: 52
Discharge: HOME | End: 2022-01-02
Attending: EMERGENCY MEDICINE
Payer: COMMERCIAL

## 2022-01-02 VITALS
HEART RATE: 74 BPM | OXYGEN SATURATION: 98 % | BODY MASS INDEX: 43.32 KG/M2 | RESPIRATION RATE: 18 BRPM | SYSTOLIC BLOOD PRESSURE: 140 MMHG | WEIGHT: 260 LBS | DIASTOLIC BLOOD PRESSURE: 90 MMHG | HEIGHT: 65 IN | TEMPERATURE: 98.4 F

## 2022-01-02 DIAGNOSIS — J44.1 COPD EXACERBATION (HCC): Primary | ICD-10-CM

## 2022-01-02 LAB — SARS-COV-2, NAAT: NOT DETECTED

## 2022-01-02 PROCEDURE — 6370000000 HC RX 637 (ALT 250 FOR IP): Performed by: EMERGENCY MEDICINE

## 2022-01-02 PROCEDURE — 96372 THER/PROPH/DIAG INJ SC/IM: CPT

## 2022-01-02 PROCEDURE — 71045 X-RAY EXAM CHEST 1 VIEW: CPT

## 2022-01-02 PROCEDURE — 6360000002 HC RX W HCPCS: Performed by: EMERGENCY MEDICINE

## 2022-01-02 PROCEDURE — 87635 SARS-COV-2 COVID-19 AMP PRB: CPT

## 2022-01-02 RX ORDER — PREDNISONE 20 MG/1
20 TABLET ORAL 2 TIMES DAILY
Qty: 10 TABLET | Refills: 0 | Status: SHIPPED | OUTPATIENT
Start: 2022-01-02 | End: 2022-01-07

## 2022-01-02 RX ORDER — DEXAMETHASONE SODIUM PHOSPHATE 4 MG/ML
4 INJECTION, SOLUTION INTRA-ARTICULAR; INTRALESIONAL; INTRAMUSCULAR; INTRAVENOUS; SOFT TISSUE ONCE
Status: COMPLETED | OUTPATIENT
Start: 2022-01-02 | End: 2022-01-02

## 2022-01-02 RX ORDER — ACETAMINOPHEN 500 MG
1000 TABLET ORAL ONCE
Status: COMPLETED | OUTPATIENT
Start: 2022-01-02 | End: 2022-01-02

## 2022-01-02 RX ORDER — DOXYCYCLINE HYCLATE 100 MG
100 TABLET ORAL 2 TIMES DAILY
Qty: 20 TABLET | Refills: 0 | Status: SHIPPED | OUTPATIENT
Start: 2022-01-02 | End: 2022-01-12

## 2022-01-02 RX ORDER — IPRATROPIUM BROMIDE AND ALBUTEROL SULFATE 2.5; .5 MG/3ML; MG/3ML
1 SOLUTION RESPIRATORY (INHALATION) ONCE
Status: COMPLETED | OUTPATIENT
Start: 2022-01-02 | End: 2022-01-02

## 2022-01-02 RX ORDER — LORATADINE AND PSEUDOEPHEDRINE SULFATE 5; 120 MG/1; MG/1
1 TABLET, EXTENDED RELEASE ORAL 2 TIMES DAILY
Qty: 20 TABLET | Refills: 0 | Status: SHIPPED | OUTPATIENT
Start: 2022-01-02

## 2022-01-02 RX ADMIN — ACETAMINOPHEN 1000 MG: 500 TABLET ORAL at 01:02

## 2022-01-02 RX ADMIN — DEXAMETHASONE SODIUM PHOSPHATE 4 MG: 4 INJECTION, SOLUTION INTRAMUSCULAR; INTRAVENOUS at 01:02

## 2022-01-02 RX ADMIN — IPRATROPIUM BROMIDE AND ALBUTEROL SULFATE 1 AMPULE: .5; 2.5 SOLUTION RESPIRATORY (INHALATION) at 01:02

## 2022-01-02 ASSESSMENT — PAIN DESCRIPTION - PROGRESSION: CLINICAL_PROGRESSION: GRADUALLY WORSENING

## 2022-01-02 ASSESSMENT — PAIN DESCRIPTION - LOCATION: LOCATION: GENERALIZED

## 2022-01-02 ASSESSMENT — PAIN DESCRIPTION - DESCRIPTORS: DESCRIPTORS: ACHING

## 2022-01-02 ASSESSMENT — PAIN SCALES - GENERAL
PAINLEVEL_OUTOF10: 5
PAINLEVEL_OUTOF10: 5

## 2022-01-02 ASSESSMENT — PAIN DESCRIPTION - FREQUENCY: FREQUENCY: CONTINUOUS

## 2022-01-02 ASSESSMENT — PAIN DESCRIPTION - ONSET: ONSET: ON-GOING

## 2022-01-02 ASSESSMENT — PAIN DESCRIPTION - PAIN TYPE: TYPE: ACUTE PAIN

## 2022-01-02 NOTE — ED PROVIDER NOTES
7.00 times per week. Drug: Marijuana Carlitos Fisher). He reports that he does not drink alcohol. Family History: family history includes Asthma in his sister; COPD in his brother and mother; Cancer in his maternal grandmother, mother, and paternal grandmother; Diabetes in his brother, father, and sister; Heart Disease in his maternal grandmother and paternal grandfather; Heart Disease (age of onset: 43) in his brother; Heart Disease (age of onset: 48) in his brother; Heart Disease (age of onset: 46) in his father. The patients home medications have been reviewed. Allergies: Patient has no known allergies. -------------------------------------------------- RESULTS -------------------------------------------------  All laboratory and radiology results have been personally reviewed by myself   LABS:  Results for orders placed or performed during the hospital encounter of 01/02/22   COVID-19, Rapid    Specimen: Nasopharyngeal Swab   Result Value Ref Range    SARS-CoV-2, NAAT Not Detected Not Detected       RADIOLOGY:  Interpreted by Radiologist.  XR CHEST PORTABLE   Final Result   There are no signs of an acute cardiopulmonary process             ------------------------- NURSING NOTES AND VITALS REVIEWED ---------------------------    The nursing notes within the ED encounter and vital signs as below have been reviewed. BP (!) 147/94   Pulse 75   Temp 97.9 °F (36.6 °C) (Temporal)   Resp 18   Ht 5' 5\" (1.651 m)   Wt 260 lb (117.9 kg)   SpO2 97%   BMI 43.27 kg/m²   Oxygen Saturation Interpretation: Normal    ---------------------------------------------------PHYSICAL EXAM--------------------------------------    Constitutional/General: Alert and oriented x3, well appearing, non toxic in NAD at the time of my exam  Head: Normocephalic and atraumatic  Eyes: PERRL, EOMI  Mouth: Oropharynx clear, handling secretions, no trismus  Neck: Supple, full ROM, no JVD.   Trachea midline  Pulmonary: Lungs --mild inspiratory rhonchi throughout with some expiratory wheezes but no crackles not in respiratory distress  Cardiovascular:  Regular rate and rhythm, no murmurs, gallops, or rubs. 2+ distal pulses  Abdomen: Soft, non tender, non distended, organomegaly, no masses no rebound tenderness no guarding no rigidity normal bowel sounds  Extremities: Moves all extremities x 4. Warm and well perfused  Skin: warm and dry without rash  Neurologic: GCS 15, cranial nerves II through XII intact no focal deficits. No meningeal signs  Psych: Normal Affect      ------------------------------ ED COURSE/MEDICAL DECISION MAKING----------------------  Medications   acetaminophen (TYLENOL) tablet 1,000 mg (1,000 mg Oral Given 1/2/22 0102)   dexamethasone (DECADRON) injection 4 mg (4 mg IntraMUSCular Given 1/2/22 0102)   ipratropium-albuterol (DUONEB) nebulizer solution 1 ampule (1 ampule Inhalation Given 1/2/22 0102)     ED COURSE:     Medical Decision Making:   Patient is felt to have his mild exacerbation of COPD. His COVID-19 test result was negative. Chest x-ray showed no focal infiltrates. The patient is referred to his PCP for close outpatient follow-up. Patient understands he is to get immediate medical attention if any new/worsening symptoms develop. Patient was encouraged to quit smoking. Patient's room air pulse ox was 97% and he has no tachypnea nor tachycardia on presentation. Counseling: The emergency provider has spoken with the patient and discussed todays results, in addition to providing specific details for the plan of care and counseling regarding the diagnosis and prognosis.   Questions are answered at this time and they are agreeable with the plan.    --------------------------------- IMPRESSION AND DISPOSITION ---------------------------------    IMPRESSION  1. COPD exacerbation ([facility] [address]        DISPOSITION  Disposition: Discharge to home  Patient condition is stable      NOTE: This report was transcribed using voice recognition software.  Every effort was made to ensure accuracy; however, inadvertent computerized transcription errors may be present        Jolanta Amezquita MD  01/02/22 5869

## 2022-01-06 RX ORDER — ALBUTEROL SULFATE 90 UG/1
AEROSOL, METERED RESPIRATORY (INHALATION)
Qty: 18 G | Refills: 0 | Status: SHIPPED
Start: 2022-01-06 | End: 2022-03-31 | Stop reason: SDUPTHER

## 2022-03-31 ENCOUNTER — OFFICE VISIT (OUTPATIENT)
Dept: FAMILY MEDICINE CLINIC | Age: 52
End: 2022-03-31
Payer: COMMERCIAL

## 2022-03-31 VITALS
RESPIRATION RATE: 16 BRPM | SYSTOLIC BLOOD PRESSURE: 134 MMHG | HEIGHT: 65 IN | OXYGEN SATURATION: 96 % | TEMPERATURE: 97.9 F | HEART RATE: 87 BPM | BODY MASS INDEX: 45.45 KG/M2 | WEIGHT: 272.8 LBS | DIASTOLIC BLOOD PRESSURE: 83 MMHG

## 2022-03-31 DIAGNOSIS — E11.9 TYPE 2 DIABETES MELLITUS WITHOUT COMPLICATION, WITHOUT LONG-TERM CURRENT USE OF INSULIN (HCC): ICD-10-CM

## 2022-03-31 DIAGNOSIS — G47.33 OSA (OBSTRUCTIVE SLEEP APNEA): ICD-10-CM

## 2022-03-31 DIAGNOSIS — E11.9 TYPE 2 DIABETES MELLITUS WITHOUT COMPLICATION, WITHOUT LONG-TERM CURRENT USE OF INSULIN (HCC): Primary | ICD-10-CM

## 2022-03-31 DIAGNOSIS — F17.200 SMOKER: ICD-10-CM

## 2022-03-31 DIAGNOSIS — I10 ESSENTIAL HYPERTENSION: ICD-10-CM

## 2022-03-31 DIAGNOSIS — E78.2 MIXED HYPERLIPIDEMIA: ICD-10-CM

## 2022-03-31 DIAGNOSIS — Z00.00 ANNUAL PHYSICAL EXAM: Primary | ICD-10-CM

## 2022-03-31 LAB
ALBUMIN SERPL-MCNC: 4.3 G/DL (ref 3.5–5.2)
ALP BLD-CCNC: 94 U/L (ref 40–129)
ALT SERPL-CCNC: 31 U/L (ref 0–40)
ANION GAP SERPL CALCULATED.3IONS-SCNC: 17 MMOL/L (ref 7–16)
AST SERPL-CCNC: 20 U/L (ref 0–39)
BASOPHILS ABSOLUTE: 0.13 E9/L (ref 0–0.2)
BASOPHILS RELATIVE PERCENT: 1.1 % (ref 0–2)
BILIRUB SERPL-MCNC: 0.3 MG/DL (ref 0–1.2)
BUN BLDV-MCNC: 12 MG/DL (ref 6–20)
CALCIUM SERPL-MCNC: 9.8 MG/DL (ref 8.6–10.2)
CHLORIDE BLD-SCNC: 101 MMOL/L (ref 98–107)
CHOLESTEROL, TOTAL: 215 MG/DL (ref 0–199)
CO2: 22 MMOL/L (ref 22–29)
CREAT SERPL-MCNC: 0.8 MG/DL (ref 0.7–1.2)
CREATININE URINE POCT: NORMAL
EOSINOPHILS ABSOLUTE: 0.41 E9/L (ref 0.05–0.5)
EOSINOPHILS RELATIVE PERCENT: 3.4 % (ref 0–6)
GFR AFRICAN AMERICAN: >60
GFR NON-AFRICAN AMERICAN: >60 ML/MIN/1.73
GLUCOSE BLD-MCNC: 188 MG/DL (ref 74–99)
HBA1C MFR BLD: 8.8 % (ref 4–5.6)
HCT VFR BLD CALC: 49.5 % (ref 37–54)
HDLC SERPL-MCNC: 39 MG/DL
HEMOGLOBIN: 16.2 G/DL (ref 12.5–16.5)
IMMATURE GRANULOCYTES #: 0.09 E9/L
IMMATURE GRANULOCYTES %: 0.7 % (ref 0–5)
LDL CHOLESTEROL CALCULATED: 118 MG/DL (ref 0–99)
LYMPHOCYTES ABSOLUTE: 2.79 E9/L (ref 1.5–4)
LYMPHOCYTES RELATIVE PERCENT: 23 % (ref 20–42)
MCH RBC QN AUTO: 29.7 PG (ref 26–35)
MCHC RBC AUTO-ENTMCNC: 32.7 % (ref 32–34.5)
MCV RBC AUTO: 90.7 FL (ref 80–99.9)
MICROALBUMIN/CREAT 24H UR: NORMAL MG/G{CREAT}
MICROALBUMIN/CREAT UR-RTO: NORMAL
MONOCYTES ABSOLUTE: 1.13 E9/L (ref 0.1–0.95)
MONOCYTES RELATIVE PERCENT: 9.3 % (ref 2–12)
NEUTROPHILS ABSOLUTE: 7.59 E9/L (ref 1.8–7.3)
NEUTROPHILS RELATIVE PERCENT: 62.5 % (ref 43–80)
PDW BLD-RTO: 14.3 FL (ref 11.5–15)
PLATELET # BLD: 211 E9/L (ref 130–450)
PMV BLD AUTO: 11.3 FL (ref 7–12)
POTASSIUM SERPL-SCNC: 4.3 MMOL/L (ref 3.5–5)
RBC # BLD: 5.46 E12/L (ref 3.8–5.8)
SODIUM BLD-SCNC: 140 MMOL/L (ref 132–146)
TOTAL PROTEIN: 7.3 G/DL (ref 6.4–8.3)
TRIGL SERPL-MCNC: 291 MG/DL (ref 0–149)
VLDLC SERPL CALC-MCNC: 58 MG/DL
WBC # BLD: 12.1 E9/L (ref 4.5–11.5)

## 2022-03-31 PROCEDURE — 99396 PREV VISIT EST AGE 40-64: CPT | Performed by: FAMILY MEDICINE

## 2022-03-31 PROCEDURE — 82044 UR ALBUMIN SEMIQUANTITATIVE: CPT | Performed by: FAMILY MEDICINE

## 2022-03-31 PROCEDURE — G8482 FLU IMMUNIZE ORDER/ADMIN: HCPCS | Performed by: FAMILY MEDICINE

## 2022-03-31 RX ORDER — TAMSULOSIN HYDROCHLORIDE 0.4 MG/1
0.4 CAPSULE ORAL DAILY
Qty: 30 CAPSULE | Refills: 5 | Status: SHIPPED | OUTPATIENT
Start: 2022-03-31

## 2022-03-31 RX ORDER — ALBUTEROL SULFATE 90 UG/1
AEROSOL, METERED RESPIRATORY (INHALATION)
Qty: 18 G | Refills: 3 | Status: SHIPPED
Start: 2022-03-31 | End: 2022-07-05

## 2022-03-31 RX ORDER — ATORVASTATIN CALCIUM 40 MG/1
TABLET, FILM COATED ORAL
Qty: 30 TABLET | Refills: 3 | Status: SHIPPED
Start: 2022-03-31 | End: 2022-09-26

## 2022-03-31 RX ORDER — OMEPRAZOLE 40 MG/1
CAPSULE, DELAYED RELEASE ORAL
Qty: 30 CAPSULE | Refills: 5 | Status: SHIPPED | OUTPATIENT
Start: 2022-03-31

## 2022-03-31 RX ORDER — LISINOPRIL 40 MG/1
TABLET ORAL
Qty: 30 TABLET | Refills: 5 | Status: SHIPPED | OUTPATIENT
Start: 2022-03-31

## 2022-03-31 RX ORDER — TERAZOSIN 2 MG/1
2 CAPSULE ORAL NIGHTLY
Qty: 30 CAPSULE | Refills: 3 | Status: SHIPPED
Start: 2022-03-31 | End: 2022-09-26

## 2022-03-31 ASSESSMENT — PATIENT HEALTH QUESTIONNAIRE - PHQ9
SUM OF ALL RESPONSES TO PHQ QUESTIONS 1-9: 0
1. LITTLE INTEREST OR PLEASURE IN DOING THINGS: 0
SUM OF ALL RESPONSES TO PHQ9 QUESTIONS 1 & 2: 0
SUM OF ALL RESPONSES TO PHQ QUESTIONS 1-9: 0
2. FEELING DOWN, DEPRESSED OR HOPELESS: 0

## 2022-03-31 NOTE — PROGRESS NOTES
7/23/2021    EGD BIOPSY performed by Lay Naqvi MD at [address] [place] [address] History   Problem Relation Age of Onset   Berman COPD Mother     Cancer Mother     Heart Disease Father 46        CABG    Diabetes Father     Diabetes Sister     Diabetes Brother     Heart Disease Brother 43        MI    Heart Disease Maternal Grandmother     Cancer Maternal Grandmother     Cancer Paternal Grandmother         colon    Heart Disease Paternal Grandfather     Asthma Sister     COPD Brother     Heart Disease Brother 48        CABG     Social History     Socioeconomic History    Marital status:      Spouse name: Not on file    Number of children: Not on file    Years of education: Not on file    Highest education level: Not on file   Occupational History    Occupation: sales   Tobacco Use    Smoking status: Current Every Day Smoker     Packs/day: 1.00     Years: 35.00     Pack years: 35.00     Types: Cigarettes    Smokeless tobacco: Never Used   Vaping Use    Vaping Use: Never used   Substance and Sexual Activity    Alcohol use: No     Alcohol/week: 0.0 standard drinks    Drug use: Yes     Frequency: 7.0 times per week     Types: Marijuana (Weed)     Comment: currently    Sexual activity: Yes     Partners: Female   Other Topics Concern    Not on file   Social History Narrative    Not on file     Social Determinants of Health     Financial Resource Strain: Low Risk     Difficulty of Paying Living Expenses: Not very hard   Food Insecurity: No Food Insecurity    Worried About Running Out of Food in the Last Year: Never true    Zenia of Food in the Last Year: Never true   Transportation Needs:     Lack of Transportation (Medical): Not on file    Lack of Transportation (Non-Medical):  Not on file   Physical Activity:     Days of Exercise per Week: Not on file    Minutes of Exercise per Session: Not on file   Stress:     Feeling of Stress : Not on file   Social Connections:     Frequency of Communication with Friends and Family: Not on file    Frequency of Social Gatherings with Friends and Family: Not on file    Attends Sabianist Services: Not on file    Active Member of Clubs or Organizations: Not on file    Attends Club or Organization Meetings: Not on file    Marital Status: Not on file   Intimate Partner Violence:     Fear of Current or Ex-Partner: Not on file    Emotionally Abused: Not on file    Physically Abused: Not on file    Sexually Abused: Not on file   Housing Stability:     Unable to Pay for Housing in the Last Year: Not on file    Number of [place] in the Last Year: Not on file    Unstable Housing in the Last Year: Not on file      No Known Allergies     Review of Systems:  Constitutional:  No fever, no fatigue, no chills, no headaches, no weight change  Dermatology:  No rash, no mole, no dry or sensitive skin  ENT:  No cough, no sore throat, no sinus pain, no runny nose, no ear pain  Cardiology:  No chest pain, no palpitations, no leg edema, no shortness of breath, no PND  Endocrinology:  No polydipsia, no polyuria, no cold intolerance, no heat intolerance, no polyphagia, no hair changes  Gastroenterology:  No dysphagia, no abdominal pain, no nausea, no vomiting, no constipation, no diarrhea, no heartburn  Female Reproductive:  No hot flashes, no abnormal vaginal discharge, no pain with menstruation, no pelvic pain  Musculoskeletal:  No joint pain, no leg cramps, no back pain, no muscle aches  Respiratory:  No shortness of breath, no orthopnea, no wheezing, no OSORIO, no hemoptysis  Urology:  No blood in the urine, no urinary frequency, no urinary incontinence, no urinary urgency, no nocturia, no dysuria  Neurology:  No numbness/tingling, no dizziness, no weakness  Psychology:  No depression, no sleep disturbances, no suicidal ideation, no anxiety  Physical Exam:  Vitals:    03/31/22 0831 03/31/22 0837   BP: (!) 155/74 134/83   Pulse: 87    Resp: 16    Temp: 97.9 °F (36.6 °C)    TempSrc: Temporal    SpO2: 96%    Weight: 272 lb 12.8 oz (123.7 kg)    Height: 5' 5\" (1.651 m)      General:  Patient alert and oriented x 3, NAD, pleasant  HEENT:  Atraumatic, normocephalic, PERRLA, EOMI, clear conjunctiva, TMs clear, nose-clear, throat - no erythema, tonsils- wnl  Neck:  Supple, no goiter, no carotid bruits, no lymphadenopathy  Lungs:  CTA B  Heart:  RRR, no murmurs, gallops or rubs  Abdomen:  Soft, NTND, + bowel sounds  Back: full ROM, no CVA tenderness  Extremities:  No clubbing, cyanosis or edema  Neuro:  CN II-XII grossly intact, 5/5 strength in bilateral upper and lower extremities, 2 + reflexes. Skin: unremarkable    Assessment/Plan:  Anthony Bazan was seen today for annual exam.    Diagnoses and all orders for this visit:    Annual physical exam    Type 2 diabetes mellitus without complication, without long-term current use of insulin ([facility])    Mixed hyperlipidemia    Essential hypertension    Asthma  -     omeprazole (PRILOSEC) 40 MG delayed release capsule; take 1 capsule by mouth once daily    SERGEY (obstructive sleep apnea)  -     omeprazole (PRILOSEC) 40 MG delayed release capsule; take 1 capsule by mouth once daily    Smoker  -     omeprazole (PRILOSEC) 40 MG delayed release capsule; take 1 capsule by mouth once daily    COPD (chronic obstructive pulmonary disease) ([facility])  -     omeprazole (PRILOSEC) 40 MG delayed release capsule; take 1 capsule by mouth once daily    Other orders  -     terazosin (HYTRIN) 2 MG capsule;  Take 1 capsule by mouth nightly  -     albuterol sulfate  (90 Base) MCG/ACT inhaler; inhale 2 puffs INTO THE LUNGS every 6 hours if needed for WHEEZING  -     lisinopril (PRINIVIL;ZESTRIL) 40 MG tablet; take 1 tablet by mouth once daily  -     atorvastatin (LIPITOR) 40 MG tablet; take 1 tablet by mouth once daily  -     mometasone-formoterol (DULERA) 200-5 MCG/ACT inhaler; inhale 2 puffs by mouth and INTO THE LUNGS twice a day  -     tamsulosin (FLOMAX) 0.4 MG capsule; Take 1 capsule by mouth daily      As above. Call or go to ED immediately if symptoms worsen or persist.  No follow-ups on file. or sooner if necessary. Educational materials and/or home exercises printed for patient's review and were included in patient instructions on his/her After Visit Summary and given to patient at the end of visit. Counseled regarding above diagnosis, including possible risks and complications,  especially if left uncontrolled. Counseled regarding the possible side effects, risks, benefits and alternatives to treatment; patient and/or guardian verbalizes understanding, agrees, feels comfortable with and wishes to proceed with above treatment plan. Advised patient to call with any new medication issues, and read all Rx info from pharmacy to assure aware of all possible risks and side effects of medication before taking. Reviewed age and gender appropriate health screening exams and vaccinations. Advised patient regarding importance of keeping up with recommended health maintenance and to schedule as soon as possible if overdue, as this is important in assessing for undiagnosed pathology, especially cancer, as well as protecting against potentially harmful/life threatening disease. Patient and/or guardian verbalizes understanding and agrees with above counseling, assessment and plan. All questions answered. Avinash Raines MD  3/31/2022    I have personally reviewed and updated the chief complaint, HPI, Past Medical, Family and Social History, as well as the above Review of Systems.

## 2022-06-12 ENCOUNTER — APPOINTMENT (OUTPATIENT)
Dept: GENERAL RADIOLOGY | Age: 52
End: 2022-06-12
Payer: COMMERCIAL

## 2022-06-12 ENCOUNTER — HOSPITAL ENCOUNTER (EMERGENCY)
Age: 52
Discharge: HOME | End: 2022-06-12
Attending: STUDENT IN AN ORGANIZED HEALTH CARE EDUCATION/TRAINING PROGRAM
Payer: COMMERCIAL

## 2022-06-12 VITALS
RESPIRATION RATE: 16 BRPM | HEIGHT: 64 IN | BODY MASS INDEX: 44.39 KG/M2 | TEMPERATURE: 97.4 F | WEIGHT: 260 LBS | DIASTOLIC BLOOD PRESSURE: 86 MMHG | HEART RATE: 74 BPM | OXYGEN SATURATION: 96 % | SYSTOLIC BLOOD PRESSURE: 146 MMHG

## 2022-06-12 DIAGNOSIS — J06.9 URI WITH COUGH AND CONGESTION: ICD-10-CM

## 2022-06-12 DIAGNOSIS — J44.9 COPD WITHOUT EXACERBATION (HCC): ICD-10-CM

## 2022-06-12 DIAGNOSIS — H66.002 LEFT ACUTE SUPPURATIVE OTITIS MEDIA: Primary | ICD-10-CM

## 2022-06-12 LAB
ALBUMIN SERPL-MCNC: 4.2 G/DL (ref 3.5–5.2)
ALP BLD-CCNC: 100 U/L (ref 40–129)
ALT SERPL-CCNC: 24 U/L (ref 0–40)
ANION GAP SERPL CALCULATED.3IONS-SCNC: 12 MMOL/L (ref 7–16)
AST SERPL-CCNC: 14 U/L (ref 0–39)
BACTERIA: ABNORMAL /HPF
BASOPHILS ABSOLUTE: 0.14 E9/L (ref 0–0.2)
BASOPHILS RELATIVE PERCENT: 1.2 % (ref 0–2)
BILIRUB SERPL-MCNC: 0.3 MG/DL (ref 0–1.2)
BILIRUBIN URINE: NEGATIVE
BLOOD, URINE: NEGATIVE
BUN BLDV-MCNC: 11 MG/DL (ref 6–20)
CALCIUM SERPL-MCNC: 10.4 MG/DL (ref 8.6–10.2)
CHLORIDE BLD-SCNC: 102 MMOL/L (ref 98–107)
CLARITY: CLEAR
CO2: 24 MMOL/L (ref 22–29)
COLOR: YELLOW
CREAT SERPL-MCNC: 0.9 MG/DL (ref 0.7–1.2)
D DIMER: <200 NG/ML DDU
EOSINOPHILS ABSOLUTE: 0.42 E9/L (ref 0.05–0.5)
EOSINOPHILS RELATIVE PERCENT: 3.5 % (ref 0–6)
EPITHELIAL CELLS, UA: ABNORMAL /HPF
GFR AFRICAN AMERICAN: >60
GFR NON-AFRICAN AMERICAN: >60 ML/MIN/1.73
GLUCOSE BLD-MCNC: 132 MG/DL (ref 74–99)
GLUCOSE URINE: NEGATIVE MG/DL
HCT VFR BLD CALC: 48 % (ref 37–54)
HEMOGLOBIN: 16.6 G/DL (ref 12.5–16.5)
IMMATURE GRANULOCYTES #: 0.07 E9/L
IMMATURE GRANULOCYTES %: 0.6 % (ref 0–5)
INFLUENZA A: NOT DETECTED
INFLUENZA B: NOT DETECTED
KETONES, URINE: NEGATIVE MG/DL
LEUKOCYTE ESTERASE, URINE: NEGATIVE
LIPASE: 27 U/L (ref 13–60)
LYMPHOCYTES ABSOLUTE: 3.13 E9/L (ref 1.5–4)
LYMPHOCYTES RELATIVE PERCENT: 26.3 % (ref 20–42)
MCH RBC QN AUTO: 30.5 PG (ref 26–35)
MCHC RBC AUTO-ENTMCNC: 34.6 % (ref 32–34.5)
MCV RBC AUTO: 88.2 FL (ref 80–99.9)
MONOCYTES ABSOLUTE: 0.86 E9/L (ref 0.1–0.95)
MONOCYTES RELATIVE PERCENT: 7.2 % (ref 2–12)
NEUTROPHILS ABSOLUTE: 7.28 E9/L (ref 1.8–7.3)
NEUTROPHILS RELATIVE PERCENT: 61.2 % (ref 43–80)
NITRITE, URINE: NEGATIVE
PDW BLD-RTO: 13.2 FL (ref 11.5–15)
PH UA: 5.5 (ref 5–9)
PLATELET # BLD: 205 E9/L (ref 130–450)
PMV BLD AUTO: 10.7 FL (ref 7–12)
POTASSIUM SERPL-SCNC: 4.5 MMOL/L (ref 3.5–5)
PRO-BNP: 35 PG/ML (ref 0–125)
PROTEIN UA: NEGATIVE MG/DL
RBC # BLD: 5.44 E12/L (ref 3.8–5.8)
RBC UA: ABNORMAL /HPF (ref 0–2)
SARS-COV-2 RNA, RT PCR: NOT DETECTED
SODIUM BLD-SCNC: 138 MMOL/L (ref 132–146)
SPECIFIC GRAVITY UA: <=1.005 (ref 1–1.03)
TOTAL PROTEIN: 7.5 G/DL (ref 6.4–8.3)
TROPONIN, HIGH SENSITIVITY: <6 NG/L (ref 0–11)
UROBILINOGEN, URINE: 0.2 E.U./DL
WBC # BLD: 11.9 E9/L (ref 4.5–11.5)
WBC UA: ABNORMAL /HPF (ref 0–5)

## 2022-06-12 PROCEDURE — 85378 FIBRIN DEGRADE SEMIQUANT: CPT

## 2022-06-12 PROCEDURE — 93005 ELECTROCARDIOGRAM TRACING: CPT | Performed by: NURSE PRACTITIONER

## 2022-06-12 PROCEDURE — 36415 COLL VENOUS BLD VENIPUNCTURE: CPT

## 2022-06-12 PROCEDURE — 71045 X-RAY EXAM CHEST 1 VIEW: CPT

## 2022-06-12 PROCEDURE — 85025 COMPLETE CBC W/AUTO DIFF WBC: CPT

## 2022-06-12 PROCEDURE — 2580000003 HC RX 258: Performed by: NURSE PRACTITIONER

## 2022-06-12 PROCEDURE — 80053 COMPREHEN METABOLIC PANEL: CPT

## 2022-06-12 PROCEDURE — 99285 EMERGENCY DEPT VISIT HI MDM: CPT

## 2022-06-12 PROCEDURE — 87636 SARSCOV2 & INF A&B AMP PRB: CPT

## 2022-06-12 PROCEDURE — 81001 URINALYSIS AUTO W/SCOPE: CPT

## 2022-06-12 PROCEDURE — 96360 HYDRATION IV INFUSION INIT: CPT

## 2022-06-12 PROCEDURE — 83690 ASSAY OF LIPASE: CPT

## 2022-06-12 PROCEDURE — 84484 ASSAY OF TROPONIN QUANT: CPT

## 2022-06-12 PROCEDURE — 6370000000 HC RX 637 (ALT 250 FOR IP): Performed by: NURSE PRACTITIONER

## 2022-06-12 PROCEDURE — 83880 ASSAY OF NATRIURETIC PEPTIDE: CPT

## 2022-06-12 RX ORDER — OXYCODONE HYDROCHLORIDE AND ACETAMINOPHEN 5; 325 MG/1; MG/1
1 TABLET ORAL ONCE
Status: COMPLETED | OUTPATIENT
Start: 2022-06-12 | End: 2022-06-12

## 2022-06-12 RX ORDER — AMOXICILLIN AND CLAVULANATE POTASSIUM 875; 125 MG/1; MG/1
1 TABLET, FILM COATED ORAL ONCE
Status: COMPLETED | OUTPATIENT
Start: 2022-06-12 | End: 2022-06-12

## 2022-06-12 RX ORDER — AMOXICILLIN AND CLAVULANATE POTASSIUM 875; 125 MG/1; MG/1
1 TABLET, FILM COATED ORAL 2 TIMES DAILY
Qty: 20 TABLET | Refills: 0 | Status: SHIPPED | OUTPATIENT
Start: 2022-06-12 | End: 2022-06-22

## 2022-06-12 RX ORDER — 0.9 % SODIUM CHLORIDE 0.9 %
500 INTRAVENOUS SOLUTION INTRAVENOUS ONCE
Status: COMPLETED | OUTPATIENT
Start: 2022-06-12 | End: 2022-06-12

## 2022-06-12 RX ORDER — IPRATROPIUM BROMIDE AND ALBUTEROL SULFATE 2.5; .5 MG/3ML; MG/3ML
1 SOLUTION RESPIRATORY (INHALATION) ONCE
Status: COMPLETED | OUTPATIENT
Start: 2022-06-12 | End: 2022-06-12

## 2022-06-12 RX ADMIN — SODIUM CHLORIDE 500 ML: 9 INJECTION, SOLUTION INTRAVENOUS at 19:27

## 2022-06-12 RX ADMIN — OXYCODONE AND ACETAMINOPHEN 1 TABLET: 5; 325 TABLET ORAL at 19:26

## 2022-06-12 RX ADMIN — AMOXICILLIN AND CLAVULANATE POTASSIUM 1 TABLET: 875; 125 TABLET, FILM COATED ORAL at 20:53

## 2022-06-12 RX ADMIN — IPRATROPIUM BROMIDE AND ALBUTEROL SULFATE 1 AMPULE: 2.5; .5 SOLUTION RESPIRATORY (INHALATION) at 19:27

## 2022-06-12 ASSESSMENT — PAIN DESCRIPTION - LOCATION
LOCATION: GENERALIZED

## 2022-06-12 ASSESSMENT — PAIN SCALES - GENERAL
PAINLEVEL_OUTOF10: 5
PAINLEVEL_OUTOF10: 10
PAINLEVEL_OUTOF10: 7

## 2022-06-12 ASSESSMENT — PAIN DESCRIPTION - DESCRIPTORS
DESCRIPTORS: ACHING;DISCOMFORT;DULL
DESCRIPTORS: ACHING;DULL;DISCOMFORT
DESCRIPTORS: ACHING

## 2022-06-12 ASSESSMENT — PAIN - FUNCTIONAL ASSESSMENT
PAIN_FUNCTIONAL_ASSESSMENT: 0-10
PAIN_FUNCTIONAL_ASSESSMENT: PREVENTS OR INTERFERES SOME ACTIVE ACTIVITIES AND ADLS

## 2022-06-12 ASSESSMENT — PAIN DESCRIPTION - ONSET: ONSET: ON-GOING

## 2022-06-12 ASSESSMENT — PAIN DESCRIPTION - FREQUENCY: FREQUENCY: CONTINUOUS

## 2022-06-12 NOTE — ED PROVIDER NOTES
[address]  Department of Emergency Medicine   ED  Encounter Note  Admit Date/RoomTime: 2022  5:12 PM  ED Room: 15/15  NAME: Neftali Allan  : 1970  MRN: [id number]     Chief Complaint:  Generalized Body Aches    HISTORY OF PRESENT ILLNESS        Neftali Allan is a 46 y.o. male who presents to the ED for evaluation of generalized body aches. This has been going on for about 2 weeks. He states he has ongoing arthritis that ibuprofen helps somewhat but it seems to be aggravated recently. He denies any associated traumatic incident. He has been having cold symptoms of left otalgia without drainage, yellow-green productive cough, mild shortness of breath with his COPD which he is using his Dulera and albuterol, pain in his left lower anterior and posterior ribs with coughing and loss of taste and smell. He reports being vaccinated for COVID-19 and he has no known exposure. He did do a home test that was negative. He denies any associated headache, vision change, syncopal episode hemoptysis, vomiting, bowel changes, calf pain, leg swelling or rash. His symptoms are moderate in severity and persistent in nature. ROS   Pertinent positives and negatives are stated within HPI, all other systems reviewed and are negative. Past Medical History:  has a past medical history of Abdominal pain, Allergic rhinitis, Anxiety, Asthma, COPD (chronic obstructive pulmonary disease) ([address] Depression, Diabetes mellitus ([facility] [address] Encounter for screening colonoscopy, GERD (gastroesophageal reflux disease), Hyperlipidemia, Hypertension, Obesity, Osteoarthritis, and Sleep apnea. Surgical History:  has a past surgical history that includes Tonsillectomy (); hiatal hernia repair (); Tympanoplasty; Colonoscopy (2017); hernia repair ();  Endoscopy, colon, diagnostic; pr drain skin abscess simple (N/A, 2018); EXCISION HIDRADENITIS OF INGUINAL / UMBILICAL AREA (N/A, 6/26/2019); Upper gastrointestinal endoscopy (N/A, 7/20/2020); Colonoscopy (N/A, 7/20/2020); Upper gastrointestinal endoscopy (N/A, 7/23/2021); Abdomen surgery (N/A, 6/24/2019); and Cholecystectomy, laparoscopic (N/A, 8/24/2021). Social History:  reports that he has been smoking cigarettes. He has a 35.00 pack-year smoking history. He has never used smokeless tobacco. He reports current drug use. Frequency: 7.00 times per week. Drug: Marijuana [place]). He reports that he does not drink alcohol. Family History: family history includes Asthma in his sister; COPD in his brother and mother; Cancer in his maternal grandmother, mother, and paternal grandmother; Diabetes in his brother, father, and sister; Heart Disease in his maternal grandmother and paternal grandfather; Heart Disease (age of onset: 43) in his brother; Heart Disease (age of onset: 48) in his brother; Heart Disease (age of onset: 46) in his father. Allergies: Patient has no known allergies. PHYSICAL EXAM   Oxygen Saturation Interpretation: Normal on room air analysis. ED Triage Vitals   BP Temp Temp Source Heart Rate Resp SpO2 Height Weight   06/12/22 1711 06/12/22 1711 06/12/22 1711 06/12/22 1711 06/12/22 1711 06/12/22 1711 06/12/22 1724 06/12/22 1724   (!) 155/91 97.4 °F (36.3 °C) Temporal 94 18 97 % 5' 4\" (1.626 m) 260 lb (117.9 kg)       Physical Exam  Constitutional/General: Alert and oriented x3, well appearing, non toxic  HEENT:  NC/NT. PERRLA. The right canal and TM are normal.  The left canal is normal with bulged TM with purulent fluid, obscured landmark and no cone of light. There is no obvious perforation. Nares has clear rhinorrhea with no sinus tenderness to percussion. Oropharynx pink and moist without tonsillar hypertrophy or exudate. The airway patent. Neck: Supple, full ROM. No midline vertebral tenderness or crepitus. Respiratory: Lung sounds clear diminished without wheezing.   Not in respiratory distress. CV:  Regular rate. Regular rhythm. No murmurs or resting tachycardia. 2+ distal pulses. GI:  Abdomen soft, non-tender, non-distended. +BS. No rebound, guarding, or rigidity. No pulsatile masses. Musculoskeletal: Moves all extremities x 4. Warm and well perfused. Capillary refill <3 seconds. No calf tenderness, palpable cords or localized foot/ankle edema bilaterally. Integument: Skin warm and dry. No rashes. No cyanosis. Normal capillary refill. Neurologic: Alert and oriented with no focal deficits, symmetric strength 5/5 in the upper and lower extremities bilaterally. Hand grasp, pushes, pulls, dorsiflexion and plantar flexion strong and equal bilaterally. Ambulatory with a steady gait no limp.     Lab / Imaging Results   (All laboratory and radiology results have been personally reviewed by myself)  Labs:  Results for orders placed or performed during the hospital encounter of 06/12/22   COVID-19 & Influenza Combo    Specimen: Nasopharyngeal Swab   Result Value Ref Range    SARS-CoV-2 RNA, RT PCR NOT DETECTED NOT DETECTED    INFLUENZA A NOT DETECTED NOT DETECTED    INFLUENZA B NOT DETECTED NOT DETECTED   Comprehensive Metabolic Panel   Result Value Ref Range    Sodium 138 132 - 146 mmol/L    Potassium 4.5 3.5 - 5.0 mmol/L    Chloride 102 98 - 107 mmol/L    CO2 24 22 - 29 mmol/L    Anion Gap 12 7 - 16 mmol/L    Glucose 132 (H) 74 - 99 mg/dL    BUN 11 6 - 20 mg/dL    CREATININE 0.9 0.7 - 1.2 mg/dL    GFR Non-African American >60 >=60 mL/min/1.73    GFR African American >60     Calcium 10.4 (H) 8.6 - 10.2 mg/dL    Total Protein 7.5 6.4 - 8.3 g/dL    Albumin 4.2 3.5 - 5.2 g/dL    Total Bilirubin 0.3 0.0 - 1.2 mg/dL    Alkaline Phosphatase 100 40 - 129 U/L    ALT 24 0 - 40 U/L    AST 14 0 - 39 U/L   Lipase   Result Value Ref Range    Lipase 27 13 - 60 U/L   CBC with Auto Differential   Result Value Ref Range    WBC 11.9 (H) 4.5 - 11.5 E9/L    RBC 5.44 3.80 - 5.80 E12/L    Hemoglobin 16.6 (H) 12.5 - 16.5 g/dL    Hematocrit 48.0 37.0 - 54.0 %    MCV 88.2 80.0 - 99.9 fL    MCH 30.5 26.0 - 35.0 pg    MCHC 34.6 (H) 32.0 - 34.5 %    RDW 13.2 11.5 - 15.0 fL    Platelets 725 930 - 773 E9/L    MPV 10.7 7.0 - 12.0 fL    Neutrophils % 61.2 43.0 - 80.0 %    Immature Granulocytes % 0.6 0.0 - 5.0 %    Lymphocytes % 26.3 20.0 - 42.0 %    Monocytes % 7.2 2.0 - 12.0 %    Eosinophils % 3.5 0.0 - 6.0 %    Basophils % 1.2 0.0 - 2.0 %    Neutrophils Absolute 7.28 1.80 - 7.30 E9/L    Immature Granulocytes # 0.07 E9/L    Lymphocytes Absolute 3.13 1.50 - 4.00 E9/L    Monocytes Absolute 0.86 0.10 - 0.95 E9/L    Eosinophils Absolute 0.42 0.05 - 0.50 E9/L    Basophils Absolute 0.14 0.00 - 0.20 E9/L   Troponin   Result Value Ref Range    Troponin, High Sensitivity <6 0 - 11 ng/L   Brain Natriuretic Peptide   Result Value Ref Range    Pro-BNP 35 0 - 125 pg/mL   D-Dimer, Quantitative   Result Value Ref Range    D-Dimer, Quant <200 ng/mL DDU   Urinalysis with Microscopic   Result Value Ref Range    Color, UA Yellow Straw/Yellow    Clarity, UA Clear Clear    Glucose, Ur Negative Negative mg/dL    Bilirubin Urine Negative Negative    Ketones, Urine Negative Negative mg/dL    Specific Gravity, UA <=1.005 1.005 - 1.030    Blood, Urine Negative Negative    pH, UA 5.5 5.0 - 9.0    Protein, UA Negative Negative mg/dL    Urobilinogen, Urine 0.2 <2.0 E.U./dL    Nitrite, Urine Negative Negative    Leukocyte Esterase, Urine Negative Negative    WBC, UA 0-1 0 - 5 /HPF    RBC, UA NONE 0 - 2 /HPF    Epithelial Cells, UA RARE /HPF    Bacteria, UA RARE (A) None Seen /HPF   EKG 12 Lead   Result Value Ref Range    Ventricular Rate 73 BPM    Atrial Rate 73 BPM    P-R Interval 148 ms    QRS Duration 100 ms    Q-T Interval 352 ms    QTc Calculation (Bazett) 387 ms    P Axis 54 degrees    R Axis 78 degrees    T Axis 58 degrees     Imaging: All Radiology results interpreted by Radiologist unless otherwise noted.   XR CHEST PORTABLE   Final Result   No acute process. ED Course / Medical Decision Making     Medications   0.9 % sodium chloride bolus (0 mLs IntraVENous Stopped 6/12/22 2000)   ipratropium-albuterol (DUONEB) nebulizer solution 1 ampule (1 ampule Inhalation Given 6/12/22 1927)   oxyCODONE-acetaminophen (PERCOCET) 5-325 MG per tablet 1 tablet (1 tablet Oral Given 6/12/22 1926)   amoxicillin-clavulanate (AUGMENTIN) 875-125 MG per tablet 1 tablet (1 tablet Oral Given 6/12/22 2053)        Re-examination:  6/12/22       Time: 2035  Patients condition is improving after treatment. Discussed results and outpatient management plan. Patient is feeling improved and amenable to discharge. Consult(s):   None    Procedure(s):   none    MDM:   Patient evaluated by myself and Dr. Noble Strickland. EKG as interpreted by her with high-sensitivity troponin of less than 6. D-dimer less than 200. He does have a mild leukocytosis of 11.9 which is typical for him. He is afebrile in the ED with a chest x-ray as interpreted by radiologist negative for acute pathology. Negative for COVID-19, influenza A and influenza B. He does have a clinically appearing superlatives left otitis media requiring antibiotic therapy. He does not appear to be having a COPD exacerbation at this time. Therefore no prednisone burst but continuation of his albuterol, Dulera and Augmentin. Patient is aware signs and symptoms indicative of reevaluation in the emergency department setting. Over-the-counter Tylenol or ibuprofen as needed for discomfort and follow-up with primary care in regards to management of his discomfort from arthritis. Patient verbalizes understanding of instructions and departed in stable condition. Plan of Care/Counseling:  ANNABEL Lomeli CNP and EM Attending Physician reviewed today's visit with the patient in addition to providing specific details for the plan of care and counseling regarding the diagnosis and prognosis.   Questions are answered at this time and are agreeable with the plan. ASSESSMENT     1. Left acute suppurative otitis media    2. URI with cough and congestion    3. COPD without exacerbation ([address]      PLAN   Discharged home. Patient condition is stable    New Medications     Discharge Medication List as of 6/12/2022  8:43 PM      START taking these medications    Details   amoxicillin-clavulanate (AUGMENTIN) 875-125 MG per tablet Take 1 tablet by mouth 2 times daily for 10 days, Disp-20 tablet, R-0Normal           Electronically signed by ANNABEL Nguyen CNP   DD: 6/12/22  **This report was transcribed using voice recognition software. Every effort was made to ensure accuracy; however, inadvertent computerized transcription errors may be present.   END OF ED PROVIDER NOTE       ANNABEL Nguyen CNP  06/12/22 2305

## 2022-06-13 NOTE — ED PROVIDER NOTES
ATTENDING PROVIDER ATTESTATION:     Ivory Paez presented to the emergency department for evaluation of Generalized Body Aches    I have reviewed and discussed the case, including pertinent history (medical, surgical, family and social) and exam findings with the Midlevel and the Nurse assigned to Ivory Paez. I have personally performed and/or participated in the history, exam, medical decision making, and procedures and agree with all pertinent clinical information. HPI  This is a 49-year-old male presents to the emergency department today with a chief complaint of body aches. The patient states he thinks he may have arthritis. He has diffuse body aches. Its been present for the past 2 weeks. Ibuprofen somewhat helps and nothing seems to worsen the symptoms. He denies any trauma. He is having associated left earache. Denies any drainage. He also is noting chest pain. He describes it as left-sided rating to the left back. It is also a sharp sensation worse with certain movements. No numbness or tingling. Patient does feel somewhat short of breath but states this is likely has bronchitis. He has been using his inhalers with relief. He has a cough productive of yellow and green sputum. No fevers. No abdominal pain. No dysuria but increased urinary frequency. No hematuria. Patient does note that he cannot smell or taste very well. Denies headache, vision change. No known sick contacts.     ROS  A complete review of systems was performed and pertinent positives and negatives are stated within HPI, all other systems reviewed and are negative.    --------------------------------------------- PAST HISTORY ---------------------------------------------  Past Medical History:  has a past medical history of Abdominal pain, Allergic rhinitis, Anxiety, Asthma, COPD [facility]chronic obstructive [facility] disease) [facility] Depression, Diabetes mellitus ([facility] [place] [facility] Encounter for screening colonoscopy, GERD (gastroesophageal reflux disease), Hyperlipidemia, Hypertension, Obesity, Osteoarthritis, and Sleep apnea. Past Surgical History:  has a past surgical history that includes Tonsillectomy (1983); hiatal hernia repair (2012); Tympanoplasty; Colonoscopy (06/08/2017); hernia repair (2012); Endoscopy, colon, diagnostic; pr drain skin abscess simple (N/A, 9/19/2018); EXCISION HIDRADENITIS OF INGUINAL / UMBILICAL AREA (N/A, 9/26/6514); Upper gastrointestinal endoscopy (N/A, 7/20/2020); Colonoscopy (N/A, 7/20/2020); Upper gastrointestinal endoscopy (N/A, 7/23/2021); Abdomen surgery (N/A, 6/24/2019); and Cholecystectomy, laparoscopic (N/A, 8/24/2021). Social History:  reports that he has been smoking cigarettes. He has a 35.00 pack-year smoking history. He has never used smokeless tobacco. He reports current drug use. Frequency: 7.00 times per week. Drug: Marijuana [place]). He reports that he does not drink alcohol. Family History: family history includes Asthma in his sister; COPD in his brother and mother; Cancer in his maternal grandmother, mother, and paternal grandmother; Diabetes in his brother, father, and sister; Heart Disease in his maternal grandmother and paternal grandfather; Heart Disease (age of onset: 43) in his brother; Heart Disease (age of onset: 48) in his brother; Heart Disease (age of onset: 46) in his father. Unless otherwise noted, family history is non contributory    The patients home medications have been reviewed. Allergies: Patient has no known allergies. Physical Exam  General: The patient is conversational and lying comfortably in bed. Head: Normocephalic and atraumatic. Eyes: Sclera non-icteric and no conjunctival injection  ENT: Nasal and oral membranes moist.  Left ear shows some erythema and bulging. No active drainage. No tenderness of mastoid. Neck: Trachea midline. No JVD  Respiratory: Lungs clear to auscultation bilaterally.   Patient speaking in full sentences. Cardiovascular: Regular rate. No pedal edema. 2+ radial pulses  Gastrointestinal:  Abdomen is soft and nondistended. Bowel sounds present. There is no tenderness. There is no guarding or rebound. Musculoskeletal: No deformity. No bony tenderness of the extremities. No tenderness of midline spine. No step-offs deformities. Mild cervical paraspinal muscle tenderness on the left. Skin: Skin warm and dry. No rashes. Neurologic: No gross motor deficits. No aphasia. Symmetric strength. Pupils are reactive to light. Symmetric facies. Alert. Psychiatric: Normal affect. MDM  This is a 46 y.o. male presenting to the ED for multiple. On initial presentation, the patient's vital signs are interpreted as hypertensive, afebrile and saturating well on room air. Based on history and physical exam, we have a broad differential.  Patient symptoms may be secondary to arthritis however with his other complaints we considered infectious process such as pneumonia or UTI. With his age and risk factors ACS, arrhythmia considered. He is not acutely in a COPD exacerbation. There may be component of COVID. Patient is distally neurovascularly intact. He does have evidence of acute otitis media but no acute otitis externa. Patient is given a Percocet for pain and not breathing treatment. Fluids administered. Chest x-ray, EKG and laboratory studies obtained. A 12-lead EKG was performed and interpreted by myself. The rate is 73 with normal sinus rhythm and normal axis. Patient has T wave version in V1. The TN interval is 140, QRS interval is 100, and QTC interval is. 387. No acute ST elevation or depression. Poor R wave progression. This is normal sinus rhythm with nonspecific abnormality. Chest x-ray shows no acute process. This is interpreted radiology. Laboratory studies show mildly elevated calcium. Electrolytes otherwise normal.  BNP and troponin negative.   LFTs normal.  Patient does have leukocytosis but downtrending from prior. D-dimer negative. Influenza COVID-negative. Urinalysis shows no infection. This is interpreted radiology. Patient is resting comfortably. Findings discussed. Given a course of Augmentin for his ear infection. Given strict return precautions. He does not require refills of his inhalers. Should follow with primary care physician and pulmonology. Verbalized understanding and agreeable to the plan. I have reviewed my findings and recommendations with Jacqueline Triplett and members of family present at the time of disposition. My findings/plan: The primary encounter diagnosis was Left acute suppurative otitis media. Diagnoses of URI with cough and congestion and COPD without exacerbation ([address] were also pertinent to this visit.   Discharge Medication List as of 6/12/2022  8:43 PM      START taking these medications    Details   amoxicillin-clavulanate (AUGMENTIN) 875-125 MG per tablet Take 1 tablet by mouth 2 times daily for 10 days, Disp-20 tablet, R-0Normal           MD Elda Pickett MD  06/13/22 0163

## 2022-06-14 LAB
EKG ATRIAL RATE: 73 BPM
EKG P AXIS: 54 DEGREES
EKG P-R INTERVAL: 148 MS
EKG Q-T INTERVAL: 352 MS
EKG QRS DURATION: 100 MS
EKG QTC CALCULATION (BAZETT): 387 MS
EKG R AXIS: 78 DEGREES
EKG T AXIS: 58 DEGREES
EKG VENTRICULAR RATE: 73 BPM

## 2022-06-21 ENCOUNTER — TELEPHONE (OUTPATIENT)
Dept: FAMILY MEDICINE CLINIC | Age: 52
End: 2022-06-21

## 2022-06-21 NOTE — TELEPHONE ENCOUNTER
Spoke to patient, he is a little better with the antibiotic only has 3 days left but its more of his ear that's bothering him with pain and drainage Left  He usually gets better with ear gtts and prednisone    he was negative for covid

## 2022-06-21 NOTE — TELEPHONE ENCOUNTER
----- Message from Brenda Zurdo sent at 6/21/2022  1:49 PM EDT -----  Subject: Appointment Request    Reason for Call: Routine ED Follow Up Visit    QUESTIONS  Type of Appointment? Established Patient  Reason for appointment request? Available appointments did not meet   patient need  Additional Information for Provider? Pt said he needs a Ed follow up. Pt   said he went to Er on 6/12. he has a Ear infection, and upper respiratory   infection. He also has body pains as well. the gave him amoxicillin and he   needs to be seen soon. Please call ot to get him scheduled this week   please.  ---------------------------------------------------------------------------  --------------  CALL BACK INFO  What is the best way for the office to contact you? OK to leave message on   voicemail  Preferred Call Back Phone Number? [phone]  ---------------------------------------------------------------------------  --------------  SCRIPT ANSWERS  Relationship to Patient? Self  (Patient requests to see provider urgently. )? No  Do you have any questions for your primary care provider that need to be   answered prior to your appointment? No  Have you been diagnosed with COVID-19 in the past 10 days? No  (Service Expert  click yes below to proceed with [employer] As Usual   Scheduling)?  Yes

## 2022-06-22 ENCOUNTER — OFFICE VISIT (OUTPATIENT)
Dept: FAMILY MEDICINE CLINIC | Age: 52
End: 2022-06-22
Payer: COMMERCIAL

## 2022-06-22 VITALS
OXYGEN SATURATION: 97 % | TEMPERATURE: 97.7 F | HEIGHT: 65 IN | HEART RATE: 93 BPM | SYSTOLIC BLOOD PRESSURE: 137 MMHG | RESPIRATION RATE: 19 BRPM | DIASTOLIC BLOOD PRESSURE: 93 MMHG | BODY MASS INDEX: 44.1 KG/M2 | WEIGHT: 264.7 LBS

## 2022-06-22 DIAGNOSIS — J44.1 CHRONIC OBSTRUCTIVE PULMONARY DISEASE WITH ACUTE EXACERBATION (HCC): Primary | ICD-10-CM

## 2022-06-22 PROBLEM — N39.41 URGE INCONTINENCE OF URINE: Status: ACTIVE | Noted: 2022-06-22

## 2022-06-22 PROBLEM — R12 HEARTBURN: Status: ACTIVE | Noted: 2018-07-17

## 2022-06-22 PROCEDURE — G8427 DOCREV CUR MEDS BY ELIG CLIN: HCPCS | Performed by: FAMILY MEDICINE

## 2022-06-22 PROCEDURE — 4004F PT TOBACCO SCREEN RCVD TLK: CPT | Performed by: FAMILY MEDICINE

## 2022-06-22 PROCEDURE — 3017F COLORECTAL CA SCREEN DOC REV: CPT | Performed by: FAMILY MEDICINE

## 2022-06-22 PROCEDURE — 3023F SPIROM DOC REV: CPT | Performed by: FAMILY MEDICINE

## 2022-06-22 PROCEDURE — G8417 CALC BMI ABV UP PARAM F/U: HCPCS | Performed by: FAMILY MEDICINE

## 2022-06-22 PROCEDURE — 99213 OFFICE O/P EST LOW 20 MIN: CPT | Performed by: FAMILY MEDICINE

## 2022-06-22 RX ORDER — PREDNISONE 20 MG/1
20 TABLET ORAL DAILY
Qty: 7 TABLET | Refills: 0 | Status: SHIPPED | OUTPATIENT
Start: 2022-06-22 | End: 2022-06-29

## 2022-06-22 RX ORDER — CIPROFLOXACIN/HYDROCORTISONE 0.2 %-1 %
3 SUSPENSION, DROPS(FINAL DOSAGE FORM)(ML) OTIC (EAR) 2 TIMES DAILY
Qty: 1 EACH | Refills: 0 | Status: SHIPPED | OUTPATIENT
Start: 2022-06-22 | End: 2022-06-29

## 2022-06-22 NOTE — PROGRESS NOTES
Chief Complaint   Patient presents with    Otitis Media       HPI:  Patient is here for follow-up of a recent ear infection. States he is currently taking augmentin. States he doesn't feel that it is very effective. Reports body aches. Reports yellow drainage from his left year and reports an odor. States he had a previous perforated ear drum in the left ear. Reports he is having a productive cough but that it is clear. Reports this began a couple weeks ago. States he got the symptoms right after his booster shot, but that it progressively worsened. Past Medical History, Surgical History, and Family History has been reviewed and updated.     Review of Systems:  Constitutional:  No fever, no fatigue, no chills, no headaches, no weight change  Dermatology:  No rash, no mole, no dry or sensitive skin  ENT:  No cough, no sore throat, no sinus pain, no runny nose, no ear pain  Cardiology:  No chest pain, no palpitations, no leg edema, no shortness of breath, no PND  Gastroenterology:  No dysphagia, no abdominal pain, no nausea, no vomiting, no constipation, no diarrhea, no heartburn  Musculoskeletal:  No joint pain, no leg cramps, no back pain, no muscle aches  Respiratory:  No shortness of breath, no orthopnea, no wheezing, no OSORIO, no hemoptysis  Urology:  No blood in the urine, no urinary frequency, no urinary incontinence, no urinary urgency, no nocturia, no dysuria  Physical Exam:  Vitals:    06/22/22 0920 06/22/22 0924   BP: (!) 136/91 (!) 137/93   Pulse: 93    Resp: 19    Temp: 97.7 °F (36.5 °C)    SpO2: 97%    Weight: 264 lb 11.2 oz (120.1 kg)    Height: 5' 5\" (1.651 m)      General:  Patient alert and oriented x 3, NAD, pleasant  HEENT:  Atraumatic, normocephalic, PERRLA, EOMI, clear conjunctiva, TMs clear, nose-congested, + sinus tenderness, throat - + erythema  Neck:  Supple, no goiter, no carotid bruits, no LAD  Lungs:  CTA B  Heart:  RRR, no murmurs, gallops or rubs  Abdomen:  Soft, NTND, + bowel sounds  Extremities:  No clubbing, cyanosis or edema  Skin: no rashes    Assessment/Plan:  Gamal Pineda was seen today for otitis media. Diagnoses and all orders for this visit:    Chronic obstructive pulmonary disease with acute exacerbation ([address]    Other orders  -     predniSONE (DELTASONE) 20 MG tablet; Take 1 tablet by mouth daily for 7 days  -     ciprofloxacin-hydrocortisone (CIPRO HC) 0.2-1 % otic suspension; Place 3 drops into the left ear 2 times daily for 7 days        Increase fluids, rest, Tylenol every 4-6 hours as needed for fever or body aches. Return to office if symptoms worsen. As above. Call or go to ED immediately if symptoms worsen or persist.  No follow-ups on file. , or sooner if necessary. Educational materials and/or home exercises printed for patient's review and were included in patient instructions on his/her After Visit Summary and given to patient at the end of visit. Counseled regarding above diagnosis, including possible risks and complications,  especially if left uncontrolled. Counseled regarding the possible side effects, risks, benefits and alternatives to treatment; patient and/or guardian verbalizes understanding, agrees, feels comfortable with and wishes to proceed with above treatment plan. Advised patient to call with any new medication issues, and read all Rx info from pharmacy to assure aware of all possible risks and side effects of medication before taking. Patient and/or guardian verbalizes understanding and agrees with above counseling, assessment and plan. All questions answered. Isha Wylie MD  6/22/2022    I have personally reviewed and updated the chief complaint, HPI, Past Medical, Family and Social History, as well as the above Review of Systems.

## 2022-07-05 RX ORDER — ALBUTEROL SULFATE 90 UG/1
AEROSOL, METERED RESPIRATORY (INHALATION)
Qty: 18 G | Refills: 3 | Status: SHIPPED
Start: 2022-07-05 | End: 2022-10-21

## 2022-08-10 ENCOUNTER — HOSPITAL ENCOUNTER (INPATIENT)
Age: 52
LOS: 4 days | Discharge: HOME | DRG: 753 | End: 2022-08-15
Attending: EMERGENCY MEDICINE | Admitting: PSYCHIATRY & NEUROLOGY
Payer: COMMERCIAL

## 2022-08-10 ENCOUNTER — OFFICE VISIT (OUTPATIENT)
Dept: FAMILY MEDICINE CLINIC | Age: 52
End: 2022-08-10
Payer: COMMERCIAL

## 2022-08-10 VITALS
WEIGHT: 266.1 LBS | DIASTOLIC BLOOD PRESSURE: 95 MMHG | SYSTOLIC BLOOD PRESSURE: 149 MMHG | OXYGEN SATURATION: 98 % | HEART RATE: 85 BPM | RESPIRATION RATE: 17 BRPM | TEMPERATURE: 98.1 F | HEIGHT: 65 IN | BODY MASS INDEX: 44.33 KG/M2

## 2022-08-10 DIAGNOSIS — G89.29 CHRONIC BILATERAL THORACIC BACK PAIN: ICD-10-CM

## 2022-08-10 DIAGNOSIS — G56.03 BILATERAL CARPAL TUNNEL SYNDROME: ICD-10-CM

## 2022-08-10 DIAGNOSIS — M54.50 LUMBAR PAIN: ICD-10-CM

## 2022-08-10 DIAGNOSIS — R45.850 HOMICIDAL IDEATION: Primary | ICD-10-CM

## 2022-08-10 DIAGNOSIS — H65.90 OTITIS MEDIA, SEROUS, TM RUPTURE: ICD-10-CM

## 2022-08-10 DIAGNOSIS — M25.562 CHRONIC PAIN OF BOTH KNEES: ICD-10-CM

## 2022-08-10 DIAGNOSIS — M25.561 CHRONIC PAIN OF BOTH KNEES: ICD-10-CM

## 2022-08-10 DIAGNOSIS — G89.29 CHRONIC PAIN OF BOTH KNEES: ICD-10-CM

## 2022-08-10 DIAGNOSIS — M54.6 CHRONIC BILATERAL THORACIC BACK PAIN: ICD-10-CM

## 2022-08-10 DIAGNOSIS — M25.559 HIP PAIN: ICD-10-CM

## 2022-08-10 DIAGNOSIS — H72.90 OTITIS MEDIA, SEROUS, TM RUPTURE: ICD-10-CM

## 2022-08-10 DIAGNOSIS — M54.2 NECK PAIN: Primary | ICD-10-CM

## 2022-08-10 PROCEDURE — G8417 CALC BMI ABV UP PARAM F/U: HCPCS | Performed by: FAMILY MEDICINE

## 2022-08-10 PROCEDURE — 99215 OFFICE O/P EST HI 40 MIN: CPT | Performed by: FAMILY MEDICINE

## 2022-08-10 PROCEDURE — 3017F COLORECTAL CA SCREEN DOC REV: CPT | Performed by: FAMILY MEDICINE

## 2022-08-10 PROCEDURE — 96372 THER/PROPH/DIAG INJ SC/IM: CPT

## 2022-08-10 PROCEDURE — G8427 DOCREV CUR MEDS BY ELIG CLIN: HCPCS | Performed by: FAMILY MEDICINE

## 2022-08-10 PROCEDURE — 99285 EMERGENCY DEPT VISIT HI MDM: CPT

## 2022-08-10 PROCEDURE — 4004F PT TOBACCO SCREEN RCVD TLK: CPT | Performed by: FAMILY MEDICINE

## 2022-08-10 RX ORDER — ARIPIPRAZOLE 2 MG/1
TABLET ORAL
Status: ON HOLD | COMMUNITY
Start: 2022-07-27 | End: 2022-08-11

## 2022-08-10 ASSESSMENT — PAIN - FUNCTIONAL ASSESSMENT: PAIN_FUNCTIONAL_ASSESSMENT: 0-10

## 2022-08-10 ASSESSMENT — LIFESTYLE VARIABLES: HOW OFTEN DO YOU HAVE A DRINK CONTAINING ALCOHOL: NEVER

## 2022-08-10 ASSESSMENT — PAIN DESCRIPTION - PAIN TYPE: TYPE: CHRONIC PAIN

## 2022-08-10 ASSESSMENT — PAIN SCALES - GENERAL: PAINLEVEL_OUTOF10: 10

## 2022-08-10 ASSESSMENT — PAIN DESCRIPTION - LOCATION: LOCATION: GENERALIZED

## 2022-08-10 NOTE — PROGRESS NOTES
Chief Complaint   Patient presents with    Follow-up       HPI:  Patient is here for follow-up of URI and ear infection. Patient states ear is much better. He complains of SOB and body pain which is worse on his left side. He is using albuterol at least 3-4 times a day. He has OSORIO and has untreated sleep apnea. He c/o pain in arm, leg and back on L. He c/o numbness and tingling in both arms. Patient's past medical, surgical, social and/or family history reviewed, updated in chart, and are non-contributory (unless otherwise stated). Medications and allergies also reviewed and updated in chart.     Review of Systems:  Constitutional:  No fever, no fatigue, no chills, no headaches, no weight change  Dermatology:  No rash, no mole, no dry or sensitive skin  ENT:  No cough, no sore throat, no sinus pain, no runny nose, no ear pain  Cardiology:  No chest pain, no palpitations, no leg edema, no shortness of breath, no PND  Gastroenterology:  No dysphagia, no abdominal pain, no nausea, no vomiting, no constipation, no diarrhea, no heartburn  Musculoskeletal:  No joint pain, no leg cramps, no back pain, no muscle aches  Respiratory:  No shortness of breath, no orthopnea, no wheezing, no OSORIO, no hemoptysis  Urology:  No blood in the urine, no urinary frequency, no urinary incontinence, no urinary urgency, no nocturia, no dysuria  Vitals:    08/10/22 1207 08/10/22 1212   BP: (!) 167/111 (!) 149/95   Pulse: 85    Resp: 17    Temp: 98.1 °F (36.7 °C)    TempSrc: Temporal    SpO2: 98%    Weight: 266 lb 1.6 oz (120.7 kg)    Height: 5' 5\" (1.651 m)        General:  Patient alert and oriented x 3, NAD, pleasant  HEENT:  Atraumatic, normocephalic, PERRLA, EOMI, clear conjunctiva, TMs clear, nose-clear, throat - no erythema  Neck:  Supple, no goiter, no carotid bruits, no lymphadenopathy  Lungs:  CTA B  Heart:  RRR, no murmurs, gallops or rubs  Abdomen:  Soft/nt/nd, + bowel sounds  Extremities:  No clubbing, cyanosis or edema  Skin: unremarkable    Assessment/Plan:      Ulises Reardon was seen today for follow-up. Diagnoses and all orders for this visit:    Neck pain  -     XR CERVICAL SPINE (4-5 VIEWS); Future    Chronic bilateral thoracic back pain  -     XR THORACIC SPINE (3 VIEWS); Future    Lumbar pain  -     XR LUMBAR SPINE (MIN 4 VIEWS); Future    Hip pain  -     XR HIP LEFT (2-3 VIEWS); Future  -     GERRI; Future  -     Sedimentation Rate; Future  -     Rheumatoid Factor; Future  -     C-Reactive Protein; Future    Chronic pain of both knees  -     XR KNEE LEFT (MIN 4 VIEWS); Future    Bilateral carpal tunnel syndrome  -     EMG    Otitis media, serous, TM rupture  -     [facility] [place], [place], , Otolaryngology, [place]    Other orders  -     Fluticasone-Umeclidin-Vilant 200-62.5-25 MCG/INH AEPB; Inhale 1 puff into the lungs in the morning. As above. Call or go to ED immediately if symptoms worsen or persist.  No follow-ups on file. , or sooner if necessary. Educational materials and/or home exercises printed for patient's review and were included in patient instructions on his/her After Visit Summary and given to patient at the end of visit. Counseled regarding above diagnosis, including possible risks and complications,  especially if left uncontrolled. Counseled regarding the possible side effects, risks, benefits and alternatives to treatment; patient and/or guardian verbalizes understanding, agrees, feels comfortable with and wishes to proceed with above treatment plan. Advised patient to call with any new medication issues, and read all Rx info from pharmacy to assure aware of all possible risks and side effects of medication before taking. Reviewed age and gender appropriate health screening exams and vaccinations.   Advised patient regarding importance of keeping up with recommended health maintenance and to schedule as soon as possible if overdue, as this is important in assessing for undiagnosed pathology, especially cancer, as well as protecting against potentially harmful/life threatening disease. Patient and/or guardian verbalizes understanding and agrees with above counseling, assessment and plan. All questions answered. Alistair Phma MD  8/23/2022    I have personally reviewed and updated the chief complaint, HPI, Past Medical, Family and Social History, as well as the above Review of Systems.

## 2022-08-11 PROBLEM — F60.2 ANTISOCIAL PERSONALITY DISORDER (HCC): Status: ACTIVE | Noted: 2022-08-11

## 2022-08-11 PROBLEM — F10.10 ALCOHOL ABUSE: Status: ACTIVE | Noted: 2022-08-11

## 2022-08-11 PROBLEM — F31.9 BIPOLAR 1 DISORDER (HCC): Status: ACTIVE | Noted: 2022-08-11

## 2022-08-11 LAB
ACETAMINOPHEN LEVEL: <5 MCG/ML (ref 10–30)
ALBUMIN SERPL-MCNC: 4.2 G/DL (ref 3.5–5.2)
ALP BLD-CCNC: 102 U/L (ref 40–129)
ALT SERPL-CCNC: 35 U/L (ref 0–40)
AMPHETAMINE SCREEN, URINE: NOT DETECTED
ANION GAP SERPL CALCULATED.3IONS-SCNC: 16 MMOL/L (ref 7–16)
AST SERPL-CCNC: 24 U/L (ref 0–39)
BARBITURATE SCREEN URINE: NOT DETECTED
BASOPHILS ABSOLUTE: 0.13 E9/L (ref 0–0.2)
BASOPHILS RELATIVE PERCENT: 1.1 % (ref 0–2)
BENZODIAZEPINE SCREEN, URINE: NOT DETECTED
BILIRUB SERPL-MCNC: <0.2 MG/DL (ref 0–1.2)
BUN BLDV-MCNC: 7 MG/DL (ref 6–20)
CALCIUM SERPL-MCNC: 9.6 MG/DL (ref 8.6–10.2)
CANNABINOID SCREEN URINE: POSITIVE
CHLORIDE BLD-SCNC: 99 MMOL/L (ref 98–107)
CO2: 18 MMOL/L (ref 22–29)
COCAINE METABOLITE SCREEN URINE: NOT DETECTED
CREAT SERPL-MCNC: 0.8 MG/DL (ref 0.7–1.2)
EKG ATRIAL RATE: 91 BPM
EKG P AXIS: 37 DEGREES
EKG P-R INTERVAL: 146 MS
EKG Q-T INTERVAL: 342 MS
EKG QRS DURATION: 106 MS
EKG QTC CALCULATION (BAZETT): 420 MS
EKG R AXIS: 70 DEGREES
EKG T AXIS: 53 DEGREES
EKG VENTRICULAR RATE: 91 BPM
EOSINOPHILS ABSOLUTE: 0.38 E9/L (ref 0.05–0.5)
EOSINOPHILS RELATIVE PERCENT: 3.2 % (ref 0–6)
ETHANOL: 158 MG/DL (ref 0–0.08)
ETHANOL: 23 MG/DL (ref 0–0.08)
FENTANYL SCREEN, URINE: NOT DETECTED
GFR AFRICAN AMERICAN: >60
GFR NON-AFRICAN AMERICAN: >60 ML/MIN/1.73
GLUCOSE BLD-MCNC: 219 MG/DL (ref 74–99)
HBA1C MFR BLD: 9.4 % (ref 4–5.6)
HCT VFR BLD CALC: 49.1 % (ref 37–54)
HEMOGLOBIN: 16.6 G/DL (ref 12.5–16.5)
IMMATURE GRANULOCYTES #: 0.14 E9/L
IMMATURE GRANULOCYTES %: 1.2 % (ref 0–5)
INFLUENZA A: NOT DETECTED
INFLUENZA B: NOT DETECTED
LYMPHOCYTES ABSOLUTE: 3.37 E9/L (ref 1.5–4)
LYMPHOCYTES RELATIVE PERCENT: 28.8 % (ref 20–42)
Lab: ABNORMAL
MCH RBC QN AUTO: 30 PG (ref 26–35)
MCHC RBC AUTO-ENTMCNC: 33.8 % (ref 32–34.5)
MCV RBC AUTO: 88.8 FL (ref 80–99.9)
METER GLUCOSE: 200 MG/DL (ref 74–99)
METER GLUCOSE: 203 MG/DL (ref 74–99)
METHADONE SCREEN, URINE: NOT DETECTED
MONOCYTES ABSOLUTE: 0.91 E9/L (ref 0.1–0.95)
MONOCYTES RELATIVE PERCENT: 7.8 % (ref 2–12)
NEUTROPHILS ABSOLUTE: 6.79 E9/L (ref 1.8–7.3)
NEUTROPHILS RELATIVE PERCENT: 57.9 % (ref 43–80)
OPIATE SCREEN URINE: NOT DETECTED
OXYCODONE URINE: NOT DETECTED
PDW BLD-RTO: 13.7 FL (ref 11.5–15)
PHENCYCLIDINE SCREEN URINE: NOT DETECTED
PLATELET # BLD: 216 E9/L (ref 130–450)
PMV BLD AUTO: 10.6 FL (ref 7–12)
POTASSIUM SERPL-SCNC: 4 MMOL/L (ref 3.5–5)
RBC # BLD: 5.53 E12/L (ref 3.8–5.8)
SALICYLATE, SERUM: <0.3 MG/DL (ref 0–30)
SARS-COV-2 RNA, RT PCR: NOT DETECTED
SODIUM BLD-SCNC: 133 MMOL/L (ref 132–146)
TOTAL PROTEIN: 7.5 G/DL (ref 6.4–8.3)
TRICYCLIC ANTIDEPRESSANTS SCREEN SERUM: NEGATIVE NG/ML
WBC # BLD: 11.7 E9/L (ref 4.5–11.5)

## 2022-08-11 PROCEDURE — 82077 ASSAY SPEC XCP UR&BREATH IA: CPT

## 2022-08-11 PROCEDURE — 6370000000 HC RX 637 (ALT 250 FOR IP): Performed by: STUDENT IN AN ORGANIZED HEALTH CARE EDUCATION/TRAINING PROGRAM

## 2022-08-11 PROCEDURE — 90792 PSYCH DIAG EVAL W/MED SRVCS: CPT | Performed by: NURSE PRACTITIONER

## 2022-08-11 PROCEDURE — 6370000000 HC RX 637 (ALT 250 FOR IP): Performed by: PSYCHIATRY & NEUROLOGY

## 2022-08-11 PROCEDURE — 6360000002 HC RX W HCPCS: Performed by: EMERGENCY MEDICINE

## 2022-08-11 PROCEDURE — 36415 COLL VENOUS BLD VENIPUNCTURE: CPT

## 2022-08-11 PROCEDURE — 6370000000 HC RX 637 (ALT 250 FOR IP): Performed by: NURSE PRACTITIONER

## 2022-08-11 PROCEDURE — 80143 DRUG ASSAY ACETAMINOPHEN: CPT

## 2022-08-11 PROCEDURE — 94664 DEMO&/EVAL PT USE INHALER: CPT

## 2022-08-11 PROCEDURE — 80053 COMPREHEN METABOLIC PANEL: CPT

## 2022-08-11 PROCEDURE — 93005 ELECTROCARDIOGRAM TRACING: CPT | Performed by: EMERGENCY MEDICINE

## 2022-08-11 PROCEDURE — 1240000000 HC EMOTIONAL WELLNESS R&B

## 2022-08-11 PROCEDURE — 82962 GLUCOSE BLOOD TEST: CPT

## 2022-08-11 PROCEDURE — 87636 SARSCOV2 & INF A&B AMP PRB: CPT

## 2022-08-11 PROCEDURE — 94640 AIRWAY INHALATION TREATMENT: CPT

## 2022-08-11 PROCEDURE — 80179 DRUG ASSAY SALICYLATE: CPT

## 2022-08-11 PROCEDURE — 83036 HEMOGLOBIN GLYCOSYLATED A1C: CPT

## 2022-08-11 PROCEDURE — 80307 DRUG TEST PRSMV CHEM ANLYZR: CPT

## 2022-08-11 PROCEDURE — 6360000002 HC RX W HCPCS: Performed by: NURSE PRACTITIONER

## 2022-08-11 PROCEDURE — 85025 COMPLETE CBC W/AUTO DIFF WBC: CPT

## 2022-08-11 RX ORDER — ATORVASTATIN CALCIUM 40 MG/1
40 TABLET, FILM COATED ORAL NIGHTLY
Status: DISCONTINUED | OUTPATIENT
Start: 2022-08-11 | End: 2022-08-15 | Stop reason: HOSPADM

## 2022-08-11 RX ORDER — CHLORDIAZEPOXIDE HYDROCHLORIDE 25 MG/1
25 CAPSULE, GELATIN COATED ORAL EVERY 6 HOURS PRN
Status: DISCONTINUED | OUTPATIENT
Start: 2022-08-11 | End: 2022-08-15 | Stop reason: HOSPADM

## 2022-08-11 RX ORDER — ALBUTEROL SULFATE 90 UG/1
2 AEROSOL, METERED RESPIRATORY (INHALATION) EVERY 6 HOURS PRN
Status: DISCONTINUED | OUTPATIENT
Start: 2022-08-11 | End: 2022-08-11 | Stop reason: CLARIF

## 2022-08-11 RX ORDER — INSULIN LISPRO 100 [IU]/ML
0-4 INJECTION, SOLUTION INTRAVENOUS; SUBCUTANEOUS NIGHTLY
Status: DISCONTINUED | OUTPATIENT
Start: 2022-08-11 | End: 2022-08-15 | Stop reason: HOSPADM

## 2022-08-11 RX ORDER — ACETAMINOPHEN 325 MG/1
650 TABLET ORAL EVERY 6 HOURS PRN
Status: DISCONTINUED | OUTPATIENT
Start: 2022-08-11 | End: 2022-08-15 | Stop reason: HOSPADM

## 2022-08-11 RX ORDER — TAMSULOSIN HYDROCHLORIDE 0.4 MG/1
0.4 CAPSULE ORAL DAILY
Status: DISCONTINUED | OUTPATIENT
Start: 2022-08-11 | End: 2022-08-15 | Stop reason: HOSPADM

## 2022-08-11 RX ORDER — DIPHENHYDRAMINE HYDROCHLORIDE 50 MG/ML
50 INJECTION INTRAMUSCULAR; INTRAVENOUS EVERY 6 HOURS PRN
Status: DISCONTINUED | OUTPATIENT
Start: 2022-08-11 | End: 2022-08-15 | Stop reason: HOSPADM

## 2022-08-11 RX ORDER — ZIPRASIDONE MESYLATE 20 MG/ML
10 INJECTION, POWDER, LYOPHILIZED, FOR SOLUTION INTRAMUSCULAR ONCE
Status: COMPLETED | OUTPATIENT
Start: 2022-08-11 | End: 2022-08-11

## 2022-08-11 RX ORDER — HALOPERIDOL 5 MG
5 TABLET ORAL EVERY 6 HOURS PRN
Status: DISCONTINUED | OUTPATIENT
Start: 2022-08-11 | End: 2022-08-15 | Stop reason: HOSPADM

## 2022-08-11 RX ORDER — INSULIN LISPRO 100 [IU]/ML
0-4 INJECTION, SOLUTION INTRAVENOUS; SUBCUTANEOUS
Status: DISCONTINUED | OUTPATIENT
Start: 2022-08-11 | End: 2022-08-15 | Stop reason: HOSPADM

## 2022-08-11 RX ORDER — DIVALPROEX SODIUM 500 MG/1
500 TABLET, DELAYED RELEASE ORAL EVERY 12 HOURS SCHEDULED
Status: DISCONTINUED | OUTPATIENT
Start: 2022-08-11 | End: 2022-08-15 | Stop reason: HOSPADM

## 2022-08-11 RX ORDER — LISINOPRIL 10 MG/1
40 TABLET ORAL ONCE
Status: COMPLETED | OUTPATIENT
Start: 2022-08-11 | End: 2022-08-11

## 2022-08-11 RX ORDER — ARIPIPRAZOLE 2 MG/1
2 TABLET ORAL DAILY
COMMUNITY
End: 2022-08-14

## 2022-08-11 RX ORDER — PANTOPRAZOLE SODIUM 40 MG/1
40 TABLET, DELAYED RELEASE ORAL EVERY MORNING
Status: DISCONTINUED | OUTPATIENT
Start: 2022-08-12 | End: 2022-08-15 | Stop reason: HOSPADM

## 2022-08-11 RX ORDER — NICOTINE 21 MG/24HR
1 PATCH, TRANSDERMAL 24 HOURS TRANSDERMAL DAILY
Status: DISCONTINUED | OUTPATIENT
Start: 2022-08-11 | End: 2022-08-15 | Stop reason: HOSPADM

## 2022-08-11 RX ORDER — HALOPERIDOL 5 MG/ML
5 INJECTION INTRAMUSCULAR EVERY 6 HOURS PRN
Status: DISCONTINUED | OUTPATIENT
Start: 2022-08-11 | End: 2022-08-15 | Stop reason: HOSPADM

## 2022-08-11 RX ORDER — DEXTROSE MONOHYDRATE 100 MG/ML
INJECTION, SOLUTION INTRAVENOUS CONTINUOUS PRN
Status: DISCONTINUED | OUTPATIENT
Start: 2022-08-11 | End: 2022-08-15 | Stop reason: HOSPADM

## 2022-08-11 RX ORDER — HYDROXYZINE PAMOATE 50 MG/1
50 CAPSULE ORAL 3 TIMES DAILY PRN
Status: DISCONTINUED | OUTPATIENT
Start: 2022-08-11 | End: 2022-08-15 | Stop reason: HOSPADM

## 2022-08-11 RX ORDER — LANOLIN ALCOHOL/MO/W.PET/CERES
3 CREAM (GRAM) TOPICAL NIGHTLY
Status: DISCONTINUED | OUTPATIENT
Start: 2022-08-11 | End: 2022-08-15 | Stop reason: HOSPADM

## 2022-08-11 RX ORDER — ARFORMOTEROL TARTRATE 15 UG/2ML
15 SOLUTION RESPIRATORY (INHALATION) 2 TIMES DAILY
Status: DISCONTINUED | OUTPATIENT
Start: 2022-08-11 | End: 2022-08-15 | Stop reason: HOSPADM

## 2022-08-11 RX ORDER — IPRATROPIUM BROMIDE AND ALBUTEROL SULFATE 2.5; .5 MG/3ML; MG/3ML
1 SOLUTION RESPIRATORY (INHALATION) ONCE
Status: COMPLETED | OUTPATIENT
Start: 2022-08-11 | End: 2022-08-11

## 2022-08-11 RX ORDER — LISINOPRIL 20 MG/1
40 TABLET ORAL DAILY
Status: DISCONTINUED | OUTPATIENT
Start: 2022-08-12 | End: 2022-08-15 | Stop reason: HOSPADM

## 2022-08-11 RX ORDER — OMEPRAZOLE 40 MG/1
40 CAPSULE, DELAYED RELEASE ORAL EVERY MORNING
Status: DISCONTINUED | OUTPATIENT
Start: 2022-08-12 | End: 2022-08-11 | Stop reason: CLARIF

## 2022-08-11 RX ORDER — ALBUTEROL SULFATE 2.5 MG/3ML
2.5 SOLUTION RESPIRATORY (INHALATION) EVERY 6 HOURS PRN
Status: DISCONTINUED | OUTPATIENT
Start: 2022-08-11 | End: 2022-08-15 | Stop reason: HOSPADM

## 2022-08-11 RX ORDER — MAGNESIUM HYDROXIDE/ALUMINUM HYDROXICE/SIMETHICONE 120; 1200; 1200 MG/30ML; MG/30ML; MG/30ML
30 SUSPENSION ORAL PRN
Status: DISCONTINUED | OUTPATIENT
Start: 2022-08-11 | End: 2022-08-15 | Stop reason: HOSPADM

## 2022-08-11 RX ORDER — DOXAZOSIN 2 MG/1
2 TABLET ORAL DAILY
Status: DISCONTINUED | OUTPATIENT
Start: 2022-08-11 | End: 2022-08-15 | Stop reason: HOSPADM

## 2022-08-11 RX ORDER — BUDESONIDE 0.5 MG/2ML
0.5 INHALANT ORAL 2 TIMES DAILY
Status: DISCONTINUED | OUTPATIENT
Start: 2022-08-11 | End: 2022-08-15 | Stop reason: HOSPADM

## 2022-08-11 RX ADMIN — ATORVASTATIN CALCIUM 40 MG: 40 TABLET, FILM COATED ORAL at 21:16

## 2022-08-11 RX ADMIN — TAMSULOSIN HYDROCHLORIDE 0.4 MG: 0.4 CAPSULE ORAL at 23:43

## 2022-08-11 RX ADMIN — LISINOPRIL 40 MG: 10 TABLET ORAL at 07:42

## 2022-08-11 RX ADMIN — IPRATROPIUM BROMIDE AND ALBUTEROL SULFATE 1 AMPULE: 2.5; .5 SOLUTION RESPIRATORY (INHALATION) at 08:10

## 2022-08-11 RX ADMIN — BUDESONIDE 500 MCG: 0.5 SUSPENSION RESPIRATORY (INHALATION) at 20:05

## 2022-08-11 RX ADMIN — MELATONIN 3 MG ORAL TABLET 3 MG: 3 TABLET ORAL at 21:16

## 2022-08-11 RX ADMIN — ZIPRASIDONE MESYLATE 10 MG: 20 INJECTION, POWDER, LYOPHILIZED, FOR SOLUTION INTRAMUSCULAR at 00:04

## 2022-08-11 RX ADMIN — DOXAZOSIN 2 MG: 2 TABLET ORAL at 23:43

## 2022-08-11 RX ADMIN — ARFORMOTEROL TARTRATE 15 MCG: 15 SOLUTION RESPIRATORY (INHALATION) at 20:05

## 2022-08-11 RX ADMIN — DIVALPROEX SODIUM 500 MG: 500 TABLET, DELAYED RELEASE ORAL at 21:16

## 2022-08-11 ASSESSMENT — SLEEP AND FATIGUE QUESTIONNAIRES
AVERAGE NUMBER OF SLEEP HOURS: 4
DO YOU USE A SLEEP AID: NO
DO YOU HAVE DIFFICULTY SLEEPING: YES
SLEEP PATTERN: DIFFICULTY ARISING;DIFFICULTY FALLING ASLEEP;DISTURBED/INTERRUPTED SLEEP

## 2022-08-11 ASSESSMENT — LIFESTYLE VARIABLES
HOW MANY STANDARD DRINKS CONTAINING ALCOHOL DO YOU HAVE ON A TYPICAL DAY: 1 OR 2
HOW OFTEN DO YOU HAVE A DRINK CONTAINING ALCOHOL: MONTHLY OR LESS
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
## 2022-08-11 NOTE — PROGRESS NOTES
Spiritual Support Group Note    Number of Participants in Group:      3                  Time:    2    Goal: Relief from isolation and loneliness             Belinda Sharing             Self-understanding and gain insight              Acceptance and belonging            Recognize they are not alone                Socialization             Empowerment       Encouragement    Topic:  [x] Spiritual Wellness and Self Care                  [x] Hope                     [] Connecting with Divine/Others        [] Thankfulness and Gratitude               [x]  Meaningfulness and Purpose               [] Forgiveness               [] Peace               [] Connect to [facility]      [] Other    Participation Level:   [x] Active Listener   [] Minimal   [] Monopolizing   [] Interactive   [] No Participation   []  Other:     Attention:   [x] Alert   [] Distractible   [] Drowsy   [] Poor   [] Other:    [place]:   [x] Cooperative   [] Suspicious   [] Withdrawn   [] Guarded   [] Irritable   [] Inhospitable   [] Other:     Others Comments from Group:

## 2022-08-11 NOTE — PROGRESS NOTES
Attended evening relaxation group. Able to recognize benefits of relaxation in wellness recovery. Was 1 of 14 in attendance.

## 2022-08-11 NOTE — ED NOTES
Behavioral Health Crisis Assessment      Chief Complaint:  Pt report to  that he is here due to having a \"bad night\". Pt refused to elaborate. Per triage note was homicidal \"toward anyone that fucks him\" he states that he has felt this way for years. Recently restarted his abilify and it is making him come out of the clouds and he is seeing how people really are    Mental Status Exam:  Pt arrived to the ED belligerent, yelling, disrupting unit, being uncooperative with staff, verbally aggressive and saying he is going to report EMS staff. Pt is somewhat alert, oriented x 3, calm and cooperative, no sights of agitation, poor focus, flat affect, depressed mood, withdrawn,  fair historian, minimal conversation, yes/no answers, soft speech, poor eye contact, fair hygiene. Legal Status  [] Voluntary:  [x] Involuntary, Issued by: ED physician     Gender  [x] Male [] Female [] Transgender  [] Other    Sexual Orientation    [x] Heterosexual [] Homosexual [] Bisexual [] Other    Brief Clinical Summary:  Pt is a 45 yo male who presented into the ED by EMS a after being intoxicated and homicidal. EMS reported to son called PD due to Pt being mean and violent and making suicidal/homicidal statements with no plan. Pt did report to drinking a 5th of tequila today. Pt reported to  that he was having a \"bad day\". Pt refused to elaborate at this time. Pt reports to ED physician that he wants to \"fuck people up\". Pt denies SI/HI to . Pt denies to any hx of suicide attempts or self injurious behaviors. Pt does does report to recently being proscribed Abilify from PCP. Pt denies to having a legal guardian or POA. Collateral Information:   Pt refuses to provide consent to reach out to family for collateral information at this time.      Risk Factors:  Hx of MH  Recently conflict with family   Recent medication change   Chronic physical health issues  loss of pleasure with things that once made you happy  helplessness/hopelessness  Substance use   financial issues   No source of income     Protective Factors:  Support from his wife Rosio Hearn - [phone] home / [phone] cell   positive rapport with PCP  help-seeking behavior  safe and stable housing  has access to essential needs    Suicidal Ideations:   [x] Reports:    [] Past [] Present   [x] Denies    Suicide Attempts:  [] Reports:   [x] Denies    C-SSRS Screening Completed by RN: Current Suicide Risk:  [x] No Risk [] Low [] Moderate [] High    Homicidal Ideations  [x] Reports:   [] Past [] Present   [x] Denies     Self Injurious/Self Mutilation Behaviors:   [] Reports:    [] Past [] Present   [x] Denies    Hallucinations/Delusions   [] Reports:   [x] Denies     Substance Use/Alcohol Use/Addiction:   [x] Reports: Pt admit to smoking marijuana daily with his last usage being today. Pt does share he has a medication card. Pt denies to any other drugs. Pt does admit to drinking an unknown amount lastnight of tequila. Pt denies to drinking on a regular basis. [] Denies   [x] SBIRT Screen Complete. Current or Past Substance Abuse Treatment  [] Yes, When and Where:  [x] No    Current or Past Mental Health Treatment:  [x] Yes, When and Where: Pt does have a hx of MH and is diagnosed with depression. Pt denies to being linked to any outpatient [facility] services. Pt reports he was seeing a counselor about a year ago but quit going. Pt did just recently started taking Abilify that was proscribed to him from his PCP. Pt denies to any hx of MH hospitalization. [] No    Legal Issues:  []  Yes (Specify)  [x]  No    Access to Weapons:  []  Yes (Specify)  [x]  No    Trauma History  [] Reports:  [x] Denies     Living Situation:  Living with his wife, daughter, son-in-law and two other sons    Employment:  Unemployed     Education Level:  GED    Violence Risk Screening:        Have you ever thought about hurting someone?    []  No  [x]  Yes (Ask the questions listed below)   When? Today    Did you follow through with the thoughts? [x] No     [] Yes- When and what happened? 2.  Have you ever threatened anyone? [x]  No  []  Yes (Ask the questions listed below)   When and what happened? Have you ever threatened someone with a gun, knife or other weapon? [x]  No  []  Yes - When and what happened? 2. Have you ever had an order of protection taken out against you? []  Yes [x]  No  3. Have you ever been arrested due to violence? []  Yes [x]  No  4. Have you ever been cruel to animals? []  Yes [x]  No    After consideration of C-SSRS screening results, C-SSRS assessments, and this professional's assessment the patient's overall suicide risk assessed to be:  [] No Risk  [] Low   [x] Moderate   [] High     [x] Discussed current suicide risk, protective and risk factors with RN and ED Physician     Disposition   [] Home:   [] Outpatient Provider:   [] Crisis Unit:   [x] Inpatient Psychiatric Unit:  [] Other:     Pt has been [place] Slipped by ED physician. Once medically cleared, SW will proceed with inpatient admission to ensure Pt safety and stabilization.       ARIANE Romero, [place]  08/11/22 7750

## 2022-08-11 NOTE — ED NOTES
Patient sleeping most of morning after eating breakfast, receiving his blood pressure medication and breathing treatment. Patient irrittable this AM verbally but was also joking some with this writer as well. No aggressive behavior noted this shift.          Chaitanya Duke RN  08/11/22 7522

## 2022-08-11 NOTE — ED NOTES
Pt sister, Luis Miguel Shah [phone] called. Pt declined to take call and didn't want Talon Mancera told he was here. No information given except general processes.      Micha Byrne  08/11/22 1119

## 2022-08-11 NOTE — ED NOTES
Dr. Grazyna Voss stated he would review the chart in the morning for re-assessment due to their previous aggressive behavior.       Martha Ravi RN  08/11/22 3434

## 2022-08-11 NOTE — ED NOTES
Attempt made to get vitals again, however pt yanked arm away and started pushing away the RN while grumbling. Unable to obtain blood pressure at this time.       Monika Rojas RN  08/11/22 0669       Monika Rojas RN  08/11/22 2370       Monika Rojas RN  08/11/22 8112 [facility]  08/11/22 1543

## 2022-08-11 NOTE — ED NOTES
SW attempted to awake Pt up to complete assessment. At this time Pt is extremely drowsy and unable to open eyes. Pt [address] was 158 @0004 and Pt also was medicated due to agitation at 0004. SW will continue to monitor and assess once more alert and oriented.       ARIANE Chandra, [facility]  08/11/22 3683

## 2022-08-11 NOTE — ED NOTES
Natasha Molina NP notified about need for review for potential admission. She responded that Dr. Laurent Guerra will review when he comes.      Jag Higgins RN  08/11/22 4924
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
## 2022-08-11 NOTE — ED PROVIDER NOTES
HPI:  8/10/22, Time: 11:44 PM EDT         Favio Valdovinos is a 46 y.o. male presenting to the ED for psychiatric evaluation, beginning 1 day ago. The complaint has been persistent, moderate in severity, and worsened by nothing. Patient was brought in by EMS. Patient was extremely agitated and reportedly making homicidal statements. She reportedly came in voluntarily. Patient reportedly had please home. EMS brought the patient here. Patient reporting no physical planes of chest pain shortness of breath or abdominal pain there is no history of fall or injury. Patient does report he wants to \"fuck people up \"patient reporting no thoughts of harming himself. Patient denying any hallucinations. ROS:   Pertinent positives and negatives are stated within HPI, all other systems reviewed and are negative.  --------------------------------------------- PAST HISTORY ---------------------------------------------  Past Medical History:  has a past medical history of Abdominal pain, Allergic rhinitis, Anxiety, Asthma, COPD (chronic obstructive pulmonary disease) ([address] Depression, Diabetes mellitus ([address] Encounter for screening colonoscopy, GERD (gastroesophageal reflux disease), Hyperlipidemia, Hypertension, Obesity, Osteoarthritis, and Sleep apnea. Past Surgical History:  has a past surgical history that includes Tonsillectomy (1983); hiatal hernia repair (2012); Tympanoplasty; Colonoscopy (06/08/2017); hernia repair (2012); Endoscopy, colon, diagnostic; pr drain skin abscess simple (N/A, 9/19/2018); EXCISION HIDRADENITIS OF INGUINAL / UMBILICAL AREA (N/A, 9/00/3714); Upper gastrointestinal endoscopy (N/A, 7/20/2020); Colonoscopy (N/A, 7/20/2020); Upper gastrointestinal endoscopy (N/A, 7/23/2021); Abdomen surgery (N/A, 6/24/2019); and Cholecystectomy, laparoscopic (N/A, 8/24/2021). Social History:  reports that he has been smoking cigarettes. He has a 35.00 pack-year smoking history.  He has never used smokeless tobacco. He reports current drug use. Frequency: 7.00 times per week. Drug: Marijuana Alatorrebrissa Colon). He reports that he does not drink alcohol. Family History: family history includes Asthma in his sister; COPD in his brother and mother; Cancer in his maternal grandmother, mother, and paternal grandmother; Diabetes in his brother, father, and sister; Heart Disease in his maternal grandmother and paternal grandfather; Heart Disease (age of onset: 43) in his brother; Heart Disease (age of onset: 48) in his brother; Heart Disease (age of onset: 46) in his father. The patients home medications have been reviewed. Allergies: Patient has no known allergies. ---------------------------------------------------PHYSICAL EXAM--------------------------------------    Constitutional/General: Alert and oriented x3, agitated anxious  Head: Normocephalic and atraumatic  Eyes: PERRL, EOMI  Mouth: Oropharynx clear, handling secretions, no trismus  Neck: Supple, full ROM, non tender to palpation in the midline, no stridor, no crepitus, no meningeal signs  Pulmonary: Lungs clear to auscultation bilaterally, no wheezes, rales, or rhonchi. Not in respiratory distress  Cardiovascular:  Regular rate. Regular rhythm. No murmurs, gallops, or rubs. 2+ distal pulses  Chest: no chest wall tenderness  Abdomen: Soft. Non tender. Non distended. +BS. No rebound, guarding, or rigidity. No pulsatile masses appreciated. Musculoskeletal: Moves all extremities x 4. Warm and well perfused, no clubbing, cyanosis, or edema. Capillary refill <3 seconds  Skin: warm and dry. No rashes.    Neurologic: GCS 15, CN 2-12 grossly intact, no focal deficits, symmetric strength 5/5 in the upper and lower extremities bilaterally  Psych: Agitated anxious homicidal not suicidal    -------------------------------------------------- RESULTS -------------------------------------------------  I have personally reviewed all laboratory and imaging results for this patient. Results are listed below.      LABS:  Results for orders placed or performed during the hospital encounter of 08/10/22   CBC with Auto Differential   Result Value Ref Range    WBC 11.7 (H) 4.5 - 11.5 E9/L    RBC 5.53 3.80 - 5.80 E12/L    Hemoglobin 16.6 (H) 12.5 - 16.5 g/dL    Hematocrit 49.1 37.0 - 54.0 %    MCV 88.8 80.0 - 99.9 fL    MCH 30.0 26.0 - 35.0 pg    MCHC 33.8 32.0 - 34.5 %    RDW 13.7 11.5 - 15.0 fL    Platelets 960 776 - 325 E9/L    MPV 10.6 7.0 - 12.0 fL    Neutrophils % 57.9 43.0 - 80.0 %    Immature Granulocytes % 1.2 0.0 - 5.0 %    Lymphocytes % 28.8 20.0 - 42.0 %    Monocytes % 7.8 2.0 - 12.0 %    Eosinophils % 3.2 0.0 - 6.0 %    Basophils % 1.1 0.0 - 2.0 %    Neutrophils Absolute 6.79 1.80 - 7.30 E9/L    Immature Granulocytes # 0.14 E9/L    Lymphocytes Absolute 3.37 1.50 - 4.00 E9/L    Monocytes Absolute 0.91 0.10 - 0.95 E9/L    Eosinophils Absolute 0.38 0.05 - 0.50 E9/L    Basophils Absolute 0.13 0.00 - 0.20 E9/L   Comprehensive Metabolic Panel   Result Value Ref Range    Sodium 133 132 - 146 mmol/L    Potassium 4.0 3.5 - 5.0 mmol/L    Chloride 99 98 - 107 mmol/L    CO2 18 (L) 22 - 29 mmol/L    Anion Gap 16 7 - 16 mmol/L    Glucose 219 (H) 74 - 99 mg/dL    BUN 7 6 - 20 mg/dL    Creatinine 0.8 0.7 - 1.2 mg/dL    GFR Non-African American >60 >=60 mL/min/1.73    GFR African American >60     Calcium 9.6 8.6 - 10.2 mg/dL    Total Protein 7.5 6.4 - 8.3 g/dL    Albumin 4.2 3.5 - 5.2 g/dL    Total Bilirubin <0.2 0.0 - 1.2 mg/dL    Alkaline Phosphatase 102 40 - 129 U/L    ALT 35 0 - 40 U/L    AST 24 0 - 39 U/L   Serum Drug Screen   Result Value Ref Range    Ethanol Lvl 158 mg/dL    Acetaminophen Level <5.0 (L) 10.0 - 73.1 mcg/mL    Salicylate, Serum <2.8 0.0 - 30.0 mg/dL    TCA Scrn NEGATIVE Cutoff:300 ng/mL   URINE DRUG SCREEN   Result Value Ref Range    Amphetamine Screen, Urine NOT DETECTED Negative <1000 ng/mL    Barbiturate Screen, Ur NOT DETECTED Negative < 200 ng/mL Benzodiazepine Screen, Urine NOT DETECTED Negative < 200 ng/mL    Cannabinoid Scrn, Ur POSITIVE (A) Negative < 50ng/mL    Cocaine Metabolite Screen, Urine NOT DETECTED Negative < 300 ng/mL    Opiate Scrn, Ur NOT DETECTED Negative < 300ng/mL    PCP Screen, Urine NOT DETECTED Negative < 25 ng/mL    Methadone Screen, Urine NOT DETECTED Negative <300 ng/mL    Oxycodone Urine NOT DETECTED Negative <100 ng/mL    FENTANYL SCREEN, URINE NOT DETECTED Negative <1 ng/mL    Drug Screen Comment: see below    EKG 12 Lead   Result Value Ref Range    Ventricular Rate 91 BPM    Atrial Rate 91 BPM    P-R Interval 146 ms    QRS Duration 106 ms    Q-T Interval 342 ms    QTc Calculation (Bazett) 420 ms    P Axis 37 degrees    R Axis 70 degrees    T Axis 53 degrees       RADIOLOGY:  Interpreted by Radiologist.  No orders to display     EKG: This EKG is signed and interpreted by me. Rate: 91  Rhythm: Sinus  Interpretation: non-specific EKG  Comparison: stable as compared to patient's most recent EKG        ------------------------- NURSING NOTES AND VITALS REVIEWED ---------------------------   The nursing notes within the ED encounter and vital signs as below have been reviewed by myself. BP (!) 147/104   Pulse (!) 114   Temp 98.3 °F (36.8 °C) (Oral)   Resp 17   Ht 5' 5\" (1.651 m)   Wt 266 lb (120.7 kg)   SpO2 98%   BMI 44.26 kg/m²   Oxygen Saturation Interpretation: Normal    The patients available past medical records and past encounters were reviewed. ------------------------------ ED COURSE/MEDICAL DECISION MAKING----------------------  Medications   ziprasidone (GEODON) injection 10 mg (10 mg IntraMUSCular Given 8/11/22 0004)             Medical Decision Making:   Patient presenting here because of psychiatric evaluation. Patient presenting here because he was agitated and reportedly making homicidal threats. Patient presented here with EMS police were involved.   Patient was pink slipped by me here in the emergency department due to his behavior. Patient is agitated and verbally abusive. Patient labs are reviewed as well as EKG. Patient will be admitted to general psychiatric unit. Patient was medicated with Geodon here in the emergency department. Re-Evaluations:             Re-evaluation. Patients symptoms show no change      Consultations:                 Critical Care: This patient's ED course included: a personal history and physicial eaxmination    This patient has been closely monitored during their ED course. Counseling: The emergency provider has spoken with the patient and discussed todays results, in addition to providing specific details for the plan of care and counseling regarding the diagnosis and prognosis. Questions are answered at this time and they are agreeable with the plan.       --------------------------------- IMPRESSION AND DISPOSITION ---------------------------------    IMPRESSION  1. Homicidal ideation        DISPOSITION  Disposition: Admit to mental health unit - medically cleared for admission  Patient condition is stable        NOTE: This report was transcribed using voice recognition software.  Every effort was made to ensure accuracy; however, inadvertent computerized transcription errors may be present          Nikolay Chanel MD  08/11/22 7302       Nikolay Chanel MD  08/11/22 3667
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
## 2022-08-11 NOTE — ED NOTES
Assigned [id number] to [facility] in admitting     [facility]  08/11/22 3156

## 2022-08-11 NOTE — ED NOTES
Jourdan Wilson NP notified of improved BP and lungs sounds after morning BP medication and breathing treatment. She will call in a few minutes to review for possible admission.       Marco A Jiménez RN  08/11/22 5686

## 2022-08-12 LAB
METER GLUCOSE: 265 MG/DL (ref 74–99)
METER GLUCOSE: 290 MG/DL (ref 74–99)

## 2022-08-12 PROCEDURE — 99232 SBSQ HOSP IP/OBS MODERATE 35: CPT | Performed by: NURSE PRACTITIONER

## 2022-08-12 PROCEDURE — 1240000000 HC EMOTIONAL WELLNESS R&B

## 2022-08-12 PROCEDURE — 6370000000 HC RX 637 (ALT 250 FOR IP): Performed by: PSYCHIATRY & NEUROLOGY

## 2022-08-12 PROCEDURE — 6360000002 HC RX W HCPCS: Performed by: NURSE PRACTITIONER

## 2022-08-12 PROCEDURE — 6370000000 HC RX 637 (ALT 250 FOR IP): Performed by: NURSE PRACTITIONER

## 2022-08-12 PROCEDURE — 94640 AIRWAY INHALATION TREATMENT: CPT

## 2022-08-12 PROCEDURE — 82962 GLUCOSE BLOOD TEST: CPT

## 2022-08-12 RX ADMIN — DIVALPROEX SODIUM 500 MG: 500 TABLET, DELAYED RELEASE ORAL at 21:04

## 2022-08-12 RX ADMIN — ATORVASTATIN CALCIUM 40 MG: 40 TABLET, FILM COATED ORAL at 21:04

## 2022-08-12 RX ADMIN — MELATONIN 3 MG ORAL TABLET 3 MG: 3 TABLET ORAL at 21:05

## 2022-08-12 RX ADMIN — LISINOPRIL 40 MG: 20 TABLET ORAL at 09:00

## 2022-08-12 RX ADMIN — PANTOPRAZOLE SODIUM 40 MG: 40 TABLET, DELAYED RELEASE ORAL at 09:00

## 2022-08-12 RX ADMIN — DOXAZOSIN 2 MG: 2 TABLET ORAL at 09:00

## 2022-08-12 RX ADMIN — ARFORMOTEROL TARTRATE 15 MCG: 15 SOLUTION RESPIRATORY (INHALATION) at 20:29

## 2022-08-12 RX ADMIN — BUDESONIDE 500 MCG: 0.5 SUSPENSION RESPIRATORY (INHALATION) at 20:29

## 2022-08-12 RX ADMIN — TAMSULOSIN HYDROCHLORIDE 0.4 MG: 0.4 CAPSULE ORAL at 08:59

## 2022-08-12 RX ADMIN — BUDESONIDE 500 MCG: 0.5 SUSPENSION RESPIRATORY (INHALATION) at 10:07

## 2022-08-12 RX ADMIN — DIVALPROEX SODIUM 500 MG: 500 TABLET, DELAYED RELEASE ORAL at 09:00

## 2022-08-12 RX ADMIN — ARFORMOTEROL TARTRATE 15 MCG: 15 SOLUTION RESPIRATORY (INHALATION) at 10:06

## 2022-08-12 ASSESSMENT — SLEEP AND FATIGUE QUESTIONNAIRES
DO YOU HAVE DIFFICULTY SLEEPING: YES
DO YOU USE A SLEEP AID: NO
AVERAGE NUMBER OF SLEEP HOURS: 5
SLEEP PATTERN: DIFFICULTY FALLING ASLEEP;DISTURBED/INTERRUPTED SLEEP

## 2022-08-12 ASSESSMENT — LIFESTYLE VARIABLES
HOW OFTEN DO YOU HAVE A DRINK CONTAINING ALCOHOL: MONTHLY OR LESS
HOW MANY STANDARD DRINKS CONTAINING ALCOHOL DO YOU HAVE ON A TYPICAL DAY: 10 OR MORE

## 2022-08-12 ASSESSMENT — PAIN SCALES - GENERAL
PAINLEVEL_OUTOF10: 0
PAINLEVEL_OUTOF10: 0

## 2022-08-12 ASSESSMENT — PATIENT HEALTH QUESTIONNAIRE - PHQ9: SUM OF ALL RESPONSES TO PHQ QUESTIONS 1-9: 6
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
## 2022-08-12 NOTE — CARE COORDINATION
Pt approached SW and asked if SW called his wife. SW advised pt that he denied singing an TOM for his wife at this time. Pt stated he will sign the TOM so he can be discharged today. SW advised pt that discharge is still up to the doctor and signing the TOM does not guarantee he will be discharging today. PT stated that is fine but he will be discharging tomorrow. SW advised pt again that discharge is still up to the doctor and a set discharge date has not been determined. Pt stated he will be calling his  then to get him discharged because he does not need to be here. At this time pt walked away. TOM signed for wife Estefani Angeles [phone].

## 2022-08-12 NOTE — BH NOTE
Patient was given phone number to [facility]. Ask patient if called . Patient states no had other things that he was working on.

## 2022-08-12 NOTE — PROGRESS NOTES
CABG    Diabetes Father     Diabetes Sister     Diabetes Brother     Heart Disease Brother 43        MI    Heart Disease Maternal Grandmother     Cancer Maternal Grandmother     Cancer Paternal Grandmother         colon    Heart Disease Paternal Grandfather     Asthma Sister     COPD Brother     Heart Disease Brother 48        CABG     Social History     Socioeconomic History    Marital status:      Spouse name: Not on file    Number of children: Not on file    Years of education: Not on file    Highest education level: Not on file   Occupational History    Occupation: sales   Tobacco Use    Smoking status: Every Day     Packs/day: 1.00     Years: 35.00     Pack years: 35.00     Types: Cigarettes    Smokeless tobacco: Never   Vaping Use    Vaping Use: Never used   Substance and Sexual Activity    Alcohol use: No     Alcohol/week: 0.0 standard drinks    Drug use: Yes     Frequency: 7.0 times per week     Types: Marijuana (Weed)     Comment: currently    Sexual activity: Yes     Partners: Female   Other Topics Concern    Not on file   Social History Narrative    Not on file     Social Determinants of Health     Financial Resource Strain: Low Risk     Difficulty of Paying Living Expenses: Not very hard   Food Insecurity: No Food Insecurity    Worried About Running Out of Food in the Last Year: Never true    Ran Out of Food in the Last Year: Never true   Transportation Needs: Not on file   Physical Activity: Not on file   Stress: Not on file   Social Connections: Not on file   Intimate Partner Violence: Not on file   Housing Stability: Not on file           ROS:  [x] All negative/unchanged except if checked.  Explain positive(checked items) below:  [] Constitutional  [] Eyes  [] Ear/Nose/Mouth/Throat  [] Respiratory  [] CV  [] GI  []   [] Musculoskeletal  [] Skin/Breast  [] Neurological  [] Endocrine  [] Heme/Lymph  [] Allergic/Immunologic    Explanation:     MEDICATIONS:    Current Facility-Administered Medications:     acetaminophen (TYLENOL) tablet 650 mg, 650 mg, Oral, Q6H PRN, Abril Garcia MD    magnesium hydroxide (MILK OF MAGNESIA) 400 MG/5ML suspension 30 mL, 30 mL, Oral, Daily PRN, Abril Garcia MD    nicotine (NICODERM CQ) 21 MG/24HR 1 patch, 1 patch, TransDERmal, Daily, Abril Garcia MD, 1 patch at 08/12/22 0900    aluminum & magnesium hydroxide-simethicone (MAALOX) 200-200-20 MG/5ML suspension 30 mL, 30 mL, Oral, PRN, Abril Garcia MD    hydrOXYzine pamoate (VISTARIL) capsule 50 mg, 50 mg, Oral, TID PRN, Abril Garcia MD    haloperidol (HALDOL) tablet 5 mg, 5 mg, Oral, Q6H PRN **OR** haloperidol lactate (HALDOL) injection 5 mg, 5 mg, IntraMUSCular, Q6H PRN, Abril Garcia MD    melatonin tablet 3 mg, 3 mg, Oral, Nightly, Abril Garcia MD, 3 mg at 08/11/22 2116    divalproex (DEPAKOTE) DR tablet 500 mg, 500 mg, Oral, 2 times per day, ANNABEL Zabala - CNP, 229 mg at 08/12/22 0900    chlordiazePOXIDE (LIBRIUM) capsule 25 mg, 25 mg, Oral, N0H PRN, ANNABEL Feliciano CNP    diphenhydrAMINE (BENADRYL) injection 50 mg, 50 mg, IntraMUSCular, G6Q PRN, ANNABEL Feliciano CNP    atorvastatin (LIPITOR) tablet 40 mg, 40 mg, Oral, Nightly, Tammie ANNABEL Gonzalez - CNP, 40 mg at 08/11/22 2116    lisinopril (PRINIVIL;ZESTRIL) tablet 40 mg, 40 mg, Oral, Daily, TammieANNABEL Osman - CNP, 40 mg at 08/12/22 0900    albuterol (PROVENTIL) nebulizer solution 2.5 mg, 2.5 mg, Nebulization, N3X PRN, ANNABEL Feliciano CNP    budesonide (PULMICORT) nebulizer suspension 500 mcg, 0.5 mg, Nebulization, BID, Houston Radarlings Dellick, APRN - CNP, 251 mcg at 08/12/22 1007    Arformoterol Tartrate (BROVANA) nebulizer solution 15 mcg, 15 mcg, Nebulization, BID, Houston Rajordan Dellick, APRN - CNP, 15 mcg at 08/12/22 1006    glucose chewable tablet 16 g, 4 tablet, Oral, PRN, [employer], APRN - CNP    dextrose bolus 10% 125 mL, 125 mL, IntraVENous, PRN **OR** dextrose bolus 10% 250 mL, 250 mL, IntraVENous,      Recent Labs     08/11/22  0004      K 4.0   CL 99   CO2 18*   BUN 7   CREATININE 0.8   GLUCOSE 219*     Recent Labs     08/11/22  0004   BILITOT <0.2   ALKPHOS 102   AST 24   ALT 35     Lab Results   Component Value Date/Time    LABAMPH NOT DETECTED 08/11/2022 12:04 AM    BARBSCNU NOT DETECTED 08/11/2022 12:04 AM    LABBENZ NOT DETECTED 08/11/2022 12:04 AM    LABMETH NOT DETECTED 08/11/2022 12:04 AM    OPIATESCREENURINE NOT DETECTED 08/11/2022 12:04 AM    PHENCYCLIDINESCREENURINE NOT DETECTED 08/11/2022 12:04 AM    ETOH 23 08/11/2022 06:03 AM     Lab Results   Component Value Date/Time    TSH 1.250 09/17/2021 12:00 PM     No results found for: LITHIUM  No results found for: VALPROATE, CBMZ      Treatment Plan:  Reviewed current Medications with the patient. Risks, benefits, side effects, drug-to-drug interactions and alternatives to treatment were discussed. Collateral information:   CD evaluation  Encourage patient to attend group and other milieu activities.   Discharge planning discussed with the patient and treatment team.    Continue Depakote 500 mg twice daily      PSYCHOTHERAPY/COUNSELING:  [x] Therapeutic interview  [x] Supportive  [] CBT  [] Ongoing  [] Other    [x] Patient continues to need, on a daily basis, active treatment furnished directly by or requiring the supervision of inpatient psychiatric personnel      Anticipated Length of stay: 3 to 7 days based on stability             Electronically signed by ANNABEL Barkley CNP on 2/46/7522 at 1:24 PM

## 2022-08-12 NOTE — GROUP NOTE
Group Therapy Note    Date: 8/12/2022    Group Start Time: 1000  Group End Time: 1050  Group Topic: Psychoeducation    [facility]    Yousif Ruby, [place]                                                                        Group Therapy Note    Date: 8/12/2022    Type of Group: Psychoeducation    Wellness Binder Information  Module Name:  Id of Stressors     Patient's Goal:  Patient will be able to id daily and life events he/she is currently experiencing. Notes:  Patient engaged in group and willing to share appropriately thru out group. Status After Intervention:  Improved    Participation Level:  Active Listener and Interactive    Participation Quality: Appropriate, Attentive, and Sharing      Speech:  normal      Thought Process/Content: Logical      Affective Functioning: Congruent      Mood: euthymic      Level of consciousness:  Alert, Oriented x4, and Attentive      Response to Learning: Able to verbalize/acknowledge new learning, Able to retain information, and Progressing to goal      Endings: None Reported    Modes of Intervention: Education, Support, Socialization, Exploration, and Problem-solving      Discipline Responsible: Psychoeducational Specialist      Signature:  Tunde Cuellar
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
## 2022-08-12 NOTE — GROUP NOTE
Group Therapy Note    Date: 8/12/2022    Group Start Time: 1116  Group End Time: 0151  Group Topic: Cognitive Skills    [facility]    ARIANE Weller LSW        Group Therapy Note    Attendees: 11       Patient's Goal:  Pt will be able to verbalize understanding of self-care, and why it is important. Notes:  Pt made connections and participated in group. Status After Intervention:  Improved    Participation Level:  Active Listener and Interactive    Participation Quality: Appropriate, Attentive, Sharing, and Supportive      Speech:  normal      Thought Process/Content: Logical  Linear      Affective Functioning: Congruent      Mood: depressed      Level of consciousness:  Alert, Oriented x4, and Attentive      Response to Learning: Able to verbalize current knowledge/experience      Endings: None Reported    Modes of Intervention: Education, Support, Socialization, and Exploration      Discipline Responsible: /Counselor      Signature:  ARIANE Weller LSW
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
## 2022-08-12 NOTE — BH NOTE
Patient ask again if called [facility]. Verbalizes no that was working on safety questioner for discharge. 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

## 2022-08-12 NOTE — BH NOTE
Patient was out on unit,walking unit with peer. Denies suicidal,homicidal or AV hallucinations. Still observed anxious, unable to sit still. Avoiding to call for help with [facility]. No behaviors observed or reported. Q 15 minute rounds continue.

## 2022-08-12 NOTE — CONSULTS
[facility]   History and Physical      CHIEF COMPLAINT:  HTN and DM    Reason for Admission:  Bipolar 1 disorder    History Obtained From:  patient    HISTORY OF PRESENT ILLNESS:      The patient is a 46 y.o. male of Bri Velasco MD with significant past medical history of anxiety, COPD, depression, DM, HTN, HLD, GERD who presented to the emergency room on 8/10 for psych evaluation. Patient was brought in by EMS due to extremely agitated and making homicidal statements. Patient was evaluated by psych and met inpatient psych criteria and therefore admitted to the behavioral health unit. Internal medicine was consulted for diabetes and hypertension management. Is awake alert oriented x3. Resting comfortably in no apparent acute distress-asking for his home blood pressure medication and respiratory meds to be resumed. Hemoglobin A1c is elevated at 9.4-noncompliance most likely the issue.           All labs personally reviewed   All imaging personally reviewed     Past Medical History:        Diagnosis Date    Abdominal pain     nausea    Allergic rhinitis     Anxiety     Asthma     stable    COPD (chronic obstructive pulmonary disease) ([facility])     controlled with inhalers    Depression     no meds    Diabetes mellitus ([facility] [address]     Encounter for screening colonoscopy     and EGD 7-23-21    GERD (gastroesophageal reflux disease)     Hyperlipidemia     Hypertension     Obesity     Osteoarthritis     Sleep apnea     BMS CPAP 7, not using     Past Surgical History:        Procedure Laterality Date    ABDOMEN SURGERY N/A 6/24/2019    INCISION AND DRAINAGE PERINEAL ABSCESS, INCISIONAL DEBRIDEMENT performed by Meir Kim MD at [facility], LAPAROSCOPIC N/A 8/24/2021    LAPAROSCOPIC ROBOTIC ASISTED CHOLECYSTECTOMY performed by Luna Guillen MD at [address] [place] [address]  06/08/2017    Dr. Tim Benson polyp, diverticulitis    COLONOSCOPY N/A 7/20/2020    COLONOSCOPY POLYPECTOMY SNARE/COLD BIOPSY performed by Collette Riding, MD at [address] [facility], COLON, DIAGNOSTIC      EXCISION HIDRADENITIS OF INGUINAL / UMBILICAL AREA N/A 8/16/1214    DEBRIDEMENT PERINEAL WOUND performed by Mick Green MD at [facility]  2012    Dr. Jazmin Dela Cruz N/A 9/19/2018    I AND D BACK ABCESS performed by Essence Waters MD at [address] [place] [address] 7/20/2020    EGD BIOPSY performed by Collette Riding, MD at [place] [address] N/A 7/23/2021    EGD BIOPSY performed by Terra Shearer MD at [facility] ENDOSCOPY         Medications Prior to Admission:    Medications Prior to Admission: ARIPiprazole (ABILIFY) 2 MG tablet, Take 2 mg by mouth in the morning. Fluticasone-Umeclidin-Vilant 200-62.5-25 MCG/INH AEPB, Inhale 1 puff into the lungs in the morning.   [DISCONTINUED] ARIPiprazole (ABILIFY) 2 MG tablet, take 1 tablet by mouth once daily  albuterol sulfate HFA (VENTOLIN HFA) 108 (90 Base) MCG/ACT inhaler, inhale 2 puffs by mouth and INTO THE LUNGS every 6 hours if needed for wheezing (Patient taking differently: Inhale 2 puffs into the lungs every 6 hours as needed inhale 2 puffs by mouth and INTO THE LUNGS every 6 hours if needed for wheezing)  terazosin (HYTRIN) 2 MG capsule, Take 1 capsule by mouth nightly  lisinopril (PRINIVIL;ZESTRIL) 40 MG tablet, take 1 tablet by mouth once daily  atorvastatin (LIPITOR) 40 MG tablet, take 1 tablet by mouth once daily  omeprazole (PRILOSEC) 40 MG delayed release capsule, take 1 capsule by mouth once daily  mometasone-formoterol (DULERA) 200-5 MCG/ACT inhaler, inhale 2 puffs by mouth and INTO THE LUNGS twice a day (Patient taking differently: Inhale 2 puffs into the lungs 2 times daily inhale 2 puffs by mouth and INTO THE LUNGS twice a day)  tamsulosin (FLOMAX) 0.4 MG capsule, Take 1 EXAM:  General:  Awake, alert, oriented X 3. Well developed, well nourished, well groomed. No apparent distress. HEENT:  Normocephalic, atraumatic. Pupils equal, round, reactive to light. No scleral icterus. No conjunctival injection. Normal lips, teeth, and gums. No nasal discharge. Neck:  Supple, FROM  Heart:  RRR, no murmurs, gallops, rubs, carotid upstroke normal, no carotid bruits  Lungs:  CTA bilaterally, bilat symmetrical expansion, no wheeze, rales, or rhonchi  Abdomen: Bowel sounds present, soft, nontender, no masses, no organomegaly, no peritoneal signs  Extremities:  No clubbing, cyanosis, or edema  Skin:  Warm and dry, no open lesions or rash  Neuro:  Cranial nerves 2-12 intact, no focal deficits  Vascular: Radial and pedal Pulses 2+  Breast: deferred  Rectal: deferred  Genitalia:  deferred      DATA:     Recent Labs     08/11/22  0004   WBC 11.7*   HGB 16.6*        Recent Labs     08/11/22  0004      K 4.0   BUN 7   CREATININE 0.8     Recent Labs     08/11/22  0004   PROT 7.5     Recent Labs     08/11/22  0004   AST 24   ALT 35   ALKPHOS 102   BILITOT <0.2     No results for input(s): BNP in the last 72 hours. No results for input(s): CKTOTAL, CKMB, CKMBINDEX, TROPONINI in the last 72 hours. ASSESSMENT:      Principal Problem:    Bipolar 1 disorder ([facility] [address]  Active Problems:    Antisocial personality disorder ([facility] [address]    Alcohol abuse  Resolved Problems:    * No resolved hospital problems.  *        PLAN:  Patient is a 55-year-old male admitted to [facility] for  Bipolar 1 disorder  -Management per psychiatry    Diabetes Mellitus  -Resume home medications, probable noncompliance, A1c 9.4  -Monitor labs  -ISS glucose control  -Hypoglycemia protocol initiated  -Change regular diet to carb controlled    Hypertension  -Continue lisinopril 40 mg daily  -Monitor vitals  -Nursing communication to obtain medication list from PCP and update home med list.    History of COPD  Resume home medications/inhalers      Kumar Das MD

## 2022-08-12 NOTE — PROGRESS NOTES
Attended community meeting. Updated on staffing and daily expectations. Shared goal for the day as to help others thru the day.

## 2022-08-13 LAB
METER GLUCOSE: 209 MG/DL (ref 74–99)
METER GLUCOSE: 248 MG/DL (ref 74–99)
METER GLUCOSE: 280 MG/DL (ref 74–99)
METER GLUCOSE: 284 MG/DL (ref 74–99)

## 2022-08-13 PROCEDURE — 6370000000 HC RX 637 (ALT 250 FOR IP): Performed by: PSYCHIATRY & NEUROLOGY

## 2022-08-13 PROCEDURE — 6370000000 HC RX 637 (ALT 250 FOR IP): Performed by: NURSE PRACTITIONER

## 2022-08-13 PROCEDURE — 82962 GLUCOSE BLOOD TEST: CPT

## 2022-08-13 PROCEDURE — 1240000000 HC EMOTIONAL WELLNESS R&B

## 2022-08-13 PROCEDURE — 94640 AIRWAY INHALATION TREATMENT: CPT

## 2022-08-13 PROCEDURE — 6360000002 HC RX W HCPCS: Performed by: NURSE PRACTITIONER

## 2022-08-13 PROCEDURE — 99232 SBSQ HOSP IP/OBS MODERATE 35: CPT | Performed by: NURSE PRACTITIONER

## 2022-08-13 RX ADMIN — TAMSULOSIN HYDROCHLORIDE 0.4 MG: 0.4 CAPSULE ORAL at 08:49

## 2022-08-13 RX ADMIN — INSULIN LISPRO 1 UNITS: 100 INJECTION, SOLUTION INTRAVENOUS; SUBCUTANEOUS at 08:56

## 2022-08-13 RX ADMIN — INSULIN LISPRO 2 UNITS: 100 INJECTION, SOLUTION INTRAVENOUS; SUBCUTANEOUS at 12:34

## 2022-08-13 RX ADMIN — DIVALPROEX SODIUM 500 MG: 500 TABLET, DELAYED RELEASE ORAL at 08:50

## 2022-08-13 RX ADMIN — MELATONIN 3 MG ORAL TABLET 3 MG: 3 TABLET ORAL at 21:20

## 2022-08-13 RX ADMIN — DOXAZOSIN 2 MG: 2 TABLET ORAL at 08:49

## 2022-08-13 RX ADMIN — ATORVASTATIN CALCIUM 40 MG: 40 TABLET, FILM COATED ORAL at 21:20

## 2022-08-13 RX ADMIN — INSULIN LISPRO 1 UNITS: 100 INJECTION, SOLUTION INTRAVENOUS; SUBCUTANEOUS at 17:26

## 2022-08-13 RX ADMIN — LISINOPRIL 40 MG: 20 TABLET ORAL at 08:49

## 2022-08-13 RX ADMIN — BUDESONIDE 500 MCG: 0.5 SUSPENSION RESPIRATORY (INHALATION) at 21:07

## 2022-08-13 RX ADMIN — DIVALPROEX SODIUM 500 MG: 500 TABLET, DELAYED RELEASE ORAL at 21:20

## 2022-08-13 RX ADMIN — ARFORMOTEROL TARTRATE 15 MCG: 15 SOLUTION RESPIRATORY (INHALATION) at 21:07

## 2022-08-13 RX ADMIN — PANTOPRAZOLE SODIUM 40 MG: 40 TABLET, DELAYED RELEASE ORAL at 08:49

## 2022-08-13 ASSESSMENT — PAIN SCALES - GENERAL
PAINLEVEL_OUTOF10: 0
PAINLEVEL_OUTOF10: 0

## 2022-08-13 NOTE — PLAN OF CARE
Patient was out on unit,social with peers. Eye contact is good. Affect is brightened. Denies suicidal,homicidal or AV hallucinations. Verbalizes anxiety 1-2 out of 10. Denies depression. Appetite , \"I'm eating all ordered meals. Sleeping good. Verbalizes that he finally came to terms that he was not ok to drink. Just got here running my mouth at people. Compliant with medications. Attending groups. Q 15 minute rounds continue.
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
## 2022-08-13 NOTE — GROUP NOTE
Group Therapy Note    Date: 8/13/2022    Group Start Time: 1100  Group End Time: 1130  Group Topic: Cognitive Skills    [facility]    TERRELL Kapoor        Group Therapy Note    Attendees: 15       Patient's Goal:  Pt will be able to challenge negative thoughts. Notes:  Pt was an active participant in group discussion. Status After Intervention:  Improved    Participation Level: Active Listener and Interactive    Participation Quality: Appropriate, Attentive, Sharing, and Supportive      Speech:  normal      Thought Process/Content: Logical  Linear      Affective Functioning: Congruent      Mood: anxious and euthymic      Level of consciousness:  Alert, Oriented x4, and Attentive      Response to Learning: Able to verbalize current knowledge/experience, Able to verbalize/acknowledge new learning, Able to retain information, Capable of insight, and Progressing to goal      Endings: None Reported    Modes of Intervention: Education, Support, Socialization, Exploration, Clarifying, and Problem-solving      Discipline Responsible: /Counselor      Signature:   Jamil Kapoor

## 2022-08-13 NOTE — PROGRESS NOTES
Wife Shala Flavia) called for up dated status. Verbalizes that patient does sound better, but still verbalizes concern how patient will act on discharge. States that he will act one way in public,but another when at home with her.

## 2022-08-13 NOTE — BH NOTE
Patient was out on unit,social watching tv with peers. Denies suicidal,homicidal or AV hallucinations. Denies anxiety or depression. Compliant with medications and attends groups. Q 15 minute rounds continue.

## 2022-08-13 NOTE — GROUP NOTE
Group Therapy Note    Date: 8/13/2022    Group Start Time: 1000  Group End Time: 2236  Group Topic: Psychoeducation    [facility]    [place], [place]                                                                        Group Therapy Note    Date: 8/13/2022    Type of Group: Psychoeducation    Wellness Binder Information  Module Name:  coping skills for life    Patient's Goal:Patient will be able to identify new coping skills used to manage daily stressors. Notes: Patient pleasant and sharing appropriately. Status After Intervention:  Improved    Participation Level:  Active Listener and Interactive    Participation Quality: Appropriate and Attentive      Speech:  normal      Thought Process/Content: Logical      Affective Functioning: Congruent      Mood: euthymic      Level of consciousness:  Alert, Oriented x4, and Attentive      Response to Learning: Able to verbalize/acknowledge new learning, Able to retain information, and Progressing to goal      Endings: None Reported    Modes of Intervention: Education, Support, Socialization, Exploration, and Problem-solving      Discipline Responsible: Psychoeducational Specialist      Signature:  Reilly Mejia

## 2022-08-13 NOTE — PROGRESS NOTES
[facility]  Day 3 Interdisciplinary Treatment Plan NOTE    Review Date & Time: 8/13/22 10:00 am    Patient was not in treatment team    Estimated Length of Stay Update:  3-5 days  Estimated Discharge Date Update: 3-5 days    EDUCATION:   Learner Progress Toward Treatment Goals: Reviewed signs, symptoms and risk of self harm and violent behavior    Method: Individual    Outcome: Needs reinforcement    PATIENT GOALS: keep adapting to medication    PLAN/TREATMENT RECOMMENDATIONS UPDATE:continue current plan of care and monitor    GOALS UPDATE:   Time frame for Short-Term Goals: 3=5 days      Noah Joseph RN

## 2022-08-13 NOTE — PROGRESS NOTES
Spiritual Support Group Note    Number of Participants in Group:                        Time:     Goal: Relief from isolation and loneliness             Belinda Sharing             Self-understanding and gain insight              Acceptance and belonging            Recognize they are not alone                Socialization             Empowerment       Encouragement    Topic:  [x] Spiritual Wellness and Self Care                  [x] Hope                     [] Connecting with Divine/Others        [] Thankfulness and Gratitude               [x]  Meaningfulness and Purpose               [] Forgiveness               [] Peace               [] Connect to [facility]      [] Other    Participation Level:   [x] Active Listener   [] Minimal   [] Monopolizing   [] Interactive   [] No Participation   []  Other:     Attention:   [x] Alert   [] Distractible   [] Drowsy   [] Poor   [] Other:    Manner:   [x] Cooperative   [] Suspicious   [] Withdrawn   [] Guarded   [] Irritable   [] Inhospitable   [] Other:     Others Comments from Group:

## 2022-08-13 NOTE — PROGRESS NOTES
BEHAVIORAL HEALTH FOLLOW-UP NOTE     8/13/2022     Patient was seen and examined in person, Chart reviewed   Patient's case discussed with staff/team    Chief Complaint: \" I am feeling okay today\"    Interim History:   Patient seen upon the unit he is watching TV he seems less irritable today. He is still focused on discharge but able to have a conversation that does not focus completely around leaving. He states that he has talked to his wife and that she wants him to [facility] on myself. \"  He believes that Depakote is helping him. He denies SI/HI intent or plan denies auditory or visual hallucinations still is very poor limited insight and judgment's collateral information received from patient was indicates significant Deepa about patient's behaviors and mood patient been extremely agitated and aggressive at home with family members.   Patient is impulsive and easily agitated          Appetite:   [x] Normal/Unchanged  [] Increased  [] Decreased      Sleep:       [x] Normal/Unchanged  [] Fair       [] Poor              Energy:    [x] Normal/Unchanged  [] Increased  [] Decreased        SI [] Present  [x] Absent    HI  []Present  [x] Absent     Aggression:  [] yes  [x] no    Patient is [x] able  [] unable to CONTRACT FOR SAFETY     PAST MEDICAL/PSYCHIATRIC HISTORY:   Past Medical History:   Diagnosis Date    Abdominal pain     nausea    Allergic rhinitis     Anxiety     Asthma     stable    COPD (chronic obstructive pulmonary disease) ([facility]     controlled with inhalers    Depression     no meds    Diabetes mellitus ([facility]     Encounter for screening colonoscopy     and EGD 7-23-21    GERD (gastroesophageal reflux disease)     Hyperlipidemia     Hypertension     Obesity     Osteoarthritis     Sleep apnea     BMS CPAP 7, not using       FAMILY/SOCIAL HISTORY:  Family History   Problem Relation Age of Onset    COPD Mother     Cancer Mother     Heart Disease Father 46        CABG    Diabetes Father     Diabetes Sister Diabetes Brother     Heart Disease Brother 43        MI    Heart Disease Maternal Grandmother     Cancer Maternal Grandmother     Cancer Paternal Grandmother         colon    Heart Disease Paternal Grandfather     Asthma Sister     COPD Brother     Heart Disease Brother 48        CABG     Social History     Socioeconomic History    Marital status:      Spouse name: Not on file    Number of children: Not on file    Years of education: Not on file    Highest education level: Not on file   Occupational History    Occupation: sales   Tobacco Use    Smoking status: Every Day     Packs/day: 1.00     Years: 35.00     Pack years: 35.00     Types: Cigarettes    Smokeless tobacco: Never   Vaping Use    Vaping Use: Never used   Substance and Sexual Activity    Alcohol use: No     Alcohol/week: 0.0 standard drinks    Drug use: Yes     Frequency: 7.0 times per week     Types: Marijuana (Weed)     Comment: currently    Sexual activity: Yes     Partners: Female   Other Topics Concern    Not on file   Social History Narrative    Not on file     Social Determinants of Health     Financial Resource Strain: Low Risk     Difficulty of Paying Living Expenses: Not very hard   Food Insecurity: No Food Insecurity    Worried About Running Out of Food in the Last Year: Never true    Ran Out of Food in the Last Year: Never true   Transportation Needs: Not on file   Physical Activity: Not on file   Stress: Not on file   Social Connections: Not on file   Intimate Partner Violence: Not on file   Housing Stability: Not on file           ROS:  [x] All negative/unchanged except if checked.  Explain positive(checked items) below:  [] Constitutional  [] Eyes  [] Ear/Nose/Mouth/Throat  [] Respiratory  [] CV  [] GI  []   [] Musculoskeletal  [] Skin/Breast  [] Neurological  [] Endocrine  [] Heme/Lymph  [] Allergic/Immunologic    Explanation:     MEDICATIONS:    Current Facility-Administered Medications:     acetaminophen (TYLENOL) tablet 650 mg, 650 mg, Oral, Q6H PRN, Marylee Buckle, MD    magnesium hydroxide (MILK OF MAGNESIA) 400 MG/5ML suspension 30 mL, 30 mL, Oral, Daily PRN, Marylee Buckle, MD    nicotine (NICODERM CQ) 21 MG/24HR 1 patch, 1 patch, TransDERmal, Daily, Marylee Buckle, MD, 1 patch at 08/13/22 0849    aluminum & magnesium hydroxide-simethicone (MAALOX) 200-200-20 MG/5ML suspension 30 mL, 30 mL, Oral, PRN, Marylee Buckle, MD    hydrOXYzine pamoate (VISTARIL) capsule 50 mg, 50 mg, Oral, TID PRN, Marylee Buckle, MD    haloperidol (HALDOL) tablet 5 mg, 5 mg, Oral, Q6H PRN **OR** haloperidol lactate (HALDOL) injection 5 mg, 5 mg, IntraMUSCular, Q6H PRN, Marylee Buckle, MD    melatonin tablet 3 mg, 3 mg, Oral, Nightly, Marylee Buckle, MD, 3 mg at 08/12/22 2105    divalproex (DEPAKOTE) DR tablet 500 mg, 500 mg, Oral, 2 times per day, ANNABEL Mackey CNP, 414 mg at 08/13/22 0850    chlordiazePOXIDE (LIBRIUM) capsule 25 mg, 25 mg, Oral, Z9R PRN, ANNABEL Cota CNP    diphenhydrAMINE (BENADRYL) injection 50 mg, 50 mg, IntraMUSCular, T8L PRN, ANNABEL Cota CNP    atorvastatin (LIPITOR) tablet 40 mg, 40 mg, Oral, Nightly, ANNABEL Samuel CNP, 40 mg at 08/12/22 2104    lisinopril (PRINIVIL;ZESTRIL) tablet 40 mg, 40 mg, Oral, Daily, ANNABEL Cota CNP, 40 mg at 08/13/22 0849    albuterol (PROVENTIL) nebulizer solution 2.5 mg, 2.5 mg, Nebulization, K9S PRN, Shayna Aures Dellick, APRN - CNP    budesonide (PULMICORT) nebulizer suspension 500 mcg, 0.5 mg, Nebulization, BID, ANNABEL Cota CNP, 127 mcg at 08/12/22 2029    Arformoterol Tartrate (BROVANA) nebulizer solution 15 mcg, 15 mcg, Nebulization, BID, ANNABEL Cota CNP, 15 mcg at 08/12/22 2029    glucose chewable tablet 16 g, 4 tablet, Oral, PRN, ANNABEL Cavazos CNP    dextrose bolus 10% 125 mL, 125 mL, IntraVENous, PRN **OR** dextrose bolus 10% 250 mL, 250 mL, IntraVENous, PRN, ANNABEL Cavazos CNP    glucagon (rDNA) injection 1 mg, 1 mg, SubCUTAneous, PRN, Corina Mills, APRN - CNP    dextrose 10 % infusion, , IntraVENous, Continuous PRN, Corina Mills, APRN - CNP    insulin lispro (HUMALOG) injection vial 0-4 Units, 0-4 Units, SubCUTAneous, TID WC, Yokasta Moody, APRN - CNP, 1 Units at 08/13/22 0856    insulin lispro (HUMALOG) injection vial 0-4 Units, 0-4 Units, SubCUTAneous, Nightly, Corina Mills, APRN - CNP    pantoprazole (PROTONIX) tablet 40 mg, 40 mg, Oral, QAM, Susana Jones, APRN - CNP, 40 mg at 08/13/22 0849    tamsulosin (FLOMAX) capsule 0.4 mg, 0.4 mg, Oral, Daily, Too Segundo MD, 0.4 mg at 08/13/22 0849    doxazosin (CARDURA) tablet 2 mg, 2 mg, Oral, Daily, Too Segundo MD, 2 mg at 08/13/22 0849      Examination:  BP (!) 163/77   Pulse 80   Temp 97.8 °F (36.6 °C)   Resp 18   Ht 5' 5\" (1.651 m)   Wt 266 lb (120.7 kg)   SpO2 95%   BMI 44.26 kg/m²   Gait - steady  Medication side effects(SE): Denies    Mental Status Examination:    Level of consciousness:  within normal limits   Appearance:  fair grooming and fair hygiene  Behavior/Motor:  no abnormalities noted  Attitude toward examiner:  cooperative  Speech:  spontaneous, normal rate and normal volume   Mood: \" I am fine. \"  Affect: Irritable easily agitated  Thought processes: Ruminating about discharge  Thought content: Devoid of any auditory visual hallucinations delusions or other perceptual normalities. Denies SI/HI intent or plan  Cognition:  oriented to person, place, and time   Concentration intact  Insight poor  Judgement poor      ASSESSMENT:   Patient symptoms are:  [] Well controlled  [x] Improving  [] Worsening  [] No change      Diagnosis:   Principal Problem:    B[facility] ([facility]  Active Problems:    Antisocial personality disorder ([facility]    Alcohol abuse  Resolved Problems:    * No resolved hospital problems.  *      LABS:    Recent Labs     08/11/22  0004   WBC 11.7*   HGB 16.6*        Recent Labs 08/11/22  0004      K 4.0   CL 99   CO2 18*   BUN 7   CREATININE 0.8   GLUCOSE 219*     Recent Labs     08/11/22  0004   BILITOT <0.2   ALKPHOS 102   AST 24   ALT 35     Lab Results   Component Value Date/Time    LABAMPH NOT DETECTED 08/11/2022 12:04 AM    BARBSCNU NOT DETECTED 08/11/2022 12:04 AM    LABBENZ NOT DETECTED 08/11/2022 12:04 AM    LABMETH NOT DETECTED 08/11/2022 12:04 AM    OPIATESCREENURINE NOT DETECTED 08/11/2022 12:04 AM    PHENCYCLIDINESCREENURINE NOT DETECTED 08/11/2022 12:04 AM    ETOH 23 08/11/2022 06:03 AM     Lab Results   Component Value Date/Time    TSH 1.250 09/17/2021 12:00 PM     No results found for: LITHIUM  No results found for: VALPROATE, CBMZ      Treatment Plan:  Reviewed current Medications with the patient. Risks, benefits, side effects, drug-to-drug interactions and alternatives to treatment were discussed. Collateral information:   CD evaluation  Encourage patient to attend group and other milieu activities.   Discharge planning discussed with the patient and treatment team.    Continue Depakote 500 mg twice daily      PSYCHOTHERAPY/COUNSELING:  [x] Therapeutic interview  [x] Supportive  [] CBT  [] Ongoing  [] Other    [x] Patient continues to need, on a daily basis, active treatment furnished directly by or requiring the supervision of inpatient psychiatric personnel      Anticipated Length of stay: 3 to 7 days based on stability             Electronically signed by ANNABEL Negrete CNP on 4/54/3359 at 9:12 AM

## 2022-08-14 LAB
METER GLUCOSE: 244 MG/DL (ref 74–99)
METER GLUCOSE: 266 MG/DL (ref 74–99)
METER GLUCOSE: 292 MG/DL (ref 74–99)
METER GLUCOSE: 301 MG/DL (ref 74–99)
VALPROIC ACID LEVEL: 45 MCG/ML (ref 50–100)

## 2022-08-14 PROCEDURE — 80164 ASSAY DIPROPYLACETIC ACD TOT: CPT

## 2022-08-14 PROCEDURE — 94640 AIRWAY INHALATION TREATMENT: CPT

## 2022-08-14 PROCEDURE — 6370000000 HC RX 637 (ALT 250 FOR IP): Performed by: NURSE PRACTITIONER

## 2022-08-14 PROCEDURE — 6360000002 HC RX W HCPCS: Performed by: NURSE PRACTITIONER

## 2022-08-14 PROCEDURE — 6370000000 HC RX 637 (ALT 250 FOR IP): Performed by: PSYCHIATRY & NEUROLOGY

## 2022-08-14 PROCEDURE — 82962 GLUCOSE BLOOD TEST: CPT

## 2022-08-14 PROCEDURE — 99232 SBSQ HOSP IP/OBS MODERATE 35: CPT | Performed by: NURSE PRACTITIONER

## 2022-08-14 PROCEDURE — 36415 COLL VENOUS BLD VENIPUNCTURE: CPT

## 2022-08-14 PROCEDURE — 1240000000 HC EMOTIONAL WELLNESS R&B

## 2022-08-14 RX ORDER — DIVALPROEX SODIUM 500 MG/1
500 TABLET, DELAYED RELEASE ORAL EVERY 12 HOURS SCHEDULED
Qty: 60 TABLET | Refills: 0 | Status: SHIPPED | OUTPATIENT
Start: 2022-08-14 | End: 2022-09-30

## 2022-08-14 RX ORDER — NICOTINE 21 MG/24HR
1 PATCH, TRANSDERMAL 24 HOURS TRANSDERMAL DAILY
Qty: 30 PATCH | Refills: 0
Start: 2022-08-15 | End: 2022-09-30

## 2022-08-14 RX ORDER — LANOLIN ALCOHOL/MO/W.PET/CERES
3 CREAM (GRAM) TOPICAL NIGHTLY
Qty: 30 TABLET | Refills: 0 | Status: SHIPPED | OUTPATIENT
Start: 2022-08-14 | End: 2022-09-30

## 2022-08-14 RX ADMIN — DIVALPROEX SODIUM 500 MG: 500 TABLET, DELAYED RELEASE ORAL at 09:06

## 2022-08-14 RX ADMIN — ATORVASTATIN CALCIUM 40 MG: 40 TABLET, FILM COATED ORAL at 20:48

## 2022-08-14 RX ADMIN — INSULIN LISPRO 2 UNITS: 100 INJECTION, SOLUTION INTRAVENOUS; SUBCUTANEOUS at 09:01

## 2022-08-14 RX ADMIN — LISINOPRIL 40 MG: 20 TABLET ORAL at 09:03

## 2022-08-14 RX ADMIN — DOXAZOSIN 2 MG: 2 TABLET ORAL at 09:03

## 2022-08-14 RX ADMIN — PANTOPRAZOLE SODIUM 40 MG: 40 TABLET, DELAYED RELEASE ORAL at 09:04

## 2022-08-14 RX ADMIN — TAMSULOSIN HYDROCHLORIDE 0.4 MG: 0.4 CAPSULE ORAL at 09:04

## 2022-08-14 RX ADMIN — INSULIN LISPRO 1 UNITS: 100 INJECTION, SOLUTION INTRAVENOUS; SUBCUTANEOUS at 12:27

## 2022-08-14 RX ADMIN — ARFORMOTEROL TARTRATE 15 MCG: 15 SOLUTION RESPIRATORY (INHALATION) at 11:46

## 2022-08-14 RX ADMIN — MELATONIN 3 MG ORAL TABLET 3 MG: 3 TABLET ORAL at 20:48

## 2022-08-14 RX ADMIN — DIVALPROEX SODIUM 500 MG: 500 TABLET, DELAYED RELEASE ORAL at 20:48

## 2022-08-14 RX ADMIN — BUDESONIDE 500 MCG: 0.5 SUSPENSION RESPIRATORY (INHALATION) at 11:47

## 2022-08-14 RX ADMIN — INSULIN LISPRO 3 UNITS: 100 INJECTION, SOLUTION INTRAVENOUS; SUBCUTANEOUS at 18:35

## 2022-08-14 NOTE — GROUP NOTE
Group Therapy Note    Date: 8/14/2022    Group Start Time: 1115  Group End Time: 1150  Group Topic: Cognitive Skills    [facility] AC[facility]  [address] [facility]        Group Therapy Note    Attendees: 15       Patient's Goal:  Pt will be able to discuss boundaries and identify ways to set boundaries. Notes:  Pt was an active participant in group discussion. Status After Intervention:  Improved    Participation Level: Active Listener and Interactive    Participation Quality: Appropriate, Attentive, Sharing, and Supportive      Speech:  normal      Thought Process/Content: Logical  Linear      Affective Functioning: Congruent and Blunted      Mood: anxious and depressed      Level of consciousness:  Alert, Oriented x4, and Attentive      Response to Learning: Able to verbalize current knowledge/experience, Able to verbalize/acknowledge new learning, Able to retain information, Capable of insight, and Progressing to goal      Endings: None Reported    Modes of Intervention: Education, Support, Socialization, Exploration, Clarifying, and Problem-solving      Discipline Responsible: /Counselor      Signature:   Jamil Kapoor

## 2022-08-14 NOTE — PLAN OF CARE
Patient was out on unit,social with peers. Eye contact is good. Affect pleasant,cooperative. Denies suicidal,homicidal or AV hallucinations. Denies anxiety or depression. Compliant with medication. Attended group. Q 15 minutes rounds continue.

## 2022-08-14 NOTE — PROGRESS NOTES
BEHAVIORAL HEALTH FOLLOW-UP NOTE     8/14/2022     Patient was seen and examined in person, Chart reviewed   Patient's case discussed with staff/team    Chief Complaint: \" I feel a lot calmer\"    Interim History:   Patient up on the unit and states he feels \"a lot calmer. \"  He is out visible in the milieu attending groups socializing with peers he states that staff and groups have been good for him. He denies SI/HI intent or plan denies auditory or visualization states he talked to his wife last night. He states that he wants to get back and hang out with his AA friends. He denies having a problem alcohol does not want to take any medication for cravings he states he is done with that and is going to start hanging out with AA friends again.   Less irritable less impulsive      Appetite:   [x] Normal/Unchanged  [] Increased  [] Decreased      Sleep:       [x] Normal/Unchanged  [] Fair       [] Poor              Energy:    [x] Normal/Unchanged  [] Increased  [] Decreased        SI [] Present  [x] Absent    HI  []Present  [x] Absent     Aggression:  [] yes  [x] no    Patient is [x] able  [] unable to CONTRACT FOR SAFETY     PAST MEDICAL/PSYCHIATRIC HISTORY:   Past Medical History:   Diagnosis Date    Abdominal pain     nausea    Allergic rhinitis     Anxiety     Asthma     stable    COPD (chronic obstructive pulmonary disease) ([facility] [address]     controlled with inhalers    Depression     no meds    Diabetes mellitus ([facility] [address]     Encounter for screening colonoscopy     and EGD 7-23-21    GERD (gastroesophageal reflux disease)     Hyperlipidemia     Hypertension     Obesity     Osteoarthritis     Sleep apnea     BMS CPAP 7, not using       FAMILY/SOCIAL HISTORY:  Family History   Problem Relation Age of Onset    COPD Mother     Cancer Mother     Heart Disease Father 46        CABG    Diabetes Father     Diabetes Sister     Diabetes Brother     Heart Disease Brother 43        MI    Heart Disease Maternal Grandmother     Cancer IntraVENous, Continuous PRN, Wendy Lal APRN - CNP    insulin lispro (HUMALOG) injection vial 0-4 Units, 0-4 Units, SubCUTAneous, TID WC, Yokasta Moody, APRN - CNP, 2 Units at 08/14/22 0901    insulin lispro (HUMALOG) injection vial 0-4 Units, 0-4 Units, SubCUTAneous, Nightly, Wendy Lal, APRN - CNP    pantoprazole (PROTONIX) tablet 40 mg, 40 mg, Oral, QAM, Shane Jones, APRN - CNP, 40 mg at 08/14/22 0904    tamsulosin (FLOMAX) capsule 0.4 mg, 0.4 mg, Oral, Daily, Ulices Valadez MD, 0.4 mg at 08/14/22 3207    doxazosin (CARDURA) tablet 2 mg, 2 mg, Oral, Daily, Ulices Valadez MD, 2 mg at 08/14/22 1271      Examination:  /84   Pulse 100   Temp 97.7 °F (36.5 °C)   Resp (!) 6   Ht 5' 5\" (1.651 m)   Wt 266 lb (120.7 kg)   SpO2 96%   BMI 44.26 kg/m²   Gait - steady  Medication side effects(SE): Denies    Mental Status Examination:    Level of consciousness:  within normal limits   Appearance:  fair grooming and fair hygiene  Behavior/Motor:  no abnormalities noted  Attitude toward examiner:  cooperative  Speech:  spontaneous, normal rate and normal volume   Mood: \" I am fine. \"  Affect: Irritable easily agitated  Thought processes: Ruminating about discharge  Thought content: Devoid of any auditory visual hallucinations delusions or other perceptual normalities. Denies SI/HI intent or plan  Cognition:  oriented to person, place, and time   Concentration intact  Insight poor  Judgement poor      ASSESSMENT:   Patient symptoms are:  [] Well controlled  [x] Improving  [] Worsening  [] No change      Diagnosis:   Principal Problem:    [facility]  Active Problems:    Antisocial personality disorder ([facility]    Alcohol abuse  Resolved Problems:    * No resolved hospital problems. *      LABS:    No results for input(s): WBC, HGB, PLT in the last 72 hours. No results for input(s): NA, K, CL, CO2, BUN, CREATININE, GLUCOSE in the last 72 hours.     No results for input(s): BILITOT, ALKPHOS, AST, ALT in the last 72 hours. Lab Results   Component Value Date/Time    LABAMPH NOT DETECTED 08/11/2022 12:04 AM    BARBSCNU NOT DETECTED 08/11/2022 12:04 AM    LABBENZ NOT DETECTED 08/11/2022 12:04 AM    LABMETH NOT DETECTED 08/11/2022 12:04 AM    OPIATESCREENURINE NOT DETECTED 08/11/2022 12:04 AM    PHENCYCLIDINESCREENURINE NOT DETECTED 08/11/2022 12:04 AM    ETOH 23 08/11/2022 06:03 AM     Lab Results   Component Value Date/Time    TSH 1.250 09/17/2021 12:00 PM     No results found for: LITHIUM  Lab Results   Component Value Date    VALPROATE 45 (L) 08/14/2022         Treatment Plan:  Reviewed current Medications with the patient. Risks, benefits, side effects, drug-to-drug interactions and alternatives to treatment were discussed. Collateral information:   CD evaluation  Encourage patient to attend group and other milieu activities.   Discharge planning discussed with the patient and treatment team.    Continue Depakote 500 mg twice daily      PSYCHOTHERAPY/COUNSELING:  [x] Therapeutic interview  [x] Supportive  [] CBT  [] Ongoing  [] Other    [x] Patient continues to need, on a daily basis, active treatment furnished directly by or requiring the supervision of inpatient psychiatric personnel      Anticipated Length of stay: 3 to 7 days based on stability             Electronically signed by ANNABEL Decker CNP on 6/25/0283 at 11:25 AM

## 2022-08-14 NOTE — PLAN OF CARE
Problem: Chronic Conditions and Co-morbidities  Goal: Patient's chronic conditions and co-morbidity symptoms are monitored and maintained or improved  8/14/2022 1809 by Bhavna Valdovinos RN  Outcome: Progressing     Problem: Anxiety  Goal: Will report anxiety at manageable levels  Description: INTERVENTIONS:  1. Administer medication as ordered  2. Teach and rehearse alternative coping skills  3. Provide emotional support with 1:1 interaction with staff  8/14/2022 1809 by Bhavna Valdovinos RN  Outcome: Progressing     Problem: Coping  Goal: Pt/Family able to verbalize concerns and demonstrate effective coping strategies  Description: INTERVENTIONS:  1. Assist patient/family to identify coping skills, available support systems and cultural and spiritual values  2. Provide emotional support, including active listening and acknowledgement of concerns of patient and caregivers  3. Reduce environmental stimuli, as able  4. Instruct patient/family in relaxation techniques, as appropriate  5. Assess for spiritual pain/suffering and initiate Spiritual Care, Psychosocial Clinical Specialist consults as needed  8/14/2022 1809 by Bhavna Valdovinos RN  Outcome: Progressing     Problem: Decision Making  Goal: Pt/Family able to effectively weigh alternatives and participate in decision making related to treatment and care  Description: INTERVENTIONS:  1. Determine when there are differences between patient's view, family's view, and healthcare provider's view of condition  2. Facilitate patient and family articulation of goals for care  3. Help patient and family identify pros/cons of alternative solutions  4. Provide information as requested by patient/family  5. Respect patient/family right to receive or not to receive information  6. Serve as a liaison between patient and family and health care team  7.  Initiate Consults from Ethics, Palliative Care or initiate [facility] as is appropriate  8/14/2022 1809 by Ginny Short Willy Block RN  Outcome: Progressing     Problem: Behavior  Goal: Pt/Family maintain appropriate behavior and adhere to behavioral management agreement, if implemented  Description: INTERVENTIONS:  1. Assess patient/family's coping skills and  non-compliant behavior (including use of illegal substances)  2. Notify security of behavior or suspected illegal substances which indicate the need for search of the family and/or belongings  3. Encourage verbalization of thoughts and concerns in a socially appropriate manner  4. Utilize positive, consistent limit setting strategies supporting safety of patient, staff and others  5. Encourage participation in the decision making process about the behavioral management agreement  6. If a visitor's behavior poses a threat to safety call refer to organization policy. 7. Initiate consult with , Psychosocial CNS, Spiritual Care as appropriate  8/14/2022 1809 by Ebony Gambino RN  Outcome: Progressing     Problem: Depression/Self Harm  Goal: Effect of psychiatric condition will be minimized and patient will be protected from self harm  Description: INTERVENTIONS:  1. Assess impact of patient's symptoms on level of functioning, self care needs and offer support as indicated  2. Assess patient/family knowledge of depression, impact on illness and need for teaching  3. Provide emotional support, presence and reassurance  4. Assess for possible suicidal thoughts or ideation. If patient expresses suicidal thoughts or statements do not leave alone, initiate Suicide Precautions, move to a room close to the nursing station and obtain sitter  5. Initiate consults as appropriate with Mental Health Professional, Spiritual Care, Psychosocial CNS, and consider a recommendation to the LIP for a Psychiatric Consultation  8/14/2022 1809 by Ebony Gambino RN  Outcome: Progressing   Pt has been out on the unit and is social with select peers.   He is pleasant, cooperative, calm and affect is bright. He interacts easily and states that he is feeling much better. He states that the medication has helped a lot with his anxiety and mood. He verbalizes that he feels much calmer and he no longer has racing thoughts. He denies any harmful ideations.

## 2022-08-15 VITALS
TEMPERATURE: 97 F | OXYGEN SATURATION: 98 % | BODY MASS INDEX: 44.32 KG/M2 | WEIGHT: 266 LBS | DIASTOLIC BLOOD PRESSURE: 66 MMHG | HEIGHT: 65 IN | RESPIRATION RATE: 18 BRPM | SYSTOLIC BLOOD PRESSURE: 140 MMHG | HEART RATE: 88 BPM

## 2022-08-15 LAB
METER GLUCOSE: 190 MG/DL (ref 74–99)
METER GLUCOSE: 240 MG/DL (ref 74–99)

## 2022-08-15 PROCEDURE — 82962 GLUCOSE BLOOD TEST: CPT

## 2022-08-15 PROCEDURE — 6370000000 HC RX 637 (ALT 250 FOR IP): Performed by: NURSE PRACTITIONER

## 2022-08-15 PROCEDURE — 6370000000 HC RX 637 (ALT 250 FOR IP): Performed by: PSYCHIATRY & NEUROLOGY

## 2022-08-15 PROCEDURE — 99239 HOSP IP/OBS DSCHRG MGMT >30: CPT | Performed by: NURSE PRACTITIONER

## 2022-08-15 PROCEDURE — 94640 AIRWAY INHALATION TREATMENT: CPT

## 2022-08-15 PROCEDURE — 6360000002 HC RX W HCPCS: Performed by: NURSE PRACTITIONER

## 2022-08-15 RX ADMIN — PANTOPRAZOLE SODIUM 40 MG: 40 TABLET, DELAYED RELEASE ORAL at 09:06

## 2022-08-15 RX ADMIN — TAMSULOSIN HYDROCHLORIDE 0.4 MG: 0.4 CAPSULE ORAL at 09:07

## 2022-08-15 RX ADMIN — ARFORMOTEROL TARTRATE 15 MCG: 15 SOLUTION RESPIRATORY (INHALATION) at 09:17

## 2022-08-15 RX ADMIN — DIVALPROEX SODIUM 500 MG: 500 TABLET, DELAYED RELEASE ORAL at 09:07

## 2022-08-15 RX ADMIN — BUDESONIDE 500 MCG: 0.5 SUSPENSION RESPIRATORY (INHALATION) at 09:17

## 2022-08-15 RX ADMIN — DOXAZOSIN 2 MG: 2 TABLET ORAL at 09:07

## 2022-08-15 RX ADMIN — LISINOPRIL 40 MG: 20 TABLET ORAL at 09:06

## 2022-08-15 NOTE — DISCHARGE SUMMARY
DISCHARGE SUMMARY      Patient ID:  Robyn Caal  [id number]  83 y.o.  1970    Admit date: 8/10/2022    Discharge date and time: 8/15/2022    Admitting Physician: Abril Garcia MD     Discharge Physician: Dr Dina Dash MD    Discharge Diagnoses:   Patient Active Problem List   Diagnosis    COPD (chronic obstructive pulmonary disease) ([facility] [place] [facility]    Smoker    SERGEY (obstructive sleep apnea)    Asthma    Morbid obesity with BMI of 45.0-49.9, adult ([facility] [place] [facility]    Diverticulitis of colon    Abscess of back    DM2 (diabetes mellitus, type 2) ([facility] [place] [facility]    Sepsis ([facility] [place] [facility]    MSSA (methicillin susceptible Staphylococcus aureus) infection    Necrotizing soft tissue infection    HANS (acute kidney injury) ([facility] [place] [facility]    Acute blood loss anemia    Primary osteoarthritis of left knee    Impingement syndrome of right shoulder    Mixed hyperlipidemia    Acute pancreatitis    Acute pancreatitis without infection or necrosis    Gastroesophageal reflux disease    Essential hypertension    Anxiety    Depressive disorder, not elsewhere classified    Heartburn    Shortness of breath    Urge incontinence of urine    Bipolar 1 disorder ([facility] [place] [facility]    Antisocial personality disorder ([facility] [place] [facility]    Alcohol abuse       Admission Condition: poor    Discharged Condition: stable    Admission Circumstance: presented to the ED brought in by EMS after patient's son called the police due to patient being violent and making suicidal homicidal statements. In the ED patient reported he was having a \"bad day. \"      PAST MEDICAL/PSYCHIATRIC HISTORY:   Past Medical History:   Diagnosis Date    Abdominal pain     nausea    Allergic rhinitis     Anxiety     Asthma     stable    COPD (chronic obstructive pulmonary disease) ([facility] [place] [facility]     controlled with inhalers    Depression     no meds    Diabetes mellitus ([facility] [place] [facility]     Encounter for screening colonoscopy     and EGD 7-23-21    GERD (gastroesophageal reflux disease)     Hyperlipidemia     Hypertension     Obesity     Osteoarthritis     Sleep apnea     BMS CPAP 7, not using       FAMILY/SOCIAL HISTORY:  Family History   Problem Relation Age of Onset    COPD Mother     Cancer Mother     Heart Disease Father 46        CABG    Diabetes Father     Diabetes Sister     Diabetes Brother     Heart Disease Brother 43        MI    Heart Disease Maternal Grandmother     Cancer Maternal Grandmother     Cancer Paternal Grandmother         colon    Heart Disease Paternal Grandfather     Asthma Sister     COPD Brother     Heart Disease Brother 48        CABG     Social History     Socioeconomic History    Marital status:      Spouse name: Not on file    Number of children: Not on file    Years of education: Not on file    Highest education level: Not on file   Occupational History    Occupation: sales   Tobacco Use    Smoking status: Every Day     Packs/day: 1.00     Years: 35.00     Pack years: 35.00     Types: Cigarettes    Smokeless tobacco: Never   Vaping Use    Vaping Use: Never used   Substance and Sexual Activity    Alcohol use: No     Alcohol/week: 0.0 standard drinks    Drug use: Yes     Frequency: 7.0 times per week     Types: Marijuana (Weed)     Comment: currently    Sexual activity: Yes     Partners: Female   Other Topics Concern    Not on file   Social History Narrative    Not on file     Social Determinants of Health     Financial Resource Strain: Low Risk     Difficulty of Paying Living Expenses: Not very hard   Food Insecurity: No Food Insecurity    Worried About Running Out of Food in the Last Year: Never true    Ran Out of Food in the Last Year: Never true   Transportation Needs: Not on file   Physical Activity: Not on file   Stress: Not on file   Social Connections: Not on file   Intimate Partner Violence: Not on file   Housing Stability: Not on file       MEDICATIONS:    Current Facility-Administered Medications:     acetaminophen (TYLENOL) tablet 650 mg, 650 mg, Oral, Q6H PRN, Trice Strickland MD    magnesium hydroxide (MILK OF MAGNESIA) 400 MG/5ML suspension 30 mL, 30 mL, Oral, Daily PRN, Connor Ignacio MD    nicotine (NICODERM CQ) 21 MG/24HR 1 patch, 1 patch, TransDERmal, Daily, Connor Ignacio MD, 1 patch at 08/15/22 0913    aluminum & magnesium hydroxide-simethicone (MAALOX) 200-200-20 MG/5ML suspension 30 mL, 30 mL, Oral, PRN, Connor Ignacio MD    hydrOXYzine pamoate (VISTARIL) capsule 50 mg, 50 mg, Oral, TID PRN, Connor Ignacio MD    haloperidol (HALDOL) tablet 5 mg, 5 mg, Oral, Q6H PRN **OR** haloperidol lactate (HALDOL) injection 5 mg, 5 mg, IntraMUSCular, Q6H PRN, Connor Ignacio MD    melatonin tablet 3 mg, 3 mg, Oral, Nightly, Connor Ignacio MD, 3 mg at 08/14/22 2048    divalproex (DEPAKOTE) DR tablet 500 mg, 500 mg, Oral, 2 times per day, ANNABEL Olson - CNP, 126 mg at 08/15/22 9872    chlordiazePOXIDE (LIBRIUM) capsule 25 mg, 25 mg, Oral, I7A PRN, ANNABEL Alberto CNP    diphenhydrAMINE (BENADRYL) injection 50 mg, 50 mg, IntraMUSCular, K4Y PRN, ANNABEL Alberto CNP    atorvastatin (LIPITOR) tablet 40 mg, 40 mg, Oral, Nightly, ANNABEL Alberto CNP, 40 mg at 08/14/22 2048    lisinopril (PRINIVIL;ZESTRIL) tablet 40 mg, 40 mg, Oral, Daily, ANNABEL Alberto CNP, 40 mg at 08/15/22 0906    albuterol (PROVENTIL) nebulizer solution 2.5 mg, 2.5 mg, Nebulization, W5H PRN, ANNABEL Alberto CNP    budesonide (PULMICORT) nebulizer suspension 500 mcg, 0.5 mg, Nebulization, BID, ANNABEL Alberto CNP, 118 mcg at 08/15/22 0590    Arformoterol Tartrate (BROVANA) nebulizer solution 15 mcg, 15 mcg, Nebulization, BID, Wing Darcie Jones, APRN - CNP, 15 mcg at 08/15/22 0917    glucose chewable tablet 16 g, 4 tablet, Oral, PRN, Venita Jaden, APRN - CNP    dextrose bolus 10% 125 mL, 125 mL, IntraVENous, PRN **OR** dextrose bolus 10% 250 mL, 250 mL, IntraVENous, PRN, Venita Jaden, APRN - CNP    glucagon (rDNA) injection 1 mg, 1 mg, SubCUTAneous, PRN, Venitayogesh Landain, APRN - CNP    dextrose 10 % infusion, , IntraVENous, Continuous PRN, Jose Luis Viraj, APRN - CNP    insulin lispro (HUMALOG) injection vial 0-4 Units, 0-4 Units, SubCUTAneous, TID WC, Yokasta Moody, APRN - CNP, 3 Units at 08/14/22 1835    insulin lispro (HUMALOG) injection vial 0-4 Units, 0-4 Units, SubCUTAneous, Nightly, Jose Luis Viraj APRN - CNP    pantoprazole (PROTONIX) tablet 40 mg, 40 mg, Oral, QAM, Chaitanya Lian Jones, APRN - CNP, 40 mg at 08/15/22 0906    tamsulosin (FLOMAX) capsule 0.4 mg, 0.4 mg, Oral, Daily, Harish Royal MD, 0.4 mg at 08/15/22 6329    doxazosin (CARDURA) tablet 2 mg, 2 mg, Oral, Daily, Harish Royal MD, 2 mg at 08/15/22 0907    Examination:  BP (!) 140/66   Pulse 88   Temp 97 °F (36.1 °C) (Temporal)   Resp 18   Ht 5' 5\" (1.651 m)   Wt 266 lb (120.7 kg)   SpO2 98%   BMI 44.26 kg/m²   Gait - steady    HOSPITAL COURSE[de-identified]   Patient was admitted to the unit on 8/10/2022 was closely monitored for suicidal ideations. He was evaluated was treated with Depakote 500 mg twice daily. Medical events were insignificant and patient continued to improve on the floor. He start coming out of his room he is attending groups to socializing with peers. He never made any suicidal statements or any suicidal gestures while in the unit. Social workers obtain collateral information from patient's wife who was able to voice any concerns that she had. She indicated that she did not want patient return home at this time there is still working at their marriage issues due to patient's past behaviors. Patient's wife is agreeable for patient to discharge to his brother's home in [facility] and patient is also agreeable to this. He was seen in treatment team prior to discharge where he was calm and pleasant and was appreciative the help that he is received here he stated that he was dedicated and focused on trying to save his relationships with his family including his wife and that he would do whatever he needed to do.   He is able to voice his future plan spontaneously which was of detail which includes getting back to his AA meetings staying with his brother and working on saving his marriage. He was not interested in inpatient alcohol treatment nor was he interested in taking any medication for alcohol cravings stating that he does not drink very often and that he has no cravings for alcohol. Treatment team felt the patient obtain the maximum benefit from his hospitalization he was set up with an outpatient mental health agency for outpatient follow-up services. At the time of discharge patient did not show any impulsive behavior. He was up on the unit he was attending groups and socializing with peers. He vehemently denied any suicidal homicidal ideations intent or plan. He was eating well and sleeping well there are no neurovegetative signs or symptoms of depression he denied any auditory or visual hallucinations. There are no overt or covert signs of psychosis. He was appreciative of the help that he received here. This patient no longer meets criteria for inpatient hospitalization. No AVH or paranoid thoughts  Neuro hopeless or worthless feeling  No active SI/HI  Appetite:  [x] Normal  [] Increased  [] Decreased    Sleep:       [x] Normal  [] Fair       [] Poor            Energy:    [x] Normal  [] Increased  [] Decreased     SI [] Present  [x] Absent  HI  []Present  [x] Absent   Aggression:  [] yes  [x] no  Patient is [x] able  [] unable to CONTRACT FOR SAFETY   Medication side effects(SE):  [x] None(Psych. Meds.) [] Other      Mental Status Examination on discharge:    Level of consciousness:  within normal limits   Appearance:  well-appearing  Behavior/Motor:  no abnormalities noted  Attitude toward examiner:  attentive and good eye contact  Speech:  spontaneous, normal rate and normal volume   Mood: \"My mood is good. \"  Affect: Bright appropriate and pleasant  Thought processes: Linear without flights of ideas loose associations  Thought content: Devoid of any auditory or visual loose [place] delusions or other perceptual normalities. Denies SI/HI intent or plan  Cognition:  oriented to person, place, and time   Concentration intact  Memory intact  Insight good   Judgement fair   Fund of Knowledge adequate      ASSESSMENT:  Patient symptoms are:  [x] Well controlled  [x] Improving  [] Worsening  [] No change    Reason for more than one antipsychotic:  [] N/A  [] 3 Failed Monotherapy attempts (Drugs tried:)  [] Crossover to a new antipsychotic  [] Taper to Monotherapy from Polypharmacy  [] Augmentation of clozapine therapy due to treatment resistance to single therapy    Diagnosis:  Principal Problem:    Bipolar 1 disorder ([facility]  Active Problems:    Antisocial personality disorder ([facility]    Alcohol abuse  Resolved Problems:    * No resolved hospital problems. *      LABS:    No results for input(s): WBC, HGB, PLT in the last 72 hours. No results for input(s): NA, K, CL, CO2, BUN, CREATININE, GLUCOSE in the last 72 hours. No results for input(s): BILITOT, ALKPHOS, AST, ALT in the last 72 hours. Lab Results   Component Value Date/Time    LABAMPH NOT DETECTED 08/11/2022 12:04 AM    BARBSCNU NOT DETECTED 08/11/2022 12:04 AM    LABBENZ NOT DETECTED 08/11/2022 12:04 AM    LABMETH NOT DETECTED 08/11/2022 12:04 AM    OPIATESCREENURINE NOT DETECTED 08/11/2022 12:04 AM    PHENCYCLIDINESCREENURINE NOT DETECTED 08/11/2022 12:04 AM    ETOH 23 08/11/2022 06:03 AM     Lab Results   Component Value Date/Time    TSH 1.250 09/17/2021 12:00 PM     No results found for: LITHIUM  Lab Results   Component Value Date    VALPROATE 45 (L) 08/14/2022       RISK ASSESSMENT AT DISCHARGE: Low risk for suicide and homicide. Treatment Plan:  Reviewed current Medications with the patient. Education provided on the complaince with treatment.     Risks, benefits, side effects, drug-to-drug interactions and alternatives to treatment were discussed. Encourage patient to attend outpatient follow up appointment and therapy. Patient was advised to call the outpatient provider, visit the nearest ED or call 911 if symptoms are not manageable. Patient's family member was contacted prior to the discharge. Medication List        START taking these medications      divalproex 500 MG DR tablet  Commonly known as: DEPAKOTE  Take 1 tablet by mouth in the morning and 1 tablet before bedtime. melatonin 3 MG Tabs tablet  Take 1 tablet by mouth nightly     nicotine 21 MG/24HR  Commonly known as: NICODERM CQ  Place 1 patch onto the skin in the morning. CONTINUE taking these medications      glucose monitoring kit  1 kit by Does not apply route daily Dx E11.9     Lancets Misc  1 each by Does not apply route daily            STOP taking these medications      ARIPiprazole 2 MG tablet  Commonly known as: ABILIFY     FLUoxetine 40 MG capsule  Commonly known as: PROzac            ASK your doctor about these medications      albuterol sulfate  (90 Base) MCG/ACT inhaler  Commonly known as: Ventolin HFA  inhale 2 puffs by mouth and INTO THE LUNGS every 6 hours if needed for wheezing     atorvastatin 40 MG tablet  Commonly known as: LIPITOR  take 1 tablet by mouth once daily     blood glucose test strips  Test twice daily & as needed for symptoms of irregular blood glucose. Dx: E11.9     Claritin-D 12 Hour 5-120 MG per extended release tablet  Generic drug: loratadine-pseudoephedrine  Take 1 tablet by mouth 2 times daily For congestion relief as needed. Fluticasone-Umeclidin-Vilant 200-62.5-25 MCG/INH Aepb  Inhale 1 puff into the lungs in the morning.      lisinopril 40 MG tablet  Commonly known as: PRINIVIL;ZESTRIL  take 1 tablet by mouth once daily     mometasone-formoterol 200-5 MCG/ACT inhaler  Commonly known as: Dulera  inhale 2 puffs by mouth and INTO THE LUNGS twice a day     omeprazole 40 MG delayed release capsule  Commonly known as: PRILOSEC  take 1 capsule by mouth once daily     tamsulosin 0.4 MG capsule  Commonly known as: FLOMAX  Take 1 capsule by mouth daily     terazosin 2 MG capsule  Commonly known as: HYTRIN  Take 1 capsule by mouth nightly               Where to Get Your Medications        These medications were sent to [facility], [address] [facility] [address]  [address] [place] [address], [address] [place] [id number]      Phone: [phone]   divalproex 500 MG DR tablet  melatonin 3 MG Tabs tablet       Information about where to get these medications is not yet available    Ask your nurse or doctor about these medications  nicotine 21 MG/24HR       Patient is counseled if he continues to abuse drugs or alcohol he could act out impulsively causing serious harm to himself or others even though may be unintentional.  He demonstrated understanding of this and has the capacity understand this    Patient is counseled hisr mental health treatment will be difficult to optimize with ongoing use of drugs or alcohol he demonstrate understanding of this as the past understand this     Patient is counseled he must remain compliant with all medications outpatient follow-up ointments    Patient is discharged home in stable condition    TIME SPEND - 35 MINUTES TO COMPLETE THE EVALUATION, DISCHARGE SUMMARY, MEDICATION RECONCILIATION AND FOLLOW UP CARE     Signed:  ANNABLE Foster CNP  8/85/6072  11:59 AM

## 2022-08-15 NOTE — PROGRESS NOTES
Patient attended morning community meeting. Updated on staffing and daily expectations. Shared goal for the day as to make 1 right choice for my family.

## 2022-08-15 NOTE — CARE COORDINATION
pt brother Pily Hansen  (TOM signed). Pily Hansen reports pt will be able to discharge to his home at [address] [place] [address] OF [employer], [address] [place] [address]. Pily Hansen expressed no concerns for pt and confirmed he does not have access to any guns or weapons. Pily Hansen feels once pt is out of his living environment that he will be okay, reports there are too many people living in his current home.      In order to ensure appropriate transition and discharge planning is in place, the following documents have been transmitted to [facility], as the new outpatient provider:    The d/c diagnosis was transmitted to the next care provider  The reason for hospitalization was transmitted to the next care provider  The d/c medications (dosage and indication) were transmitted to the next care provider   The continuing care plan was transmitted to the next care provider

## 2022-08-15 NOTE — BH NOTE
[facility]  Discharge Note    Pt discharged with followings belongings:   Dental Appliances: None  Vision - Corrective Lenses: Eyeglasses  Hearing Aid: None  Jewelry: None  Body Piercings Removed: No  Clothing: Socks, Undergarments, Shirt, Pants  Other Valuables: Other (Comment) (none)   Valuables returned to patient. Patient education on aftercare instructions: yes  Patient verbalize understanding of AVS:  yes. Status EXAM upon discharge:  Mental Status and Behavioral Exam  Normal: Yes  Level of Assistance: Independent/Self  Facial Expression: Brightened  Affect: Appropriate  Level of Consciousness: Alert  Frequency of Checks: 4 times per hour, close  Mood:Normal: Yes  Mood: Anxious  Motor Activity:Normal: Yes  Eye Contact: Good  Observed Behavior: Cooperative, Friendly  Sexual Misconduct History: Current - no  Preception: [place] to person, [place] to place, [place] to situation, [place] to time  Attention:Normal: Yes  Attention: Distractible  Thought Processes:  (organized, appropriate)  Thought Content:Normal: Yes  Thought Content: Preoccupations  Depression Symptoms: No problems reported or observed. Anxiety Symptoms: No problems reported or observed. Anna Symptoms: No problems reported or observed.   Hallucinations: None  Delusions: No  Memory:Normal: Yes  Memory: Confabulation  Insight and Judgment: Yes  Insight and Judgment: Poor judgment, Poor insight    Tobacco Screening:  Practical Counseling, on admission, azul X, if applicable and completed (first 3 are required if patient doesn't refuse):            ( ) Recognizing danger situations (included triggers and roadblocks)                    ( ) Coping skills (new ways to manage stress,relaxation techniques, changing routine, distraction)                                                           ( ) Basic information about quitting (benefits of quitting, techniques in how to quit, available resources  ( ) [facility]                                                                                                                   ( x) Patient refused counseling  ( x) Patient refused referral  ( x) Patient refused prescription upon discharge  ( ) Patient has not smoked in the last 30 days    Metabolic Screening:    Lab Results   Component Value Date    LABA1C 9.4 (H) 08/11/2022       Lab Results   Component Value Date    CHOL 215 (H) 03/31/2022    CHOL 149 09/17/2021    CHOL 152 03/10/2021    CHOL 151 09/15/2020    CHOL 259 (H) 06/04/2020    CHOL 216 (H) 11/20/2019    CHOL 135 06/11/2018    CHOL 137 09/08/2017    CHOL 144 05/15/2017    CHOL 125 02/13/2017    CHOL 201 (H) 01/11/2017    CHOL 216 (H) 08/27/2015     Lab Results   Component Value Date    TRIG 291 (H) 03/31/2022    TRIG 193 (H) 09/17/2021    TRIG 170 (H) 07/10/2021    TRIG 135 03/10/2021    TRIG 149 09/15/2020    TRIG 209 (H) 06/04/2020    TRIG 236 (H) 11/20/2019    TRIG 193 (H) 06/11/2018    TRIG 109 09/08/2017    TRIG 106 05/15/2017    TRIG 147 02/13/2017    TRIG 181 (H) 01/11/2017    TRIG 221 (H) 08/27/2015     Lab Results   Component Value Date    HDL 39 03/31/2022    HDL 37 09/17/2021    HDL 39 03/10/2021    HDL 41 09/15/2020    HDL 40 06/04/2020    HDL 35 11/20/2019    HDL 37 06/11/2018    HDL 38 09/08/2017    HDL 45 05/15/2017    HDL 40 02/13/2017    HDL 38 01/11/2017    HDL 35 08/27/2015     No components found for: LDLCAL  Lab Results   Component Value Date    LABVLDL 58 03/31/2022    LABVLDL 39 09/17/2021    LABVLDL 27 03/10/2021    LABVLDL 30 09/15/2020    LABVLDL 42 06/04/2020    LABVLDL 47 11/20/2019    LABVLDL 39 06/11/2018    LABVLDL 22 09/08/2017    LABVLDL 21 05/15/2017    LABVLDL 29 02/13/2017    LABVLDL 36 01/11/2017    LABVLDL 44 08/27/2015       Estefanía Cheng RN

## 2022-08-29 ENCOUNTER — HOSPITAL ENCOUNTER (OUTPATIENT)
Age: 52
Discharge: HOME | End: 2022-08-31
Payer: COMMERCIAL

## 2022-08-29 ENCOUNTER — HOSPITAL ENCOUNTER (OUTPATIENT)
Dept: GENERAL RADIOLOGY | Age: 52
Discharge: HOME | End: 2022-08-31
Payer: COMMERCIAL

## 2022-08-29 ENCOUNTER — HOSPITAL ENCOUNTER (OUTPATIENT)
Age: 52
Discharge: HOME | End: 2022-08-29
Payer: COMMERCIAL

## 2022-08-29 DIAGNOSIS — M25.562 CHRONIC PAIN OF BOTH KNEES: ICD-10-CM

## 2022-08-29 DIAGNOSIS — G89.29 CHRONIC PAIN OF BOTH KNEES: ICD-10-CM

## 2022-08-29 DIAGNOSIS — M25.559 HIP PAIN: ICD-10-CM

## 2022-08-29 DIAGNOSIS — M25.561 CHRONIC PAIN OF BOTH KNEES: ICD-10-CM

## 2022-08-29 DIAGNOSIS — M54.50 LUMBAR PAIN: ICD-10-CM

## 2022-08-29 DIAGNOSIS — M54.6 CHRONIC BILATERAL THORACIC BACK PAIN: ICD-10-CM

## 2022-08-29 DIAGNOSIS — G89.29 CHRONIC BILATERAL THORACIC BACK PAIN: ICD-10-CM

## 2022-08-29 DIAGNOSIS — M54.2 NECK PAIN: ICD-10-CM

## 2022-08-29 LAB
C-REACTIVE PROTEIN: 0.4 MG/DL (ref 0–0.4)
RHEUMATOID FACTOR: <10 IU/ML (ref 0–13)
SEDIMENTATION RATE, ERYTHROCYTE: 14 MM/HR (ref 0–15)

## 2022-08-29 PROCEDURE — 86038 ANTINUCLEAR ANTIBODIES: CPT

## 2022-08-29 PROCEDURE — 73502 X-RAY EXAM HIP UNI 2-3 VIEWS: CPT

## 2022-08-29 PROCEDURE — 72050 X-RAY EXAM NECK SPINE 4/5VWS: CPT

## 2022-08-29 PROCEDURE — 36415 COLL VENOUS BLD VENIPUNCTURE: CPT

## 2022-08-29 PROCEDURE — 72100 X-RAY EXAM L-S SPINE 2/3 VWS: CPT

## 2022-08-29 PROCEDURE — 72072 X-RAY EXAM THORAC SPINE 3VWS: CPT

## 2022-08-29 PROCEDURE — 86140 C-REACTIVE PROTEIN: CPT

## 2022-08-29 PROCEDURE — 73560 X-RAY EXAM OF KNEE 1 OR 2: CPT

## 2022-08-29 PROCEDURE — 85651 RBC SED RATE NONAUTOMATED: CPT

## 2022-08-29 PROCEDURE — 86431 RHEUMATOID FACTOR QUANT: CPT

## 2022-08-30 LAB — ANTI-NUCLEAR ANTIBODY (ANA): NEGATIVE

## 2022-09-06 ENCOUNTER — HOSPITAL ENCOUNTER (OUTPATIENT)
Dept: NEUROLOGY | Age: 52
Discharge: HOME | End: 2022-09-06
Payer: COMMERCIAL

## 2022-09-06 PROCEDURE — 95911 NRV CNDJ TEST 9-10 STUDIES: CPT

## 2022-09-06 PROCEDURE — 95886 MUSC TEST DONE W/N TEST COMP: CPT

## 2022-09-06 NOTE — PROCEDURES
[address] [facility]  [address], [address] [address]  Phone: [phone]  Fax: [phone]      Referring Provider: Cameron Romano MD  Primary Care Physician: Samanta Angeles MD  Patient Name: Gabrielle Perkins  Patient YOB: 1970  Gender: male  BMI: There is no height or weight on file to calculate BMI. There were no vitals taken for this visit. 9/6/2022    Reason for referral: EMG    Description of clinical problem:   No chief complaint on file. Pain Yes   ; Numbness/tingling  Yes; Weakness  No       Brief physical exam:   Sensory deficit Yes; Weakness No; Atrophy  No; Reflex abnormality Yes      Study Limitations:  none    Summary of Findings:   Nerve conduction studies: The following nerve conduction studies were abnormal:   Prolongation of median distal motor latencies bilaterally  All other nerve conduction studies listed in the table above were normal in latency, amplitude and conduction velocity. [address]  Electrodiagnostic Laboratory   [place]         Full Name: Janine Ross Gender: Female  MRN: [id number] YOB: 1970  Location<IPADD> Y\[facility] [place] [facility]\"" (2)      Visit Date: 9/6/2022 10:50  Age: 46 Years 2 Months Old  Examining Physician: Dr. Spence Barthel   Referring Physician: Dr. Tayo Jones  Technician: Audrey Bone   Height: 5 feet 5 inch  Weight: 266 lbs  Notes: Bilateral Carpal Tunnel Syndrome       Motor NCS      Nerve / Sites Lat. Lat Diff Amplitude Amp. 1-2 Distance Velocity Temp.    ms ms mV % cm m/s °C   L Median - APB      Wrist 6.88  4.9 100 8  32.5      Elbow 11.20 4.32 3.0 61.1 18 42 32.6   R Median - APB      Wrist 5.99  4.2 100 8  32.5      Elbow 9.90 3.91 4.5 108 18 46 32.4   L Ulnar - ADM      Wrist 2.66  6.7 100 8  33      B. Elbow 6.20 3.54 5.8 86.8 17 48 32.9      A. Elbow 8.28 2.08 5.6 83.7 10 48 32.7   R Ulnar - ADM      Wrist 2.76  7.2 100 8  32.2      B. Elbow 5.83 3.07 7.0 98.2 17 55 32. 2      A. Elbow 7.66 1.82 6.9 96.4 10 55 32.2       Sensory NCS      Nerve / Sites Onset Lat Peak Lat PP Amp Distance Velocity Temp.    ms ms µV cm m/s °C   L Median - Digit II (Antidromic)      Mid Palm 1.35 1.93 9.9 7 52 32.7      Wrist 4.06 4.90 9.6 14 34 32.7   R Median - Digit II (Antidromic)      Mid Palm 1.51 2.14 10.2 7 46 24.3      Wrist 4.32 4.84 8.6 14 32 23.9   L Ulnar - Digit V (Antidromic)      Wrist 2.50 3.18 19.2 14 56 32.7   R Ulnar - Digit V (Antidromic)      Wrist 2.45 3.33 22.9 14 57 25.7   L Radial - Anatomical snuff box (Forearm)      Forearm 1.67 2.24 30.1 10 60 32.9   R Radial - Anatomical snuff box (Forearm)      Forearm 1.61 2.24 25.9 10 62 32.4       F  Wave      Nerve F Lat M Lat F-M Lat    ms ms ms   L Median - APB 31.4 7.1 24.3   L Ulnar - ADM 27.9 2.6 25.4   R Median - APB 34.1 5.6 28.4   R Ulnar - ADM 27.7 3.0 24.7       EMG         EMG Summary Table     Spontaneous MUAP Recruitment   Muscle IA Fib PSW Fasc H.F. Amp Dur. PPP Pattern   L. Abductor pollicis brevis N None None None None N N N N   L. First dorsal interosseous N None None None None N N N N   L. Brachioradialis N None None None None N N N N   L. Biceps brachii N None None None None N N N N   L. Deltoid N None None None None N N N N   L. Triceps brachii N None None None None N N N N                  Needle EMG:   Needle EMG was performed using a monopolar needle. The following abnormalities were seen on needle EMG: none  All other muscles tested, as listed in the table above demonstrated normal amplitude, duration, phases and recruitment and no active denervation signs were seen. Diagnostic Interpretation:   Moderate to severe bilateral carpal tunnel syndrome, slightly worse on right side both electrically and clinically.   Clinical correlation advised      Technologist: Elle Dooley MD    Nerve conduction studies and electromyography were performed according to our laboratory policies and procedures which can be provided upon request. All abnormal values are identified in the table.  Laboratory normal values can also be provided upon request.       Cc: MD Samanta Rosenbaum Ace, MD

## 2022-09-07 DIAGNOSIS — G56.00 CARPAL TUNNEL SYNDROME, UNSPECIFIED LATERALITY: Primary | ICD-10-CM

## 2022-09-08 DIAGNOSIS — M47.812 OSTEOARTHRITIS OF CERVICAL SPINE, UNSPECIFIED SPINAL OSTEOARTHRITIS COMPLICATION STATUS: ICD-10-CM

## 2022-09-08 DIAGNOSIS — M51.34 DDD (DEGENERATIVE DISC DISEASE), THORACIC: ICD-10-CM

## 2022-09-08 DIAGNOSIS — M50.30 DDD (DEGENERATIVE DISC DISEASE), CERVICAL: Primary | ICD-10-CM

## 2022-09-17 ENCOUNTER — HOSPITAL ENCOUNTER (EMERGENCY)
Age: 52
Discharge: HOME | End: 2022-09-17
Attending: STUDENT IN AN ORGANIZED HEALTH CARE EDUCATION/TRAINING PROGRAM
Payer: COMMERCIAL

## 2022-09-17 ENCOUNTER — APPOINTMENT (OUTPATIENT)
Dept: CT IMAGING | Age: 52
End: 2022-09-17
Payer: COMMERCIAL

## 2022-09-17 VITALS
BODY MASS INDEX: 44.26 KG/M2 | TEMPERATURE: 97.9 F | OXYGEN SATURATION: 96 % | SYSTOLIC BLOOD PRESSURE: 136 MMHG | DIASTOLIC BLOOD PRESSURE: 74 MMHG | WEIGHT: 266 LBS | HEART RATE: 86 BPM | RESPIRATION RATE: 14 BRPM

## 2022-09-17 DIAGNOSIS — L03.221 CELLULITIS OF NECK: Primary | ICD-10-CM

## 2022-09-17 LAB
ANION GAP SERPL CALCULATED.3IONS-SCNC: 9 MMOL/L (ref 7–16)
BASOPHILS ABSOLUTE: 0.08 E9/L (ref 0–0.2)
BASOPHILS RELATIVE PERCENT: 0.7 % (ref 0–2)
BUN BLDV-MCNC: 12 MG/DL (ref 6–20)
CALCIUM SERPL-MCNC: 9.5 MG/DL (ref 8.6–10.2)
CHLORIDE BLD-SCNC: 99 MMOL/L (ref 98–107)
CO2: 27 MMOL/L (ref 22–29)
CREAT SERPL-MCNC: 0.8 MG/DL (ref 0.7–1.2)
EOSINOPHILS ABSOLUTE: 0.36 E9/L (ref 0.05–0.5)
EOSINOPHILS RELATIVE PERCENT: 3 % (ref 0–6)
GFR AFRICAN AMERICAN: >60
GFR NON-AFRICAN AMERICAN: >60 ML/MIN/1.73
GLUCOSE BLD-MCNC: 294 MG/DL (ref 74–99)
HCT VFR BLD CALC: 43.1 % (ref 37–54)
HEMOGLOBIN: 14.7 G/DL (ref 12.5–16.5)
IMMATURE GRANULOCYTES #: 0.07 E9/L
IMMATURE GRANULOCYTES %: 0.6 % (ref 0–5)
LACTIC ACID: 1.6 MMOL/L (ref 0.5–2.2)
LYMPHOCYTES ABSOLUTE: 2.38 E9/L (ref 1.5–4)
LYMPHOCYTES RELATIVE PERCENT: 19.6 % (ref 20–42)
MCH RBC QN AUTO: 30.4 PG (ref 26–35)
MCHC RBC AUTO-ENTMCNC: 34.1 % (ref 32–34.5)
MCV RBC AUTO: 89 FL (ref 80–99.9)
MONOCYTES ABSOLUTE: 1.26 E9/L (ref 0.1–0.95)
MONOCYTES RELATIVE PERCENT: 10.4 % (ref 2–12)
NEUTROPHILS ABSOLUTE: 7.99 E9/L (ref 1.8–7.3)
NEUTROPHILS RELATIVE PERCENT: 65.7 % (ref 43–80)
PDW BLD-RTO: 13.5 FL (ref 11.5–15)
PLATELET # BLD: 133 E9/L (ref 130–450)
PMV BLD AUTO: 11.3 FL (ref 7–12)
POTASSIUM REFLEX MAGNESIUM: 4.6 MMOL/L (ref 3.5–5)
RBC # BLD: 4.84 E12/L (ref 3.8–5.8)
SODIUM BLD-SCNC: 135 MMOL/L (ref 132–146)
WBC # BLD: 12.1 E9/L (ref 4.5–11.5)

## 2022-09-17 PROCEDURE — 85025 COMPLETE CBC W/AUTO DIFF WBC: CPT

## 2022-09-17 PROCEDURE — 96366 THER/PROPH/DIAG IV INF ADDON: CPT

## 2022-09-17 PROCEDURE — 6360000004 HC RX CONTRAST MEDICATION: Performed by: RADIOLOGY

## 2022-09-17 PROCEDURE — 80048 BASIC METABOLIC PNL TOTAL CA: CPT

## 2022-09-17 PROCEDURE — 83605 ASSAY OF LACTIC ACID: CPT

## 2022-09-17 PROCEDURE — 36415 COLL VENOUS BLD VENIPUNCTURE: CPT

## 2022-09-17 PROCEDURE — 70491 CT SOFT TISSUE NECK W/DYE: CPT

## 2022-09-17 PROCEDURE — 2580000003 HC RX 258: Performed by: NURSE PRACTITIONER

## 2022-09-17 PROCEDURE — 6360000002 HC RX W HCPCS: Performed by: NURSE PRACTITIONER

## 2022-09-17 PROCEDURE — 99285 EMERGENCY DEPT VISIT HI MDM: CPT

## 2022-09-17 PROCEDURE — 96365 THER/PROPH/DIAG IV INF INIT: CPT

## 2022-09-17 PROCEDURE — 96361 HYDRATE IV INFUSION ADD-ON: CPT

## 2022-09-17 RX ORDER — SULFAMETHOXAZOLE AND TRIMETHOPRIM 800; 160 MG/1; MG/1
1 TABLET ORAL 2 TIMES DAILY
Qty: 14 TABLET | Refills: 0 | Status: SHIPPED | OUTPATIENT
Start: 2022-09-17 | End: 2022-09-30 | Stop reason: SDUPTHER

## 2022-09-17 RX ORDER — 0.9 % SODIUM CHLORIDE 0.9 %
1000 INTRAVENOUS SOLUTION INTRAVENOUS ONCE
Status: COMPLETED | OUTPATIENT
Start: 2022-09-17 | End: 2022-09-17

## 2022-09-17 RX ORDER — CEPHALEXIN 500 MG/1
500 CAPSULE ORAL 4 TIMES DAILY
Qty: 28 CAPSULE | Refills: 0 | Status: SHIPPED | OUTPATIENT
Start: 2022-09-17 | End: 2022-09-30 | Stop reason: SDUPTHER

## 2022-09-17 RX ADMIN — IOPAMIDOL 75 ML: 755 INJECTION, SOLUTION INTRAVENOUS at 13:25

## 2022-09-17 RX ADMIN — PIPERACILLIN AND TAZOBACTAM 3375 MG: 3; .375 INJECTION, POWDER, LYOPHILIZED, FOR SOLUTION INTRAVENOUS at 15:33

## 2022-09-17 RX ADMIN — SODIUM CHLORIDE 1000 ML: 9 INJECTION, SOLUTION INTRAVENOUS at 12:19

## 2022-09-17 ASSESSMENT — PAIN DESCRIPTION - ORIENTATION: ORIENTATION: POSTERIOR

## 2022-09-17 ASSESSMENT — PAIN DESCRIPTION - PAIN TYPE: TYPE: ACUTE PAIN

## 2022-09-17 ASSESSMENT — PAIN DESCRIPTION - DESCRIPTORS: DESCRIPTORS: DISCOMFORT

## 2022-09-17 ASSESSMENT — PAIN DESCRIPTION - LOCATION: LOCATION: HEAD

## 2022-09-17 ASSESSMENT — PAIN DESCRIPTION - FREQUENCY: FREQUENCY: CONTINUOUS

## 2022-09-17 ASSESSMENT — PAIN SCALES - GENERAL: PAINLEVEL_OUTOF10: 5

## 2022-09-17 ASSESSMENT — PAIN - FUNCTIONAL ASSESSMENT: PAIN_FUNCTIONAL_ASSESSMENT: 0-10

## 2022-09-17 NOTE — ED PROVIDER NOTES
ED Attending shared visit  CC: No    Department of Emergency Medicine   ED  Provider Note  Admit Date/RoomTime: 9/17/2022 10:36 AM  ED Room: RASHID/RASHID        9/17/22  12:04 PM EDT    History of Present Illness:   Gabrielle Perkins is a 46 y.o. male presenting to the ED for abscess to the posterior neck region which started 4 days ago. Patient is complaining of some erythema and pain to the site. The complaint has been constant, moderate in severity, and worsened by nothing in particular. Relieved by nothing. No medications taken prior to arrival.  Patient states that he was shaving his scalp recently and feels that he had a little scratch from the razor. He states that he has been digging at the area and picking at the site and feels this has caused the abscess. Patient states that he has had a history of abscesses in the past.  He has been admitted in the past for necrotizing fasciitis. Patient reports that he is a diabetic. He states that he has not checked his blood sugar in the last week. He states that he is not on any medications to control his diabetes but that he tries to control his diet and exercise. Patient denies any fevers or chills. Patient denies any headache. Denies any neck swelling. Denies any difficulty swallowing or breathing. Reports eating and drinking normally. Denies any chest pain or shortness of breath. Denies any abdominal pain, nausea, vomiting, diarrhea. Denies any recent antibiotic use. No other reported complaints currently.       Review of Systems:   A complete review of systems was performed and pertinent positives and negatives are stated within HPI, all other systems reviewed and are negative.          --------------------------------------------- PAST HISTORY ---------------------------------------------  Past Medical History:  has a past medical history of Abdominal pain, Allergic rhinitis, Anxiety, Asthma, COPD (chronic obstructive pulmonary disease) ([facility] Depression, Diabetes mellitus ([facility] Encounter for screening colonoscopy, GERD (gastroesophageal reflux disease), Hyperlipidemia, Hypertension, Obesity, Osteoarthritis, and Sleep apnea. Past Surgical History:  has a past surgical history that includes Tonsillectomy (1983); hiatal hernia repair (2012); Tympanoplasty; Colonoscopy (06/08/2017); hernia repair (2012); Endoscopy, colon, diagnostic; pr drain skin abscess simple (N/A, 9/19/2018); EXCISION HIDRADENITIS OF INGUINAL / UMBILICAL AREA (N/A, 3/09/5741); Upper gastrointestinal endoscopy (N/A, 7/20/2020); Colonoscopy (N/A, 7/20/2020); Upper gastrointestinal endoscopy (N/A, 7/23/2021); Abdomen surgery (N/A, 6/24/2019); and Cholecystectomy, laparoscopic (N/A, 8/24/2021). Social History:  reports that he has been smoking cigarettes. He has a 35.00 pack-year smoking history. He has never used smokeless tobacco. He reports current drug use. Frequency: 7.00 times per week. Drug: Marijuana [place]). He reports that he does not drink alcohol. Family History: family history includes Asthma in his sister; COPD in his brother and mother; Cancer in his maternal grandmother, mother, and paternal grandmother; Diabetes in his brother, father, and sister; Heart Disease in his maternal grandmother and paternal grandfather; Heart Disease (age of onset: 43) in his brother; Heart Disease (age of onset: 48) in his brother; Heart Disease (age of onset: 46) in his father. Unless otherwise noted, family history is non contributory    The patients home medications have been reviewed. Allergies: Patient has no known allergies.     -------------------------------------------------- RESULTS -------------------------------------------------  All laboratory and radiology results have been personally reviewed by myself   LABS:  Results for orders placed or performed during the hospital encounter of 09/17/22   CBC with Auto Differential   Result Value Ref Range    WBC 12.1 (H) 4.5 - 11.5 E9/L    RBC 4.84 3.80 - 5.80 E12/L    Hemoglobin 14.7 12.5 - 16.5 g/dL    Hematocrit 43.1 37.0 - 54.0 %    MCV 89.0 80.0 - 99.9 fL    MCH 30.4 26.0 - 35.0 pg    MCHC 34.1 32.0 - 34.5 %    RDW 13.5 11.5 - 15.0 fL    Platelets 366 307 - 202 E9/L    MPV 11.3 7.0 - 12.0 fL    Neutrophils % 65.7 43.0 - 80.0 %    Immature Granulocytes % 0.6 0.0 - 5.0 %    Lymphocytes % 19.6 (L) 20.0 - 42.0 %    Monocytes % 10.4 2.0 - 12.0 %    Eosinophils % 3.0 0.0 - 6.0 %    Basophils % 0.7 0.0 - 2.0 %    Neutrophils Absolute 7.99 (H) 1.80 - 7.30 E9/L    Immature Granulocytes # 0.07 E9/L    Lymphocytes Absolute 2.38 1.50 - 4.00 E9/L    Monocytes Absolute 1.26 (H) 0.10 - 0.95 E9/L    Eosinophils Absolute 0.36 0.05 - 0.50 E9/L    Basophils Absolute 0.08 0.00 - 0.20 T6/N   Basic Metabolic Panel w/ Reflex to MG   Result Value Ref Range    Sodium 135 132 - 146 mmol/L    Potassium reflex Magnesium 4.6 3.5 - 5.0 mmol/L    Chloride 99 98 - 107 mmol/L    CO2 27 22 - 29 mmol/L    Anion Gap 9 7 - 16 mmol/L    Glucose 294 (H) 74 - 99 mg/dL    BUN 12 6 - 20 mg/dL    Creatinine 0.8 0.7 - 1.2 mg/dL    GFR Non-African American >60 >=60 mL/min/1.73    GFR African American >60     Calcium 9.5 8.6 - 10.2 mg/dL   Lactic Acid   Result Value Ref Range    Lactic Acid 1.6 0.5 - 2.2 mmol/L       RADIOLOGY:  Interpreted by Radiologist.  CT SOFT TISSUE NECK W CONTRAST   Final Result   1. Inflammatory changes in the posterior subcutaneous fat, greater on the   right extending from the skull base to about the C3-4 level, consistent with   cellulitis. No definite abscess seen. 2. Otherwise, no evidence of acute process in the neck. 3. Probable right vallecular cyst measuring 14 x 8 mm. Correlation with   direct visualization is suggested as well as follow-up study, if clinically   indicated.       RECOMMENDATIONS:   Unavailable             ------------------------- NURSING NOTES AND VITALS REVIEWED ---------------------------   The nursing notes within the ED encounter and vital signs as below have been reviewed. /74   Pulse 86   Temp 97.9 °F (36.6 °C)   Resp 14   Wt 266 lb (120.7 kg)   SpO2 96%   BMI 44.26 kg/m²   Oxygen Saturation Interpretation: Normal      ---------------------------------------------------PHYSICAL EXAM--------------------------------------    Constitutional/General: Alert and oriented x3, well appearing, non toxic in NAD, pleasant  Head: Normocephalic and atraumatic. Eyes: PERRL, EOMI, conjunctiva normal, sclera non icteric, no eye drainage  Mouth: Oropharynx clear, without erythema, handling secretions, no trismus, no asymmetry of the posterior oropharynx or uvular edema. No tongue/lip swelling. Neck: Supple, full ROM, non tender to palpation in the midline, no stridor, no crepitus, no meningeal signs. No lymphadenopathy. posterior neck region with 7 cm in diameter of erythema surrounding which appears to be consistent with cellulitis. No fluctuance. No induration. Respiratory: Lungs clear to auscultation bilaterally, no wheezes, rales, or rhonchi. Not in respiratory distress. Respirations easy and unlabored. Cardiovascular:  S1S2. Regular rate. Regular rhythm. No murmurs, gallops, or rubs. 2+ distal pulses  Chest: No chest wall tenderness  GI:  Abdomen Soft, Non tender, Non distended. +BS x 4 quadrants. No organomegaly, no palpable masses,  No rebound, guarding, or rigidity. Musculoskeletal: Moves all extremities x 4. Warm and well perfused, no clubbing, cyanosis, or edema. Capillary refill <3 seconds  Integument: skin warm and dry. No rashes. Abscess as documented above.   Lymphatic: no lymphadenopathy noted  Neurologic: GCS 15, no focal deficits, symmetric strength 5/5 in the upper and lower extremities bilaterally  Psychiatric: Normal Affect      ------------------------------ ED COURSE/MEDICAL DECISION MAKING----------------------  Medications   0.9 % sodium chloride bolus (0 mLs IntraVENous Stopped 9/17/22 1282) iopamidol (ISOVUE-370) 76 % injection 75 mL (75 mLs IntraVENous Given 9/17/22 1325)   piperacillin-tazobactam (ZOSYN) 3,375 mg in sodium chloride 0.9 % 50 mL IVPB ([id number]) (0 mg IntraVENous Stopped 9/17/22 1726)            Medical Decision Making: At this time the patient is without objective evidence of an acute process requiring hospitalization or inpatient management. They have remained hemodynamically stable throughout their entire ED visit and are stable for discharge with outpatient follow up. The plan has been discussed in detail and they are aware of the specific conditions for emergent return, as well as the importance of follow-up. Patient is a 75-year-old male presenting to the emergency department today for evaluation of erythema to the posterior neck region which has been present for 4 days. Patient seen & evaluated with Dr. Marvin Boothe. Labs obtained with mild leukocytosis of 12.1 but otherwise within normal limits. CT soft tissue neck showing inflammatory changes consistent with cellulitis and no definitive abscess present. Patient will be treated with antibiotics. Advised to return to the emergency department if symptoms worsen or if he develops any streaking or erythema/, new symptoms such as fever or chills, or worsening of symptoms. Patient advised on follow-up with primary care provider in 1 to 2 days for reevaluation of wound. Counseling: The emergency provider has spoken with the patient and discussed todays results, in addition to providing specific details for the plan of care and counseling regarding the diagnosis and prognosis. Questions are answered at this time and they are agreeable with the plan.      --------------------------------- IMPRESSION AND DISPOSITION ---------------------------------    IMPRESSION  1.  Cellulitis of neck        DISPOSITION  Disposition: Discharge to home  Patient condition is stable                ANNABEL Dolan CNP  09/17/22 Damion Correa

## 2022-09-23 ENCOUNTER — EVALUATION (OUTPATIENT)
Dept: PHYSICAL THERAPY | Age: 52
End: 2022-09-23
Payer: COMMERCIAL

## 2022-09-23 DIAGNOSIS — M47.812 OSTEOARTHRITIS OF CERVICAL SPINE, UNSPECIFIED SPINAL OSTEOARTHRITIS COMPLICATION STATUS: ICD-10-CM

## 2022-09-23 DIAGNOSIS — M50.30 DDD (DEGENERATIVE DISC DISEASE), CERVICAL: Primary | ICD-10-CM

## 2022-09-23 DIAGNOSIS — M51.34 DDD (DEGENERATIVE DISC DISEASE), THORACIC: ICD-10-CM

## 2022-09-23 PROCEDURE — 97161 PT EVAL LOW COMPLEX 20 MIN: CPT | Performed by: PHYSICAL THERAPIST

## 2022-09-23 PROCEDURE — 97110 THERAPEUTIC EXERCISES: CPT | Performed by: PHYSICAL THERAPIST

## 2022-09-23 NOTE — PROGRESS NOTES
[facility]          Phone: [phone] Fax: [phone]    Physical Therapy Daily Treatment Note  Date:  2022    Patient Name:  Sheryl Courtney    :  1970  MRN: [id number]    Evaluating therapist: Mai Martin PT, DPT  [id number]    Restrictions/Precautions:  pt has cyst on R suboccipital region-- tender to touch    Diagnosis:     Diagnosis Orders   1. DDD (degenerative disc disease), cervical        2. DDD (degenerative disc disease), thoracic        3. Osteoarthritis of cervical spine, unspecified spinal osteoarthritis complication status          Treatment Diagnosis:    Insurance/Certification information:  [employer] [place] Medicaid  Referring Physician:  Bri Velasco MD  Plan of care signed (Y/N):    Visit# / total visits:   (auth 12 visits -- 22 through 22)  Pain level: 6/10   Time In:  1025  Time Out:  1105    Subjective:  See initial eval    Exercises:  Exercise/Equipment Resistance/Repetitions Other comments     Cervical rotation stretch 3x 20 sec hold ea direction      Cervical lateral flexion stretch 3x 20 sec hold ea direction      Levator scapulae stretch 3x 20 sec hold ea direction      Cervical retraction 10x 3 sec hold      Scapular retraction 10x 5 sec hold                                                                                                           Other Therapeutic Activities:  Pt tolerated introduction of TE's with mild discomfort in thoracic spine following scapular retractions.     Home Exercise Program:  cervical rotation stretch, cervical lateral flexion stretch, levator scapulae stretch, scapular retractions, cervical retractions    Manual Treatments:  N/A    Modalities:  N/A     Time-in Time-out Total Time   86540  Evaluation Low Complexity 1025 1045 20   27964  Evaluation Med Complexity      71352  Evaluation High Complexity      04990  Ther Ex 1045 1105 20   02353  Neuro Re-ed        28247  Ther Activities 22545  Manual Therapy       36088  E-stim       77713  Ultrasound            Session 6568 1905 40       Treatment/Activity Tolerance:  [x] Patient tolerated treatment well [] Patient limited by fatigue  [] Patient limited by pain  [] Patient limited by other medical complications  [] Other:     Prognosis: [x] Good [] Fair  [] Poor    Patient Requires Follow-up: [x] Yes  [] No    Plan:   [x] Continue per plan of care [] Alter current plan (see comments)  [] Plan of care initiated [] Hold pending MD visit [] Discharge    Plan for Next Session:        Electronically signed by:  Ernesto Saldaña, PT, DPT  [id number]

## 2022-09-23 NOTE — PROGRESS NOTES
[facility]   Phone: [phone]   Fax: [phone]    Patient: Marcella Saldana  : 1970  MRN: [id number]  Referring Provider: Arslan Neely MD  [address] [place] [address], [address] [facility],  [address] [place] [address]     Medical Diagnosis:      Diagnosis Orders   1. DDD (degenerative disc disease), cervical        2. DDD (degenerative disc disease), thoracic        3. Osteoarthritis of cervical spine, unspecified spinal osteoarthritis complication status             SUBJECTIVE:     Onset date: Approx 2 months ago. Neck and thoracic pain. Onset[de-identified] Sudden onset    Mechanism of Injury: Pain increased following COVID booster shot 2022. Variable intensity of shooting pains in L UE since shot. Previous PT: none    Medical Management for Current Problem:  N/A    Chief complaint: pain, decreased motion, weakness, poor sleep quality     Behavior: condition is getting better    Pain: constant  Current: 6/10     Best: 6/10     Worst:10/10    Symptom Type/Quality: sharp, shooting  Location[de-identified] Neck: left lateral neck with radiation to left scapula primarily, mild symptoms to R side. Provoking Activities/Positions:  sitting cross-legged (ring sitting) on hard surface, yard work                  Relieving Activitie/Positions:  heat, ibuprofen, marijuanna     Disturbed Sleep: yes    Imaging results: XR CERVICAL SPINE (4-5 VIEWS)    Result Date: 2022  EXAMINATION: THREE XRAY VIEWS OF THE THORACIC SPINE; THREE XRAY VIEWS OF THE LUMBAR SPINE; TWO XRAY VIEWS OF THE LEFT HIP; 4 XRAY VIEWS OF THE CERVICAL SPINE; TWO XRAY VIEWS OF THE LEFT KNEE 2022 9:49 am COMPARISON: Left knee 2019 and two view chest dated 2021.  HISTORY: ORDERING SYSTEM PROVIDED HISTORY: Chronic bilateral thoracic back pain; ORDERING SYSTEM PROVIDED HISTORY: Lumbar pain; ORDERING SYSTEM PROVIDED HISTORY: Hip pain; ORDERING SYSTEM PROVIDED HISTORY: Neck pain; ORDERING SYSTEM PROVIDED HISTORY: Chronic pain of both knees FINDINGS: C-spine: Mild degenerative facet arthropathy is best appreciated from C2 to C 5. Mild degenerative disc disease is best appreciated at C5-6. No fracture or dislocation. Normal cervical soft tissues. T and L-spine[de-identified] Mild multilevel degenerative disc disease. Mild anterior wedging deformity at T12 and L1 vertebral bodies is not clearly changed compared to a chest radiograph dated 9 July 2021 and is therefore chronic. Normal thoracic soft tissues. Left hip: No significant arthropathy. No fracture or dislocation. Normal soft tissues. Left knee: Very mild osteoarthritis at the lateral weight-bearing and patellofemoral compartments. No fracture or dislocation. Normal soft tissues. Degenerative cervical, thoracic and lumbar spondylosis. Chronic mild injuries of T12 and L1 vertebral bodies. Unremarkable left hip. Very mild osteoarthritis at the left knee. XR THORACIC SPINE (3 VIEWS)    Result Date: 8/29/2022  EXAMINATION: THREE XRAY VIEWS OF THE THORACIC SPINE; THREE XRAY VIEWS OF THE LUMBAR SPINE; TWO XRAY VIEWS OF THE LEFT HIP; 4 XRAY VIEWS OF THE CERVICAL SPINE; TWO XRAY VIEWS OF THE LEFT KNEE 8/29/2022 9:49 am COMPARISON: Left knee 2nd December 2019 and two view chest dated 9 July 2021. HISTORY: ORDERING SYSTEM PROVIDED HISTORY: Chronic bilateral thoracic back pain; ORDERING SYSTEM PROVIDED HISTORY: Lumbar pain; ORDERING SYSTEM PROVIDED HISTORY: Hip pain; ORDERING SYSTEM PROVIDED HISTORY: Neck pain; ORDERING SYSTEM PROVIDED HISTORY: Chronic pain of both knees FINDINGS: C-spine: Mild degenerative facet arthropathy is best appreciated from C2 to C 5. Mild degenerative disc disease is best appreciated at C5-6. No fracture or dislocation. Normal cervical soft tissues. T and L-spine[de-identified] Mild multilevel degenerative disc disease.   Mild anterior wedging deformity at T12 and L1 vertebral bodies is not clearly changed compared to a chest radiograph dated 9 July 2021 and is therefore chronic. Normal thoracic soft tissues. Left hip: No significant arthropathy. No fracture or dislocation. Normal soft tissues. Left knee: Very mild osteoarthritis at the lateral weight-bearing and patellofemoral compartments. No fracture or dislocation. Normal soft tissues. Degenerative cervical, thoracic and lumbar spondylosis. Chronic mild injuries of T12 and L1 vertebral bodies. Unremarkable left hip. Very mild osteoarthritis at the left knee. XR LUMBAR SPINE (2-3 VIEWS)    Result Date: 8/29/2022  EXAMINATION: THREE XRAY VIEWS OF THE THORACIC SPINE; THREE XRAY VIEWS OF THE LUMBAR SPINE; TWO XRAY VIEWS OF THE LEFT HIP; 4 XRAY VIEWS OF THE CERVICAL SPINE; TWO XRAY VIEWS OF THE LEFT KNEE 8/29/2022 9:49 am COMPARISON: Left knee 2nd December 2019 and two view chest dated 9 July 2021. HISTORY: ORDERING SYSTEM PROVIDED HISTORY: Chronic bilateral thoracic back pain; ORDERING SYSTEM PROVIDED HISTORY: Lumbar pain; ORDERING SYSTEM PROVIDED HISTORY: Hip pain; ORDERING SYSTEM PROVIDED HISTORY: Neck pain; ORDERING SYSTEM PROVIDED HISTORY: Chronic pain of both knees FINDINGS: C-spine: Mild degenerative facet arthropathy is best appreciated from C2 to C 5. Mild degenerative disc disease is best appreciated at C5-6. No fracture or dislocation. Normal cervical soft tissues. T and L-spine[de-identified] Mild multilevel degenerative disc disease. Mild anterior wedging deformity at T12 and L1 vertebral bodies is not clearly changed compared to a chest radiograph dated 9 July 2021 and is therefore chronic. Normal thoracic soft tissues. Left hip: No significant arthropathy. No fracture or dislocation. Normal soft tissues. Left knee: Very mild osteoarthritis at the lateral weight-bearing and patellofemoral compartments. No fracture or dislocation. Normal soft tissues. Degenerative cervical, thoracic and lumbar spondylosis. Chronic mild injuries of T12 and L1 vertebral bodies. Unremarkable left hip.  Very mild osteoarthritis at the left knee. XR HIP LEFT (2-3 VIEWS)    Result Date: 8/29/2022  EXAMINATION: THREE XRAY VIEWS OF THE THORACIC SPINE; THREE XRAY VIEWS OF THE LUMBAR SPINE; TWO XRAY VIEWS OF THE LEFT HIP; 4 XRAY VIEWS OF THE CERVICAL SPINE; TWO XRAY VIEWS OF THE LEFT KNEE 8/29/2022 9:49 am COMPARISON: Left knee 2nd December 2019 and two view chest dated 9 July 2021. HISTORY: ORDERING SYSTEM PROVIDED HISTORY: Chronic bilateral thoracic back pain; ORDERING SYSTEM PROVIDED HISTORY: Lumbar pain; ORDERING SYSTEM PROVIDED HISTORY: Hip pain; ORDERING SYSTEM PROVIDED HISTORY: Neck pain; ORDERING SYSTEM PROVIDED HISTORY: Chronic pain of both knees FINDINGS: C-spine: Mild degenerative facet arthropathy is best appreciated from C2 to C 5. Mild degenerative disc disease is best appreciated at C5-6. No fracture or dislocation. Normal cervical soft tissues. T and L-spine[de-identified] Mild multilevel degenerative disc disease. Mild anterior wedging deformity at T12 and L1 vertebral bodies is not clearly changed compared to a chest radiograph dated 9 July 2021 and is therefore chronic. Normal thoracic soft tissues. Left hip: No significant arthropathy. No fracture or dislocation. Normal soft tissues. Left knee: Very mild osteoarthritis at the lateral weight-bearing and patellofemoral compartments. No fracture or dislocation. Normal soft tissues. Degenerative cervical, thoracic and lumbar spondylosis. Chronic mild injuries of T12 and L1 vertebral bodies. Unremarkable left hip. Very mild osteoarthritis at the left knee. XR KNEE LEFT (1-2 VIEWS)    Result Date: 8/29/2022  EXAMINATION: THREE XRAY VIEWS OF THE THORACIC SPINE; THREE XRAY VIEWS OF THE LUMBAR SPINE; TWO XRAY VIEWS OF THE LEFT HIP; 4 XRAY VIEWS OF THE CERVICAL SPINE; TWO XRAY VIEWS OF THE LEFT KNEE 8/29/2022 9:49 am COMPARISON: Left knee 2nd December 2019 and two view chest dated 9 July 2021.  HISTORY: ORDERING SYSTEM PROVIDED HISTORY: Chronic bilateral thoracic back pain; ORDERING SYSTEM PROVIDED HISTORY: Lumbar pain; ORDERING SYSTEM PROVIDED HISTORY: Hip pain; ORDERING SYSTEM PROVIDED HISTORY: Neck pain; ORDERING SYSTEM PROVIDED HISTORY: Chronic pain of both knees FINDINGS: C-spine: Mild degenerative facet arthropathy is best appreciated from C2 to C 5. Mild degenerative disc disease is best appreciated at C5-6. No fracture or dislocation. Normal cervical soft tissues. T and L-spine[de-identified] Mild multilevel degenerative disc disease. Mild anterior wedging deformity at T12 and L1 vertebral bodies is not clearly changed compared to a chest radiograph dated 9 July 2021 and is therefore chronic. Normal thoracic soft tissues. Left hip: No significant arthropathy. No fracture or dislocation. Normal soft tissues. Left knee: Very mild osteoarthritis at the lateral weight-bearing and patellofemoral compartments. No fracture or dislocation. Normal soft tissues. Degenerative cervical, thoracic and lumbar spondylosis. Chronic mild injuries of T12 and L1 vertebral bodies. Unremarkable left hip. Very mild osteoarthritis at the left knee. CT SOFT TISSUE NECK W CONTRAST    Result Date: 9/17/2022  EXAMINATION: CT OF THE NECK SOFT TISSUE WITH CONTRAST  9/17/2022 TECHNIQUE: CT of the neck was performed with the administration of intravenous contrast. Multiplanar reformatted images are provided for review. Automated exposure control, iterative reconstruction, and/or weight based adjustment of the mA/kV was utilized to reduce the radiation dose to as low as reasonably achievable. COMPARISON: None. HISTORY: ORDERING SYSTEM PROVIDED HISTORY: abscess TECHNOLOGIST PROVIDED HISTORY: Reason for exam:->abscess Decision Support Exception - unselect if not a suspected or confirmed emergency medical condition->Emergency Medical Condition (MA) FINDINGS: PHARYNX/LARYNX:  The palatine tonsils are normal in appearance. The tongue is normal in appearance.   There is a small cystic area in the right vallecula measuring 14 x 8 mm, most likely a benign cyst.  The piriform sinuses are not well aerated, probably normal variant. Otherwise, the valleculae, epiglottis, aryepiglottic folds appear unremarkable. The true and false vocal cords are normal in appearance. No mass or abscess is seen. SALIVARY GLANDS/THYROID:  The parotid and submandibular glands appear unremarkable. The thyroid gland appears unremarkable. LYMPH NODES:  No cervical or supraclavicular lymphadenopathy is seen. SOFT TISSUES:  No appreciable soft tissue swelling or mass is seen. BRAIN/ORBITS/SINUSES:  The visualized portion of the intracranial contents appear unremarkable. The visualized portion of the orbits, paranasal sinuses and mastoid air cells demonstrate no acute abnormality. Small retention cysts are seen in both maxillary sinuses. LUNG APICES/SUPERIOR MEDIASTINUM:  No focal consolidation is seen within the visualized lung apices. No superior mediastinal lymphadenopathy or mass. The visualized portion of the trachea appears unremarkable. BONES:  No aggressive appearing lytic or blastic bony lesion. There is stranding in the subcutaneous fat posteriorly extending from the skull base to about the C3-4 level, greater on the right side, consistent with cellulitis. No definite focal fluid collection is seen to suggest an abscess. 1. Inflammatory changes in the posterior subcutaneous fat, greater on the right extending from the skull base to about the C3-4 level, consistent with cellulitis. No definite abscess seen. 2. Otherwise, no evidence of acute process in the neck. 3. Probable right vallecular cyst measuring 14 x 8 mm. Correlation with direct visualization is suggested as well as follow-up study, if clinically indicated.  RECOMMENDATIONS: Unavailable       Past Medical History:  Past Medical History:   Diagnosis Date    Abdominal pain     nausea    Allergic rhinitis     Anxiety     Asthma     stable    COPD (chronic obstructive pulmonary disease) ([facility]     controlled with inhalers    Depression     no meds    Diabetes mellitus ([facility]     Encounter for screening colonoscopy     and EGD 7-23-21    GERD (gastroesophageal reflux disease)     Hyperlipidemia     Hypertension     Obesity     Osteoarthritis     Sleep apnea     BMS CPAP 7, not using     Past Surgical History:   Procedure Laterality Date    ABDOMEN SURGERY N/A 6/24/2019    INCISION AND DRAINAGE PERINEAL ABSCESS, INCISIONAL DEBRIDEMENT performed by Meir Kim MD at [facility], LAPAROSCOPIC N/A 8/24/2021    LAPAROSCOPIC ROBOTIC ASISTED CHOLECYSTECTOMY performed by Luna Guillen MD at [address] [place] [address]  06/08/2017    Dr. Tim Benson polyp, diverticulitis    COLONOSCOPY N/A 7/20/2020    COLONOSCOPY POLYPECTOMY SNARE/COLD BIOPSY performed by Rj Ibanez MD at [address] [facility], COLON, DIAGNOSTIC      EXCISION HIDRADENITIS OF INGUINAL / UMBILICAL AREA N/A 5/46/0556    DEBRIDEMENT PERINEAL WOUND performed by Meir Kim MD at [place] [address]  2012    Dr. Juma Martinez N/A 9/19/2018    I AND D BACK ABCESS performed by Fatimah Mitchell MD at [address] [place] [address] 7/20/2020    EGD BIOPSY performed by Rj Ibanez MD at [place] [address] N/A 7/23/2021    EGD BIOPSY performed by Luna Guillen MD at [facility] ENDOSCOPY       Medications:   Current Outpatient Medications   Medication Sig Dispense Refill    sulfamethoxazole-trimethoprim (BACTRIM DS;SEPTRA DS) 800-160 MG per tablet Take 1 tablet by mouth 2 times daily for 7 days 14 tablet 0    cephALEXin (KEFLEX) 500 MG capsule Take 1 capsule by mouth 4 times daily for 7 days 28 capsule 0    divalproex (DEPAKOTE) 500 MG DR tablet Take 1 tablet by mouth in the morning and 1 tablet before bedtime.  60 tablet 0    melatonin 3 MG TABS tablet Take 1 tablet by mouth nightly 30 tablet 0    nicotine (NICODERM CQ) 21 MG/24HR Place 1 patch onto the skin in the morning. 30 patch 0    Fluticasone-Umeclidin-Vilant 200-62.5-25 MCG/INH AEPB Inhale 1 puff into the lungs in the morning. 60 each 3    albuterol sulfate HFA (VENTOLIN HFA) 108 (90 Base) MCG/ACT inhaler inhale 2 puffs by mouth and INTO THE LUNGS every 6 hours if needed for wheezing (Patient taking differently: Inhale 2 puffs into the lungs every 6 hours as needed inhale 2 puffs by mouth and INTO THE LUNGS every 6 hours if needed for wheezing) 18 g 3    terazosin (HYTRIN) 2 MG capsule Take 1 capsule by mouth nightly 30 capsule 3    lisinopril (PRINIVIL;ZESTRIL) 40 MG tablet take 1 tablet by mouth once daily 30 tablet 5    atorvastatin (LIPITOR) 40 MG tablet take 1 tablet by mouth once daily 30 tablet 3    omeprazole (PRILOSEC) 40 MG delayed release capsule take 1 capsule by mouth once daily 30 capsule 5    mometasone-formoterol (DULERA) 200-5 MCG/ACT inhaler inhale 2 puffs by mouth and INTO THE LUNGS twice a day (Patient taking differently: Inhale 2 puffs into the lungs 2 times daily inhale 2 puffs by mouth and INTO THE LUNGS twice a day) 26 g 5    tamsulosin (FLOMAX) 0.4 MG capsule Take 1 capsule by mouth daily 30 capsule 5    CLARITIN-D 12 HOUR 5-120 MG per extended release tablet Take 1 tablet by mouth 2 times daily For congestion relief as needed. (Patient not taking: No sig reported) 20 tablet 0    blood glucose monitor strips Test twice daily & as needed for symptoms of irregular blood glucose. Dx: E11.9 100 strip 3    glucose monitoring kit (FREESTYLE) monitoring kit 1 kit by Does not apply route daily Dx E11.9 1 kit 0    Lancets MISC 1 each by Does not apply route daily 100 each 3     No current facility-administered medications for this visit. Occupation: does not work. Physical demands include:  N/A . Status:  N/A .     Exercise regimen: none    Hobbies: collecting vintage/nostalgic toys, watching TV    Patient Goals: pain relief, get back to normal, be able to care for house, apply for disability. Contraindications/Precautions: none    OBJECTIVE:     Observations: well nourished male    Inspection: demonstrates generalized pronated posture (forward head, protracted scapula, internally rotated shoulders, and flexed trunk and lower extremities)        Range of Motion:    Neck:    Flexion:  [] Normal   [x] Limited 22*   Extension:  [] Normal   [x] Limited 28*    Right Rotation: [x] Normal   [] Limited 40*   Left Rotation:  [] Normal   [x] Limited 22   Right Side Bending: [x] Normal   [] Limited 30*   Left Side Bending: [x] Normal   [] Limited 30* painful    Upper Extremity:   Right:   [x] Normal   [] Limited    Left:   [] Normal   [x] Limited: limited flexion and abduction d/t discomfort. Strength:     Neck: decreased ROM, decreased strength   R UE: 4-/5   L UE: 4-/5    Palpation: Tender to palpation at B paraspinals into upper T-spine and along C-spine     Sensation: intact to light touch and temperature.     Special Tests:   [] Nerve Root Compression           Right []+ / [] -    Left []+ / [] -  [] Cervical Distraction []+ / [] -    [] Spurling's Test           Right []+ / [] -    Left []+ / [] -     [] Flexion/rotation test :           Right []+ / [] -    Left []+ / [] -    [] Other: []+ / [] -      Special Test Comments: N/A    ASSESSMENT     Outcome Measure:   Neck Disability Index 62% disability     Problems:   Pain reported 6-10/10  ROM decreased in cervical flexion, extension, lateral flexion, L rotation and shoulder flexion/abduction  Strength decreased functionally in cervical spine  Decreased functional ability with sitting, work, ADLs , bending, reaching, lifting, inability to participate in exercise regimen / fitness program, inability to participate in hobbies    Reason for Skilled Care: Pt presents with neck and upper back pain with associated decreased tolerance to B UE use and ALD tasks. He will benefit from skilled PT services to improve cervical ROM, postural alignment and strengthening, and increased activity tolerance for ADL and IADL tasks. [x] There are no barriers affecting plan of care or recovery    [] Barriers to this patient's plan of care or recovery include. Domestic Concerns:  [x] No  [] Yes:    Long Term goals (4-6 weeks)  Decrease reported pain to 2-3/10  Increase cervical ROM to 35* flexion, 35* extension, 30* L rotation  Able to perform/complete the following functions/tasks: complete 20 min light household tasks with 2-3/10 pain in neck/upper back, read for 30 minutes with 2-3/10 pain in neck/upper back  NDI 44%  Independent with Home Exercise Programs    Rehab Potential: [x] Good  [] Fair  [] Poor    PLAN       Treatment Plan:   [x] Therapeutic Exercise  [x] Therapeutic Activity  [x] Neuromuscular Re-education   [] Gait Training  [] Balance Training  [] Aerobic conditioning  [x] Manual Therapy  [] Massage/Fascial release   [] Work/Sport specific activities    [] Pain Neuroscience [x] Cold/hotpack  [] Vasocompression  [x] Electrical Stimulation  [] Lumbar/Cervical Traction  [] Ultrasound   [] Iontophoresis: 4 mg/mL Dexamethasone Sodium Phosphate 40-80 mAmin  [x] Dry Needling      [x] Instruction in HEP      []  Medication allergies reviewed for use of Dexamethasone Sodium Phosphate 4mg/ml  with iontophoresis treatments. Patient is not allergic.       The following CPT codes are likely to be used in the care of this patient: 29990 PT Evaluation: Low Complexity, 75150 PT Re-Evaluation, 21492 Therapeutic Exercise, 56869 Neuromuscular Re-Education, 72667 Therapeutic Activities, 65785 Manual Therapy, and 77767 Electric Stimulation      Suggested Professional Referral: [x] No  [] Yes:     Patient Education:  [x] Plans/Goals, Risks/Benefits discussed  [x] Home exercise program  Method of Education: [x] Verbal  [x] Demo  [x] Written  Comprehension of Education:  [x] Verbalizes understanding. [x] Demonstrates understanding. [] Needs Review. [] Demonstrates/verbalizes understanding of HEP/Ed previously given. Frequency:  1-2 days per week for 4-6 weeks    Patient understands diagnosis/prognosis and consents to treatment, plan and goals: [x] Yes    [] No     Thank you for the opportunity to work with your patient. If you have questions or comments, please contact me at number listed above. Electronically signed by: Lucy Judge, PT, DPT  [id number]    Medicare Patients Only     Please sign Physician's Certification and return to: [address]  [facility]  [address] [address]  Dept: [phone]  Dept Fax: [phone]  Loc: [phone] Certification / Comments     Frequency/Duration 1-2 days per week for 4-6 weeks. Certification period from 9/23/2022  to 12/30/2022. I have reviewed the Plan of Care established for skilled therapy services and certify that the services are required and that they will be provided while the patient is under my care.     Physician's Comments/Revisions:               Physician's Printed Name:                                           [de-identified] Signature:                                                               Date:

## 2022-09-26 RX ORDER — TERAZOSIN 2 MG/1
CAPSULE ORAL
Qty: 30 CAPSULE | Refills: 3 | Status: SHIPPED | OUTPATIENT
Start: 2022-09-26

## 2022-09-26 RX ORDER — ATORVASTATIN CALCIUM 40 MG/1
TABLET, FILM COATED ORAL
Qty: 30 TABLET | Refills: 3 | Status: SHIPPED | OUTPATIENT
Start: 2022-09-26

## 2022-09-27 ENCOUNTER — TREATMENT (OUTPATIENT)
Dept: PHYSICAL THERAPY | Age: 52
End: 2022-09-27
Payer: COMMERCIAL

## 2022-09-27 DIAGNOSIS — M47.812 OSTEOARTHRITIS OF CERVICAL SPINE, UNSPECIFIED SPINAL OSTEOARTHRITIS COMPLICATION STATUS: ICD-10-CM

## 2022-09-27 DIAGNOSIS — M50.30 DDD (DEGENERATIVE DISC DISEASE), CERVICAL: Primary | ICD-10-CM

## 2022-09-27 DIAGNOSIS — M51.34 DDD (DEGENERATIVE DISC DISEASE), THORACIC: ICD-10-CM

## 2022-09-27 PROCEDURE — 97110 THERAPEUTIC EXERCISES: CPT | Performed by: PHYSICAL THERAPIST

## 2022-09-27 NOTE — PROGRESS NOTES
levator scapulae stretch, scapular retractions, cervical retractions    Manual Treatments:  Pt seated on edge of mat for manual therapy d/t pain with all touching of R suboccipitals in previous session. Manual stretching for pecs and ischemic compression alternating with MFR applied to upper traps, levator scapulae and thoracic paraspinals.     Modalities:  N/A     Time-in Time-out Total Time   40387  Evaluation Low Complexity      99967  Evaluation Med Complexity      37580  Evaluation High Complexity      02948  Ther Ex 1045 [id number] [id number]   56289  Neuro Re-ed        09062  Ther Activities        52343  Manual Therapy       77693  E-stim       03791  Ultrasound            Session [id number]       Treatment/Activity Tolerance:  [x] Patient tolerated treatment well [] Patient limited by fatigue  [] Patient limited by pain  [] Patient limited by other medical complications  [] Other:     Prognosis: [x] Good [] Fair  [] Poor    Patient Requires Follow-up: [x] Yes  [] No    Plan:   [x] Continue per plan of care [] Alter current plan (see comments)  [] Plan of care initiated [] Hold pending MD visit [] Discharge    Plan for Next Session:  continue to improve postural strength/enduranc.e      Electronically signed by:  Annmarie Watkins, PT, DPT  [id number]

## 2022-09-30 ENCOUNTER — TREATMENT (OUTPATIENT)
Dept: PHYSICAL THERAPY | Age: 52
End: 2022-09-30
Payer: COMMERCIAL

## 2022-09-30 ENCOUNTER — OFFICE VISIT (OUTPATIENT)
Dept: FAMILY MEDICINE CLINIC | Age: 52
End: 2022-09-30
Payer: COMMERCIAL

## 2022-09-30 VITALS
HEART RATE: 84 BPM | OXYGEN SATURATION: 95 % | RESPIRATION RATE: 18 BRPM | TEMPERATURE: 98.2 F | DIASTOLIC BLOOD PRESSURE: 88 MMHG | SYSTOLIC BLOOD PRESSURE: 124 MMHG | BODY MASS INDEX: 44.22 KG/M2 | WEIGHT: 265.4 LBS | HEIGHT: 65 IN

## 2022-09-30 DIAGNOSIS — E11.9 TYPE 2 DIABETES MELLITUS WITHOUT COMPLICATION, WITHOUT LONG-TERM CURRENT USE OF INSULIN (HCC): Primary | ICD-10-CM

## 2022-09-30 DIAGNOSIS — E78.2 MIXED HYPERLIPIDEMIA: ICD-10-CM

## 2022-09-30 DIAGNOSIS — I10 ESSENTIAL HYPERTENSION: ICD-10-CM

## 2022-09-30 DIAGNOSIS — M50.30 DDD (DEGENERATIVE DISC DISEASE), CERVICAL: Primary | ICD-10-CM

## 2022-09-30 DIAGNOSIS — M51.34 DDD (DEGENERATIVE DISC DISEASE), THORACIC: ICD-10-CM

## 2022-09-30 DIAGNOSIS — L08.9 INFECTED SEBACEOUS CYST: ICD-10-CM

## 2022-09-30 DIAGNOSIS — M47.812 OSTEOARTHRITIS OF CERVICAL SPINE, UNSPECIFIED SPINAL OSTEOARTHRITIS COMPLICATION STATUS: ICD-10-CM

## 2022-09-30 DIAGNOSIS — L72.3 INFECTED SEBACEOUS CYST: ICD-10-CM

## 2022-09-30 PROCEDURE — 3046F HEMOGLOBIN A1C LEVEL >9.0%: CPT | Performed by: FAMILY MEDICINE

## 2022-09-30 PROCEDURE — 3017F COLORECTAL CA SCREEN DOC REV: CPT | Performed by: FAMILY MEDICINE

## 2022-09-30 PROCEDURE — 99214 OFFICE O/P EST MOD 30 MIN: CPT | Performed by: FAMILY MEDICINE

## 2022-09-30 PROCEDURE — 90674 CCIIV4 VAC NO PRSV 0.5 ML IM: CPT | Performed by: FAMILY MEDICINE

## 2022-09-30 PROCEDURE — G8417 CALC BMI ABV UP PARAM F/U: HCPCS | Performed by: FAMILY MEDICINE

## 2022-09-30 PROCEDURE — 4004F PT TOBACCO SCREEN RCVD TLK: CPT | Performed by: FAMILY MEDICINE

## 2022-09-30 PROCEDURE — 97110 THERAPEUTIC EXERCISES: CPT | Performed by: PHYSICAL THERAPIST

## 2022-09-30 PROCEDURE — 2022F DILAT RTA XM EVC RTNOPTHY: CPT | Performed by: FAMILY MEDICINE

## 2022-09-30 PROCEDURE — G8427 DOCREV CUR MEDS BY ELIG CLIN: HCPCS | Performed by: FAMILY MEDICINE

## 2022-09-30 PROCEDURE — 90471 IMMUNIZATION ADMIN: CPT | Performed by: FAMILY MEDICINE

## 2022-09-30 RX ORDER — CEPHALEXIN 500 MG/1
500 CAPSULE ORAL 2 TIMES DAILY
Qty: 14 CAPSULE | Refills: 0 | Status: SHIPPED | OUTPATIENT
Start: 2022-09-30 | End: 2022-10-07

## 2022-09-30 RX ORDER — SULFAMETHOXAZOLE AND TRIMETHOPRIM 800; 160 MG/1; MG/1
1 TABLET ORAL 2 TIMES DAILY
Qty: 14 TABLET | Refills: 0 | Status: SHIPPED | OUTPATIENT
Start: 2022-09-30 | End: 2022-10-07

## 2022-09-30 SDOH — ECONOMIC STABILITY: FOOD INSECURITY: WITHIN THE PAST 12 MONTHS, YOU WORRIED THAT YOUR FOOD WOULD RUN OUT BEFORE YOU GOT MONEY TO BUY MORE.: SOMETIMES TRUE

## 2022-09-30 SDOH — ECONOMIC STABILITY: FOOD INSECURITY: WITHIN THE PAST 12 MONTHS, THE FOOD YOU BOUGHT JUST DIDN'T LAST AND YOU DIDN'T HAVE MONEY TO GET MORE.: SOMETIMES TRUE

## 2022-09-30 ASSESSMENT — SOCIAL DETERMINANTS OF HEALTH (SDOH): HOW HARD IS IT FOR YOU TO PAY FOR THE VERY BASICS LIKE FOOD, HOUSING, MEDICAL CARE, AND HEATING?: SOMEWHAT HARD

## 2022-09-30 NOTE — PROGRESS NOTES
DM2:   Patient is here to fu regarding DM2. Patient is not controlled. Taking all medications and tolerating well. Fasting sugars are running not checking. Patient is taking ASA and Ace Inhibitor/ARB. Patient is  on appropriately-dosed statin. LDL is not at goal.  BP is  controlled. No hypoglycemic episodes. Patient does not see Podiatry regularly. Saw an Eye Dr 2021. Patient is aware that it is necessary to see an Eye Dr yearly. Patient does smoke. Most recent labs reviewed with patient. Patient does have complaints or concerns today. Pt would like to discuss Cyst that he has on his neck. Pt is not fasting today. Last labs done on 09/17/2022    HM Reviewed. Lab Results   Component Value Date    LABA1C 9.4 (H) 08/11/2022       Lab Results   Component Value Date    LDLCALC 118 (H) 03/31/2022        Patient's past medical, surgical, social and/or family history reviewed, updated in chart, and are non-contributory (unless otherwise stated). Medications and allergies also reviewed and updated in chart.       Review of Systems:  Constitutional:  No fever, no fatigue, no chills, no headaches, no weight change  Dermatology:  No rash, no mole, no dry or sensitive skin  ENT:  No cough, no sore throat, no sinus pain, no runny nose, no ear pain  Cardiology:  No chest pain, no palpitations, no leg edema, no shortness of breath, no PND  Gastroenterology:  No dysphagia, no abdominal pain, no nausea, no vomiting, no constipation, no diarrhea, no heartburn  Musculoskeletal:  No joint pain, no leg cramps, no back pain, no muscle aches  Respiratory:  No shortness of breath, no orthopnea, no wheezing, no OSORIO, no hemoptysis  Urology:  No blood in the urine, no urinary frequency, no urinary incontinence, no urinary urgency, no nocturia, no dysuria  Vitals:    09/30/22 0914   BP: 124/88   Pulse: 84   Resp: 18   Temp: 98.2 °F (36.8 °C)   TempSrc: Temporal   SpO2: 95%   Weight: 265 lb 6.4 oz (120.4 kg)   Height: 5' 5\" (1.651 m)       General:  Patient alert and oriented x 3, NAD, pleasant  HEENT:  Atraumatic, normocephalic, PERRLA, EOMI, clear conjunctiva, TMs clear, nose-clear, throat - no erythema  Neck:  Supple, no goiter, no carotid bruits, no lymphadenopathy  Lungs:  CTA B  Heart:  RRR, no murmurs, gallops or rubs  Abdomen:  Soft/nt/nd, + bowel sounds  Extremities:  No clubbing, cyanosis or edema  Skin: unremarkable    Assessment/Plan:      Macie England was seen today for diabetes. Diagnoses and all orders for this visit:    Type 2 diabetes mellitus without complication, without long-term current use of insulin (HCC)  -     Comprehensive Metabolic Panel; Future  -     CBC; Future  -     Lipid Panel; Future  -     Hemoglobin A1C; Future    Mixed hyperlipidemia    Essential hypertension    Infected sebaceous cyst    Other orders  -     Influenza, FLUCELVAX, (age 10 mo+), IM, Preservative Free, 0.5 mL  -     dapagliflozin (FARXIGA) 10 MG tablet; Take 1 tablet by mouth every morning  -     cephALEXin (KEFLEX) 500 MG capsule; Take 1 capsule by mouth 2 times daily for 7 days  -     sulfamethoxazole-trimethoprim (BACTRIM DS;SEPTRA DS) 800-160 MG per tablet; Take 1 tablet by mouth 2 times daily for 7 days    As above. Call or go to ED immediately if symptoms worsen or persist.  Return in about 3 months (around 12/30/2022). , or sooner if necessary. Educational materials and/or home exercises printed for patient's review and were included in patient instructions on his/her After Visit Summary and given to patient at the end of visit. Counseled regarding above diagnosis, including possible risks and complications,  especially if left uncontrolled. Counseled regarding the possible side effects, risks, benefits and alternatives to treatment; patient and/or guardian verbalizes understanding, agrees, feels comfortable with and wishes to proceed with above treatment plan.     Advised patient to call with any new medication issues, and read all Rx info from pharmacy to assure aware of all possible risks and side effects of medication before taking. Reviewed age and gender appropriate health screening exams and vaccinations. Advised patient regarding importance of keeping up with recommended health maintenance and to schedule as soon as possible if overdue, as this is important in assessing for undiagnosed pathology, especially cancer, as well as protecting against potentially harmful/life threatening disease. Patient and/or guardian verbalizes understanding and agrees with above counseling, assessment and plan. All questions answered. Alcira Medina MD  10/2/2022    I have personally reviewed and updated the chief complaint, HPI, Past Medical, Family and Social History, as well as the above Review of Systems.

## 2022-09-30 NOTE — PROGRESS NOTES
[facility]          Phone: [phone] Fax: [phone]    Physical Therapy Daily Treatment Note  Date:  2022    Patient Name:  Sheryl Courtney    :  1970  MRN: [id number]    Evaluating therapist: Mai Martin PT, DPT  [id number]    Restrictions/Precautions:  pt has cyst on R suboccipital region-- tender to touch    Diagnosis:     Diagnosis Orders   1. DDD (degenerative disc disease), cervical        2. DDD (degenerative disc disease), thoracic        3. Osteoarthritis of cervical spine, unspecified spinal osteoarthritis complication status            Treatment Diagnosis:    Insurance/Certification information:  [employer] [place] Medicaid  Referring Physician:  Bri Velasco MD  Plan of care signed (Y/N):  yes  Visit# / total visits:  3/8 (auth 12 visits -- 22 through 22)  Pain level: 3-4/10   Time In:  1036  Time Out:  1115    Subjective: Pt reports continued compliance with HEP with decreased strain in neck but ongoing discomfort in t-spine. Exercises:  Exercise/Equipment Resistance/Repetitions Other comments     Cervical rotation stretch 3x 20 sec hold ea direction      Cervical lateral flexion stretch 3x 20 sec hold ea direction      Levator scapulae stretch 3x 20 sec hold ea direction    Seated thoracic extension stretch 10x Therapist applied over pressure at symptomatic points along T-spine     Cervical retraction      Scapular retraction             Mid rows 10 x2 Red TB     Resisted ER 10 x2 ea UE Red TB             UBE 3 min forward, 3 min backward       Supine thoracic stretch over towel roll 3 min Added to HEP                                                              Other Therapeutic Activities:  Pt tolerated introduction of thoracic extension stretch in supine reporting improved comfort of upper back tightness. He demonstrates improved overall ROM and tolerance to TE's this date.     Home Exercise Program:  cervical rotation stretch, cervical lateral flexion stretch, levator scapulae stretch, scapular retractions, cervical retractions, towel roll stretch    Manual Treatments:  Pt seated on edge of mat for manual therapy d/t pain with all touching of R suboccipitals in previous session. Manual stretching for pecs and ischemic compression alternating with MFR applied to upper traps, levator scapulae and thoracic paraspinals.     Modalities:  N/A     Time-in Time-out Total Time   92353  Evaluation Low Complexity      18109  Evaluation Med Complexity      01993  Evaluation High Complexity      54876  Ther Ex 1036 1115 39   02107  Neuro Re-ed        17852  Ther Activities        59047  Manual Therapy       60144  E-stim       24355  Ultrasound            Session 2008 9531 28       Treatment/Activity Tolerance:  [x] Patient tolerated treatment well [] Patient limited by fatigue  [] Patient limited by pain  [] Patient limited by other medical complications  [] Other:     Prognosis: [x] Good [] Fair  [] Poor    Patient Requires Follow-up: [x] Yes  [] No    Plan:   [x] Continue per plan of care [] Alter current plan (see comments)  [] Plan of care initiated [] Hold pending MD visit [] Discharge    Plan for Next Session:  continue to improve postural strength/enduranc.e      Electronically signed by:  Suki Vizcaino, PT, DPT  [id number]

## 2022-10-04 ENCOUNTER — TREATMENT (OUTPATIENT)
Dept: PHYSICAL THERAPY | Age: 52
End: 2022-10-04
Payer: COMMERCIAL

## 2022-10-04 DIAGNOSIS — M51.34 DDD (DEGENERATIVE DISC DISEASE), THORACIC: ICD-10-CM

## 2022-10-04 DIAGNOSIS — M50.30 DDD (DEGENERATIVE DISC DISEASE), CERVICAL: Primary | ICD-10-CM

## 2022-10-04 DIAGNOSIS — M47.812 OSTEOARTHRITIS OF CERVICAL SPINE, UNSPECIFIED SPINAL OSTEOARTHRITIS COMPLICATION STATUS: ICD-10-CM

## 2022-10-04 PROCEDURE — 97110 THERAPEUTIC EXERCISES: CPT

## 2022-10-04 PROCEDURE — 97140 MANUAL THERAPY 1/> REGIONS: CPT

## 2022-10-04 NOTE — PROGRESS NOTES
[facility]          Phone: [phone] Fax: [phone]    Physical Therapy Daily Treatment Note  Date:  10/4/2022    Patient Name:  Patrice Ortega    :  1970  MRN: [id number]    Evaluating therapist: Nona Mensah PT, DPT  [id number]    Restrictions/Precautions:  pt has cyst on R suboccipital region-- tender to touch    Diagnosis:     Diagnosis Orders   1. DDD (degenerative disc disease), cervical        2. DDD (degenerative disc disease), thoracic        3. Osteoarthritis of cervical spine, unspecified spinal osteoarthritis complication status            Treatment Diagnosis:    Insurance/Certification information:  [employer] [place] Medicaid  Referring Physician:  Erick Evans MD  Plan of care signed (Y/N):  yes  Visit# / total visits:   (auth 12 visits -- 22 through 22)  Pain level: 5-6/10   Time In:  1112  Time Out:  1155    Subjective: Pt feels very sore and tight today. Pt reports continued compliance with HEP. Pt also reported he has been focusing on postural awareness seated and standing. Exercises:  Exercise/Equipment Resistance/Repetitions Other comments     Cervical rotation stretch 3x 20 sec hold ea direction      Cervical lateral flexion stretch 3x 20 sec hold ea direction      Levator scapulae stretch 3x 20 sec hold ea direction    Seated thoracic extension stretch 10x Therapist applied over pressure at symptomatic points along T-spine     Cervical retraction      Scapular retraction             Mid rows 10 x2 Red TB     Resisted ER 10 x2 ea UE Red TB             UBE 3 min forward, 3 min backward       Supine thoracic stretch over towel roll Added to HEP                                                              Other Therapeutic Activities:  Pt tolerated ex this date. Pt reported relief in his neck and shoulders after manual therapy intervention as noted . Pt reported decreased tightness in para spinals and upper traps.     Therapist demo'd to pt and Pt was instructed to use tennis ball at home for ischemic compression, and release along thoracic para spinals. Pt reported understanding. Home Exercise Program:  cervical rotation stretch, cervical lateral flexion stretch, levator scapulae stretch, scapular retractions, cervical retractions, towel roll stretch    Manual Treatments:  Pt seated on edge of mat for manual therapy d/t pain with all touching of R suboccipitals in previous session. Manual stretching for pecs and ischemic compression alternating with MFR applied to upper traps, levator scapulae and thoracic paraspinals.     Modalities:  N/A     Time-in Time-out Total Time   01179  Evaluation Low Complexity      84191  Evaluation Med Complexity      33743  Evaluation High Complexity      61832  Ther Ex 1112 1140 28   20730  Neuro Re-ed        69137  Ther Activities        29692  Manual Therapy  5923 4616 15   91734  E-stim       18584  Ultrasound            Session 9667 8177 01       Treatment/Activity Tolerance:  [x] Patient tolerated treatment well [] Patient limited by fatigue  [] Patient limited by pain  [] Patient limited by other medical complications  [] Other:     Prognosis: [x] Good [] Fair  [] Poor    Patient Requires Follow-up: [x] Yes  [] No    Plan:   [x] Continue per plan of care [] Alter current plan (see comments)  [] Plan of care initiated [] Hold pending MD visit [] Discharge    Plan for Next Session:  continue to improve postural strength/enduranc.e      Electronically signed by:  Ev Ladd PTA [id number]

## 2022-10-07 ENCOUNTER — TREATMENT (OUTPATIENT)
Dept: PHYSICAL THERAPY | Age: 52
End: 2022-10-07
Payer: COMMERCIAL

## 2022-10-07 DIAGNOSIS — M50.30 DDD (DEGENERATIVE DISC DISEASE), CERVICAL: Primary | ICD-10-CM

## 2022-10-07 DIAGNOSIS — M51.34 DDD (DEGENERATIVE DISC DISEASE), THORACIC: ICD-10-CM

## 2022-10-07 DIAGNOSIS — M47.812 OSTEOARTHRITIS OF CERVICAL SPINE, UNSPECIFIED SPINAL OSTEOARTHRITIS COMPLICATION STATUS: ICD-10-CM

## 2022-10-07 PROCEDURE — 97140 MANUAL THERAPY 1/> REGIONS: CPT | Performed by: PHYSICAL THERAPIST

## 2022-10-07 PROCEDURE — 97110 THERAPEUTIC EXERCISES: CPT | Performed by: PHYSICAL THERAPIST

## 2022-10-07 NOTE — PROGRESS NOTES
[facility]          Phone: [phone] Fax: [phone]    Physical Therapy Daily Treatment Note  Date:  10/7/2022    Patient Name:  Devon Sauer    :  1970  MRN: [id number]    Evaluating therapist: Angel FloresrLIZZETTE, SANCHEZ  [id number]    Restrictions/Precautions:  pt has cyst on R suboccipital region-- tender to touch    Diagnosis:     Diagnosis Orders   1. DDD (degenerative disc disease), cervical        2. DDD (degenerative disc disease), thoracic        3. Osteoarthritis of cervical spine, unspecified spinal osteoarthritis complication status              Treatment Diagnosis:    Insurance/Certification information:  [employer] [place] [employer]  Referring Physician:  Demetrice Hedrick MD  Plan of care signed (Y/N):  yes  Visit# / total visits:   (auth 12 visits -- 22 through 22)  Pain level: 5-6/10   Time In:  1120  Time Out:  1155    Subjective: Pt feels very sore and tight today which he attributes to cold weather d/t global muscular tightness. Pt reports he has not tried tennis ball self release d/t not having a ball at home at this time.     Exercises:  Exercise/Equipment Resistance/Repetitions Other comments     Cervical rotation stretch 3x 20 sec hold ea direction      Cervical lateral flexion stretch 3x 20 sec hold ea direction      Levator scapulae stretch 3x 20 sec hold ea direction    Seated thoracic extension stretch 10x Seated in black chair using back of chair for OP     Cervical retraction      Scapular retraction             Mid rows 10 x2 Green TB with handles     Resisted ER 10 x2 ea UE Green TB with handles     B shoulder extension 15x Green TB with handles      UBE 4 min forward, 4 min backward       Supine thoracic stretch over towel roll Added to HEP                                                              Other Therapeutic Activities:  Pt tolerated progression of resistance and introduction of B shoulder extension with only muscular fatigue reported. Pt noted to have decreased tension following interventions this date. Home Exercise Program:  cervical rotation stretch, cervical lateral flexion stretch, levator scapulae stretch, scapular retractions, cervical retractions, towel roll stretch    Manual Treatments:  Pt seated in chair for all manual therapy this date. Pt tolerated manual release of soft tissue restriction throughout suboccipitals, upper traps, and B thoracic paraspinals. Overpressure not required during thoracic extension stretch d/t position of back of chair.     Modalities:  N/A     Time-in Time-out Total Time   25975  Evaluation Low Complexity      32187  Evaluation Med Complexity      45613  Evaluation High Complexity      45730  Ther Ex 1120 1140 20   71122  Neuro Re-ed        97916  Ther Activities        63970  Manual Therapy  1140 1155 15   61478  E-stim       98928  Ultrasound            Session 2267 9112 87        Treatment/Activity Tolerance:  [x] Patient tolerated treatment well [] Patient limited by fatigue  [] Patient limited by pain  [] Patient limited by other medical complications  [] Other:     Prognosis: [x] Good [] Fair  [] Poor    Patient Requires Follow-up: [x] Yes  [] No    Plan:   [x] Continue per plan of care [] Alter current plan (see comments)  [] Plan of care initiated [] Hold pending MD visit [] Discharge    Plan for Next Session:  continue to improve postural strength/endurance      Electronically signed by:  Zoë Salvador PT, DPT  [id number]

## 2022-10-11 ENCOUNTER — TREATMENT (OUTPATIENT)
Dept: PHYSICAL THERAPY | Age: 52
End: 2022-10-11
Payer: COMMERCIAL

## 2022-10-11 DIAGNOSIS — M47.812 OSTEOARTHRITIS OF CERVICAL SPINE, UNSPECIFIED SPINAL OSTEOARTHRITIS COMPLICATION STATUS: ICD-10-CM

## 2022-10-11 DIAGNOSIS — M50.30 DDD (DEGENERATIVE DISC DISEASE), CERVICAL: Primary | ICD-10-CM

## 2022-10-11 DIAGNOSIS — M51.34 DDD (DEGENERATIVE DISC DISEASE), THORACIC: ICD-10-CM

## 2022-10-11 PROCEDURE — 97140 MANUAL THERAPY 1/> REGIONS: CPT

## 2022-10-11 PROCEDURE — 97110 THERAPEUTIC EXERCISES: CPT

## 2022-10-11 NOTE — PROGRESS NOTES
[facility]          Phone: [phone] Fax: [phone]    Physical Therapy Daily Treatment Note  Date:  10/11/2022    Patient Name:  Roxana Engle    :  1970  MRN: [id number]    Evaluating therapist: Philomena Marshall PT, DPT  [id number]    Restrictions/Precautions:  pt has cyst on R suboccipital region-- tender to touch    Diagnosis:     Diagnosis Orders   1. DDD (degenerative disc disease), cervical        2. DDD (degenerative disc disease), thoracic        3. Osteoarthritis of cervical spine, unspecified spinal osteoarthritis complication status              Treatment Diagnosis:    Insurance/Certification information:  [employer] [place] [employer]  Referring Physician:  Pennie Mcdaniel MD  Plan of care signed (Y/N):  yes  Visit# / total visits:   (auth 12 visits -- 22 through 22)  Pain level: 3-4/10   Time In:  1118  Time Out:  1156    Subjective: Pt had no new reports    Exercises:  Exercise/Equipment Resistance/Repetitions Other comments     Cervical rotation stretch 3x 20 sec hold ea direction      Cervical lateral flexion stretch 3x 20 sec hold ea direction      Levator scapulae stretch 3x 20 sec hold ea direction    Seated thoracic extension stretch 10x Seated in black chair using back of chair for OP     Cervical retraction      Scapular retraction             Mid rows 10 x2 Green TB with handles     Resisted ER 10 x2 ea UE Green TB with handles     B shoulder extension 15x Green TB with handles      UBE 4 min forward, 4 min backward       Supine thoracic stretch over towel roll Added to HEP                                                              Other Therapeutic Activities:  Pt tolerated TE. Pt noted to have decreased tension following interventions this date.  Pain 2/10     Home Exercise Program:  cervical rotation stretch, cervical lateral flexion stretch, levator scapulae stretch, scapular retractions, cervical retractions, towel roll stretch    Manual Treatments:  Pt seated in chair for all manual therapy this date. Pt tolerated manual release of soft tissue restriction throughout suboccipitals, upper traps, and B thoracic paraspinals. Overpressure not required during thoracic extension stretch d/t position of back of chair.     Modalities:  N/A     Time-in Time-out Total Time   13982  Evaluation Low Complexity      31102  Evaluation Med Complexity      20346  Evaluation High Complexity      27965  Ther Ex 1118 1141 23   14655  Neuro Re-ed        00848  Ther Activities        15676  Manual Therapy  0460 4068 88   83143  E-stim       73968  Ultrasound            Session 7346 3925 46        Treatment/Activity Tolerance:  [x] Patient tolerated treatment well [] Patient limited by fatigue  [] Patient limited by pain  [] Patient limited by other medical complications  [] Other:     Prognosis: [x] Good [] Fair  [] Poor    Patient Requires Follow-up: [x] Yes  [] No    Plan:   [x] Continue per plan of care [] Alter current plan (see comments)  [] Plan of care initiated [] Hold pending MD visit [] Discharge    Plan for Next Session:  continue to improve postural strength/endurance      Electronically signed by: Ran Ortega [facility] [id number]

## 2022-10-12 ENCOUNTER — PROCEDURE VISIT (OUTPATIENT)
Dept: AUDIOLOGY | Age: 52
End: 2022-10-12
Payer: COMMERCIAL

## 2022-10-12 ENCOUNTER — OFFICE VISIT (OUTPATIENT)
Dept: ENT CLINIC | Age: 52
End: 2022-10-12
Payer: COMMERCIAL

## 2022-10-12 VITALS
BODY MASS INDEX: 42.65 KG/M2 | DIASTOLIC BLOOD PRESSURE: 81 MMHG | SYSTOLIC BLOOD PRESSURE: 114 MMHG | HEIGHT: 65 IN | HEART RATE: 71 BPM | WEIGHT: 256 LBS

## 2022-10-12 DIAGNOSIS — Z01.812 PRE-PROCEDURE LAB EXAM: ICD-10-CM

## 2022-10-12 DIAGNOSIS — H71.92 CHOLESTEATOMA OF LEFT EAR: Primary | ICD-10-CM

## 2022-10-12 DIAGNOSIS — H90.6 MIXED HEARING LOSS, BILATERAL: Primary | ICD-10-CM

## 2022-10-12 LAB
BUN BLDV-MCNC: 15 MG/DL (ref 6–20)
CREAT SERPL-MCNC: 0.8 MG/DL (ref 0.7–1.2)
GFR AFRICAN AMERICAN: >60
GFR NON-AFRICAN AMERICAN: >60 ML/MIN/1.73

## 2022-10-12 PROCEDURE — 3017F COLORECTAL CA SCREEN DOC REV: CPT | Performed by: OTOLARYNGOLOGY

## 2022-10-12 PROCEDURE — G8482 FLU IMMUNIZE ORDER/ADMIN: HCPCS | Performed by: OTOLARYNGOLOGY

## 2022-10-12 PROCEDURE — 4004F PT TOBACCO SCREEN RCVD TLK: CPT | Performed by: OTOLARYNGOLOGY

## 2022-10-12 PROCEDURE — 3074F SYST BP LT 130 MM HG: CPT | Performed by: OTOLARYNGOLOGY

## 2022-10-12 PROCEDURE — 92557 COMPREHENSIVE HEARING TEST: CPT | Performed by: AUDIOLOGIST

## 2022-10-12 PROCEDURE — 3078F DIAST BP <80 MM HG: CPT | Performed by: OTOLARYNGOLOGY

## 2022-10-12 PROCEDURE — G8428 CUR MEDS NOT DOCUMENT: HCPCS | Performed by: OTOLARYNGOLOGY

## 2022-10-12 PROCEDURE — 92567 TYMPANOMETRY: CPT | Performed by: AUDIOLOGIST

## 2022-10-12 PROCEDURE — G8417 CALC BMI ABV UP PARAM F/U: HCPCS | Performed by: OTOLARYNGOLOGY

## 2022-10-12 PROCEDURE — 99203 OFFICE O/P NEW LOW 30 MIN: CPT | Performed by: OTOLARYNGOLOGY

## 2022-10-12 ASSESSMENT — ENCOUNTER SYMPTOMS
VOICE CHANGE: 0
SHORTNESS OF BREATH: 0
RHINORRHEA: 0
SORE THROAT: 0
VOMITING: 0
TROUBLE SWALLOWING: 0
COUGH: 0
SINUS PRESSURE: 0

## 2022-10-12 NOTE — PROGRESS NOTES
This patient was referred for audiometric and tympanometric testing by Dr. Fish Fernandez due to hearing loss. He notes history of a left ear perforation. Audiometry using pure tone air and bone conduction testing revealed a mild  mixed hearing loss, in the right ear. The left ear revealed a moderately severe to profound mixed hearing loss. Reliability was good. Speech reception thresholds were in good agreement with the pure tone averages, bilaterally. Speech discrimination scores were excellent, bilaterally. Tympanometry revealed normal middle ear peak pressure and compliance, in the right ear. The left ear showed positive pressure +115 daPa with good compliance. The results were reviewed with the patient. Recommendations for follow up will be made pending physician consult.     Electronically signed by Kem Manuel on 10/12/2022 at 9:59 AM

## 2022-10-12 NOTE — PROGRESS NOTES
[address] [facility] Otolaryngology  Dr. Lavinia Marin. CORINNE Nunez Ms.Ed. New Consult       Patient Name:  Rupert Rangel  :  1970     CHIEF C/O:    Chief Complaint   Patient presents with    New Patient     Lt OM, serous, W/TM perf,        HISTORY OBTAINED FROM:  patient    HISTORY OF PRESENT ILLNESS:       Suzan Pa is a 46y.o. year old male, here today for left sided ear infection with TM rupture. Started about 2 months ago saw Pcp placed on oral abx and seemed to help some. Denies ear pain or drainage. Has complaint of hearing loss in left ear. Intermittent episodes of dizziness. Lase seen patient in 2017 with left TM perforation which is not present on exam today.  Significant hearing decrease since last audiogram.       Past Medical History:   Diagnosis Date    Abdominal pain     nausea    Allergic rhinitis     Anxiety     Asthma     stable    COPD (chronic obstructive pulmonary disease) (formerly [facility])     controlled with inhalers    Depression     no meds    Diabetes mellitus ([facility] [address]     Encounter for screening colonoscopy     and EGD 21    GERD (gastroesophageal reflux disease)     Hyperlipidemia     Hypertension     Obesity     Osteoarthritis     Sleep apnea     BMS CPAP 7, not using     Past Surgical History:   Procedure Laterality Date    ABDOMEN SURGERY N/A 2019    INCISION AND DRAINAGE PERINEAL ABSCESS, INCISIONAL DEBRIDEMENT performed by Aurelia Terry MD at [address] [place] [address], LAPAROSCOPIC N/A 2021    LAPAROSCOPIC ROBOTIC ASISTED CHOLECYSTECTOMY performed by Tania Fowler MD at [address] [place] [address]  2017    Dr. Gin Alvarado polyp, diverticulitis    COLONOSCOPY N/A 2020    COLONOSCOPY POLYPECTOMY SNARE/COLD BIOPSY performed by Anayeli Hill MD at [address] [facility], COLON, DIAGNOSTIC      EXCISION HIDRADENITIS OF INGUINAL / UMBILICAL AREA N/A 8411    DEBRIDEMENT PERINEAL WOUND performed by Aurelia Terry MD at [place] [address]      Dr. Paige Marquez HERNIA REPAIR  2012    OK DRAIN SKIN ABSCESS SIMPLE N/A 9/19/2018    I AND D BACK ABCESS performed by Derrek Gomez MD at [facility] ENDOSCOPY N/A 7/20/2020    EGD BIOPSY performed by Darren Ulrich MD at [place] [address] N/A 7/23/2021    EGD BIOPSY performed by Yang Hernandez MD at [facility] ENDOSCOPY       Current Outpatient Medications:     Fluticasone-Umeclidin-Vilant ([facility]), Inhale into the lungs, Disp: , Rfl:     dapagliflozin (FARXIGA) 10 MG tablet, Take 1 tablet by mouth every morning, Disp: 30 tablet, Rfl: 5    terazosin (HYTRIN) 2 MG capsule, take 1 capsule by mouth IN THE EVENING, Disp: 30 capsule, Rfl: 3    atorvastatin (LIPITOR) 40 MG tablet, take 1 tablet by mouth once daily, Disp: 30 tablet, Rfl: 3    Fluticasone-Umeclidin-Vilant 200-62.5-25 MCG/INH AEPB, Inhale 1 puff into the lungs in the morning., Disp: 60 each, Rfl: 3    albuterol sulfate HFA (VENTOLIN HFA) 108 (90 Base) MCG/ACT inhaler, inhale 2 puffs by mouth and INTO THE LUNGS every 6 hours if needed for wheezing (Patient taking differently: Inhale 2 puffs into the lungs every 6 hours as needed inhale 2 puffs by mouth and INTO THE LUNGS every 6 hours if needed for wheezing), Disp: 18 g, Rfl: 3    lisinopril (PRINIVIL;ZESTRIL) 40 MG tablet, take 1 tablet by mouth once daily, Disp: 30 tablet, Rfl: 5    omeprazole (PRILOSEC) 40 MG delayed release capsule, take 1 capsule by mouth once daily, Disp: 30 capsule, Rfl: 5    tamsulosin (FLOMAX) 0.4 MG capsule, Take 1 capsule by mouth daily, Disp: 30 capsule, Rfl: 5    blood glucose monitor strips, Test twice daily & as needed for symptoms of irregular blood glucose.  Dx: E11.9, Disp: 100 strip, Rfl: 3    glucose monitoring kit (FREESTYLE) monitoring kit, 1 kit by Does not apply route daily Dx E11.9, Disp: 1 kit, Rfl: 0    Lancets MISC, 1 each by Does not apply route daily, Disp: 100 each, Rfl: 3    divalproex (DEPAKOTE) 500 MG DR tablet, Take 1 tablet by mouth in the morning and 1 tablet before bedtime. , Disp: 60 tablet, Rfl: 0    melatonin 3 MG TABS tablet, Take 1 tablet by mouth nightly, Disp: 30 tablet, Rfl: 0    nicotine (NICODERM CQ) 21 MG/24HR, Place 1 patch onto the skin in the morning., Disp: 30 patch, Rfl: 0    mometasone-formoterol (DULERA) 200-5 MCG/ACT inhaler, inhale 2 puffs by mouth and INTO THE LUNGS twice a day (Patient not taking: No sig reported), Disp: 26 g, Rfl: 5    CLARITIN-D 12 HOUR 5-120 MG per extended release tablet, Take 1 tablet by mouth 2 times daily For congestion relief as needed. (Patient not taking: No sig reported), Disp: 20 tablet, Rfl: 0  Patient has no known allergies. Social History     Tobacco Use    Smoking status: Every Day     Packs/day: 1.00     Years: 35.00     Pack years: 35.00     Types: Cigarettes    Smokeless tobacco: Never   Vaping Use    Vaping Use: Never used   Substance Use Topics    Alcohol use: No     Alcohol/week: 0.0 standard drinks    Drug use: Yes     Frequency: 7.0 times per week     Types: Marijuana Daril Dnota)     Comment: currently     Family History   Problem Relation Age of Onset    COPD Mother     Cancer Mother     Heart Disease Father 46        CABG    Diabetes Father     Diabetes Sister     Diabetes Brother     Heart Disease Brother 43        MI    Heart Disease Maternal Grandmother     Cancer Maternal Grandmother     Cancer Paternal Grandmother         colon    Heart Disease Paternal Grandfather     Asthma Sister     COPD Brother     Heart Disease Brother 48        CABG       Review of Systems   Constitutional:  Negative for chills and fever. HENT:  Positive for hearing loss. Negative for congestion, ear discharge, ear pain, postnasal drip, rhinorrhea, sinus pressure, sore throat, trouble swallowing and voice change. Respiratory:  Negative for cough and shortness of breath.     Cardiovascular:  Negative for chest pain and palpitations. Gastrointestinal:  Negative for vomiting. Skin:  Negative for rash. Allergic/Immunologic: Negative for environmental allergies. Neurological:  Negative for dizziness and headaches. Hematological:  Does not bruise/bleed easily. All other systems reviewed and are negative. /81   Pulse 71   Ht 5' 5\" (1.651 m)   Wt 256 lb (116.1 kg)   BMI 42.60 kg/m²   Physical Exam  Vitals and nursing note reviewed. Constitutional:       Appearance: He is well-developed. HENT:      Head: Normocephalic and atraumatic. No contusion, masses or laceration. Jaw: No tenderness or swelling. Right Ear: Tympanic membrane and ear canal normal. Decreased hearing noted. No drainage. No middle ear effusion. There is no impacted cerumen. No PE tube. Tympanic membrane is not perforated. Left Ear: Ear canal normal. Decreased hearing noted. No drainage. A middle ear effusion is present. There is no impacted cerumen. No PE tube. Tympanic membrane is erythematous and retracted. Tympanic membrane is not perforated. Ears:      Comments: B/l snhl  Mixed hearing loss l concern for left cholesteatoma      Nose: No congestion or rhinorrhea. Right Nostril: No epistaxis. Left Nostril: No epistaxis. Right Turbinates: Not swollen. Left Turbinates: Not swollen. Mouth/Throat:      Mouth: Mucous membranes are moist. No oral lesions. Dentition: No gum lesions. Pharynx: No oropharyngeal exudate or posterior oropharyngeal erythema. Eyes:      Pupils: Pupils are equal, round, and reactive to light. Neck:      Thyroid: No thyromegaly. Trachea: No tracheal deviation. Cardiovascular:      Rate and Rhythm: Normal rate. Pulmonary:      Effort: Pulmonary effort is normal. No respiratory distress. Musculoskeletal:         General: Normal range of motion. Cervical back: Normal range of motion. Lymphadenopathy:      Cervical: No cervical adenopathy.    Skin: General: Skin is warm. Findings: No erythema. Neurological:      Mental Status: He is alert. Cranial Nerves: No cranial nerve deficit. IMPRESSION/PLAN:  Ct iac w contrast for poss cholesteatoma  Referral to Dr Audra Werner   B/l snhl  Mixed hearing loss l concern for left cholesteatoma   Prn     Dr. Jennyfer Mayfield [facility] Otolaryngology/Facial Plastic Surgery Residency  Associate Clinical Professor:  Oswaldo Toro, [facility]

## 2022-10-18 ENCOUNTER — TREATMENT (OUTPATIENT)
Dept: PHYSICAL THERAPY | Age: 52
End: 2022-10-18
Payer: COMMERCIAL

## 2022-10-18 DIAGNOSIS — M51.34 DDD (DEGENERATIVE DISC DISEASE), THORACIC: ICD-10-CM

## 2022-10-18 DIAGNOSIS — M50.30 DDD (DEGENERATIVE DISC DISEASE), CERVICAL: Primary | ICD-10-CM

## 2022-10-18 DIAGNOSIS — M47.812 OSTEOARTHRITIS OF CERVICAL SPINE, UNSPECIFIED SPINAL OSTEOARTHRITIS COMPLICATION STATUS: ICD-10-CM

## 2022-10-18 PROCEDURE — 97140 MANUAL THERAPY 1/> REGIONS: CPT

## 2022-10-18 PROCEDURE — 97110 THERAPEUTIC EXERCISES: CPT

## 2022-10-18 NOTE — PROGRESS NOTES
[facility]          Phone: [phone] Fax: [phone]    Physical Therapy Daily Treatment Note  Date:  10/18/2022    Patient Name:  Roxana Engle    :  1970  MRN: [id number]    Evaluating therapist: Philomena Marshall PT, DPT  [id number]    Restrictions/Precautions:  pt has cyst on R suboccipital region-- tender to touch    Diagnosis:     Diagnosis Orders   1. DDD (degenerative disc disease), cervical        2. DDD (degenerative disc disease), thoracic        3. Osteoarthritis of cervical spine, unspecified spinal osteoarthritis complication status              Treatment Diagnosis:    Insurance/Certification information:  [employer] [place] Medicaid  Referring Physician:  Pennie Mcdaniel MD  Plan of care signed (Y/N):  yes  Visit# / total visits:   (auth 12 visits -- 22 through 22)  Pain level: 5-6/10   Time In:  1122  Time Out:  1152    Subjective: Pt had no new reports    Exercises:  Exercise/Equipment Resistance/Repetitions Other comments     Cervical rotation stretch 3x 20 sec hold ea direction      Cervical lateral flexion stretch 3x 20 sec hold ea direction      Levator scapulae stretch 3x 20 sec hold ea direction    Seated thoracic extension stretch 10x Seated in black chair using back of chair for OP     Cervical retraction      Scapular retraction             Mid rows 10 x2 Green TB with handles 10/18 HEP     Resisted ER 10 x2 ea UE Green TB with handles 10/18 HEP     B shoulder extension 15x Green TB with handles 10/18 HEP      UBE 4 min forward, 4 min backward       Supine thoracic stretch over towel roll Added to HEP                                                              Other Therapeutic Activities:  Pt tolerated TE. Pt noted to have decreased tension following interventions this date.  Pain 3-4/10     Home Exercise Program:  cervical rotation stretch, cervical lateral flexion stretch, levator scapulae stretch, scapular retractions, cervical retractions, towel roll stretch 10/18 HEP provided and reviewed with pt. Pt demonstrated good technique to therapist. Arianna Sahu provided. Manual Treatments:  Pt seated in chair for all manual therapy this date. Pt tolerated manual release of soft tissue restriction throughout suboccipitals, upper traps, and B thoracic paraspinals. Overpressure not required during thoracic extension stretch d/t position of back of chair.     Modalities:  N/A     Time-in Time-out Total Time   68481  Evaluation Low Complexity      90672  Evaluation Med Complexity      32916  Evaluation High Complexity      08448  Ther Ex 1122 1142 20   89724  Neuro Re-ed        57342  Ther Activities        41734  Manual Therapy  6480 8437 10   68677  E-stim       78875  Ultrasound            Session 7059 2630 30        Treatment/Activity Tolerance:  [x] Patient tolerated treatment well [] Patient limited by fatigue  [] Patient limited by pain  [] Patient limited by other medical complications  [] Other:     Prognosis: [x] Good [] Fair  [] Poor    Patient Requires Follow-up: [x] Yes  [] No    Plan:   [x] Continue per plan of care [] Alter current plan (see comments)  [] Plan of care initiated [] Hold pending MD visit [] Discharge    Plan for Next Session:  continue to improve postural strength/endurance      Electronically signed by: Aba Maher PTA [id number]

## 2022-10-20 ENCOUNTER — TREATMENT (OUTPATIENT)
Dept: PHYSICAL THERAPY | Age: 52
End: 2022-10-20
Payer: COMMERCIAL

## 2022-10-20 DIAGNOSIS — M50.30 DDD (DEGENERATIVE DISC DISEASE), CERVICAL: Primary | ICD-10-CM

## 2022-10-20 DIAGNOSIS — M47.812 OSTEOARTHRITIS OF CERVICAL SPINE, UNSPECIFIED SPINAL OSTEOARTHRITIS COMPLICATION STATUS: ICD-10-CM

## 2022-10-20 DIAGNOSIS — M51.34 DDD (DEGENERATIVE DISC DISEASE), THORACIC: ICD-10-CM

## 2022-10-20 PROCEDURE — 97110 THERAPEUTIC EXERCISES: CPT

## 2022-10-20 PROCEDURE — 97140 MANUAL THERAPY 1/> REGIONS: CPT

## 2022-10-20 NOTE — PROGRESS NOTES
for 20 mins, pain is > 2-3/10. Pt is indep w/ HEP. Pt was educated on how to utilize his his SPc to release trigger points in his upper traps. Pt demonstrated understanding to therapist.     Home Exercise Program:  cervical rotation stretch, cervical lateral flexion stretch, levator scapulae stretch, scapular retractions, cervical retractions, towel roll stretch 10/18 HEP provided and reviewed with pt. Pt demonstrated good technique to therapist. Angela Puentes provided. Manual Treatments:  Pt seated in chair for all manual therapy this date. Pt tolerated manual release of soft tissue restriction throughout suboccipitals, upper traps, and B thoracic paraspinals. Overpressure not required during thoracic extension stretch d/t position of back of chair.  X 10 mins    Modalities:  N/A     Time-in Time-out Total Time   19977  Evaluation Low Complexity      51286  Evaluation Med Complexity      13168  Evaluation High Complexity      35520  Ther Ex 1118 1132 14   46378  Neuro Re-ed        86825  Ther Activities        71789  Manual Therapy  3193 5257 10   72908  E-stim       14809  Ultrasound            Session 0468 0005 24        Treatment/Activity Tolerance:  [x] Patient tolerated treatment well [] Patient limited by fatigue  [] Patient limited by pain  [] Patient limited by other medical complications  [] Other:     Prognosis: [x] Good [] Fair  [] Poor    Patient Requires Follow-up: [x] Yes  [] No    Plan:   [x] Continue per plan of care [] Alter current plan (see comments)  [] Plan of care initiated [] Hold pending MD visit [] Discharge    Plan for Next Session:  continue to improve postural strength/endurance      Electronically signed by: Ba Urias PTA [id number]

## 2022-10-21 ENCOUNTER — TELEPHONE (OUTPATIENT)
Dept: ENT CLINIC | Age: 52
End: 2022-10-21

## 2022-10-21 RX ORDER — ALBUTEROL SULFATE 90 UG/1
AEROSOL, METERED RESPIRATORY (INHALATION)
Qty: 18 G | Refills: 3 | Status: SHIPPED | OUTPATIENT
Start: 2022-10-21

## 2022-10-21 NOTE — PROGRESS NOTES
[facility]                Phone: [phone] Fax: [phone]    Physical Therapy  Outpatient Discharge Summary     Date:  10/21/2022    Patient Name:  Rodolfo Cormier    :  1970  MRN: [id number]    DIAGNOSIS:     Diagnosis Orders   1. DDD (degenerative disc disease), cervical        2. DDD (degenerative disc disease), thoracic        3. Osteoarthritis of cervical spine, unspecified spinal osteoarthritis complication status          REFERRING PHYSICIAN:  Kat Roman MD    ATTENDANCE:  Pt has attended 8 of 8 scheduled treatments from 2022 to 10/20/2022. TREATMENTS RECEIVED:  Manual therapy for release of tension in upper traps, B cervical and thoracic paraspinal musculature. Therapeutic exercises for improved cervical ROM and postural alignment. Development of personalized HEP. INITIAL STATUS:  Pain reported 6-10/10  ROM decreased in cervical flexion, extension, lateral flexion, L rotation and shoulder flexion/abduction  Strength decreased functionally in cervical spine  Decreased functional ability with sitting, work, ADLs , bending, reaching, lifting, inability to participate in exercise regimen / fitness program, inability to participate in hobbies  NDI: 62% disability    FINAL STATUS:  Pain reported 3-4/10  Cervical ROM:  30* flexion, 30* extension, 30* L rotation  Strength improved throughout postural musculature  Pt reports he is able to:  read a bood for 30 min with pain remaining under 2-3/10  tolerate housework x20 min with pain >2-3/10  NDI: 60% disability    GOALS:  2 out of 5 Long Term Goals were obtained. LONG TERM GOALS NOT OBTAINED/REASON:  Pt pain exceeding 2-3/10 with light housework, baseline pain decreased but remaubed at 3-4/10 limiting further progress towards LTGs and improvement of self report with NDI. PATIENT GOALS:  pain relief, get back to normal, be able to care for house, apply for disability. - Not met.     REASON FOR DISCHARGE:  Pt has reached max potential with PT interventions at this time. PATIENT EDUCATION/INSTRUCTIONS:  Continue with HEP for improving ROM and maintaining postural improvements; educated in self release using cane for MFR of tight musculature. RECOMMENDATIONS:  follow up with PCP as needed. Thank you for the opportunity to work with your patient. If you have questions or comments, please feel free to contact me by phone or fax.       Electronically Signed by: Lion Friend PT, DPT  [id number] 10/21/2022

## 2022-10-28 NOTE — PROGRESS NOTES
NEW PATIENT VISIT  Chief Complaint   Patient presents with    Ear Problem     Referred by Dr. Davonte Sandoval for possible cholesteatoma left ear      History of Present Illness:     Patrice Ortega is a 46 y.o. male presenting with history of left tympanic membrane perforation and possible debris in the ear; bilateral mixed hearing loss which has worsened significantly. S/p CT scan; patient of Dr. Davonte Sandoval;  noted to have long history of ear issues, possible 2 childhood tubes and adenotonsillectomy, he then had a perforation in his left ear which was patched when he was bout 15 years age. He states he continued to have ear issues about 1 year after the patch and has noted that his hearing is significantly worse in the left ear.   He is a smoker (1ppd), has well controlled DMII          TOBACCO  Social History     Tobacco Use   Smoking Status Every Day    Packs/day: 1.00    Years: 35.00    Pack years: 35.00    Types: Cigarettes   Smokeless Tobacco Never        ALCOHOL   Social History     Substance and Sexual Activity   Alcohol Use No    Alcohol/week: 0.0 standard drinks        [address]   Social History     Substance and Sexual Activity   Drug Use Yes    Frequency: 7.0 times per week    Types: Marijuana ([address])    Comment: currently        CURRENT OUTPATIENT MEDICATIONS:   Outpatient Medications Marked as Taking for the 10/31/22 encounter (Office Visit) with Kip Hudson MD   Medication Sig Dispense Refill    albuterol sulfate HFA (VENTOLIN HFA) 108 (90 Base) MCG/ACT inhaler inhale 2 puffs by mouth and INTO THE LUNGS every 6 hours if needed for wheezing 18 g 3    Fluticasone-Umeclidin-Vilant (TRELEGY ELLIPTA IN) Inhale into the lungs      dapagliflozin (FARXIGA) 10 MG tablet Take 1 tablet by mouth every morning 30 tablet 5    terazosin (HYTRIN) 2 MG capsule take 1 capsule by mouth IN THE EVENING 30 capsule 3    atorvastatin (LIPITOR) 40 MG tablet take 1 tablet by mouth once daily 30 tablet 3    Fluticasone-Umeclidin-Vilant 200-62.5-25 MCG/INH AEPB Inhale 1 puff into the lungs in the morning. 60 each 3    lisinopril (PRINIVIL;ZESTRIL) 40 MG tablet take 1 tablet by mouth once daily 30 tablet 5    omeprazole (PRILOSEC) 40 MG delayed release capsule take 1 capsule by mouth once daily 30 capsule 5    tamsulosin (FLOMAX) 0.4 MG capsule Take 1 capsule by mouth daily 30 capsule 5    blood glucose monitor strips Test twice daily & as needed for symptoms of irregular blood glucose. Dx: E11.9 100 strip 3    glucose monitoring kit (FREESTYLE) monitoring kit 1 kit by Does not apply route daily Dx E11.9 1 kit 0    Lancets MISC 1 each by Does not apply route daily 100 each 3        ALLERGIES:   No Known Allergies    Timing Age of Onset childhood   Duration Increasing in Severity Yes   Days of work missed in last year n/a      Modifying Factors Seasonal variation No        Associated Symptoms Mouth breathing No    Communication concerns Yes    Halitosis No     Family History Family members with similar conditions No   Family history of bleeding concerns No   Family history of anesthia concerns No        Review of Symptoms:  I have reviewed the patient's medical history in detail; there are no changes to the history as noted in the electronic medical record. /83 (Site: Left Upper Arm, Position: Sitting, Cuff Size: Large Adult)   Pulse 99   Temp 97 °F (36.1 °C) (Temporal)   Ht 5' 5\" (1.651 m)   Wt 265 lb (120.2 kg)   SpO2 95%   BMI 44.10 kg/m²     Physical Exam    Allergies No Known Allergies   Constitutional Retractions/cyanosis {No     Head and Face   Lesions or masses No  facies symmetrical Yes   Eyes Ocular motion with gaze alignment Yes   Ears Inspection: Scars, lesions or masses No   Otoscopy  EAC patent bilaterally without occlusion External ears normal. Canals clear.  TM right normal.left TM with small inferior perforation, stapes not seen well in CT and some retraction noted of the TM      Nasal Inspection    No external Scars, lesions or masses    Pyriform apertures widely patent    Nasal musosa healthy   Septum Midline, no Septal Perforation, no septal hematoma   Turbinates Intact, healthy   Oral Cavity Lips no lesions    Poor dentition    Gums no lesions    Oral mucosa healthy    Hard and Soft Palate no lesions    Uvula single fid    Tongue no lesions    Tonsils absent bilaterally Symmetric without exudate   Neck . Neck supple without tenderness or crepitus   Lymph  Cranial Nerve exam No palpable adenopathy  Grossly intact. CN VII symmetrical   Respiration Symmetric without Increased work of breathing    Cardiovacular No Cyanosis    Skin healthy   Diagnostics    Test ordered No orders of the defined types were placed in this encounter. Review of existing tests Lab Results   Component Value Date/Time    WBC 12.1 (H) 09/17/2022 12:18 PM    HGB 14.7 09/17/2022 12:18 PM    HCT 43.1 09/17/2022 12:18 PM     09/17/2022 12:18 PM    MCV 89.0 09/17/2022 12:18 PM    MCH 30.4 09/17/2022 12:18 PM    MCHC 34.1 09/17/2022 12:18 PM    RDW 13.5 09/17/2022 12:18 PM    NEUTOPHILPCT 65.7 09/17/2022 12:18 PM    LYMPHOPCT 19.6 (L) 09/17/2022 12:18 PM    MONOPCT 10.4 09/17/2022 12:18 PM    EOSRELPCT 3.0 09/17/2022 12:18 PM    BASOPCT 0.7 09/17/2022 12:18 PM    NEUTROABS 7.99 (H) 09/17/2022 12:18 PM    LYMPHSABS 2.38 09/17/2022 12:18 PM    EOSABS 0.36 09/17/2022 12:18 PM        Old records  Reviewed   Discussion with other providers    Done     On this date 10/31/2022 I have spent 10 minutes reviewing previous notes, test results and 30 min face to face with the patient discussing the diagnosis and importance of compliance with the treatment plan as well as documenting on the day of the visit.     A/P    Impression / Plan:     Bhavna Corcoran is a 46 y.o.  male with left tympanic membrane perforation and retraction with possible IS disruption and left mixed hearing loss and right SNHL confirmed by audiogram and examination, who will benefit from left tympanoplasty with possible OCR.  The rest of the exam was benign      Patient will benefit from hearing aids regardless of surgery   Risks and benefits discussed with family including persistent perforation, chronic ear disease, infection, pain, scarring, facial paralysis, hearing loss, recurrent disease   Smoking cessation  Dental care  Blood sugars control  Patient will need an audiogram once healed   Old records were reviewed     Iman Martinez MD 10/28/22 5:13 PM EDT   Director Otology and Cochlear Implant Programs

## 2022-10-31 ENCOUNTER — OFFICE VISIT (OUTPATIENT)
Dept: ENT CLINIC | Age: 52
End: 2022-10-31
Payer: COMMERCIAL

## 2022-10-31 ENCOUNTER — PREP FOR PROCEDURE (OUTPATIENT)
Dept: ENT CLINIC | Age: 52
End: 2022-10-31

## 2022-10-31 VITALS
HEIGHT: 65 IN | OXYGEN SATURATION: 95 % | WEIGHT: 265 LBS | BODY MASS INDEX: 44.15 KG/M2 | DIASTOLIC BLOOD PRESSURE: 83 MMHG | SYSTOLIC BLOOD PRESSURE: 133 MMHG | HEART RATE: 99 BPM | TEMPERATURE: 97 F

## 2022-10-31 DIAGNOSIS — H72.92 PERFORATION OF LEFT TYMPANIC MEMBRANE: ICD-10-CM

## 2022-10-31 DIAGNOSIS — H91.8X9 ASYMMETRICAL HEARING LOSS: Primary | ICD-10-CM

## 2022-10-31 DIAGNOSIS — H90.A32 MIXED CONDUCTIVE AND SENSORINEURAL HEARING LOSS OF LEFT EAR WITH RESTRICTED HEARING OF RIGHT EAR: ICD-10-CM

## 2022-10-31 PROBLEM — H72.90 TYMPANIC MEMBRANE PERFORATION: Status: ACTIVE | Noted: 2022-10-31

## 2022-10-31 PROCEDURE — 3078F DIAST BP <80 MM HG: CPT | Performed by: OTOLARYNGOLOGY

## 2022-10-31 PROCEDURE — G8427 DOCREV CUR MEDS BY ELIG CLIN: HCPCS | Performed by: OTOLARYNGOLOGY

## 2022-10-31 PROCEDURE — 3074F SYST BP LT 130 MM HG: CPT | Performed by: OTOLARYNGOLOGY

## 2022-10-31 PROCEDURE — 99215 OFFICE O/P EST HI 40 MIN: CPT | Performed by: OTOLARYNGOLOGY

## 2022-10-31 PROCEDURE — 4004F PT TOBACCO SCREEN RCVD TLK: CPT | Performed by: OTOLARYNGOLOGY

## 2022-10-31 PROCEDURE — G8417 CALC BMI ABV UP PARAM F/U: HCPCS | Performed by: OTOLARYNGOLOGY

## 2022-10-31 PROCEDURE — G8482 FLU IMMUNIZE ORDER/ADMIN: HCPCS | Performed by: OTOLARYNGOLOGY

## 2022-10-31 PROCEDURE — 3017F COLORECTAL CA SCREEN DOC REV: CPT | Performed by: OTOLARYNGOLOGY

## 2022-10-31 NOTE — PATIENT INSTRUCTIONS
Thank you for choosing our [facility] or [place]  [email or web address]. We are committed to your medical treatment and  care. If you need to reschedule or cancel your surgery or follow up  appointment, please call the surgery scheduler at [phone]. INSTRUCTIONS FOR SURGERY    Nothing to eat or drink after midnight the night before surgery unless surgery is at [facility] or otherwise instructed by the hospital.    DO NOT TAKE ANY ASPIRIN PRODUCTS 7 days prior to surgery-unless required by your cardiologist or primary care physician. Tylenol only. No Advil, Motrin, Aleve, or Ibuprofen    Any illegal drugs in your system (including Marijuana even if legally prescribed) will result in your surgery being cancelled. Please be sure to check with our office or the hospital on time frame for the drugs to be out of your system. Should your insurance change at any time you must contact our office. Failure to do so may result in your surgery being rescheduled. If you need paperwork filled out for work, you must give the office 2 weeks to complete and submit the forms.       [address], [facility] will call you a couple days prior to your surgery and give you further instructions, if any questions call them at [phone]

## 2022-11-01 ENCOUNTER — TELEPHONE (OUTPATIENT)
Dept: ENT CLINIC | Age: 52
End: 2022-11-01

## 2022-11-01 NOTE — TELEPHONE ENCOUNTER
Prior Authorization Form:      DEMOGRAPHICS:                     Patient Name:  Patrice Ortega  Patient :  1970            Insurance:  Payor: Clarisse Villa / Plan: Garcia Lopez / Product Type: *No Product type* /   Insurance ID Number:    Payer/Plan Subscr  Sex Relation Sub.  Ins. ID Effective Group Num   1. [employer] - * ARYA VELEZ 1970 Male Self [id number] [facility]/[birth year]/19 [facility] [address]         DIAGNOSIS & PROCEDURE:                       Procedure/Operation: Left Tympanoplasty with possible Ossicular chain reconstruction            CPT Code: 31625    Diagnosis:  Left tympanic membrane perforation    ICD10 Code: H91.8X9, H72.92    Location:  [facility]      Surgeon:  Dr Clay Hurley INFORMATION:                          Date: 22    Time: na              Anesthesia:  General                                                       Status:  Outpatient        Special Comments:  see all chart notes        Electronically signed by Candice Goel LPN on 11/3/9024 at 91:50 AM

## 2022-11-07 ENCOUNTER — TELEPHONE (OUTPATIENT)
Dept: ENT CLINIC | Age: 52
End: 2022-11-07

## 2022-11-07 NOTE — TELEPHONE ENCOUNTER
Called asked pt if he would like to have surgery on 11-9-2022 Pt states he is unable to do surgery on this day will keep his scheduled date Electronically signed by Bryan Calderon LPN on 63/5/6471 at 4:21 AM

## 2022-11-15 ENCOUNTER — OFFICE VISIT (OUTPATIENT)
Dept: FAMILY MEDICINE CLINIC | Age: 52
End: 2022-11-15
Payer: COMMERCIAL

## 2022-11-15 VITALS
DIASTOLIC BLOOD PRESSURE: 84 MMHG | TEMPERATURE: 97 F | WEIGHT: 267.9 LBS | SYSTOLIC BLOOD PRESSURE: 129 MMHG | RESPIRATION RATE: 19 BRPM | OXYGEN SATURATION: 95 % | HEART RATE: 94 BPM | HEIGHT: 65 IN | BODY MASS INDEX: 44.64 KG/M2

## 2022-11-15 DIAGNOSIS — E11.9 TYPE 2 DIABETES MELLITUS WITHOUT COMPLICATION, WITHOUT LONG-TERM CURRENT USE OF INSULIN (HCC): ICD-10-CM

## 2022-11-15 DIAGNOSIS — Z01.818 PRE-OP EXAM: Primary | ICD-10-CM

## 2022-11-15 DIAGNOSIS — I10 ESSENTIAL HYPERTENSION: ICD-10-CM

## 2022-11-15 PROCEDURE — 3074F SYST BP LT 130 MM HG: CPT | Performed by: FAMILY MEDICINE

## 2022-11-15 PROCEDURE — 2022F DILAT RTA XM EVC RTNOPTHY: CPT | Performed by: FAMILY MEDICINE

## 2022-11-15 PROCEDURE — G8427 DOCREV CUR MEDS BY ELIG CLIN: HCPCS | Performed by: FAMILY MEDICINE

## 2022-11-15 PROCEDURE — 3017F COLORECTAL CA SCREEN DOC REV: CPT | Performed by: FAMILY MEDICINE

## 2022-11-15 PROCEDURE — 99213 OFFICE O/P EST LOW 20 MIN: CPT | Performed by: FAMILY MEDICINE

## 2022-11-15 PROCEDURE — 93000 ELECTROCARDIOGRAM COMPLETE: CPT | Performed by: FAMILY MEDICINE

## 2022-11-15 PROCEDURE — 3078F DIAST BP <80 MM HG: CPT | Performed by: FAMILY MEDICINE

## 2022-11-15 PROCEDURE — 3046F HEMOGLOBIN A1C LEVEL >9.0%: CPT | Performed by: FAMILY MEDICINE

## 2022-11-15 PROCEDURE — G8417 CALC BMI ABV UP PARAM F/U: HCPCS | Performed by: FAMILY MEDICINE

## 2022-11-15 PROCEDURE — 4004F PT TOBACCO SCREEN RCVD TLK: CPT | Performed by: FAMILY MEDICINE

## 2022-11-15 PROCEDURE — G8482 FLU IMMUNIZE ORDER/ADMIN: HCPCS | Performed by: FAMILY MEDICINE

## 2022-11-15 NOTE — PROGRESS NOTES
Chief Complaint   Patient presents with    Pre-op Exam       HPI:  Patient is here for pre-op exam. States he is having ear surgery on his left ear November 30. States on January 18 he is having surgery for his carpal tunnel with Dr. Albert Herbert. He denies CP, SOB Or palpitations. Patient's past medical, surgical, social and/or family history reviewed, updated in chart, and are non-contributory (unless otherwise stated). Medications and allergies also reviewed and updated in chart.     Review of Systems:  Constitutional:  No fever, no fatigue, no chills, no headaches, no weight change  Dermatology:  No rash, no mole, no dry or sensitive skin  ENT:  No cough, no sore throat, no sinus pain, no runny nose, no ear pain  Cardiology:  No chest pain, no palpitations, no leg edema, no shortness of breath, no PND  Gastroenterology:  No dysphagia, no abdominal pain, no nausea, no vomiting, no constipation, no diarrhea, no heartburn  Musculoskeletal:  No joint pain, no leg cramps, no back pain, no muscle aches  Respiratory:  No shortness of breath, no orthopnea, no wheezing, no OSORIO, no hemoptysis  Urology:  No blood in the urine, no urinary frequency, no urinary incontinence, no urinary urgency, no nocturia, no dysuria  Vitals:    11/15/22 1215   BP: 129/84   Pulse: 94   Resp: 19   Temp: 97 °F (36.1 °C)   SpO2: 95%   Weight: 267 lb 14.4 oz (121.5 kg)   Height: 5' 5\" (1.651 m)       General:  Patient alert and oriented x 3, NAD, pleasant  HEENT:  Atraumatic, normocephalic, PERRLA, EOMI, clear conjunctiva, TMs clear, nose-clear, throat - no erythema  Neck:  Supple, no goiter, no carotid bruits, no lymphadenopathy  Lungs:  CTA B  Heart:  RRR, no murmurs, gallops or rubs  Abdomen:  Soft/nt/nd, + bowel sounds  Extremities:  No clubbing, cyanosis or edema  Skin: unremarkable    Assessment/Plan:      Jacqueline Kruger was seen today for pre-op exam.    Diagnoses and all orders for this visit:    Pre-op exam  -     EKG 12 Lead - Clinic Performed    Type 2 diabetes mellitus without complication, without long-term current use of insulin ([facility] [address]    Essential hypertension    As above. Call or go to ED immediately if symptoms worsen or persist.  No follow-ups on file. , or sooner if necessary. Educational materials and/or home exercises printed for patient's review and were included in patient instructions on his/her After Visit Summary and given to patient at the end of visit. Counseled regarding above diagnosis, including possible risks and complications,  especially if left uncontrolled. Counseled regarding the possible side effects, risks, benefits and alternatives to treatment; patient and/or guardian verbalizes understanding, agrees, feels comfortable with and wishes to proceed with above treatment plan. Advised patient to call with any new medication issues, and read all Rx info from pharmacy to assure aware of all possible risks and side effects of medication before taking. Reviewed age and gender appropriate health screening exams and vaccinations. Advised patient regarding importance of keeping up with recommended health maintenance and to schedule as soon as possible if overdue, as this is important in assessing for undiagnosed pathology, especially cancer, as well as protecting against potentially harmful/life threatening disease. Patient and/or guardian verbalizes understanding and agrees with above counseling, assessment and plan. All questions answered. Lucas Arvizu MD  11/17/2022    I have personally reviewed and updated the chief complaint, HPI, Past Medical, Family and Social History, as well as the above Review of Systems.

## 2022-11-17 DIAGNOSIS — F17.200 SMOKER: ICD-10-CM

## 2022-11-17 DIAGNOSIS — G47.33 OSA (OBSTRUCTIVE SLEEP APNEA): ICD-10-CM

## 2022-11-17 RX ORDER — LISINOPRIL 40 MG/1
TABLET ORAL
Qty: 30 TABLET | Refills: 5 | Status: SHIPPED | OUTPATIENT
Start: 2022-11-17

## 2022-11-17 RX ORDER — TAMSULOSIN HYDROCHLORIDE 0.4 MG/1
CAPSULE ORAL
Qty: 30 CAPSULE | Refills: 5 | Status: SHIPPED | OUTPATIENT
Start: 2022-11-17

## 2022-11-17 RX ORDER — OMEPRAZOLE 40 MG/1
CAPSULE, DELAYED RELEASE ORAL
Qty: 30 CAPSULE | Refills: 5 | Status: SHIPPED | OUTPATIENT
Start: 2022-11-17

## 2022-12-16 ENCOUNTER — TELEPHONE (OUTPATIENT)
Dept: ENT CLINIC | Age: 52
End: 2022-12-16

## 2022-12-16 NOTE — TELEPHONE ENCOUNTER
LM requesting return phone call to reschedule surgery. Provided office direct contact information and hours.

## 2022-12-20 RX ORDER — FLUTICASONE FUROATE, UMECLIDINIUM BROMIDE AND VILANTEROL TRIFENATATE 200; 62.5; 25 UG/1; UG/1; UG/1
POWDER RESPIRATORY (INHALATION)
Qty: 1 EACH | Refills: 5 | Status: SHIPPED | OUTPATIENT
Start: 2022-12-20

## 2023-01-04 ENCOUNTER — TELEPHONE (OUTPATIENT)
Dept: ENT CLINIC | Age: 53
End: 2023-01-04

## 2023-01-04 ENCOUNTER — PREP FOR PROCEDURE (OUTPATIENT)
Dept: ENT CLINIC | Age: 53
End: 2023-01-04

## 2023-01-04 NOTE — TELEPHONE ENCOUNTER
Prior Authorization Form:      DEMOGRAPHICS:                     Patient Name:  Mario Ferris  Patient :  1970            Insurance:  Payor: Sonia Shrestha / Plan: Vanessa Maurice / Product Type: *No Product type* /   Insurance ID Number:    Payer/Plan Subscr  Sex Relation Sub.  Ins. ID Effective Group Num   1. [employer] - * ARYA VELEZ 1970 Male Self [id number] 10[birth year] [facility] [address]         DIAGNOSIS & PROCEDURE:                       Procedure/Operation: LEFT TYMPANOPLASTY WITH POSSIBLE OCR          CPT Code: 60910     Diagnosis:  LEFT TYMP PERF     ICD10 Code: H72.92     Location:  [facility]     Surgeon:  DR. Brendon Youngblood INFORMATION:                          Date: 23    Time: N/A              Anesthesia:  General                                                       Status:  Outpatient        Special Comments:  N/A       Electronically signed by Karie Seymour MA on 2023 at 7:25 AM

## 2023-01-16 NOTE — PROGRESS NOTES
[facility] PRE-ADMISSION TESTING INSTRUCTIONS    The Preadmission Testing patient is instructed accordingly using the following criteria (check applicable):    ARRIVAL INSTRUCTIONS:  [x] Parking the day of Surgery is located in the Main Entrance lot. Upon entering the door, make an immediate right to the surgery reception desk    [x] Bring photo ID and insurance card    [] Bring in a copy of Living will or Durable Power of  papers. [x] Please be sure to arrange for responsible adult to provide transportation to and from the hospital    [x] Please arrange for responsible adult to be with you for the 24 hour period post procedure due to having anesthesia    [x] If you awake am of surgery not feeling well or have temperature >100 please call [phone]    GENERAL INSTRUCTIONS:    [x] Nothing by mouth after midnight, including gum, candy, mints or water    [x] You may brush your teeth, but do not swallow any water    [x] Take medications as instructed with 1-2 oz of water    [x] Stop herbal supplements and vitamins 5 days prior to procedure    [] Follow preop dosing of blood thinners per physician instructions    [] Take 1/2 dose of evening insulin, but no insulin after midnight    [] No oral diabetic medications after midnight    [] If diabetic and have low blood sugar or feel symptomatic, take 1-2oz apple juice only    [x] Bring inhalers day of surgery    [] Bring C-PAP/ Bi-Pap day of surgery    [] Bring urine specimen day of surgery    [x] Shower or bath with soap, lather and rinse well, AM of Surgery, no lotion, powders or creams     [] Follow bowel prep as instructed per surgeon    [x] No tobacco products ro marijuana within 24 hours of surgery     [x] No alcohol or illegal drug use within 24 hours of surgery.     [x] Jewelry, body piercing's, eyeglasses, contact lenses and dentures are not permitted into surgery (bring cases)      [] Please do not wear any nail polish, make up or hair products on the day of surgery    [x] You can expect a call the business day prior to procedure to notify you if your arrival time changes    [x] If you receive a survey after surgery we would greatly appreciate your comments    [] Parent/guardian of a minor must accompany their child and remain on the premises  the entire time they are under our care     [] Pediatric patients may bring favorite toy, blanket or comfort item with them    [] A caregiver or family member must remain with the patient during their stay if they are mentally handicapped, have dementia, disoriented or unable to use a call light or would be a safety concern if left unattended    [x] Please notify surgeon if you develop any illness between now and time of surgery (cold, cough, sore throat, fever, nausea, vomiting) or any signs of infections  including skin, wounds, and dental.    [x]  The Outpatient Pharmacy is available to fill your prescription here on your day of surgery, ask your preop nurse for details    [x] Other instructions: wear loose, comfortable clothing    EDUCATIONAL MATERIALS PROVIDED:    [] PAT Preoperative Education Packet/Booklet     [] Medication List    [] Transfusion bracelet applied with instructions    [] Shower with soap, lather and rinse well, and use CHG wipes provided the evening before surgery as instructed    [] Incentive spirometer with instructions

## 2023-01-17 ENCOUNTER — ANESTHESIA EVENT (OUTPATIENT)
Dept: OPERATING ROOM | Age: 53
End: 2023-01-17
Payer: COMMERCIAL

## 2023-01-17 NOTE — H&P
History of Present Illness:      Marycarmen Turner is a 46 y.o. male presenting with history of left tympanic membrane perforation and possible debris in the ear; bilateral mixed hearing loss which has worsened significantly. S/p CT scan; patient of Dr. Jyoti Ocasio;  noted to have long history of ear issues, possible 2 childhood tubes and adenotonsillectomy, he then had a perforation in his left ear which was patched when he was bout 15 years age. He states he continued to have ear issues about 1 year after the patch and has noted that his hearing is significantly worse in the left ear. He is a smoker (1ppd), has well controlled DMII            TOBACCO  Social History           Tobacco Use   Smoking Status Every Day    Packs/day: 1.00    Years: 35.00    Pack years: 35.00    Types: Cigarettes   Smokeless Tobacco Never         ALCOHOL   Social History           Substance and Sexual Activity   Alcohol Use No    Alcohol/week: 0.0 standard drinks         [address]   Social History           Substance and Sexual Activity   Drug Use Yes    Frequency: 7.0 times per week    Types: Marijuana (Lyndal Locus)     Comment: currently         CURRENT OUTPATIENT MEDICATIONS:   Active Medications          Outpatient Medications Marked as Taking for the 10/31/22 encounter (Office Visit) with Esmer Charles MD   Medication Sig Dispense Refill    albuterol sulfate HFA (VENTOLIN HFA) 108 (90 Base) MCG/ACT inhaler inhale 2 puffs by mouth and INTO THE LUNGS every 6 hours if needed for wheezing 18 g 3    Fluticasone-Umeclidin-Vilant (TRELEGY ELLIPTA IN) Inhale into the lungs        dapagliflozin (FARXIGA) 10 MG tablet Take 1 tablet by mouth every morning 30 tablet 5    terazosin (HYTRIN) 2 MG capsule take 1 capsule by mouth IN THE EVENING 30 capsule 3    atorvastatin (LIPITOR) 40 MG tablet take 1 tablet by mouth once daily 30 tablet 3    Fluticasone-Umeclidin-Vilant 200-62.5-25 MCG/INH AEPB Inhale 1 puff into the lungs in the morning.  60 each 3 lisinopril (PRINIVIL;ZESTRIL) 40 MG tablet take 1 tablet by mouth once daily 30 tablet 5    omeprazole (PRILOSEC) 40 MG delayed release capsule take 1 capsule by mouth once daily 30 capsule 5    tamsulosin (FLOMAX) 0.4 MG capsule Take 1 capsule by mouth daily 30 capsule 5    blood glucose monitor strips Test twice daily & as needed for symptoms of irregular blood glucose. Dx: E11.9 100 strip 3    glucose monitoring kit (FREESTYLE) monitoring kit 1 kit by Does not apply route daily Dx E11.9 1 kit 0    Lancets MISC 1 each by Does not apply route daily 100 each 3            ALLERGIES:   No Known Allergies     Timing Age of Onset childhood   Duration Increasing in Severity Yes   Days of work missed in last year n/a      Modifying Factors Seasonal variation No         Associated Symptoms Mouth breathing No     Communication concerns Yes     Halitosis No      Family History Family members with similar conditions No   Family history of bleeding concerns No   Family history of anesthia concerns No         Review of Symptoms:  I have reviewed the patient's medical history in detail; there are no changes to the history as noted in the electronic medical record. /83 (Site: Left Upper Arm, Position: Sitting, Cuff Size: Large Adult)   Pulse 99   Temp 97 °F (36.1 °C) (Temporal)   Ht 5' 5\" (1.651 m)   Wt 265 lb (120.2 kg)   SpO2 95%   BMI 44.10 kg/m²      Physical Exam     Allergies No Known Allergies   Constitutional Retractions/cyanosis {No      Head and Face    Lesions or masses No  facies symmetrical Yes   Eyes Ocular motion with gaze alignment Yes   Ears Inspection: Scars, lesions or masses No   Otoscopy  EAC patent bilaterally without occlusion External ears normal. Canals clear.  TM right normal.left TM with small inferior perforation, stapes not seen well in CT and some retraction noted of the TM      Nasal Inspection     No external Scars, lesions or masses     Pyriform apertures widely patent     Nasal musosa healthy   Septum Midline, no Septal Perforation, no septal hematoma   Turbinates Intact, healthy   Oral Cavity Lips no lesions     Poor dentition     Gums no lesions     Oral mucosa healthy     Hard and Soft Palate no lesions     Uvula single fid     Tongue no lesions     Tonsils absent bilaterally Symmetric without exudate   Neck . Neck supple without tenderness or crepitus   Lymph  Cranial Nerve exam No palpable adenopathy  Grossly intact. CN VII symmetrical   Respiration Symmetric without Increased work of breathing    Cardiovacular No Cyanosis    Skin healthy   Diagnostics     Test ordered No orders of the defined types were placed in this encounter. Review of existing tests       Lab Results   Component Value Date/Time     WBC 12.1 (H) 09/17/2022 12:18 PM     HGB 14.7 09/17/2022 12:18 PM     HCT 43.1 09/17/2022 12:18 PM      09/17/2022 12:18 PM     MCV 89.0 09/17/2022 12:18 PM     MCH 30.4 09/17/2022 12:18 PM     MCHC 34.1 09/17/2022 12:18 PM     RDW 13.5 09/17/2022 12:18 PM     NEUTOPHILPCT 65.7 09/17/2022 12:18 PM     LYMPHOPCT 19.6 (L) 09/17/2022 12:18 PM     MONOPCT 10.4 09/17/2022 12:18 PM     EOSRELPCT 3.0 09/17/2022 12:18 PM     BASOPCT 0.7 09/17/2022 12:18 PM     NEUTROABS 7.99 (H) 09/17/2022 12:18 PM     LYMPHSABS 2.38 09/17/2022 12:18 PM     EOSABS 0.36 09/17/2022 12:18 PM          Old records  Reviewed   Discussion with other providers     Done      On this date 10/31/2022 I have spent 10 minutes reviewing previous notes, test results and 30 min face to face with the patient discussing the diagnosis and importance of compliance with the treatment plan as well as documenting on the day of the visit.      A/P     Impression / Plan:      Sachi Dunn is a 46 y.o.  male with left tympanic membrane perforation and retraction with possible IS disruption and left mixed hearing loss and right SNHL confirmed by audiogram and examination, who will benefit from left tympanoplasty with possible OCR. The rest of the exam was benign      Patient will benefit from hearing aids regardless of surgery   Risks and benefits discussed with family including persistent perforation, chronic ear disease, infection, pain, scarring, facial paralysis, hearing loss, recurrent disease   Smoking cessation  Dental care  Blood sugars control  Patient will need an audiogram once healed   Old records were reviewed      Elena Fitzpatrick MD 10/28/22 5:13 PM EDT   Director Otology and Cochlear Implant Programs

## 2023-01-18 ENCOUNTER — HOSPITAL ENCOUNTER (OUTPATIENT)
Age: 53
Setting detail: OUTPATIENT SURGERY
Discharge: HOME | End: 2023-01-18
Attending: OTOLARYNGOLOGY | Admitting: OTOLARYNGOLOGY
Payer: COMMERCIAL

## 2023-01-18 ENCOUNTER — ANESTHESIA (OUTPATIENT)
Dept: OPERATING ROOM | Age: 53
End: 2023-01-18
Payer: COMMERCIAL

## 2023-01-18 VITALS
SYSTOLIC BLOOD PRESSURE: 133 MMHG | WEIGHT: 263 LBS | TEMPERATURE: 98.8 F | HEIGHT: 64 IN | DIASTOLIC BLOOD PRESSURE: 96 MMHG | OXYGEN SATURATION: 93 % | RESPIRATION RATE: 18 BRPM | HEART RATE: 95 BPM | BODY MASS INDEX: 44.9 KG/M2

## 2023-01-18 DIAGNOSIS — H72.92 PERFORATION OF LEFT TYMPANIC MEMBRANE: Primary | ICD-10-CM

## 2023-01-18 LAB
ANION GAP SERPL CALCULATED.3IONS-SCNC: 11 MMOL/L (ref 7–16)
BUN BLDV-MCNC: 10 MG/DL (ref 6–20)
CALCIUM SERPL-MCNC: 9.5 MG/DL (ref 8.6–10.2)
CHLORIDE BLD-SCNC: 101 MMOL/L (ref 98–107)
CO2: 24 MMOL/L (ref 22–29)
CREAT SERPL-MCNC: 0.7 MG/DL (ref 0.7–1.2)
GFR SERPL CREATININE-BSD FRML MDRD: >60 ML/MIN/1.73
GLUCOSE BLD-MCNC: 201 MG/DL (ref 74–99)
HCT VFR BLD CALC: 50 % (ref 37–54)
HEMOGLOBIN: 16.4 G/DL (ref 12.5–16.5)
MCH RBC QN AUTO: 29.2 PG (ref 26–35)
MCHC RBC AUTO-ENTMCNC: 32.8 % (ref 32–34.5)
MCV RBC AUTO: 89.1 FL (ref 80–99.9)
METER GLUCOSE: 155 MG/DL (ref 74–99)
PDW BLD-RTO: 14.1 FL (ref 11.5–15)
PLATELET # BLD: 168 E9/L (ref 130–450)
PMV BLD AUTO: 10.5 FL (ref 7–12)
POTASSIUM SERPL-SCNC: 4.3 MMOL/L (ref 3.5–5)
RBC # BLD: 5.61 E12/L (ref 3.8–5.8)
SODIUM BLD-SCNC: 136 MMOL/L (ref 132–146)
WBC # BLD: 9.9 E9/L (ref 4.5–11.5)

## 2023-01-18 PROCEDURE — 69633 REBUILD EARDRUM STRUCTURES: CPT | Performed by: OTOLARYNGOLOGY

## 2023-01-18 PROCEDURE — 82962 GLUCOSE BLOOD TEST: CPT

## 2023-01-18 PROCEDURE — 2720000010 HC SURG SUPPLY STERILE: Performed by: OTOLARYNGOLOGY

## 2023-01-18 PROCEDURE — 21235 EAR CARTILAGE GRAFT: CPT | Performed by: OTOLARYNGOLOGY

## 2023-01-18 PROCEDURE — 2500000003 HC RX 250 WO HCPCS: Performed by: OTOLARYNGOLOGY

## 2023-01-18 PROCEDURE — 85027 COMPLETE CBC AUTOMATED: CPT

## 2023-01-18 PROCEDURE — 2709999900 HC NON-CHARGEABLE SUPPLY: Performed by: OTOLARYNGOLOGY

## 2023-01-18 PROCEDURE — 80048 BASIC METABOLIC PNL TOTAL CA: CPT

## 2023-01-18 PROCEDURE — 2580000003 HC RX 258: Performed by: STUDENT IN AN ORGANIZED HEALTH CARE EDUCATION/TRAINING PROGRAM

## 2023-01-18 PROCEDURE — 3700000001 HC ADD 15 MINUTES (ANESTHESIA): Performed by: OTOLARYNGOLOGY

## 2023-01-18 PROCEDURE — 7100000011 HC PHASE II RECOVERY - ADDTL 15 MIN: Performed by: OTOLARYNGOLOGY

## 2023-01-18 PROCEDURE — 7100000010 HC PHASE II RECOVERY - FIRST 15 MIN: Performed by: OTOLARYNGOLOGY

## 2023-01-18 PROCEDURE — 6360000002 HC RX W HCPCS: Performed by: STUDENT IN AN ORGANIZED HEALTH CARE EDUCATION/TRAINING PROGRAM

## 2023-01-18 PROCEDURE — 36415 COLL VENOUS BLD VENIPUNCTURE: CPT

## 2023-01-18 PROCEDURE — 3600000004 HC SURGERY LEVEL 4 BASE: Performed by: OTOLARYNGOLOGY

## 2023-01-18 PROCEDURE — 6370000000 HC RX 637 (ALT 250 FOR IP): Performed by: OTOLARYNGOLOGY

## 2023-01-18 PROCEDURE — 3700000000 HC ANESTHESIA ATTENDED CARE: Performed by: OTOLARYNGOLOGY

## 2023-01-18 PROCEDURE — 2500000003 HC RX 250 WO HCPCS

## 2023-01-18 PROCEDURE — L8613 OSSICULAR IMPLANT: HCPCS | Performed by: OTOLARYNGOLOGY

## 2023-01-18 PROCEDURE — 7100000000 HC PACU RECOVERY - FIRST 15 MIN: Performed by: OTOLARYNGOLOGY

## 2023-01-18 PROCEDURE — 6360000002 HC RX W HCPCS

## 2023-01-18 PROCEDURE — 7100000001 HC PACU RECOVERY - ADDTL 15 MIN: Performed by: OTOLARYNGOLOGY

## 2023-01-18 PROCEDURE — 69440 EXPLORATION OF MIDDLE EAR: CPT | Performed by: OTOLARYNGOLOGY

## 2023-01-18 PROCEDURE — 3600000014 HC SURGERY LEVEL 4 ADDTL 15MIN: Performed by: OTOLARYNGOLOGY

## 2023-01-18 PROCEDURE — 2580000003 HC RX 258

## 2023-01-18 PROCEDURE — 6360000002 HC RX W HCPCS: Performed by: OTOLARYNGOLOGY

## 2023-01-18 DEVICE — CENTERED ALTO PARTIAL SIZERS INCLUDED TITANIUM/SILICONE
Type: IMPLANTABLE DEVICE | Site: EAR | Status: FUNCTIONAL
Brand: CENTERED ALTO PARTIAL

## 2023-01-18 RX ORDER — LIDOCAINE HYDROCHLORIDE 20 MG/ML
INJECTION, SOLUTION EPIDURAL; INFILTRATION; INTRACAUDAL; PERINEURAL PRN
Status: DISCONTINUED | OUTPATIENT
Start: 2023-01-18 | End: 2023-01-18 | Stop reason: SDUPTHER

## 2023-01-18 RX ORDER — SODIUM CHLORIDE 9 MG/ML
INJECTION, SOLUTION INTRAVENOUS PRN
Status: DISCONTINUED | OUTPATIENT
Start: 2023-01-18 | End: 2023-01-18 | Stop reason: HOSPADM

## 2023-01-18 RX ORDER — MIDAZOLAM HYDROCHLORIDE 1 MG/ML
INJECTION INTRAMUSCULAR; INTRAVENOUS PRN
Status: DISCONTINUED | OUTPATIENT
Start: 2023-01-18 | End: 2023-01-18 | Stop reason: SDUPTHER

## 2023-01-18 RX ORDER — SODIUM CHLORIDE 0.9 % (FLUSH) 0.9 %
5-40 SYRINGE (ML) INJECTION EVERY 12 HOURS SCHEDULED
Status: DISCONTINUED | OUTPATIENT
Start: 2023-01-18 | End: 2023-01-18 | Stop reason: HOSPADM

## 2023-01-18 RX ORDER — AZITHROMYCIN 250 MG/1
250 TABLET, FILM COATED ORAL SEE ADMIN INSTRUCTIONS
Qty: 6 TABLET | Refills: 0 | Status: SHIPPED | OUTPATIENT
Start: 2023-01-18 | End: 2023-01-23

## 2023-01-18 RX ORDER — ONDANSETRON 2 MG/ML
INJECTION INTRAMUSCULAR; INTRAVENOUS PRN
Status: DISCONTINUED | OUTPATIENT
Start: 2023-01-18 | End: 2023-01-18 | Stop reason: SDUPTHER

## 2023-01-18 RX ORDER — LIDOCAINE HYDROCHLORIDE AND EPINEPHRINE 10; 10 MG/ML; UG/ML
INJECTION, SOLUTION INFILTRATION; PERINEURAL PRN
Status: DISCONTINUED | OUTPATIENT
Start: 2023-01-18 | End: 2023-01-18 | Stop reason: ALTCHOICE

## 2023-01-18 RX ORDER — SODIUM CHLORIDE 0.9 % (FLUSH) 0.9 %
5-40 SYRINGE (ML) INJECTION EVERY 12 HOURS SCHEDULED
Status: CANCELLED | OUTPATIENT
Start: 2023-01-18

## 2023-01-18 RX ORDER — BACITRACIN ZINC 500 [USP'U]/G
OINTMENT TOPICAL PRN
Status: DISCONTINUED | OUTPATIENT
Start: 2023-01-18 | End: 2023-01-18 | Stop reason: ALTCHOICE

## 2023-01-18 RX ORDER — SUCCINYLCHOLINE/SOD CL,ISO/PF 200MG/10ML
SYRINGE (ML) INTRAVENOUS PRN
Status: DISCONTINUED | OUTPATIENT
Start: 2023-01-18 | End: 2023-01-18 | Stop reason: SDUPTHER

## 2023-01-18 RX ORDER — MORPHINE SULFATE 2 MG/ML
1 INJECTION, SOLUTION INTRAMUSCULAR; INTRAVENOUS EVERY 5 MIN PRN
Status: CANCELLED | OUTPATIENT
Start: 2023-01-18

## 2023-01-18 RX ORDER — SODIUM CHLORIDE 0.9 % (FLUSH) 0.9 %
5-40 SYRINGE (ML) INJECTION PRN
Status: DISCONTINUED | OUTPATIENT
Start: 2023-01-18 | End: 2023-01-18 | Stop reason: HOSPADM

## 2023-01-18 RX ORDER — MEPERIDINE HYDROCHLORIDE 25 MG/ML
12.5 INJECTION INTRAMUSCULAR; INTRAVENOUS; SUBCUTANEOUS EVERY 5 MIN PRN
Status: CANCELLED | OUTPATIENT
Start: 2023-01-18

## 2023-01-18 RX ORDER — EPINEPHRINE 1 MG/ML(1)
AMPUL (ML) INJECTION PRN
Status: DISCONTINUED | OUTPATIENT
Start: 2023-01-18 | End: 2023-01-18 | Stop reason: ALTCHOICE

## 2023-01-18 RX ORDER — PHENYLEPHRINE HCL IN 0.9% NACL 1 MG/10 ML
SYRINGE (ML) INTRAVENOUS PRN
Status: DISCONTINUED | OUTPATIENT
Start: 2023-01-18 | End: 2023-01-18 | Stop reason: SDUPTHER

## 2023-01-18 RX ORDER — SODIUM CHLORIDE 0.9 % (FLUSH) 0.9 %
5-40 SYRINGE (ML) INJECTION PRN
Status: CANCELLED | OUTPATIENT
Start: 2023-01-18

## 2023-01-18 RX ORDER — FENTANYL CITRATE 50 UG/ML
INJECTION, SOLUTION INTRAMUSCULAR; INTRAVENOUS PRN
Status: DISCONTINUED | OUTPATIENT
Start: 2023-01-18 | End: 2023-01-18 | Stop reason: SDUPTHER

## 2023-01-18 RX ORDER — TRAMADOL HYDROCHLORIDE 50 MG/1
50 TABLET ORAL EVERY 6 HOURS PRN
Qty: 12 TABLET | Refills: 0 | Status: SHIPPED | OUTPATIENT
Start: 2023-01-18 | End: 2023-01-21

## 2023-01-18 RX ORDER — SODIUM CHLORIDE 9 MG/ML
INJECTION, SOLUTION INTRAVENOUS CONTINUOUS PRN
Status: DISCONTINUED | OUTPATIENT
Start: 2023-01-18 | End: 2023-01-18 | Stop reason: SDUPTHER

## 2023-01-18 RX ORDER — CIPROFLOXACIN AND DEXAMETHASONE 3; 1 MG/ML; MG/ML
4 SUSPENSION/ DROPS AURICULAR (OTIC) ONCE
Status: DISCONTINUED | OUTPATIENT
Start: 2023-01-18 | End: 2023-01-18 | Stop reason: HOSPADM

## 2023-01-18 RX ORDER — SODIUM CHLORIDE 9 MG/ML
INJECTION, SOLUTION INTRAVENOUS PRN
Status: CANCELLED | OUTPATIENT
Start: 2023-01-18

## 2023-01-18 RX ORDER — GLYCOPYRROLATE 0.2 MG/ML
INJECTION INTRAMUSCULAR; INTRAVENOUS PRN
Status: DISCONTINUED | OUTPATIENT
Start: 2023-01-18 | End: 2023-01-18 | Stop reason: SDUPTHER

## 2023-01-18 RX ORDER — MORPHINE SULFATE 2 MG/ML
2 INJECTION, SOLUTION INTRAMUSCULAR; INTRAVENOUS EVERY 5 MIN PRN
Status: CANCELLED | OUTPATIENT
Start: 2023-01-18

## 2023-01-18 RX ORDER — ONDANSETRON 4 MG/1
4 TABLET, ORALLY DISINTEGRATING ORAL 3 TIMES DAILY PRN
Qty: 12 TABLET | Refills: 0 | Status: SHIPPED | OUTPATIENT
Start: 2023-01-18

## 2023-01-18 RX ORDER — DEXAMETHASONE SODIUM PHOSPHATE 4 MG/ML
INJECTION, SOLUTION INTRA-ARTICULAR; INTRALESIONAL; INTRAMUSCULAR; INTRAVENOUS; SOFT TISSUE PRN
Status: DISCONTINUED | OUTPATIENT
Start: 2023-01-18 | End: 2023-01-18 | Stop reason: SDUPTHER

## 2023-01-18 RX ORDER — PROPOFOL 10 MG/ML
INJECTION, EMULSION INTRAVENOUS PRN
Status: DISCONTINUED | OUTPATIENT
Start: 2023-01-18 | End: 2023-01-18 | Stop reason: SDUPTHER

## 2023-01-18 RX ADMIN — Medication 200 MCG: at 14:31

## 2023-01-18 RX ADMIN — PROPOFOL 70 MG: 10 INJECTION, EMULSION INTRAVENOUS at 14:15

## 2023-01-18 RX ADMIN — FENTANYL CITRATE 50 MCG: 50 INJECTION, SOLUTION INTRAMUSCULAR; INTRAVENOUS at 14:21

## 2023-01-18 RX ADMIN — WATER 2000 MG: 1 INJECTION INTRAMUSCULAR; INTRAVENOUS; SUBCUTANEOUS at 14:01

## 2023-01-18 RX ADMIN — Medication 300 MCG: at 14:44

## 2023-01-18 RX ADMIN — FENTANYL CITRATE 50 MCG: 50 INJECTION, SOLUTION INTRAMUSCULAR; INTRAVENOUS at 15:00

## 2023-01-18 RX ADMIN — ONDANSETRON 4 MG: 2 INJECTION INTRAMUSCULAR; INTRAVENOUS at 14:47

## 2023-01-18 RX ADMIN — GLYCOPYRROLATE 0.2 MG: 0.2 INJECTION, SOLUTION INTRAMUSCULAR; INTRAVENOUS at 14:04

## 2023-01-18 RX ADMIN — DEXAMETHASONE SODIUM PHOSPHATE 10 MG: 4 INJECTION, SOLUTION INTRAMUSCULAR; INTRAVENOUS at 14:02

## 2023-01-18 RX ADMIN — FENTANYL CITRATE 100 MCG: 50 INJECTION, SOLUTION INTRAMUSCULAR; INTRAVENOUS at 13:54

## 2023-01-18 RX ADMIN — Medication 120 MG: at 13:54

## 2023-01-18 RX ADMIN — Medication 200 MCG: at 14:41

## 2023-01-18 RX ADMIN — SODIUM CHLORIDE: 9 INJECTION, SOLUTION INTRAVENOUS at 14:14

## 2023-01-18 RX ADMIN — PROPOFOL 200 MG: 10 INJECTION, EMULSION INTRAVENOUS at 13:54

## 2023-01-18 RX ADMIN — SODIUM CHLORIDE: 9 INJECTION, SOLUTION INTRAVENOUS at 13:41

## 2023-01-18 RX ADMIN — LIDOCAINE HYDROCHLORIDE 100 MG: 20 INJECTION, SOLUTION EPIDURAL; INFILTRATION; INTRACAUDAL; PERINEURAL at 13:54

## 2023-01-18 RX ADMIN — FENTANYL CITRATE 50 MCG: 50 INJECTION, SOLUTION INTRAMUSCULAR; INTRAVENOUS at 14:15

## 2023-01-18 RX ADMIN — Medication 100 MCG: at 14:28

## 2023-01-18 RX ADMIN — Medication 200 MCG: at 14:39

## 2023-01-18 RX ADMIN — MIDAZOLAM 2 MG: 1 INJECTION INTRAMUSCULAR; INTRAVENOUS at 13:47

## 2023-01-18 RX ADMIN — Medication 100 MCG: at 14:25

## 2023-01-18 ASSESSMENT — PAIN - FUNCTIONAL ASSESSMENT: PAIN_FUNCTIONAL_ASSESSMENT: 0-10

## 2023-01-18 ASSESSMENT — LIFESTYLE VARIABLES: SMOKING_STATUS: 1

## 2023-01-18 NOTE — ANESTHESIA POSTPROCEDURE EVALUATION
Department of Anesthesiology  Postprocedure Note    Patient: Екатерина Maradiaga  MRN: [id number]  YOB: 1970  Date of evaluation: 1/18/2023      Procedure Summary     Date: 01/18/23 Room / Location: [address]    Anesthesia Start: 6791 Anesthesia Stop: 4927    Procedure: LEFT EAR TYMPANOPLASTY WITH OSSICULAR CHAIN RECONSTRUCTION AND CARTILAGE GRAFT (Left: Ear) Diagnosis:       Rupture of left tympanic membrane      (Rupture of left tympanic membrane [H72.92])    Surgeons: Alberto Camargo MD Responsible Provider: Pastora Mccabe MD    Anesthesia Type: general ASA Status: 3          Anesthesia Type: No value filed.     Regine Phase I: Regine Score: 10    Regine Phase II:        Anesthesia Post Evaluation    Patient location during evaluation: PACU  Patient participation: complete - patient participated  Level of consciousness: awake  Pain score: 2  Airway patency: patent  Nausea & Vomiting: no nausea and no vomiting  Complications: no  Cardiovascular status: blood pressure returned to baseline and hemodynamically stable  Respiratory status: acceptable and face mask  Hydration status: euvolemic

## 2023-01-18 NOTE — DISCHARGE INSTRUCTIONS
POSTOP EAR SURGERY INSTRUCTIONS  Regular diet  No strenuous activity for 2 weeks  Alternate OTC tylenol with ibuprofen for pain control  Prescription if provided for breakthrough pain control   He can return to school/work in 3-5 days  No sports or recess at school  He can gently wash His hair but strict dry ear precautions for the inside of the ear  (Can use ear plugs or cotton ball with vaseline to keep moisture out of the ear)  Topical antibiotic ointment to any visible incision 2X/day for 2 weeks  Keep follow up appointment

## 2023-01-18 NOTE — PROGRESS NOTES
CLINICAL PHARMACY NOTE: MEDS TO BEDS    Total # of Prescriptions Filled: 3   The following medications were delivered to the patient:  Azithromycin 250 mg  Ondansetron 4 mg  Tramadol 50 mg    Additional Documentation:

## 2023-01-18 NOTE — ANESTHESIA PRE PROCEDURE
Department of Anesthesiology  Preprocedure Note       Name:  Steffi Hoang   Age:  46 y.o.  :  1970                                          MRN:  [id number]         Date:  2023      Surgeon: Lonnie Croft):  Chere Kocher, MD    Procedure: Procedure(s):  LEFT EAR TYMPANOPLASTY    Medications prior to admission:   Prior to Admission medications    Medication Sig Start Date End Date Taking? Authorizing Provider   Patel Kang 200-62.5-25 MCG/ACT AEPB inhaler inhale 1 puff by mouth INTO THE LUNGS every morning 22   Merlin Notice, MD   omeprazole (PRILOSEC) 40 MG delayed release capsule take 1 capsule by mouth once daily 22   Merlin Notice, MD   lisinopril (PRINIVIL;ZESTRIL) 40 MG tablet take 1 tablet by mouth once daily  Patient taking differently: take 1 tablet by mouth once daily in the evening 22   Merlin Notice, MD   tamsulosin [facility]) 0.4 MG capsule take 1 capsule by mouth once daily 22   Merlin Notice, MD   albuterol sulfate HFA (VENTOLIN HFA) 108 (90 Base) MCG/ACT inhaler inhale 2 puffs by mouth and INTO THE LUNGS every 6 hours if needed for wheezing 10/21/22   Merlin Notice, MD   dapagliflozin (FARXIGA) 10 MG tablet Take 1 tablet by mouth every morning 22   Merlin Notice, MD   terazosin (HYTRIN) 2 MG capsule take 1 capsule by mouth IN THE EVENING 22   Merlin Notice, MD   atorvastatin (LIPITOR) 40 MG tablet take 1 tablet by mouth once daily 22   Merlin Notice, MD   divalproex (DEPAKOTE) 500 MG DR tablet Take 1 tablet by mouth in the morning and 1 tablet before bedtime. 22 47/00/17  ANNABEL Givens CNP   melatonin 3 MG TABS tablet Take 1 tablet by mouth nightly  Patient taking differently: Take 3 mg by mouth nightly prn 8/14/22 10/89/64  ANNABEL Givens CNP   nicotine (NICODERM CQ) 21 MG/24HR Place 1 patch onto the skin in the morning.   Patient not taking: Reported on 2023 8/15/22 11/15/22 Artist Tiffany Jones, APRN - CNP   ARIPiprazole (ABILIFY) 2 MG tablet Take 2 mg by mouth in the morning. 8/14/22  Historical Provider, MD   FLUoxetine (PROZAC) 40 MG capsule 80 mg nightly   Patient not taking: No sig reported 3/16/21 8/11/22  Historical Provider, MD   blood glucose monitor strips Test twice daily & as needed for symptoms of irregular blood glucose. Dx: E11.9 9/15/20   Tyson Mckeon MD   glucose monitoring kit (FREESTYLE) monitoring kit 1 kit by Does not apply route daily Dx E11.9 11/22/19   Tyson Mckeon MD   Lancets MISC 1 each by Does not apply route daily 11/22/19   Tyson Mckeon MD       Current medications:    No current facility-administered medications for this visit. No current outpatient medications on file.      Facility-Administered Medications Ordered in Other Visits   Medication Dose Route Frequency Provider Last Rate Last Admin    sodium chloride flush 0.9 % injection 5-40 mL  5-40 mL IntraVENous 2 times per day Hussein Pitch, DO        sodium chloride flush 0.9 % injection 5-40 mL  5-40 mL IntraVENous PRN Hussein Pitch, DO        0.9 % sodium chloride infusion   IntraVENous PRN Hussein Pitch, DO        ceFAZolin (ANCEF) 2,000 mg in sterile water 20 mL IV syringe  2,000 mg IntraVENous On Call to 4605 Juan Antunez, DO           Allergies:  No Known Allergies    Problem List:    Patient Active Problem List   Diagnosis Code    COPD (chronic obstructive pulmonary disease) ([facility] J44.9    Smoker F17.200    SERGEY (obstructive sleep apnea) G47.33    Asthma J45.909    Morbid obesity with BMI of 45.0-49.9, adult ([facility]) E66.01, Z68.42    Diverticulitis of colon K57.32    Abscess of back L02.212    DM2 (diabetes mellitus, type 2) ([facility]) E11.9    Sepsis ([facility] A41.9    MSSA (methicillin susceptible Staphylococcus aureus) infection A49.01    Necrotizing soft tissue infection M79.89    HANS (acute kidney injury) ([facility] N17.9    Acute blood loss anemia D62    Primary osteoarthritis of left knee M17.12    Impingement syndrome of right shoulder M75.41    Mixed hyperlipidemia E78.2    Acute pancreatitis K85.90    Acute pancreatitis without infection or necrosis K85.90    Gastroesophageal reflux disease K21.9    Essential hypertension I10    Anxiety F41.9    Depressive disorder, not elsewhere classified F32.89    Heartburn R12    Shortness of breath R06.02    Urge incontinence of urine N39.41    Bipolar 1 disorder (HCC) F31.9    Antisocial personality disorder (HCC) F60.2    Alcohol abuse F10.10    Tympanic membrane perforation H72.90       Past Medical History:        Diagnosis Date    Abdominal pain     nausea    Allergic rhinitis     Anxiety     Asthma     stable    Bipolar 1 disorder ([facility])     COPD (chronic obstructive pulmonary disease) ([facility])     controlled with inhalers    Depression     no meds    Diabetes mellitus ([facility]     Encounter for screening colonoscopy     and EGD 7-23-21    GERD (gastroesophageal reflux disease)     Hyperlipidemia     Hypertension     Obesity     Osteoarthritis     Perforation of left tympanic membrane     Sleep apnea     BMS CPAP 7, not using       Past Surgical History:        Procedure Laterality Date    ABDOMEN SURGERY N/A 6/24/2019    INCISION AND DRAINAGE PERINEAL ABSCESS, INCISIONAL DEBRIDEMENT performed by Steve Rdz MD at [address] [place] [address], LAPAROSCOPIC N/A 8/24/2021    LAPAROSCOPIC ROBOTIC ASISTED CHOLECYSTECTOMY performed by Janae Phillips MD at [facility] COLONOSCOPY  06/08/2017    Dr. Missy Butts polyp, diverticulitis    COLONOSCOPY N/A 7/20/2020    COLONOSCOPY POLYPECTOMY SNARE/COLD BIOPSY performed by Linda Burt MD at [facility] ENDOSCOPY    ENDOSCOPY, COLON, DIAGNOSTIC      EXCISION HIDRADENITIS OF INGUINAL / UMBILICAL AREA N/A 7/00/0347    DEBRIDEMENT PERINEAL WOUND performed by Steve Rdz MD at [address] [place] [address]  2012    Dr. Sharron Echols REPAIR  2012    OK DRAIN SKIN ABSCESS SIMPLE N/A 9/19/2018    I AND D BACK ABCESS performed by Edinson David MD at [facility]  ENDOSCOPY N/A 7/20/2020    EGD BIOPSY performed by Rock Gamino MD at [facility] N/A 7/23/2021    EGD BIOPSY performed by Stephanie Garcia MD at [facility] History:    Social History     Tobacco Use    Smoking status: Every Day     Packs/day: 1.00     Years: 35.00     Pack years: 35.00     Types: Cigarettes    Smokeless tobacco: Never   Substance Use Topics    Alcohol use: No     Alcohol/week: 0.0 standard drinks                                Ready to quit: Not Answered  Counseling given: Not Answered      Vital Signs (Current): There were no vitals filed for this visit.                                            BP Readings from Last 3 Encounters:   11/15/22 129/84   10/31/22 133/83   10/12/22 114/81       NPO Status:                                                                                 BMI:   Wt Readings from Last 3 Encounters:   01/16/23 263 lb (119.3 kg)   11/15/22 267 lb 14.4 oz (121.5 kg)   10/31/22 265 lb (120.2 kg)     There is no height or weight on file to calculate BMI.    CBC:   Lab Results   Component Value Date/Time    WBC 9.9 01/18/2023 11:10 AM    RBC 5.61 01/18/2023 11:10 AM    HGB 16.4 01/18/2023 11:10 AM    HCT 50.0 01/18/2023 11:10 AM    MCV 89.1 01/18/2023 11:10 AM    RDW 14.1 01/18/2023 11:10 AM     01/18/2023 11:10 AM       CMP:   Lab Results   Component Value Date/Time     09/17/2022 12:18 PM    K 4.6 09/17/2022 12:18 PM    CL 99 09/17/2022 12:18 PM    CO2 27 09/17/2022 12:18 PM    BUN 15 10/12/2022 09:43 AM    CREATININE 0.8 10/12/2022 09:43 AM    GFRAA >60 10/12/2022 09:43 AM    LABGLOM >60 10/12/2022 09:43 AM    GLUCOSE 294 09/17/2022 12:18 PM    PROT 7.5 08/11/2022 12:04 AM    CALCIUM 9.5 09/17/2022 12:18 PM    BILITOT <0.2 08/11/2022 12:04 AM    ALKPHOS 102 08/11/2022 12:04 AM    AST 24 08/11/2022 12:04 AM    ALT 35 08/11/2022 12:04 AM       POC Tests: No results for input(s): POCGLU, POCNA, POCK, POCCL, POCBUN, POCHEMO, POCHCT in the last 72 hours. Coags:   Lab Results   Component Value Date/Time    PROTIME 11.7 07/09/2021 11:12 PM    INR 1.1 07/09/2021 11:12 PM    APTT 34.4 07/09/2021 11:12 PM       HCG (If Applicable): No results found for: PREGTESTUR, PREGSERUM, HCG, HCGQUANT     ABGs:   Lab Results   Component Value Date/Time    K6MKHOQS 96.4 02/17/2012 02:10 PM        Type & Screen (If Applicable):  No results found for: LABABO, LABRH    Drug/Infectious Status (If Applicable):  No results found for: HIV, HEPCAB    COVID-19 Screening (If Applicable):   Lab Results   Component Value Date/Time    COVID19 NOT DETECTED 08/11/2022 12:04 AM           Anesthesia Evaluation  Patient summary reviewed and Nursing notes reviewed no history of anesthetic complications:   Airway: Mallampati: III  TM distance: >3 FB   Neck ROM: full  Mouth opening: > = 3 FB   Dental:      Comment: Many are damaged    Pulmonary:normal exam    (+) COPD:  sleep apnea: on CPAP,  asthma: current smoker          Patient smoked on day of surgery. Cardiovascular:  Exercise tolerance: good (>4 METS),   (+) hypertension:, hyperlipidemia        Rhythm: regular  Rate: normal           Beta Blocker:  Not on Beta Blocker         Neuro/Psych:   (+) depression/anxiety             GI/Hepatic/Renal:   (+) GERD: poorly controlled,          ROS comment: bph  cholelithiasis. Endo/Other:    (+) DiabetesType II DM, , : arthritis: OA., .                 Abdominal:             Vascular: negative vascular ROS. Other Findings:             Anesthesia Plan      general     ASA 3       Induction: intravenous. Anesthetic plan and risks discussed with patient and spouse.     Use of blood products discussed with patient whom consented to blood products. Plan discussed with attending. Kandis Horn MD   1/18/2023    Chart reviewed and patient assessed. Agreed with above note.  Genny JIN CRNA

## 2023-01-18 NOTE — H&P
Favian Vizcaino was seen and re-examined preoperatively today, January 18, 2023.  There was no substantial change in his physical and medical status.  Patient is fit for the proposed surgical procedure.  All questions were appropriately addressed and had no further questions regarding the risks, benefits, and alternatives of the procedure.  Favian Vizcaino and family wished to proceed.    Ugo Ayala DO  Resident Physician  [facility]  Otolaryngology Residency  1/18/2023  12:19 PM

## 2023-01-18 NOTE — OP NOTE
Patient ID:  Patient name: Ivory Paez  YOB: 1970  Medical record number: [id number]    Date of Procedure: 1/18/2023    Surgeon: Dr. Beth Catalan    Assistant: none    Preop: left TM perforation, CHL    Postop: Same. Incudostapedial dehiscence    Procedure: left Tympanoplasty with OCR with PORP with cartilage graft with endoscopic assistance with facial nerve monitoring    HPI: Ivory Paez is a 46 y.o. male with a history of left increased conductive hearing loss     Risks and benefits were discussed including infection, bleeding, hematoma, scarring, cholesteatoma, need for further surgery, injury to surrounding nerves (facial nerve) and vessels, persistent drainage or perforation    Procedure: Patient was brought to the OR and induced under general anesthesia with an ETT. The left ear was verfied. The table was turned 180 degrees. The facial nerve monitor was placed, tested and working appropriately. The ear was prepped and drapped in a sterile fashion. 10 ml of 1% lido with epi was injected in the postauricular area and canal. The ear was irrigated. 6 O'Clock and 12 O'Clock canal incisions were made, and connected. The perforation was rimmed with a naqvi needle. A tympanomeatal flap was elevated and the entire perforation was seen. The chorda tympani was preserved, the stapes was intact and mobile but was connected by a small fibrotic band to the incus. The endoscope was used to identify and lyse the band. This band was lysed and the stapes was healthy     A separate tragal cartilage incision was made. The tragal cartilage was identified. Using the 3mm dermal punch, the tragal cartilage was harvested, carved and shaped to size using sharp dissection. Hemostasis was with the bilpolar. The incision was closed using interrupted #5.0 fast absorbing gut. A PORP was loaded onto the field and cut to size, and was placed on the stapes head.  The carved cartilage was placed on the PORP head to stabilize the PORP and protect the ear drum. The tympanomeatal flap was replaced. The lateral ear canal was filled with gelfoam with saline. The wound was cleaned and dressed.     Complications: none    EBL: minimal    Findings: incudostapedial fibrosis, released; stapes mobile, PORP placed, chorda tympani and facial nerve preserved

## 2023-01-19 RX ORDER — ALBUTEROL SULFATE 90 UG/1
AEROSOL, METERED RESPIRATORY (INHALATION)
Qty: 18 G | Refills: 3 | Status: SHIPPED | OUTPATIENT
Start: 2023-01-19

## 2023-01-31 NOTE — PROGRESS NOTES
CC:   Chief Complaint   Patient presents with    Post-Op Check     2 week P/O tympanoplasty     Karely Richard is a 46 y.o. male is s/p left tympanplasty with PORP 1/18/2023; OR Findings: incudostapedial fibrosis, released; stapes mobile, PORP placed, chorda tympani and facial nerve preserved; presented with history of left tympanic membrane perforation and possible debris in the ear; bilateral mixed hearing loss which has worsened significantly. S/p CT scan; patient of Dr. Nichole Mcdowell;  noted to have long history of ear issues, possible 2 childhood tubes and adenotonsillectomy, he then had a perforation in his left ear which was patched when he was bout 15 years age. He states he continued to have ear issues about 1 year after the patch and has noted that his hearing is significantly worse in the left ear.   He is a smoker (1ppd), has well controlled DMII            PAST MEDICAL HISTORY:   Past Medical History:   Diagnosis Date    Abdominal pain     nausea    Allergic rhinitis     Anxiety     Asthma     stable    Bipolar 1 disorder (HCC)     COPD (chronic obstructive pulmonary disease) ([address]     controlled with inhalers    Depression     no meds    Diabetes mellitus ([address]     Encounter for screening colonoscopy     and EGD 7-23-21    GERD (gastroesophageal reflux disease)     Hyperlipidemia     Hypertension     Obesity     Osteoarthritis     Perforation of left tympanic membrane     Sleep apnea     BMS CPAP 7, not using        PAST SURGICAL HISTORY:   Past Surgical History:   Procedure Laterality Date    ABDOMEN SURGERY N/A 6/24/2019    INCISION AND DRAINAGE PERINEAL ABSCESS, INCISIONAL DEBRIDEMENT performed by Reyna Neely MD at [facility], LAPAROSCOPIC N/A 8/24/2021    LAPAROSCOPIC ROBOTIC ASISTED CHOLECYSTECTOMY performed by Bela Cardenas MD at [facility]    COLONOSCOPY  06/08/2017    Dr. Tasia Lima polyp, diverticulitis    COLONOSCOPY N/A 7/20/2020    COLONOSCOPY POLYPECTOMY SNARE/COLD BIOPSY performed by Nahid Mclaughlin MD at [facility], DIAGNOSTIC      EXCISION HIDRADENITIS OF INGUINAL / UMBILICAL AREA N/A 7/78/7391    DEBRIDEMENT PERINEAL WOUND performed by Robert Raya MD at [address] [place] [address]  2012    Dr. Alice Smith N/A 9/19/2018    I AND D BACK ABCESS performed by Meir Li MD at [address] [facility] Left 1/18/2023    LEFT EAR TYMPANOPLASTY WITH OSSICULAR CHAIN RECONSTRUCTION AND CARTILAGE GRAFT performed by Quinn Ziegler MD at [address] N/A 7/20/2020    EGD BIOPSY performed by Nahid Mclaughlin MD at [facility] N/A 7/23/2021    EGD BIOPSY performed by Wendy Hidalgo MD at [address]:   Social History     Socioeconomic History    Marital status:      Spouse name: Not on file    Number of children: Not on file    Years of education: Not on file    Highest education level: Not on file   Occupational History    Occupation: sales   Tobacco Use    Smoking status: Every Day     Packs/day: 1.00     Years: 35.00     Pack years: 35.00     Types: Cigarettes    Smokeless tobacco: Never   Vaping Use    Vaping Use: Never used   Substance and Sexual Activity    Alcohol use: No     Alcohol/week: 0.0 standard drinks    Drug use: Yes     Frequency: 7.0 times per week     Types: Marijuana [employer])     Comment: currently.  instructed to hold 24h prior to DOS    Sexual activity: Yes     Partners: Female   Other Topics Concern    Not on file   Social History Narrative    Not on file     Social Determinants of Health     Financial Resource Strain: Medium Risk    Difficulty of Paying Living Expenses: Somewhat hard   Food Insecurity: Food Insecurity Present    Worried About Running Out of Food in the Last Year: Sometimes true    Ran Out of Food in the Last Year: Sometimes true   Transportation Needs: Not on file   Physical Activity: Not on file   Stress: Not on file   Social Connections: Not on file   Intimate Partner Violence: Not on file   Housing Stability: Not on file        TOBACCO  Social History     Tobacco Use   Smoking Status Every Day    Packs/day: 1.00    Years: 35.00    Pack years: 35.00    Types: Cigarettes   Smokeless Tobacco Never        ALCOHOL   Social History     Substance and Sexual Activity   Alcohol Use No    Alcohol/week: 0.0 standard drinks        DRUGHX   Social History     Substance and Sexual Activity   Drug Use Yes    Frequency: 7.0 times per week    Types: Marijuana (Weed)    Comment: currently.  instructed to hold 24h prior to [address]:   Outpatient Medications Marked as Taking for the 2/2/23 encounter (Office Visit) with Ayan Sorto MD   Medication Sig Dispense Refill    ofloxacin (FLOXIN OTIC) 0.3 % otic solution Place 5 drops into the left ear 2 times daily for 10 days 1 each 0    albuterol sulfate HFA (VENTOLIN HFA) 108 (90 Base) MCG/ACT inhaler inhale 2 puffs by mouth and INTO THE LUNGS every 6 hours if needed for wheezing 18 g 3    ondansetron (ZOFRAN-ODT) 4 MG disintegrating tablet Take 1 tablet by mouth 3 times daily as needed for Nausea or Vomiting 12 tablet 0    TRELEGY ELLIPTA 200-62.5-25 MCG/ACT AEPB inhaler inhale 1 puff by mouth INTO THE LUNGS every morning 1 each 5    omeprazole (PRILOSEC) 40 MG delayed release capsule take 1 capsule by mouth once daily 30 capsule 5    lisinopril (PRINIVIL;ZESTRIL) 40 MG tablet take 1 tablet by mouth once daily (Patient taking differently: take 1 tablet by mouth once daily in the evening) 30 tablet 5    tamsulosin (FLOMAX) 0.4 MG capsule take 1 capsule by mouth once daily 30 capsule 5    dapagliflozin (FARXIGA) 10 MG tablet Take 1 tablet by mouth every morning 30 tablet 5    terazosin (HYTRIN) 2 MG capsule take 1 capsule by mouth IN THE EVENING 30 capsule 3 atorvastatin (LIPITOR) 40 MG tablet take 1 tablet by mouth once daily 30 tablet 3    blood glucose monitor strips Test twice daily & as needed for symptoms of irregular blood glucose. Dx: E11.9 100 strip 3    glucose monitoring kit (FREESTYLE) monitoring kit 1 kit by Does not apply route daily Dx E11.9 1 kit 0    Lancets MISC 1 each by Does not apply route daily 100 each 3        ALLERGIES:   No Known Allergies    ROS: I have reviewed the patient's medical history in detail; there are no changes to the history as noted in the electronic medical record. Exam: Ht 5' 4\" (1.626 m)   Wt 263 lb (119.3 kg)   BMI 45.14 kg/m²   Mari Roberts is a 46 y.o. male  appears well, in no apparent distress. Alert and oriented times three, pleasant and cooperative. Vital signs are as documented in vital signs section. Breathing comfortably, without stertor or stridor  Ear exam: External ears normal. Canal right clear. TM right normal. Canal with packing  No nasal lesions, no erythema  No oral lesions.    There is no palpable adenopathy or tenderness    Lab Results   Component Value Date/Time    WBC 9.9 01/18/2023 11:10 AM    HGB 16.4 01/18/2023 11:10 AM    HCT 50.0 01/18/2023 11:10 AM     01/18/2023 11:10 AM    MCV 89.1 01/18/2023 11:10 AM    MCH 29.2 01/18/2023 11:10 AM    MCHC 32.8 01/18/2023 11:10 AM    RDW 14.1 01/18/2023 11:10 AM    NEUTOPHILPCT 65.7 09/17/2022 12:18 PM    LYMPHOPCT 19.6 (L) 09/17/2022 12:18 PM    MONOPCT 10.4 09/17/2022 12:18 PM    EOSRELPCT 3.0 09/17/2022 12:18 PM    BASOPCT 0.7 09/17/2022 12:18 PM    NEUTROABS 7.99 (H) 09/17/2022 12:18 PM    LYMPHSABS 2.38 09/17/2022 12:18 PM    EOSABS 0.36 09/17/2022 12:18 PM       A/P:      Mari Roberts is a 46 y.o. male is s/p left tympanplasty with PORP 1/18/2023; OR Findings: incudostapedial fibrosis, released; stapes mobile, PORP placed, chorda tympani and facial nerve preserved  Hearing subjectively better  Floxin ear drops for 10 days  Dry ear precautions  Follow up in 1 month with audio    Jose Reed MD 1/30/23 9:53 PM EST  Director Otology and Cochlear Implant Programs

## 2023-02-02 ENCOUNTER — OFFICE VISIT (OUTPATIENT)
Dept: ENT CLINIC | Age: 53
End: 2023-02-02

## 2023-02-02 VITALS — WEIGHT: 263 LBS | HEIGHT: 64 IN | BODY MASS INDEX: 44.9 KG/M2

## 2023-02-02 DIAGNOSIS — Z98.890 S/P TYMPANOPLASTY: ICD-10-CM

## 2023-02-02 DIAGNOSIS — H90.A32 MIXED CONDUCTIVE AND SENSORINEURAL HEARING LOSS OF LEFT EAR WITH RESTRICTED HEARING OF RIGHT EAR: ICD-10-CM

## 2023-02-02 DIAGNOSIS — H91.8X9 ASYMMETRICAL HEARING LOSS: Primary | ICD-10-CM

## 2023-02-02 PROCEDURE — 99024 POSTOP FOLLOW-UP VISIT: CPT | Performed by: OTOLARYNGOLOGY

## 2023-02-02 RX ORDER — OFLOXACIN 3 MG/ML
5 SOLUTION AURICULAR (OTIC) 2 TIMES DAILY
Qty: 1 EACH | Refills: 0
Start: 2023-02-02 | End: 2023-02-12

## 2023-02-14 ENCOUNTER — OFFICE VISIT (OUTPATIENT)
Dept: FAMILY MEDICINE CLINIC | Age: 53
End: 2023-02-14
Payer: COMMERCIAL

## 2023-02-14 VITALS
OXYGEN SATURATION: 98 % | DIASTOLIC BLOOD PRESSURE: 80 MMHG | TEMPERATURE: 97.7 F | WEIGHT: 254.6 LBS | RESPIRATION RATE: 16 BRPM | BODY MASS INDEX: 42.42 KG/M2 | SYSTOLIC BLOOD PRESSURE: 127 MMHG | HEIGHT: 65 IN | HEART RATE: 81 BPM

## 2023-02-14 DIAGNOSIS — Z00.00 ANNUAL PHYSICAL EXAM: ICD-10-CM

## 2023-02-14 DIAGNOSIS — E11.9 TYPE 2 DIABETES MELLITUS WITHOUT COMPLICATION, WITHOUT LONG-TERM CURRENT USE OF INSULIN (HCC): ICD-10-CM

## 2023-02-14 DIAGNOSIS — E78.2 MIXED HYPERLIPIDEMIA: ICD-10-CM

## 2023-02-14 DIAGNOSIS — R07.9 CHEST PAIN, UNSPECIFIED TYPE: Primary | ICD-10-CM

## 2023-02-14 DIAGNOSIS — I10 ESSENTIAL HYPERTENSION: ICD-10-CM

## 2023-02-14 PROCEDURE — 3078F DIAST BP <80 MM HG: CPT | Performed by: FAMILY MEDICINE

## 2023-02-14 PROCEDURE — 99396 PREV VISIT EST AGE 40-64: CPT | Performed by: FAMILY MEDICINE

## 2023-02-14 PROCEDURE — G8482 FLU IMMUNIZE ORDER/ADMIN: HCPCS | Performed by: FAMILY MEDICINE

## 2023-02-14 PROCEDURE — 3074F SYST BP LT 130 MM HG: CPT | Performed by: FAMILY MEDICINE

## 2023-02-14 RX ORDER — DOXYCYCLINE HYCLATE 100 MG/1
100 CAPSULE ORAL 2 TIMES DAILY
Qty: 20 CAPSULE | Refills: 0 | Status: SHIPPED | OUTPATIENT
Start: 2023-02-14 | End: 2023-02-24

## 2023-02-14 RX ORDER — ALBUTEROL SULFATE 90 UG/1
AEROSOL, METERED RESPIRATORY (INHALATION)
Qty: 18 G | Refills: 3 | Status: SHIPPED | OUTPATIENT
Start: 2023-02-14

## 2023-02-14 SDOH — ECONOMIC STABILITY: INCOME INSECURITY: HOW HARD IS IT FOR YOU TO PAY FOR THE VERY BASICS LIKE FOOD, HOUSING, MEDICAL CARE, AND HEATING?: NOT HARD AT ALL

## 2023-02-14 SDOH — ECONOMIC STABILITY: FOOD INSECURITY: WITHIN THE PAST 12 MONTHS, THE FOOD YOU BOUGHT JUST DIDN'T LAST AND YOU DIDN'T HAVE MONEY TO GET MORE.: NEVER TRUE

## 2023-02-14 SDOH — ECONOMIC STABILITY: HOUSING INSECURITY
IN THE LAST 12 MONTHS, WAS THERE A TIME WHEN YOU DID NOT HAVE A STEADY PLACE TO SLEEP OR SLEPT IN A SHELTER (INCLUDING NOW)?: NO

## 2023-02-14 SDOH — ECONOMIC STABILITY: FOOD INSECURITY: WITHIN THE PAST 12 MONTHS, YOU WORRIED THAT YOUR FOOD WOULD RUN OUT BEFORE YOU GOT MONEY TO BUY MORE.: NEVER TRUE

## 2023-02-14 ASSESSMENT — PATIENT HEALTH QUESTIONNAIRE - PHQ9
3. TROUBLE FALLING OR STAYING ASLEEP: 2
2. FEELING DOWN, DEPRESSED OR HOPELESS: 0
SUM OF ALL RESPONSES TO PHQ QUESTIONS 1-9: 2
10. IF YOU CHECKED OFF ANY PROBLEMS, HOW DIFFICULT HAVE THESE PROBLEMS MADE IT FOR YOU TO DO YOUR WORK, TAKE CARE OF THINGS AT HOME, OR GET ALONG WITH OTHER PEOPLE: 0
6. FEELING BAD ABOUT YOURSELF - OR THAT YOU ARE A FAILURE OR HAVE LET YOURSELF OR YOUR FAMILY DOWN: 0
1. LITTLE INTEREST OR PLEASURE IN DOING THINGS: 0
9. THOUGHTS THAT YOU WOULD BE BETTER OFF DEAD, OR OF HURTING YOURSELF: 0
SUM OF ALL RESPONSES TO PHQ9 QUESTIONS 1 & 2: 0
SUM OF ALL RESPONSES TO PHQ QUESTIONS 1-9: 2
SUM OF ALL RESPONSES TO PHQ QUESTIONS 1-9: 2
7. TROUBLE CONCENTRATING ON THINGS, SUCH AS READING THE NEWSPAPER OR WATCHING TELEVISION: 0
8. MOVING OR SPEAKING SO SLOWLY THAT OTHER PEOPLE COULD HAVE NOTICED. OR THE OPPOSITE, BEING SO FIGETY OR RESTLESS THAT YOU HAVE BEEN MOVING AROUND A LOT MORE THAN USUAL: 0
5. POOR APPETITE OR OVEREATING: 0
SUM OF ALL RESPONSES TO PHQ QUESTIONS 1-9: 2

## 2023-02-14 NOTE — PROGRESS NOTES
Annual exam:  Patient is here for routine yearly physical/preventative visit. Patient has concerns today of cyst on left groin area. Patient is  generally healthy. Chronic medical conditions are generally controlled. Most recent labs reviewed with patient and  are remarkable. Health maintenance reviewed with patient and is not up to date. Overall doing well. Pt. Is fasting. Labs done 1/18/23     reviewed.      Past Medical History:   Diagnosis Date    Abdominal pain     nausea    Allergic rhinitis     Anxiety     Asthma     stable    Bipolar 1 disorder (HCC)     COPD (chronic obstructive pulmonary disease) (HCC)     controlled with inhalers    Depression     no meds    Diabetes mellitus ([facility] [address]     Encounter for screening colonoscopy     and EGD 7-23-21    GERD (gastroesophageal reflux disease)     Hyperlipidemia     Hypertension     Obesity     Osteoarthritis     Perforation of left tympanic membrane     Sleep apnea     BMS CPAP 7, not using      Past Surgical History:   Procedure Laterality Date    ABDOMEN SURGERY N/A 6/24/2019    INCISION AND DRAINAGE PERINEAL ABSCESS, INCISIONAL DEBRIDEMENT performed by Judie Solis MD at [address] [place] [address], LAPAROSCOPIC N/A 8/24/2021    LAPAROSCOPIC ROBOTIC ASISTED CHOLECYSTECTOMY performed by Qi Pascual MD at [address] [place] [address]  06/08/2017    Dr. Jennifer Luevano polyp, diverticulitis    COLONOSCOPY N/A 7/20/2020    COLONOSCOPY POLYPECTOMY SNARE/COLD BIOPSY performed by Susy Barnes MD at [facility], COLON, DIAGNOSTIC      EXCISION HIDRADENITIS OF INGUINAL / UMBILICAL AREA N/A 5/41/0130    DEBRIDEMENT PERINEAL WOUND performed by Judie Solis MD at [facility]  2012    Dr. Marquez Crook N/A 9/19/2018    I AND D BACK ABCESS performed by Jose Cervantes MD at [facility] Left 1/18/2023    LEFT EAR TYMPANOPLASTY WITH OSSICULAR CHAIN RECONSTRUCTION AND CARTILAGE GRAFT performed by Tawana Smith MD at . [facility] [address] N/A 7/20/2020    EGD BIOPSY performed by Guanako Ayala MD at [facility] N/A 7/23/2021    EGD BIOPSY performed by Monroe Magana MD at [facility] ENDOSCOPY      Family History   Problem Relation Age of Onset    COPD Mother     Cancer Mother     Heart Disease Father 46        CABG    Diabetes Father     Diabetes Sister     Asthma Sister     Diabetes Brother     Heart Disease Brother 43        MI    COPD Brother     Heart Disease Brother 48        CABG    Heart Disease Maternal Grandmother     Cancer Maternal Grandmother     Cancer Paternal Grandmother         colon    Heart Disease Paternal Grandfather      Social History     Socioeconomic History    Marital status:      Spouse name: Not on file    Number of children: Not on file    Years of education: Not on file    Highest education level: Not on file   Occupational History    Occupation: sales   Tobacco Use    Smoking status: Every Day     Packs/day: 1.00     Years: 35.00     Pack years: 35.00     Types: Cigarettes    Smokeless tobacco: Never   Vaping Use    Vaping Use: Never used   Substance and Sexual Activity    Alcohol use: No     Alcohol/week: 0.0 standard drinks    Drug use: Yes     Frequency: 7.0 times per week     Types: Marijuana (Weed)     Comment: currently.  instructed to hold 24h prior to DOS    Sexual activity: Yes     Partners: Female   Other Topics Concern    Not on file   Social History Narrative    Not on file     Social Determinants of Health     Financial Resource Strain: Low Risk     Difficulty of Paying Living Expenses: Not hard at all   Food Insecurity: No Food Insecurity    Worried About Running Out of Food in the Last Year: Never true    [address] in the Last Year: Never true   Transportation Needs: Unknown    Lack of Transportation (Medical): Not on file    Lack of Transportation (Non-Medical):  No   Physical Activity: Not on file   Stress: Not on file   Social Connections: Not on file   Intimate Partner Violence: Not on file   Housing Stability: Unknown    Unable to Pay for Housing in the Last Year: Not on file    Number of Places Lived in the Last Year: Not on file    Unstable Housing in the Last Year: No      No Known Allergies     Review of Systems:  Constitutional:  No fever, no fatigue, no chills, no headaches, no weight change  Dermatology:  No rash, no mole, no dry or sensitive skin  ENT:  No cough, no sore throat, no sinus pain, no runny nose, no ear pain  Cardiology:  No chest pain, no palpitations, no leg edema, no shortness of breath, no PND  Endocrinology:  No polydipsia, no polyuria, no cold intolerance, no heat intolerance, no polyphagia, no hair changes  Gastroenterology:  No dysphagia, no abdominal pain, no nausea, no vomiting, no constipation, no diarrhea, no heartburn  Female Reproductive:  No hot flashes, no abnormal vaginal discharge, no pain with menstruation, no pelvic pain  Musculoskeletal:  No joint pain, no leg cramps, no back pain, no muscle aches  Respiratory:  No shortness of breath, no orthopnea, no wheezing, no OSORIO, no hemoptysis  Urology:  No blood in the urine, no urinary frequency, no urinary incontinence, no urinary urgency, no nocturia, no dysuria  Neurology:  No numbness/tingling, no dizziness, no weakness  Psychology:  No depression, no sleep disturbances, no suicidal ideation, no anxiety  Physical Exam:  Vitals:    02/14/23 0939   BP: 127/80   Pulse: 81   Resp: 16   Temp: 97.7 °F (36.5 °C)   TempSrc: Tympanic   SpO2: 98%   Weight: 254 lb 9.6 oz (115.5 kg)   Height: 5' 5\" (1.651 m)     General:  Patient alert and oriented x 3, NAD, pleasant  HEENT:  Atraumatic, normocephalic, PERRLA, EOMI, clear conjunctiva, TMs clear, nose-clear, throat - no erythema, tonsils- wnl  Neck:  Supple, no goiter, no carotid bruits, no lymphadenopathy  Lungs:  CTA B  Heart:  RRR, no murmurs, gallops or rubs  Abdomen:  Soft, NTND, + bowel sounds  Back: full ROM, no CVA tenderness  Extremities:  No clubbing, cyanosis or edema  Neuro:  CN II-XII grossly intact, 5/5 strength in bilateral upper and lower extremities, 2 + reflexes. Skin: unremarkable    Assessment/Plan:  Davey Irby was seen today for annual exam.    Diagnoses and all orders for this visit:    Chest pain, unspecified type  -     NM Cardiac Stress Test Nuclear Imaging; Future  -     Cardiac Stress Test - w/Pharm; Future    Type 2 diabetes mellitus without complication, without long-term current use of insulin (HCC)  -     Comprehensive Metabolic Panel; Future  -     Lipid Panel; Future  -     Hemoglobin A1C; Future    Essential hypertension    Mixed hyperlipidemia    Annual physical exam    Other orders  -     albuterol sulfate HFA (VENTOLIN HFA) 108 (90 Base) MCG/ACT inhaler; inhale 2 puffs by mouth and INTO THE LUNGS every 6 hours if needed for wheezing  -     doxycycline hyclate (VIBRAMYCIN) 100 MG capsule; Take 1 capsule by mouth 2 times daily for 10 days  -     regadenoson (LEXISCAN) injection 0.4 mg  -     Beta Blocker Management Prior to Cardiac Stress Test; Standing    As above. Call or go to ED immediately if symptoms worsen or persist.  Return in about 6 months (around 8/14/2023). or sooner if necessary. Educational materials and/or home exercises printed for patient's review and were included in patient instructions on his/her After Visit Summary and given to patient at the end of visit. Counseled regarding above diagnosis, including possible risks and complications,  especially if left uncontrolled. Counseled regarding the possible side effects, risks, benefits and alternatives to treatment; patient and/or guardian verbalizes understanding, agrees, feels comfortable with and wishes to proceed with above treatment plan.     Advised patient to call with any new medication issues, and read all Rx info from pharmacy to assure aware of all possible risks and side effects of medication before taking. Reviewed age and gender appropriate health screening exams and vaccinations. Advised patient regarding importance of keeping up with recommended health maintenance and to schedule as soon as possible if overdue, as this is important in assessing for undiagnosed pathology, especially cancer, as well as protecting against potentially harmful/life threatening disease. Patient and/or guardian verbalizes understanding and agrees with above counseling, assessment and plan. All questions answered. Ernesto Prieto MD  2/15/2023    I have personally reviewed and updated the chief complaint, HPI, Past Medical, Family and Social History, as well as the above Review of Systems.

## 2023-02-15 DIAGNOSIS — E11.9 TYPE 2 DIABETES MELLITUS WITHOUT COMPLICATION, WITHOUT LONG-TERM CURRENT USE OF INSULIN (HCC): Primary | ICD-10-CM

## 2023-02-17 RX ORDER — ATORVASTATIN CALCIUM 40 MG/1
TABLET, FILM COATED ORAL
Qty: 30 TABLET | Refills: 3 | Status: SHIPPED | OUTPATIENT
Start: 2023-02-17

## 2023-02-17 RX ORDER — TERAZOSIN 2 MG/1
2 CAPSULE ORAL NIGHTLY
Qty: 30 CAPSULE | Refills: 3 | Status: SHIPPED | OUTPATIENT
Start: 2023-02-17

## 2023-02-20 ENCOUNTER — TELEPHONE (OUTPATIENT)
Dept: CARDIOLOGY | Age: 53
End: 2023-02-20

## 2023-02-20 NOTE — TELEPHONE ENCOUNTER
Left message to schedule nuclear stress test.  Electronically signed by Surya Conroy on 2/20/2023 at 2:18 PM

## 2023-02-23 ENCOUNTER — TELEPHONE (OUTPATIENT)
Dept: FAMILY MEDICINE CLINIC | Age: 53
End: 2023-02-23

## 2023-02-23 NOTE — TELEPHONE ENCOUNTER
Favian phone regarding his Ozempic prior Ike Murphy needed by Pharmacy/ins     However is is asking if they deny can he take Trulicity, also can he take the [place] after taking Ozempic he wants to know risks      Thank You

## 2023-03-02 ENCOUNTER — TELEPHONE (OUTPATIENT)
Dept: CARDIOLOGY | Age: 53
End: 2023-03-02

## 2023-03-02 NOTE — TELEPHONE ENCOUNTER
Spoke with patient and confirmed pharmacological  stress test appointment on 3/6/2023 at 0800. Instructions for test,given hold Farxiga morning of the test bring inhaler to the test  no caffeine products 12 hours prior  to the test , and COVID-19 preprocedure information reviewed with patient, questions answered. Patient verbalized understanding.

## 2023-03-03 ENCOUNTER — PATIENT MESSAGE (OUTPATIENT)
Dept: FAMILY MEDICINE CLINIC | Age: 53
End: 2023-03-03

## 2023-03-03 NOTE — TELEPHONE ENCOUNTER
From: Cheryl Shetty  To: Dr. Shirley New: 3/3/2023 3:47 PM EST  Subject: Stephanie Richardson    I still haven't received approval for the Ozempic.  Just wondering what is going on

## 2023-03-06 ENCOUNTER — HOSPITAL ENCOUNTER (OUTPATIENT)
Dept: CARDIOLOGY | Age: 53
Discharge: HOME | End: 2023-03-06
Payer: COMMERCIAL

## 2023-03-06 VITALS
DIASTOLIC BLOOD PRESSURE: 74 MMHG | WEIGHT: 250 LBS | HEART RATE: 76 BPM | HEIGHT: 65 IN | SYSTOLIC BLOOD PRESSURE: 110 MMHG | RESPIRATION RATE: 16 BRPM | BODY MASS INDEX: 41.65 KG/M2

## 2023-03-06 DIAGNOSIS — R07.9 CHEST PAIN, UNSPECIFIED TYPE: ICD-10-CM

## 2023-03-06 DIAGNOSIS — R07.9 CHEST PAIN, UNSPECIFIED TYPE: Primary | ICD-10-CM

## 2023-03-06 PROCEDURE — A9500 TC99M SESTAMIBI: HCPCS | Performed by: INTERNAL MEDICINE

## 2023-03-06 PROCEDURE — 2580000003 HC RX 258: Performed by: INTERNAL MEDICINE

## 2023-03-06 PROCEDURE — 6360000002 HC RX W HCPCS: Performed by: FAMILY MEDICINE

## 2023-03-06 PROCEDURE — 78452 HT MUSCLE IMAGE SPECT MULT: CPT

## 2023-03-06 PROCEDURE — 3430000000 HC RX DIAGNOSTIC RADIOPHARMACEUTICAL: Performed by: INTERNAL MEDICINE

## 2023-03-06 PROCEDURE — 93017 CV STRESS TEST TRACING ONLY: CPT

## 2023-03-06 RX ORDER — SODIUM CHLORIDE 0.9 % (FLUSH) 0.9 %
10 SYRINGE (ML) INJECTION PRN
Status: DISCONTINUED | OUTPATIENT
Start: 2023-03-06 | End: 2023-03-07 | Stop reason: HOSPADM

## 2023-03-06 RX ORDER — TECHNETIUM TC-99M SESTAMIBI 1 MG/10ML
30.9 INJECTION INTRAVENOUS
Status: COMPLETED | OUTPATIENT
Start: 2023-03-06 | End: 2023-03-06

## 2023-03-06 RX ORDER — TECHNETIUM TC-99M SESTAMIBI 1 MG/10ML
9.6 INJECTION INTRAVENOUS
Status: COMPLETED | OUTPATIENT
Start: 2023-03-06 | End: 2023-03-06

## 2023-03-06 RX ADMIN — Medication 9.6 MILLICURIE: at 08:01

## 2023-03-06 RX ADMIN — SODIUM CHLORIDE, PRESERVATIVE FREE 10 ML: 5 INJECTION INTRAVENOUS at 08:01

## 2023-03-06 RX ADMIN — REGADENOSON 0.4 MG: 0.08 INJECTION, SOLUTION INTRAVENOUS at 09:04

## 2023-03-06 RX ADMIN — SODIUM CHLORIDE, PRESERVATIVE FREE 10 ML: 5 INJECTION INTRAVENOUS at 09:03

## 2023-03-06 RX ADMIN — Medication 30.9 MILLICURIE: at 09:04

## 2023-03-06 RX ADMIN — SODIUM CHLORIDE, PRESERVATIVE FREE 10 ML: 5 INJECTION INTRAVENOUS at 09:04

## 2023-03-06 NOTE — PROCEDURES
[address] [facility] [address] [facility]   [address] [employer] [address], [address]  [phone]                Pharmacologic Stress Nuclear Gated SPECT Study    Name: Grzegorz AQUINO Street Account Number: [de-identified]    :  1970          Sex: male         Date of Study:  3/6/2023    Height: 5' 5\" (165.1 cm)         Weight: 250 lb (113.4 kg)     Ordering Provider: Antoine Mata MD          PCP: Antoine Mata MD      Cardiologist: none             Interpreting Physician: Joe Lane MD  _________________________________________________________________________________    Indication:   Detecting the presence and location of coronary artery disease    Clinical History:   Patient has no known history of coronary artery disease. Procedure:   Pharmacologic stress testing was performed with regadenoson 0.4 mg for 15 seconds. The heart rate was 76 at baseline and sailaja to 102 beats during the infusion. This corresponds to 60% of maximum predicted heart rate. The blood pressure at baseline was 110/74 and blood pressure at the end of infusion was 140/80. Blood pressure response was normal during the stress procedure. The patient experienced shortness of breathe during the infusion. ECG during the infusion did not change. IMAGING: Myocardial perfusion imaging was performed at rest 30-35 minutes following the intravenous injection of 9.6 mCi of (Tc-Sestamibi) followed by 10 ml of Normal Saline. As per infusion protocol, the patient was injected intravenously with 30.9 mCi of (Tc-Sestamibi) followed by 10 ml of Normal Saline. Gated post-stress tomographic imaging was performed 20-25 minutes after stress. FINDINGS: The overall quality of the study was excellent. Left ventricular cavity size was noted to be normal.    Rotational analog analysis demonstrated no patient motion or abnormal extracardiac radioactivity.     The gated SPECT stress imaging in the short, vertical long, and horizontal long axis demonstrated normal homogeneous tracer distribution throughout the myocardium. The resting images show no change. Gated SPECT left ventricular ejection fraction was calculated to be 57%, with normal myocardial thickening and wall motion. Impression:    ECG during the infusion did not change. The myocardial perfusion imaging was normal.    Overall left ventricular systolic function was normal without regional wall motion abnormalities. Low risk general pharmacologic nuclear stress test.    Thank you for sending your patient to this [employer].      Electronically signed by Nellie Chapman MD on 3/6/23 at 10:40 AM EST

## 2023-03-06 NOTE — DISCHARGE INSTRUCTIONS
[address] [facility] Lab      Instructions to Patients    The following are the instructions for patients who have had a procedure in our office today. Patient name: Jonnie Sawyer    Radionuclide Activity: 40mCi of 99mTc-Sestamibi    Date Administered: 3/6/2023    Expires: 48 hours after scheduled appointment time      Patient may resume normal activity unless otherwise instructed. Patient may resume medications as normal.  If the need should arise, patient may call [phone] between the hours of 8:00am-5:00pm.  After hours there is at least one physician on-call at all times for those patients needing assistance. Patients may call [phone] and the answering service will direct the patient to one of our physicians for assistance. After the patient's test if they are going to be leaving from an airport in the near future they should take this letter with them to verify the test and radionuclide used for their test.      This letter verifies that the above named bearer received an injection of a radionuclide for medical purpose/usage only.         Electronically signed by Laura Betancourt on 3/6/2023 at 8:56 AM

## 2023-03-21 RX ORDER — DAPAGLIFLOZIN 10 MG/1
TABLET, FILM COATED ORAL
Qty: 30 TABLET | Refills: 5 | Status: SHIPPED | OUTPATIENT
Start: 2023-03-21

## 2023-03-26 NOTE — PROGRESS NOTES
(Medical): Not on file    Lack of Transportation (Non-Medical):  No   Physical Activity: Not on file   Stress: Not on file   Social Connections: Not on file   Intimate Partner Violence: Not on file   Housing Stability: Unknown    Unable to Pay for Housing in the Last Year: Not on file    Number of Places Lived in the Last Year: Not on file    Unstable Housing in the Last Year: No        TOBACCO  Social History     Tobacco Use   Smoking Status Every Day    Packs/day: 1.00    Years: 35.00    Pack years: 35.00    Types: Cigarettes   Smokeless Tobacco Never        ALCOHOL   Social History     Substance and Sexual Activity   Alcohol Use No    Alcohol/week: 0.0 standard drinks        [address]   Social History     Substance and Sexual Activity   Drug Use Yes    Frequency: 7.0 times per week    Types: Marijuana (Vernel Cherie)    Comment: smoked last 3/6/23         CURRENT OUTPATIENT MEDICATIONS:   Outpatient Medications Marked as Taking for the 3/27/23 encounter (Office Visit) with Elli Calderon MD   Medication Sig Dispense Refill    FARXIGA 10 MG tablet take 1 tablet by mouth every morning 30 tablet 5    terazosin (HYTRIN) 2 MG capsule Take 1 capsule by mouth nightly 30 capsule 3    atorvastatin (LIPITOR) 40 MG tablet take 1 tablet by mouth once daily 30 tablet 3    Semaglutide,0.25 or 0.5MG/DOS, 2 MG/1.5ML SOPN Inject 1 Adjustable Dose Pre-filled Pen Syringe into the skin once a week 1 Adjustable Dose Pre-filled Pen Syringe 2    albuterol sulfate HFA (VENTOLIN HFA) 108 (90 Base) MCG/ACT inhaler inhale 2 puffs by mouth and INTO THE LUNGS every 6 hours if needed for wheezing 18 g 3    ondansetron (ZOFRAN-ODT) 4 MG disintegrating tablet Take 1 tablet by mouth 3 times daily as needed for Nausea or Vomiting 12 tablet 0    TRELEGY ELLIPTA 200-62.5-25 MCG/ACT AEPB inhaler inhale 1 puff by mouth INTO THE LUNGS every morning 1 each 5    omeprazole (PRILOSEC) 40 MG delayed release capsule take 1 capsule by mouth once daily 30 capsule 5

## 2023-03-27 ENCOUNTER — PROCEDURE VISIT (OUTPATIENT)
Dept: AUDIOLOGY | Age: 53
End: 2023-03-27
Payer: COMMERCIAL

## 2023-03-27 ENCOUNTER — OFFICE VISIT (OUTPATIENT)
Dept: ENT CLINIC | Age: 53
End: 2023-03-27

## 2023-03-27 VITALS
DIASTOLIC BLOOD PRESSURE: 76 MMHG | HEIGHT: 65 IN | WEIGHT: 250 LBS | HEART RATE: 80 BPM | OXYGEN SATURATION: 99 % | SYSTOLIC BLOOD PRESSURE: 126 MMHG | BODY MASS INDEX: 41.65 KG/M2

## 2023-03-27 DIAGNOSIS — H72.92 PERFORATION OF LEFT TYMPANIC MEMBRANE: Primary | ICD-10-CM

## 2023-03-27 DIAGNOSIS — H90.A32 MIXED CONDUCTIVE AND SENSORINEURAL HEARING LOSS OF LEFT EAR WITH RESTRICTED HEARING OF RIGHT EAR: ICD-10-CM

## 2023-03-27 DIAGNOSIS — Z98.890 S/P TYMPANOPLASTY: ICD-10-CM

## 2023-03-27 DIAGNOSIS — H91.8X9 ASYMMETRICAL HEARING LOSS: Primary | ICD-10-CM

## 2023-03-27 PROCEDURE — 92553 AUDIOMETRY AIR & BONE: CPT | Performed by: AUDIOLOGIST

## 2023-03-27 PROCEDURE — 99024 POSTOP FOLLOW-UP VISIT: CPT | Performed by: OTOLARYNGOLOGY

## 2023-03-27 NOTE — PROGRESS NOTES
I have discussed the case, including pertinent history and exam findings with the audiology extern. I have seen and examined the patient and the key elements of the encounter have been performed by me. I agree with the assessment, plan and orders as documented by the audiology extern.    Nahomi Mccord CCC/CHAUNCEY  Audiologist  [id number]  NPI#:  [id number]

## 2023-03-27 NOTE — PROGRESS NOTES
This patient was referred for audiometric testing by Dr. Dagmar Bueno due to  history of left tympanic membrane perforation and bilateral hearing loss . Patient has a history of left tympanoplasty. Patient stated that hearing has gotten better since surgery and denied any ear pain, drainage, or pressure. Audiometry using pure tone air and bone conduction testing revealed a mild sensorineural hearing loss through 500 Hz, rising to within normal limits through 6000 Hz, sloping to mild at 8000 Hz, in the right ear. The left ear revealed a mild to severe conductive hearing loss. Reliability was good. The results were reviewed with the patient. Recommendations for follow up will be made pending physician consult. CARIE Niño.   Audiology Intern (4th Year)

## 2023-05-09 DIAGNOSIS — G47.33 OSA (OBSTRUCTIVE SLEEP APNEA): ICD-10-CM

## 2023-05-09 DIAGNOSIS — F17.200 SMOKER: ICD-10-CM

## 2023-05-09 RX ORDER — OMEPRAZOLE 40 MG/1
CAPSULE, DELAYED RELEASE ORAL
Qty: 30 CAPSULE | Refills: 5 | Status: SHIPPED | OUTPATIENT
Start: 2023-05-09

## 2023-05-09 RX ORDER — TAMSULOSIN HYDROCHLORIDE 0.4 MG/1
CAPSULE ORAL
Qty: 30 CAPSULE | Refills: 5 | Status: SHIPPED | OUTPATIENT
Start: 2023-05-09

## 2023-05-09 RX ORDER — LISINOPRIL 40 MG/1
TABLET ORAL
Qty: 30 TABLET | Refills: 5 | Status: SHIPPED | OUTPATIENT
Start: 2023-05-09

## 2023-05-18 RX ORDER — ALBUTEROL SULFATE 90 UG/1
AEROSOL, METERED RESPIRATORY (INHALATION)
Qty: 18 G | Refills: 3 | Status: SHIPPED | OUTPATIENT
Start: 2023-05-18

## 2023-06-27 RX ORDER — ATORVASTATIN CALCIUM 40 MG/1
TABLET, FILM COATED ORAL
Qty: 30 TABLET | Refills: 3 | Status: SHIPPED | OUTPATIENT
Start: 2023-06-27

## 2023-06-27 RX ORDER — TERAZOSIN 2 MG/1
2 CAPSULE ORAL NIGHTLY
Qty: 30 CAPSULE | Refills: 3 | Status: SHIPPED | OUTPATIENT
Start: 2023-06-27

## 2023-07-13 ENCOUNTER — OFFICE VISIT (OUTPATIENT)
Dept: FAMILY MEDICINE CLINIC | Age: 53
End: 2023-07-13
Payer: COMMERCIAL

## 2023-07-13 VITALS
HEART RATE: 57 BPM | BODY MASS INDEX: 41.32 KG/M2 | SYSTOLIC BLOOD PRESSURE: 114 MMHG | DIASTOLIC BLOOD PRESSURE: 66 MMHG | OXYGEN SATURATION: 98 % | TEMPERATURE: 97 F | WEIGHT: 248 LBS | HEIGHT: 65 IN | RESPIRATION RATE: 16 BRPM

## 2023-07-13 DIAGNOSIS — I10 ESSENTIAL HYPERTENSION: ICD-10-CM

## 2023-07-13 DIAGNOSIS — E11.9 TYPE 2 DIABETES MELLITUS WITHOUT COMPLICATION, WITHOUT LONG-TERM CURRENT USE OF INSULIN (HCC): Primary | ICD-10-CM

## 2023-07-13 DIAGNOSIS — L40.50 PSORIATIC ARTHRITIS (HCC): ICD-10-CM

## 2023-07-13 DIAGNOSIS — Z87.891 PERSONAL HISTORY OF TOBACCO USE: ICD-10-CM

## 2023-07-13 LAB
CREATININE URINE POCT: NORMAL
HBA1C MFR BLD: 7.9 %
MICROALBUMIN/CREAT 24H UR: NORMAL MG/G{CREAT}
MICROALBUMIN/CREAT UR-RTO: NORMAL

## 2023-07-13 PROCEDURE — 3074F SYST BP LT 130 MM HG: CPT | Performed by: FAMILY MEDICINE

## 2023-07-13 PROCEDURE — 83037 HB GLYCOSYLATED A1C HOME DEV: CPT | Performed by: FAMILY MEDICINE

## 2023-07-13 PROCEDURE — G8417 CALC BMI ABV UP PARAM F/U: HCPCS | Performed by: FAMILY MEDICINE

## 2023-07-13 PROCEDURE — G8427 DOCREV CUR MEDS BY ELIG CLIN: HCPCS | Performed by: FAMILY MEDICINE

## 2023-07-13 PROCEDURE — G0296 VISIT TO DETERM LDCT ELIG: HCPCS | Performed by: FAMILY MEDICINE

## 2023-07-13 PROCEDURE — 82044 UR ALBUMIN SEMIQUANTITATIVE: CPT | Performed by: FAMILY MEDICINE

## 2023-07-13 PROCEDURE — 99214 OFFICE O/P EST MOD 30 MIN: CPT | Performed by: FAMILY MEDICINE

## 2023-07-13 PROCEDURE — 4004F PT TOBACCO SCREEN RCVD TLK: CPT | Performed by: FAMILY MEDICINE

## 2023-07-13 PROCEDURE — 2022F DILAT RTA XM EVC RTNOPTHY: CPT | Performed by: FAMILY MEDICINE

## 2023-07-13 PROCEDURE — 3078F DIAST BP <80 MM HG: CPT | Performed by: FAMILY MEDICINE

## 2023-07-13 PROCEDURE — 3017F COLORECTAL CA SCREEN DOC REV: CPT | Performed by: FAMILY MEDICINE

## 2023-07-13 PROCEDURE — 3051F HG A1C>EQUAL 7.0%<8.0%: CPT | Performed by: FAMILY MEDICINE

## 2023-07-13 NOTE — PROGRESS NOTES
W LDCT  -     CT Lung Screen (Annual/Baseline); Future  -     CT Lung Screen; Standing  -     CT Lung Screen    Psoriatic arthritis ([facility])  -     [facility], Rheumatology, [address]    Essential hypertension      As above. Call or go to ED immediately if symptoms worsen or persist.  No follow-ups on file. , or sooner if necessary. Educational materials and/or home exercises printed for patient's review and were included in patient instructions on his/her After Visit Summary and given to patient at the end of visit. Counseled regarding above diagnosis, including possible risks and complications,  especially if left uncontrolled. Counseled regarding the possible side effects, risks, benefits and alternatives to treatment; patient and/or guardian verbalizes understanding, agrees, feels comfortable with and wishes to proceed with above treatment plan. Advised patient to call with any new medication issues, and read all Rx info from pharmacy to assure aware of all possible risks and side effects of medication before taking. Reviewed age and gender appropriate health screening exams and vaccinations. Advised patient regarding importance of keeping up with recommended health maintenance and to schedule as soon as possible if overdue, as this is important in assessing for undiagnosed pathology, especially cancer, as well as protecting against potentially harmful/life threatening disease. Patient and/or guardian verbalizes understanding and agrees with above counseling, assessment and plan. All questions answered. Roberto Cueva MD  8/5/2023    I have personally reviewed and updated the chief complaint, HPI, Past Medical, Family and Social History, as well as the above Review of Systems.

## 2023-07-19 RX ORDER — ALBUTEROL SULFATE 90 UG/1
AEROSOL, METERED RESPIRATORY (INHALATION)
Qty: 18 G | Refills: 3 | Status: SHIPPED | OUTPATIENT
Start: 2023-07-19

## 2023-07-29 ENCOUNTER — HOSPITAL ENCOUNTER (OUTPATIENT)
Dept: CT IMAGING | Age: 53
End: 2023-07-29
Attending: FAMILY MEDICINE
Payer: COMMERCIAL

## 2023-07-29 DIAGNOSIS — Z87.891 PERSONAL HISTORY OF TOBACCO USE: ICD-10-CM

## 2023-07-29 PROCEDURE — 71271 CT THORAX LUNG CANCER SCR C-: CPT

## 2023-08-01 ENCOUNTER — TELEPHONE (OUTPATIENT)
Dept: CASE MANAGEMENT | Age: 53
End: 2023-08-01

## 2023-08-01 NOTE — TELEPHONE ENCOUNTER
No call, encounter opened to process CT Lung Screening. CT Lung Screen: 7/29/2023    IMPRESSION:  No suspicious pulmonary nodule. LUNG RADS:  Lung-RADS 1 - Negative ()     Management:  12 month screening LDCT     RECOMMENDATIONS:  If you would like to register your patient with the [facility], please contact the Nurse Navigator at  [phone]. Pack years: 39    Social History     Tobacco Use  Smoking Status: Current Every Day Smoker    Start Date:    Quit Date:    Types: Cigarettes   Packs/Day: 1.5   Years: 30   Pack Years: 39   Smokeless Tobacco: Never         Results letter sent to patient via my chart or mailed.      [address]

## 2023-08-09 RX ORDER — FLUTICASONE FUROATE, UMECLIDINIUM BROMIDE AND VILANTEROL TRIFENATATE 200; 62.5; 25 UG/1; UG/1; UG/1
POWDER RESPIRATORY (INHALATION)
Qty: 60 EACH | Refills: 0 | Status: SHIPPED | OUTPATIENT
Start: 2023-08-09

## 2023-08-21 RX ORDER — DULAGLUTIDE 0.75 MG/.5ML
INJECTION, SOLUTION SUBCUTANEOUS
Qty: 2 ML | Refills: 2 | Status: SHIPPED | OUTPATIENT
Start: 2023-08-21

## 2023-08-31 ENCOUNTER — OFFICE VISIT (OUTPATIENT)
Dept: RHEUMATOLOGY | Age: 53
End: 2023-08-31
Payer: COMMERCIAL

## 2023-08-31 VITALS — HEIGHT: 65 IN | WEIGHT: 245 LBS | BODY MASS INDEX: 40.82 KG/M2

## 2023-08-31 DIAGNOSIS — E11.9 TYPE 2 DIABETES MELLITUS WITHOUT COMPLICATION, WITHOUT LONG-TERM CURRENT USE OF INSULIN (HCC): ICD-10-CM

## 2023-08-31 DIAGNOSIS — M13.0 POLYARTHRITIS: ICD-10-CM

## 2023-08-31 DIAGNOSIS — M13.0 POLYARTHRITIS: Primary | ICD-10-CM

## 2023-08-31 DIAGNOSIS — M15.9 GENERALIZED OSTEOARTHRITIS: ICD-10-CM

## 2023-08-31 LAB
ABSOLUTE IMMATURE GRANULOCYTE: 0.07 K/UL (ref 0–0.58)
ALBUMIN SERPL-MCNC: 4.1 G/DL (ref 3.5–5.2)
ALP BLD-CCNC: 80 U/L (ref 40–129)
ALT SERPL-CCNC: 17 U/L (ref 0–40)
ANION GAP SERPL CALCULATED.3IONS-SCNC: 11 MMOL/L (ref 7–16)
AST SERPL-CCNC: 12 U/L (ref 0–39)
BASOPHILS ABSOLUTE: 0.09 K/UL (ref 0–0.2)
BASOPHILS RELATIVE PERCENT: 1 % (ref 0–2)
BILIRUB SERPL-MCNC: 0.2 MG/DL (ref 0–1.2)
BUN BLDV-MCNC: 11 MG/DL (ref 6–20)
C-REACTIVE PROTEIN: 3 MG/L (ref 0–5)
CALCIUM SERPL-MCNC: 9.3 MG/DL (ref 8.6–10.2)
CHLORIDE BLD-SCNC: 101 MMOL/L (ref 98–107)
CO2: 26 MMOL/L (ref 22–29)
CREAT SERPL-MCNC: 0.8 MG/DL (ref 0.7–1.2)
EOSINOPHILS ABSOLUTE: 0.39 K/UL (ref 0.05–0.5)
EOSINOPHILS RELATIVE PERCENT: 3 % (ref 0–6)
GFR SERPL CREATININE-BSD FRML MDRD: >60 ML/MIN/1.73M2
GLUCOSE BLD-MCNC: 131 MG/DL (ref 74–99)
HCT VFR BLD CALC: 52.5 % (ref 37–54)
HEMOGLOBIN: 16.9 G/DL (ref 12.5–16.5)
IMMATURE GRANULOCYTES: 1 % (ref 0–5)
LYMPHOCYTES ABSOLUTE: 2.7 K/UL (ref 1.5–4)
LYMPHOCYTES RELATIVE PERCENT: 22 % (ref 20–42)
MCH RBC QN AUTO: 29.6 PG (ref 26–35)
MCHC RBC AUTO-ENTMCNC: 32.2 G/DL (ref 32–34.5)
MCV RBC AUTO: 92.1 FL (ref 80–99.9)
MONOCYTES ABSOLUTE: 1.19 K/UL (ref 0.1–0.95)
MONOCYTES RELATIVE PERCENT: 10 % (ref 2–12)
NEUTROPHILS ABSOLUTE: 8.03 K/UL (ref 1.8–7.3)
NEUTROPHILS RELATIVE PERCENT: 64 % (ref 43–80)
PDW BLD-RTO: 14.7 % (ref 11.5–15)
PLATELET # BLD: 182 K/UL (ref 130–450)
PMV BLD AUTO: 11.6 FL (ref 7–12)
POTASSIUM SERPL-SCNC: 4.5 MMOL/L (ref 3.5–5)
RBC # BLD: 5.7 M/UL (ref 3.8–5.8)
RHEUMATOID FACTOR: <10 IU/ML (ref 0–13)
SEDIMENTATION RATE, ERYTHROCYTE: 0 MM/HR (ref 0–15)
SODIUM BLD-SCNC: 138 MMOL/L (ref 132–146)
TOTAL PROTEIN: 7.2 G/DL (ref 6.4–8.3)
WBC # BLD: 12.5 K/UL (ref 4.5–11.5)

## 2023-08-31 PROCEDURE — 99204 OFFICE O/P NEW MOD 45 MIN: CPT | Performed by: INTERNAL MEDICINE

## 2023-08-31 PROCEDURE — 4004F PT TOBACCO SCREEN RCVD TLK: CPT | Performed by: INTERNAL MEDICINE

## 2023-08-31 PROCEDURE — 3051F HG A1C>EQUAL 7.0%<8.0%: CPT | Performed by: INTERNAL MEDICINE

## 2023-08-31 PROCEDURE — 3017F COLORECTAL CA SCREEN DOC REV: CPT | Performed by: INTERNAL MEDICINE

## 2023-08-31 PROCEDURE — G8427 DOCREV CUR MEDS BY ELIG CLIN: HCPCS | Performed by: INTERNAL MEDICINE

## 2023-08-31 PROCEDURE — 2022F DILAT RTA XM EVC RTNOPTHY: CPT | Performed by: INTERNAL MEDICINE

## 2023-08-31 PROCEDURE — G8417 CALC BMI ABV UP PARAM F/U: HCPCS | Performed by: INTERNAL MEDICINE

## 2023-08-31 RX ORDER — CELECOXIB 200 MG/1
200 CAPSULE ORAL 2 TIMES DAILY PRN
Qty: 60 CAPSULE | Refills: 5 | Status: SHIPPED | OUTPATIENT
Start: 2023-08-31

## 2023-08-31 ASSESSMENT — ENCOUNTER SYMPTOMS
DIARRHEA: 0
SHORTNESS OF BREATH: 0
NAUSEA: 0
COLOR CHANGE: 0
VOMITING: 0
ABDOMINAL PAIN: 0
TROUBLE SWALLOWING: 0
COUGH: 0

## 2023-08-31 NOTE — PATIENT INSTRUCTIONS
You have osteoarthritis either way    Try celebrex one tab twice per day as needed with food    I see no obvious signs of underlying autoimmune inflammatory arthritis but will need further workup

## 2023-08-31 NOTE — PROGRESS NOTES
SEDRATE 14 08/29/2022     Lab Results   Component Value Date    CRP 0.4 08/29/2022            An electronic signature was used to authenticate this note. This note was generated with a voice recognition dictation system. Please disregard any errors or omission which have escaped my review.     --Omaha Morris, DO

## 2023-09-01 LAB — ANTI-NUCLEAR ANTIBODY (ANA): NEGATIVE

## 2023-09-03 LAB — HLA B27: NEGATIVE

## 2023-09-07 LAB
ANTI SSA: <0.3 U/ML
ANTI SSB: <0.3 U/ML
CCP IGG ANTIBODIES: 0.8 U/ML (ref 0–7)

## 2023-09-13 LAB
MISCELLANEOUS LAB TEST RESULT: NORMAL
TEST NAME: NORMAL

## 2023-09-25 RX ORDER — ALBUTEROL SULFATE 90 UG/1
AEROSOL, METERED RESPIRATORY (INHALATION)
Qty: 18 G | Refills: 3 | Status: SHIPPED | OUTPATIENT
Start: 2023-09-25

## 2023-10-24 NOTE — PROGRESS NOTES
About Running Out of Food in the Last Year: Never true     Ran Out of Food in the Last Year: Never true   Transportation Needs: Unknown (2/14/2023)    PRAPARE - Transportation     Lack of Transportation (Medical): Not on file     Lack of Transportation (Non-Medical):  No   Physical Activity: Not on file   Stress: Not on file   Social Connections: Not on file   Intimate Partner Violence: Not on file   Housing Stability: Unknown (2/14/2023)    Housing Stability Vital Sign     Unable to Pay for Housing in the Last Year: Not on file     Number of [address] in the Last Year: Not on file     Unstable Housing in the Last Year: No        TOBACCO  Social History     Tobacco Use   Smoking Status Every Day    Packs/day: 1.50    Years: 30.00    Additional pack years: 0.00    Total pack years: 45.00    Types: Cigarettes   Smokeless Tobacco Never        ALCOHOL   Social History     Substance and Sexual Activity   Alcohol Use No        [address]   Social History     Substance and Sexual Activity   Drug Use Yes    Frequency: 7.0 times per week    Types: Marijuana ([place])    Comment: smoked last 3/6/23         CURRENT OUTPATIENT MEDICATIONS:   Outpatient Medications Marked as Taking for the 10/26/23 encounter (Office Visit) with Brandee Mancia MD   Medication Sig Dispense Refill    albuterol sulfate HFA (VENTOLIN HFA) 108 (90 Base) MCG/ACT inhaler inhale 2 puffs by mouth and INTO THE LUNGS every 6 hours if needed for wheezing 18 g 3    celecoxib (CELEBREX) 200 MG capsule Take 1 capsule by mouth 2 times daily as needed for Pain 60 capsule 5    TRULICITY 3.35 RW/8.1KT SOPN inject 0.5 milliliters ( 0.75 milligrams ) subcutaneously every week 2 mL 2    TRELEGY ELLIPTA 200-62.5-25 MCG/ACT AEPB inhaler inhale 1 puff by mouth INTO THE LUNGS every morning 60 each 0    atorvastatin (LIPITOR) 40 MG tablet take 1 tablet by mouth once daily 30 tablet 3    terazosin (HYTRIN) 2 MG capsule take 1 capsule by mouth nightly 30 capsule 3

## 2023-10-26 ENCOUNTER — OFFICE VISIT (OUTPATIENT)
Dept: ENT CLINIC | Age: 53
End: 2023-10-26
Payer: MEDICAID

## 2023-10-26 VITALS — BODY MASS INDEX: 40.32 KG/M2 | WEIGHT: 242 LBS | HEIGHT: 65 IN

## 2023-10-26 DIAGNOSIS — Z98.890 S/P TYMPANOPLASTY: ICD-10-CM

## 2023-10-26 DIAGNOSIS — H72.92 PERFORATION OF LEFT TYMPANIC MEMBRANE: ICD-10-CM

## 2023-10-26 DIAGNOSIS — H90.A32 MIXED CONDUCTIVE AND SENSORINEURAL HEARING LOSS OF LEFT EAR WITH RESTRICTED HEARING OF RIGHT EAR: ICD-10-CM

## 2023-10-26 DIAGNOSIS — H91.8X3 ASYMMETRICAL HEARING LOSS: Primary | ICD-10-CM

## 2023-10-26 PROCEDURE — 99214 OFFICE O/P EST MOD 30 MIN: CPT | Performed by: OTOLARYNGOLOGY

## 2023-11-06 DIAGNOSIS — F17.200 SMOKER: ICD-10-CM

## 2023-11-06 DIAGNOSIS — G47.33 OSA (OBSTRUCTIVE SLEEP APNEA): ICD-10-CM

## 2023-11-06 RX ORDER — TAMSULOSIN HYDROCHLORIDE 0.4 MG/1
CAPSULE ORAL
Qty: 30 CAPSULE | Refills: 5 | Status: SHIPPED | OUTPATIENT
Start: 2023-11-06

## 2023-11-06 RX ORDER — LISINOPRIL 40 MG/1
TABLET ORAL
Qty: 30 TABLET | Refills: 5 | Status: SHIPPED | OUTPATIENT
Start: 2023-11-06

## 2023-11-06 RX ORDER — OMEPRAZOLE 40 MG/1
CAPSULE, DELAYED RELEASE ORAL
Qty: 30 CAPSULE | Refills: 5 | Status: SHIPPED | OUTPATIENT
Start: 2023-11-06

## 2023-11-19 ENCOUNTER — APPOINTMENT (OUTPATIENT)
Dept: CT IMAGING | Age: 53
End: 2023-11-19
Payer: MEDICARE

## 2023-11-19 ENCOUNTER — HOSPITAL ENCOUNTER (EMERGENCY)
Age: 53
Discharge: LEFT BEFORE DISCHARGE | End: 2023-11-20
Attending: EMERGENCY MEDICINE
Payer: MEDICARE

## 2023-11-19 VITALS
DIASTOLIC BLOOD PRESSURE: 98 MMHG | BODY MASS INDEX: 40.32 KG/M2 | TEMPERATURE: 98.9 F | SYSTOLIC BLOOD PRESSURE: 147 MMHG | OXYGEN SATURATION: 98 % | RESPIRATION RATE: 18 BRPM | WEIGHT: 242 LBS | HEIGHT: 65 IN | HEART RATE: 92 BPM

## 2023-11-19 DIAGNOSIS — K61.1 PERIRECTAL ABSCESS: Primary | ICD-10-CM

## 2023-11-19 LAB
ALBUMIN SERPL-MCNC: 4.2 G/DL (ref 3.5–5.2)
ALP SERPL-CCNC: 76 U/L (ref 40–129)
ALT SERPL-CCNC: 12 U/L (ref 0–40)
ANION GAP SERPL CALCULATED.3IONS-SCNC: 11 MMOL/L (ref 7–16)
AST SERPL-CCNC: 11 U/L (ref 0–39)
BILIRUB SERPL-MCNC: 0.4 MG/DL (ref 0–1.2)
BUN SERPL-MCNC: 10 MG/DL (ref 6–20)
CALCIUM SERPL-MCNC: 9.6 MG/DL (ref 8.6–10.2)
CHLORIDE SERPL-SCNC: 102 MMOL/L (ref 98–107)
CO2 SERPL-SCNC: 25 MMOL/L (ref 22–29)
CREAT SERPL-MCNC: 0.8 MG/DL (ref 0.7–1.2)
ERYTHROCYTE [DISTWIDTH] IN BLOOD BY AUTOMATED COUNT: 13.8 % (ref 11.5–15)
GFR SERPL CREATININE-BSD FRML MDRD: >60 ML/MIN/1.73M2
GLUCOSE SERPL-MCNC: 103 MG/DL (ref 74–99)
HCT VFR BLD AUTO: 50.5 % (ref 37–54)
HGB BLD-MCNC: 16.6 G/DL (ref 12.5–16.5)
LACTATE BLDV-SCNC: 1.5 MMOL/L (ref 0.5–2.2)
LIPASE SERPL-CCNC: 40 U/L (ref 13–60)
MAGNESIUM SERPL-MCNC: 1.9 MG/DL (ref 1.6–2.6)
MCH RBC QN AUTO: 29.9 PG (ref 26–35)
MCHC RBC AUTO-ENTMCNC: 32.9 G/DL (ref 32–34.5)
MCV RBC AUTO: 91 FL (ref 80–99.9)
PLATELET # BLD AUTO: 186 K/UL (ref 130–450)
PMV BLD AUTO: 10.2 FL (ref 7–12)
POTASSIUM SERPL-SCNC: 4.2 MMOL/L (ref 3.5–5)
PROT SERPL-MCNC: 7.2 G/DL (ref 6.4–8.3)
RBC # BLD AUTO: 5.55 M/UL (ref 3.8–5.8)
SODIUM SERPL-SCNC: 138 MMOL/L (ref 132–146)
WBC OTHER # BLD: 15.1 K/UL (ref 4.5–11.5)

## 2023-11-19 PROCEDURE — 6360000004 HC RX CONTRAST MEDICATION: Performed by: RADIOLOGY

## 2023-11-19 PROCEDURE — 6360000002 HC RX W HCPCS: Performed by: EMERGENCY MEDICINE

## 2023-11-19 PROCEDURE — 87040 BLOOD CULTURE FOR BACTERIA: CPT

## 2023-11-19 PROCEDURE — 96367 TX/PROPH/DG ADDL SEQ IV INF: CPT

## 2023-11-19 PROCEDURE — 85027 COMPLETE CBC AUTOMATED: CPT

## 2023-11-19 PROCEDURE — 83735 ASSAY OF MAGNESIUM: CPT

## 2023-11-19 PROCEDURE — 96365 THER/PROPH/DIAG IV INF INIT: CPT

## 2023-11-19 PROCEDURE — 86140 C-REACTIVE PROTEIN: CPT

## 2023-11-19 PROCEDURE — 96375 TX/PRO/DX INJ NEW DRUG ADDON: CPT

## 2023-11-19 PROCEDURE — 80053 COMPREHEN METABOLIC PANEL: CPT

## 2023-11-19 PROCEDURE — 83690 ASSAY OF LIPASE: CPT

## 2023-11-19 PROCEDURE — 85652 RBC SED RATE AUTOMATED: CPT

## 2023-11-19 PROCEDURE — 74177 CT ABD & PELVIS W/CONTRAST: CPT

## 2023-11-19 PROCEDURE — 83605 ASSAY OF LACTIC ACID: CPT

## 2023-11-19 PROCEDURE — 99285 EMERGENCY DEPT VISIT HI MDM: CPT

## 2023-11-19 PROCEDURE — 2580000003 HC RX 258: Performed by: EMERGENCY MEDICINE

## 2023-11-19 RX ORDER — MORPHINE SULFATE 2 MG/ML
2 INJECTION, SOLUTION INTRAMUSCULAR; INTRAVENOUS ONCE
Status: COMPLETED | OUTPATIENT
Start: 2023-11-19 | End: 2023-11-19

## 2023-11-19 RX ORDER — ONDANSETRON 2 MG/ML
4 INJECTION INTRAMUSCULAR; INTRAVENOUS ONCE
Status: COMPLETED | OUTPATIENT
Start: 2023-11-19 | End: 2023-11-19

## 2023-11-19 RX ORDER — 0.9 % SODIUM CHLORIDE 0.9 %
1000 INTRAVENOUS SOLUTION INTRAVENOUS ONCE
Status: COMPLETED | OUTPATIENT
Start: 2023-11-19 | End: 2023-11-20

## 2023-11-19 RX ADMIN — IOPAMIDOL 75 ML: 755 INJECTION, SOLUTION INTRAVENOUS at 23:31

## 2023-11-19 RX ADMIN — PIPERACILLIN AND TAZOBACTAM 4500 MG: 4; .5 INJECTION, POWDER, LYOPHILIZED, FOR SOLUTION INTRAVENOUS at 23:40

## 2023-11-19 RX ADMIN — ONDANSETRON 4 MG: 2 INJECTION INTRAMUSCULAR; INTRAVENOUS at 23:14

## 2023-11-19 RX ADMIN — SODIUM CHLORIDE 1000 ML: 9 INJECTION, SOLUTION INTRAVENOUS at 23:16

## 2023-11-19 RX ADMIN — MORPHINE SULFATE 2 MG: 2 INJECTION, SOLUTION INTRAMUSCULAR; INTRAVENOUS at 23:15

## 2023-11-19 ASSESSMENT — PAIN DESCRIPTION - DESCRIPTORS: DESCRIPTORS: ACHING

## 2023-11-19 ASSESSMENT — PAIN DESCRIPTION - PAIN TYPE: TYPE: ACUTE PAIN

## 2023-11-19 ASSESSMENT — PAIN - FUNCTIONAL ASSESSMENT: PAIN_FUNCTIONAL_ASSESSMENT: 0-10

## 2023-11-19 ASSESSMENT — LIFESTYLE VARIABLES
HOW OFTEN DO YOU HAVE A DRINK CONTAINING ALCOHOL: NEVER
HOW MANY STANDARD DRINKS CONTAINING ALCOHOL DO YOU HAVE ON A TYPICAL DAY: PATIENT DOES NOT DRINK

## 2023-11-19 ASSESSMENT — PAIN SCALES - GENERAL
PAINLEVEL_OUTOF10: 5
PAINLEVEL_OUTOF10: 8

## 2023-11-19 ASSESSMENT — PAIN DESCRIPTION - ONSET: ONSET: ON-GOING

## 2023-11-19 ASSESSMENT — PAIN DESCRIPTION - LOCATION: LOCATION: BUTTOCKS

## 2023-11-19 ASSESSMENT — PAIN DESCRIPTION - FREQUENCY: FREQUENCY: CONTINUOUS

## 2023-11-20 LAB
CRP SERPL HS-MCNC: 16 MG/L (ref 0–5)
ERYTHROCYTE [SEDIMENTATION RATE] IN BLOOD BY WESTERGREN METHOD: 9 MM/HR (ref 0–15)

## 2023-11-20 PROCEDURE — 96367 TX/PROPH/DG ADDL SEQ IV INF: CPT

## 2023-11-20 PROCEDURE — 6360000002 HC RX W HCPCS: Performed by: EMERGENCY MEDICINE

## 2023-11-20 PROCEDURE — 2580000003 HC RX 258: Performed by: EMERGENCY MEDICINE

## 2023-11-20 RX ORDER — DOXYCYCLINE HYCLATE 100 MG
100 TABLET ORAL 2 TIMES DAILY
Qty: 20 TABLET | Refills: 0 | Status: SHIPPED | OUTPATIENT
Start: 2023-11-20 | End: 2023-11-30

## 2023-11-20 RX ORDER — CLINDAMYCIN HYDROCHLORIDE 300 MG/1
300 CAPSULE ORAL 4 TIMES DAILY
Qty: 40 CAPSULE | Refills: 0 | Status: SHIPPED | OUTPATIENT
Start: 2023-11-20 | End: 2023-11-30

## 2023-11-20 RX ADMIN — VANCOMYCIN HYDROCHLORIDE 1750 MG: 1 INJECTION, POWDER, LYOPHILIZED, FOR SOLUTION INTRAVENOUS at 00:21

## 2023-11-20 NOTE — ED NOTES
Per Dr. Dipesh Valadez, pt to sign out AMA, despite ED attending offering to fulfill pt requests.      Gerber Mejia, [place]  11/20/23 5230

## 2023-11-20 NOTE — ED NOTES
Pt was provided with discharge packet and signed AMA form. Pt verbalized understanding of risks for leaving without completing treatment, including infection, worsening of condition, up to risk of death. Pt also verbalized understanding of need to continue prescribed antibiotics and follow-up care with surgeon. Pt ambulated out of ED independently.      Mansoor Luther, [address] [place] [address]  11/20/23 4794

## 2023-11-20 NOTE — ED NOTES
Access line and pas notified pt is signing out [address].  Pts admission and transport canceled      Yaquelin Sadler  11/20/23 0242

## 2023-11-20 NOTE — DISCHARGE INSTRUCTIONS
You are leaving [address] [place] [address] in 04 and acceptable risk of morbidity mortality you can return as needed to follow-up with surgery as soon as possible I did write you prescriptions for antibiotics

## 2023-11-20 NOTE — ED NOTES
Pt used call light. Pt was observed standing by doorway, and stated \"disconnect me, I'm going home. I'm not waiting here all day and doing all of this again. And I need oxygen, I have COPD. \" Pt was asked how much oxygen he wears at home, to which he responded \"I use a rescue inhaler. \" Pt was offered to have requests addressed. Pt continued to get dressed. ED attending and primary RN notified.      Steve Ruiz, [address]  11/20/23 3232

## 2023-11-20 NOTE — ED PROVIDER NOTES
HPI:  11/19/23,   Time: 10:41 PM SAMANTHA Manrique is a 48 y.o. male presenting to the ED for perirectal abscess, beginning days ago. The complaint has been persistent, moderate in severity, and worsened by nothing. Patient is a diabetic he has had these before and he is actually had to go to surgery for disease been septic before he has no his vital signs are stable he is a diabetic he does not check his blood sugars he has no abdominal pain no chest pain or shortness of breath does not feel weak or lightheaded his vital signs are stable we will start him on Zosyn and Vanco we will get a CT of his abdomen pelvis with IV contrast he does have a small perirectal abscess noted patient most likely will need to be transferred because of his high risk of sepsis which she has had before from the same things and apparently less than what into his testicles but he has no testicle pain right now and apparently became septic    ROS:   Pertinent positives and negatives are stated within HPI, all other systems reviewed and are negative.  --------------------------------------------- PAST HISTORY ---------------------------------------------  Past Medical History:  has a past medical history of Abdominal pain, Allergic rhinitis, Anxiety, Asthma, Bipolar 1 disorder ([address]), COPD (chronic obstructive pulmonary disease) ([address]), Depression, Diabetes mellitus ([address]), Encounter for screening colonoscopy, GERD (gastroesophageal reflux disease), Hyperlipidemia, Hypertension, Obesity, Osteoarthritis, Perforation of left tympanic membrane, and Sleep apnea. Past Surgical History:  has a past surgical history that includes Tonsillectomy (1983); hiatal hernia repair (2012); Tympanoplasty; Colonoscopy (06/08/2017); hernia repair (2012); Endoscopy, colon, diagnostic; pr incision & drainage abscess simple/single (N/A, 9/19/2018); EXCISION HIDRADENITIS OF INGUINAL / UMBILICAL AREA (N/A, 7/34/3448);  Upper gastrointestinal endoscopy (N/A,

## 2023-11-25 LAB
MICROORGANISM SPEC CULT: NORMAL
MICROORGANISM SPEC CULT: NORMAL
SERVICE CMNT-IMP: NORMAL
SERVICE CMNT-IMP: NORMAL
SPECIMEN DESCRIPTION: NORMAL
SPECIMEN DESCRIPTION: NORMAL

## 2023-11-27 RX ORDER — FLUTICASONE FUROATE, UMECLIDINIUM BROMIDE AND VILANTEROL TRIFENATATE 200; 62.5; 25 UG/1; UG/1; UG/1
POWDER RESPIRATORY (INHALATION)
Qty: 60 EACH | Refills: 0 | Status: SHIPPED | OUTPATIENT
Start: 2023-11-27

## 2023-11-29 DIAGNOSIS — J44.1 CHRONIC OBSTRUCTIVE PULMONARY DISEASE WITH ACUTE EXACERBATION (HCC): Primary | Chronic | ICD-10-CM

## 2023-11-29 RX ORDER — ALBUTEROL SULFATE 90 UG/1
2 AEROSOL, METERED RESPIRATORY (INHALATION) EVERY 6 HOURS PRN
Qty: 18 G | Refills: 3 | Status: SHIPPED | OUTPATIENT
Start: 2023-11-29

## 2023-12-19 PROBLEM — E11.69 TYPE 2 DIABETES MELLITUS WITH OTHER SPECIFIED COMPLICATION, WITHOUT LONG-TERM CURRENT USE OF INSULIN (HCC): Status: ACTIVE | Noted: 2018-09-18

## 2023-12-27 ENCOUNTER — TELEPHONE (OUTPATIENT)
Dept: FAMILY MEDICINE CLINIC | Age: 53
End: 2023-12-27

## 2023-12-27 DIAGNOSIS — G47.33 OSA (OBSTRUCTIVE SLEEP APNEA): Primary | ICD-10-CM

## 2023-12-27 NOTE — TELEPHONE ENCOUNTER
Antonetta Kussmaul form the sleep center called stating that the patient has not had his equipment for 2 years so the order needs to be changed is a Diagnostic sleep study. Please advise.

## 2024-01-09 ENCOUNTER — HOSPITAL ENCOUNTER (EMERGENCY)
Age: 54
Discharge: HOME | End: 2024-01-09
Payer: MEDICARE

## 2024-01-09 ENCOUNTER — APPOINTMENT (OUTPATIENT)
Dept: CT IMAGING | Age: 54
End: 2024-01-09
Payer: MEDICARE

## 2024-01-09 ENCOUNTER — TELEPHONE (OUTPATIENT)
Dept: FAMILY MEDICINE CLINIC | Age: 54
End: 2024-01-09

## 2024-01-09 VITALS
DIASTOLIC BLOOD PRESSURE: 80 MMHG | TEMPERATURE: 97.7 F | SYSTOLIC BLOOD PRESSURE: 128 MMHG | RESPIRATION RATE: 18 BRPM | HEART RATE: 82 BPM | OXYGEN SATURATION: 98 %

## 2024-01-09 DIAGNOSIS — S39.012A STRAIN OF LUMBAR REGION, INITIAL ENCOUNTER: Primary | ICD-10-CM

## 2024-01-09 DIAGNOSIS — M51.26 LUMBAR HERNIATED DISC: ICD-10-CM

## 2024-01-09 DIAGNOSIS — M54.30 SCIATICA, UNSPECIFIED LATERALITY: ICD-10-CM

## 2024-01-09 PROCEDURE — 99284 EMERGENCY DEPT VISIT MOD MDM: CPT

## 2024-01-09 PROCEDURE — 6370000000 HC RX 637 (ALT 250 FOR IP): Performed by: NURSE PRACTITIONER

## 2024-01-09 PROCEDURE — 96372 THER/PROPH/DIAG INJ SC/IM: CPT

## 2024-01-09 PROCEDURE — 6370000000 HC RX 637 (ALT 250 FOR IP): Performed by: PHYSICIAN ASSISTANT

## 2024-01-09 PROCEDURE — 72131 CT LUMBAR SPINE W/O DYE: CPT

## 2024-01-09 PROCEDURE — 6360000002 HC RX W HCPCS: Performed by: PHYSICIAN ASSISTANT

## 2024-01-09 RX ORDER — OXYCODONE HYDROCHLORIDE AND ACETAMINOPHEN 5; 325 MG/1; MG/1
1 TABLET ORAL ONCE
Status: COMPLETED | OUTPATIENT
Start: 2024-01-09 | End: 2024-01-09

## 2024-01-09 RX ORDER — KETOROLAC TROMETHAMINE 30 MG/ML
30 INJECTION, SOLUTION INTRAMUSCULAR; INTRAVENOUS ONCE
Status: COMPLETED | OUTPATIENT
Start: 2024-01-09 | End: 2024-01-09

## 2024-01-09 RX ORDER — ORPHENADRINE CITRATE 100 MG/1
100 TABLET, EXTENDED RELEASE ORAL 2 TIMES DAILY
Qty: 20 TABLET | Refills: 0 | Status: SHIPPED | OUTPATIENT
Start: 2024-01-09 | End: 2024-01-19

## 2024-01-09 RX ORDER — HYDROCODONE BITARTRATE AND ACETAMINOPHEN 5; 325 MG/1; MG/1
1 TABLET ORAL EVERY 8 HOURS PRN
Qty: 3 TABLET | Refills: 0 | Status: SHIPPED | OUTPATIENT
Start: 2024-01-09 | End: 2024-01-12

## 2024-01-09 RX ORDER — ORPHENADRINE CITRATE 30 MG/ML
60 INJECTION INTRAMUSCULAR; INTRAVENOUS ONCE
Status: COMPLETED | OUTPATIENT
Start: 2024-01-09 | End: 2024-01-09

## 2024-01-09 RX ORDER — PREDNISONE 10 MG/1
TABLET ORAL
Qty: 20 TABLET | Refills: 0 | Status: SHIPPED | OUTPATIENT
Start: 2024-01-09 | End: 2024-01-19

## 2024-01-09 RX ADMIN — ORPHENADRINE CITRATE 60 MG: 60 INJECTION INTRAMUSCULAR; INTRAVENOUS at 14:46

## 2024-01-09 RX ADMIN — OXYCODONE AND ACETAMINOPHEN 1 TABLET: 5; 325 TABLET ORAL at 14:46

## 2024-01-09 RX ADMIN — OXYCODONE AND ACETAMINOPHEN 1 TABLET: 5; 325 TABLET ORAL at 20:31

## 2024-01-09 RX ADMIN — KETOROLAC TROMETHAMINE 30 MG: 30 INJECTION, SOLUTION INTRAMUSCULAR; INTRAVENOUS at 14:46

## 2024-01-09 ASSESSMENT — PAIN SCALES - GENERAL
PAINLEVEL_OUTOF10: 10
PAINLEVEL_OUTOF10: 7
PAINLEVEL_OUTOF10: 7

## 2024-01-09 ASSESSMENT — PAIN DESCRIPTION - LOCATION
LOCATION: BACK
LOCATION: BACK

## 2024-01-09 NOTE — TELEPHONE ENCOUNTER
Patient called and stated that he fell yesterday and he thinks he slipped a disc, he said that he has pain in both legs and shooting down his left arm, are you able to send him for an xray he stated. He is in a lot of pain. Patient has not been to the ER, he wanted to call you first

## 2024-01-09 NOTE — ED PROVIDER NOTES
good follow-up care.  He was made aware of strict return precautions and the need for follow-up and supportive measures.  Patient very thankful, smiling and very talkative with staff.  Patient safely discharged home   Discharged home.  Patient condition is stable    New Medications     Discharge Medication List as of 1/9/2024  8:36 PM        START taking these medications    Details   predniSONE (DELTASONE) 10 MG tablet Take 40mg po qd x 5 days QS for 5 days, Disp-20 tablet, R-0Normal      HYDROcodone-acetaminophen (NORCO) 5-325 MG per tablet Take 1 tablet by mouth every 8 hours as needed for Pain for up to 3 days. Intended supply: 3 days. Take lowest dose possible to manage pain Max Daily Amount: 3 tablets, Disp-3 tablet, R-0Normal      orphenadrine (NORFLEX) 100 MG extended release tablet Take 1 tablet by mouth 2 times daily for 10 days, Disp-20 tablet, R-0Normal           Electronically signed by Ambreen Gusman PA-C   DD: 1/9/24  **This report was transcribed using voice recognition software. Every effort was made to ensure accuracy; however, inadvertent computerized transcription errors may be present.  END OF ED PROVIDER NOTE

## 2024-01-15 RX ORDER — FLUTICASONE FUROATE, UMECLIDINIUM BROMIDE AND VILANTEROL TRIFENATATE 200; 62.5; 25 UG/1; UG/1; UG/1
POWDER RESPIRATORY (INHALATION)
Qty: 60 EACH | Refills: 0 | Status: SHIPPED | OUTPATIENT
Start: 2024-01-15

## 2024-01-15 RX ORDER — TERAZOSIN 2 MG/1
2 CAPSULE ORAL NIGHTLY
Qty: 30 CAPSULE | Refills: 3 | Status: SHIPPED | OUTPATIENT
Start: 2024-01-15

## 2024-02-20 ENCOUNTER — HOSPITAL ENCOUNTER (EMERGENCY)
Age: 54
Discharge: HOME | End: 2024-02-20
Payer: MEDICARE

## 2024-02-20 ENCOUNTER — APPOINTMENT (OUTPATIENT)
Dept: CT IMAGING | Age: 54
End: 2024-02-20
Payer: MEDICARE

## 2024-02-20 VITALS
TEMPERATURE: 98 F | RESPIRATION RATE: 18 BRPM | HEART RATE: 88 BPM | SYSTOLIC BLOOD PRESSURE: 166 MMHG | WEIGHT: 240 LBS | BODY MASS INDEX: 39.94 KG/M2 | DIASTOLIC BLOOD PRESSURE: 98 MMHG | OXYGEN SATURATION: 99 %

## 2024-02-20 DIAGNOSIS — K59.00 CONSTIPATION, UNSPECIFIED CONSTIPATION TYPE: Primary | ICD-10-CM

## 2024-02-20 DIAGNOSIS — R10.84 GENERALIZED ABDOMINAL PAIN: ICD-10-CM

## 2024-02-20 LAB
ALBUMIN SERPL-MCNC: 4.6 G/DL (ref 3.5–5.2)
ALP SERPL-CCNC: 90 U/L (ref 40–129)
ALT SERPL-CCNC: 17 U/L (ref 0–40)
ANION GAP SERPL CALCULATED.3IONS-SCNC: 10 MMOL/L (ref 7–16)
AST SERPL-CCNC: 11 U/L (ref 0–39)
BASOPHILS # BLD: 0.11 K/UL (ref 0–0.2)
BASOPHILS NFR BLD: 1 % (ref 0–2)
BILIRUB SERPL-MCNC: 0.4 MG/DL (ref 0–1.2)
BUN SERPL-MCNC: 11 MG/DL (ref 6–20)
CALCIUM SERPL-MCNC: 10 MG/DL (ref 8.6–10.2)
CHLORIDE SERPL-SCNC: 101 MMOL/L (ref 98–107)
CO2 SERPL-SCNC: 28 MMOL/L (ref 22–29)
CREAT SERPL-MCNC: 0.7 MG/DL (ref 0.7–1.2)
EOSINOPHIL # BLD: 0.37 K/UL (ref 0.05–0.5)
EOSINOPHILS RELATIVE PERCENT: 3 % (ref 0–6)
ERYTHROCYTE [DISTWIDTH] IN BLOOD BY AUTOMATED COUNT: 13.2 % (ref 11.5–15)
GFR SERPL CREATININE-BSD FRML MDRD: >60 ML/MIN/1.73M2
GLUCOSE SERPL-MCNC: 137 MG/DL (ref 74–99)
HCT VFR BLD AUTO: 53.6 % (ref 37–54)
HGB BLD-MCNC: 17.9 G/DL (ref 12.5–16.5)
IMM GRANULOCYTES # BLD AUTO: 0.07 K/UL (ref 0–0.58)
IMM GRANULOCYTES NFR BLD: 1 % (ref 0–5)
LACTATE BLDV-SCNC: 1.2 MMOL/L (ref 0.5–2.2)
LIPASE SERPL-CCNC: 53 U/L (ref 13–60)
LYMPHOCYTES NFR BLD: 3.62 K/UL (ref 1.5–4)
LYMPHOCYTES RELATIVE PERCENT: 31 % (ref 20–42)
MCH RBC QN AUTO: 30 PG (ref 26–35)
MCHC RBC AUTO-ENTMCNC: 33.4 G/DL (ref 32–34.5)
MCV RBC AUTO: 89.8 FL (ref 80–99.9)
MONOCYTES NFR BLD: 1 K/UL (ref 0.1–0.95)
MONOCYTES NFR BLD: 9 % (ref 2–12)
NEUTROPHILS NFR BLD: 56 % (ref 43–80)
NEUTS SEG NFR BLD: 6.66 K/UL (ref 1.8–7.3)
PLATELET # BLD AUTO: 196 K/UL (ref 130–450)
PMV BLD AUTO: 11.1 FL (ref 7–12)
POTASSIUM SERPL-SCNC: 4.1 MMOL/L (ref 3.5–5)
PROT SERPL-MCNC: 7.7 G/DL (ref 6.4–8.3)
RBC # BLD AUTO: 5.97 M/UL (ref 3.8–5.8)
SODIUM SERPL-SCNC: 139 MMOL/L (ref 132–146)
TROPONIN I SERPL HS-MCNC: 11 NG/L (ref 0–11)
TROPONIN I SERPL HS-MCNC: 12 NG/L (ref 0–11)
WBC OTHER # BLD: 11.8 K/UL (ref 4.5–11.5)

## 2024-02-20 PROCEDURE — 6370000000 HC RX 637 (ALT 250 FOR IP): Performed by: PHYSICIAN ASSISTANT

## 2024-02-20 PROCEDURE — 80053 COMPREHEN METABOLIC PANEL: CPT

## 2024-02-20 PROCEDURE — 6360000004 HC RX CONTRAST MEDICATION: Performed by: RADIOLOGY

## 2024-02-20 PROCEDURE — 83605 ASSAY OF LACTIC ACID: CPT

## 2024-02-20 PROCEDURE — 83690 ASSAY OF LIPASE: CPT

## 2024-02-20 PROCEDURE — 85025 COMPLETE CBC W/AUTO DIFF WBC: CPT

## 2024-02-20 PROCEDURE — 74177 CT ABD & PELVIS W/CONTRAST: CPT

## 2024-02-20 PROCEDURE — 6360000002 HC RX W HCPCS: Performed by: PHYSICIAN ASSISTANT

## 2024-02-20 PROCEDURE — 96374 THER/PROPH/DIAG INJ IV PUSH: CPT

## 2024-02-20 PROCEDURE — 2580000003 HC RX 258: Performed by: PHYSICIAN ASSISTANT

## 2024-02-20 PROCEDURE — 84484 ASSAY OF TROPONIN QUANT: CPT

## 2024-02-20 PROCEDURE — 99285 EMERGENCY DEPT VISIT HI MDM: CPT

## 2024-02-20 RX ORDER — ONDANSETRON 2 MG/ML
4 INJECTION INTRAMUSCULAR; INTRAVENOUS ONCE
Status: COMPLETED | OUTPATIENT
Start: 2024-02-20 | End: 2024-02-20

## 2024-02-20 RX ORDER — HYDROCODONE BITARTRATE AND ACETAMINOPHEN 5; 325 MG/1; MG/1
1 TABLET ORAL ONCE
Status: COMPLETED | OUTPATIENT
Start: 2024-02-20 | End: 2024-02-20

## 2024-02-20 RX ORDER — 0.9 % SODIUM CHLORIDE 0.9 %
1000 INTRAVENOUS SOLUTION INTRAVENOUS ONCE
Status: COMPLETED | OUTPATIENT
Start: 2024-02-20 | End: 2024-02-20

## 2024-02-20 RX ORDER — MAGNESIUM CARB/ALUMINUM HYDROX 105-160MG
296 TABLET,CHEWABLE ORAL ONCE
Status: DISCONTINUED | OUTPATIENT
Start: 2024-02-20 | End: 2024-02-21 | Stop reason: HOSPADM

## 2024-02-20 RX ORDER — POLYETHYLENE GLYCOL 3350 17 G/17G
POWDER, FOR SOLUTION ORAL
Qty: 238 G | Refills: 0 | Status: SHIPPED | OUTPATIENT
Start: 2024-02-20 | End: 2024-03-05

## 2024-02-20 RX ADMIN — HYDROCODONE BITARTRATE AND ACETAMINOPHEN 1 TABLET: 5; 325 TABLET ORAL at 18:13

## 2024-02-20 RX ADMIN — SODIUM CHLORIDE 1000 ML: 9 INJECTION, SOLUTION INTRAVENOUS at 18:11

## 2024-02-20 RX ADMIN — ONDANSETRON 4 MG: 2 INJECTION INTRAMUSCULAR; INTRAVENOUS at 18:13

## 2024-02-20 RX ADMIN — IOPAMIDOL 75 ML: 755 INJECTION, SOLUTION INTRAVENOUS at 20:15

## 2024-02-20 ASSESSMENT — PAIN SCALES - GENERAL: PAINLEVEL_OUTOF10: 9

## 2024-02-20 ASSESSMENT — PAIN - FUNCTIONAL ASSESSMENT: PAIN_FUNCTIONAL_ASSESSMENT: 0-10

## 2024-02-20 NOTE — ED PROVIDER NOTES
provided with prescription for MiraLAX.  Initially he did have Zosyn ordered but I discontinued this as he does not have any concerning diverticulitis or any infectious etiology.  Patient expressed understanding of good follow-up care, supportive measures and strict return precautions with full understanding.  Patient pleasant in conversation.  Patient thankful.  Patient safely discharged with close follow-up..     History from : Patient and Medical records     Limitations to history : None    Chronic Conditions: has a past medical history of Abdominal pain, Allergic rhinitis, Anxiety, Asthma, Bipolar 1 disorder ([facility]), COPD (chronic obstructive pulmonary disease) ([facility]), Depression, Diabetes mellitus ([facility]), Encounter for screening colonoscopy, GERD (gastroesophageal reflux disease), Hyperlipidemia, Hypertension, Obesity, Osteoarthritis, Perforation of left tympanic membrane, and Sleep apnea.    CONSULTS:PCP    Discussion with Other Profesionals : None    Social Determinants : None    Records Reviewed : Source patient and Inpatient Notes epic medical records        Disposition Considerations (Tests not ordered but considered, Shared Decision Making, Pt Expectation of Test or Tx.):   Appropriate for outpatient management yes and Evaluation by myself and discharge recommended.      I am the Primary Clinician of Record.    Counseling:   The emergency provider has spoken with the patient and discussed today’s results, in addition to providing specific details for the plan of care and counseling regarding the diagnosis and prognosis.  Questions are answered at this time and they are agreeable with the plan.      --------------------------------- IMPRESSION AND DISPOSITION ---------------------------------    IMPRESSION  1. Constipation, unspecified constipation type    2. Generalized abdominal pain        DISPOSITION  Disposition: Discharge to home  Patient condition is stable      NOTE: This report was transcribed using

## 2024-03-04 RX ORDER — FLUTICASONE FUROATE, UMECLIDINIUM BROMIDE AND VILANTEROL TRIFENATATE 200; 62.5; 25 UG/1; UG/1; UG/1
POWDER RESPIRATORY (INHALATION)
Qty: 60 EACH | Refills: 0 | Status: SHIPPED | OUTPATIENT
Start: 2024-03-04

## 2024-03-04 RX ORDER — ATORVASTATIN CALCIUM 40 MG/1
40 TABLET, FILM COATED ORAL DAILY
Qty: 90 TABLET | Refills: 3 | Status: SHIPPED | OUTPATIENT
Start: 2024-03-04

## 2024-03-15 RX ORDER — TERAZOSIN 2 MG/1
2 CAPSULE ORAL NIGHTLY
Qty: 90 CAPSULE | Refills: 3 | Status: SHIPPED | OUTPATIENT
Start: 2024-03-15

## 2024-03-25 DIAGNOSIS — J44.1 CHRONIC OBSTRUCTIVE PULMONARY DISEASE WITH ACUTE EXACERBATION (HCC): Chronic | ICD-10-CM

## 2024-03-25 RX ORDER — ALBUTEROL SULFATE 90 UG/1
2 AEROSOL, METERED RESPIRATORY (INHALATION) EVERY 6 HOURS PRN
Qty: 18 EACH | Refills: 3 | Status: SHIPPED | OUTPATIENT
Start: 2024-03-25

## 2024-04-05 RX ORDER — FLUTICASONE FUROATE, UMECLIDINIUM BROMIDE AND VILANTEROL TRIFENATATE 200; 62.5; 25 UG/1; UG/1; UG/1
POWDER RESPIRATORY (INHALATION)
Qty: 28 EACH | Refills: 2 | Status: SHIPPED | OUTPATIENT
Start: 2024-04-05

## 2024-06-22 DIAGNOSIS — J44.1 CHRONIC OBSTRUCTIVE PULMONARY DISEASE WITH ACUTE EXACERBATION (HCC): Chronic | ICD-10-CM

## 2024-06-24 RX ORDER — ALBUTEROL SULFATE 90 UG/1
2 AEROSOL, METERED RESPIRATORY (INHALATION) EVERY 6 HOURS PRN
Qty: 18 EACH | Refills: 3 | Status: SHIPPED | OUTPATIENT
Start: 2024-06-24

## 2024-06-27 RX ORDER — FLUTICASONE FUROATE, UMECLIDINIUM BROMIDE AND VILANTEROL TRIFENATATE 200; 62.5; 25 UG/1; UG/1; UG/1
POWDER RESPIRATORY (INHALATION)
Qty: 60 EACH | Refills: 2 | Status: SHIPPED | OUTPATIENT
Start: 2024-06-27

## 2024-07-15 ENCOUNTER — HOSPITAL ENCOUNTER (EMERGENCY)
Age: 54
Discharge: HOME | End: 2024-07-16
Payer: COMMERCIAL

## 2024-07-15 VITALS
HEART RATE: 85 BPM | SYSTOLIC BLOOD PRESSURE: 157 MMHG | RESPIRATION RATE: 16 BRPM | DIASTOLIC BLOOD PRESSURE: 110 MMHG | OXYGEN SATURATION: 97 % | TEMPERATURE: 98 F

## 2024-07-15 DIAGNOSIS — R10.13 EPIGASTRIC PAIN: Primary | ICD-10-CM

## 2024-07-15 PROCEDURE — 99285 EMERGENCY DEPT VISIT HI MDM: CPT

## 2024-07-16 ENCOUNTER — APPOINTMENT (OUTPATIENT)
Dept: CT IMAGING | Age: 54
End: 2024-07-16
Payer: COMMERCIAL

## 2024-07-16 LAB
ALBUMIN SERPL-MCNC: 4.5 G/DL (ref 3.5–5.2)
ALP SERPL-CCNC: 109 U/L (ref 40–129)
ALT SERPL-CCNC: 34 U/L (ref 0–40)
ANION GAP SERPL CALCULATED.3IONS-SCNC: 14 MMOL/L (ref 7–16)
AST SERPL-CCNC: 19 U/L (ref 0–39)
BASOPHILS # BLD: 0.13 K/UL (ref 0–0.2)
BASOPHILS NFR BLD: 1 % (ref 0–2)
BILIRUB SERPL-MCNC: 0.3 MG/DL (ref 0–1.2)
BILIRUB UR QL STRIP: NEGATIVE
BUN SERPL-MCNC: 9 MG/DL (ref 6–20)
CALCIUM SERPL-MCNC: 9.7 MG/DL (ref 8.6–10.2)
CHLORIDE SERPL-SCNC: 100 MMOL/L (ref 98–107)
CLARITY UR: CLEAR
CO2 SERPL-SCNC: 23 MMOL/L (ref 22–29)
COLOR UR: YELLOW
CREAT SERPL-MCNC: 0.7 MG/DL (ref 0.7–1.2)
EOSINOPHIL # BLD: 0.63 K/UL (ref 0.05–0.5)
EOSINOPHILS RELATIVE PERCENT: 4 % (ref 0–6)
ERYTHROCYTE [DISTWIDTH] IN BLOOD BY AUTOMATED COUNT: 13 % (ref 11.5–15)
GFR, ESTIMATED: >90 ML/MIN/1.73M2
GLUCOSE SERPL-MCNC: 145 MG/DL (ref 74–99)
GLUCOSE UR STRIP-MCNC: NEGATIVE MG/DL
HCT VFR BLD AUTO: 53 % (ref 37–54)
HGB BLD-MCNC: 18.4 G/DL (ref 12.5–16.5)
HGB UR QL STRIP.AUTO: NEGATIVE
KETONES UR STRIP-MCNC: NEGATIVE MG/DL
LACTATE BLDV-SCNC: 1.1 MMOL/L (ref 0.5–1.9)
LEUKOCYTE ESTERASE UR QL STRIP: NEGATIVE
LYMPHOCYTES NFR BLD: 3.63 K/UL (ref 1.5–4)
LYMPHOCYTES RELATIVE PERCENT: 25 % (ref 20–42)
MCH RBC QN AUTO: 31.3 PG (ref 26–35)
MCHC RBC AUTO-ENTMCNC: 34.7 G/DL (ref 32–34.5)
MCV RBC AUTO: 90.1 FL (ref 80–99.9)
MONOCYTES NFR BLD: 1 K/UL (ref 0.1–0.95)
MONOCYTES NFR BLD: 7 % (ref 2–12)
NEUTROPHILS NFR BLD: 63 % (ref 43–80)
NEUTS SEG NFR BLD: 9.02 K/UL (ref 1.8–7.3)
NITRITE UR QL STRIP: NEGATIVE
PH UR STRIP: 6 [PH] (ref 5–9)
PLATELET # BLD AUTO: 189 K/UL (ref 130–450)
PMV BLD AUTO: 10.9 FL (ref 7–12)
POTASSIUM SERPL-SCNC: 4.2 MMOL/L (ref 3.5–5)
PROT SERPL-MCNC: 8.1 G/DL (ref 6.4–8.3)
PROT UR STRIP-MCNC: NEGATIVE MG/DL
RBC # BLD AUTO: 5.88 M/UL (ref 3.8–5.8)
RBC # BLD: ABNORMAL 10*6/UL
RBC #/AREA URNS HPF: NORMAL /HPF
SODIUM SERPL-SCNC: 137 MMOL/L (ref 132–146)
SP GR UR STRIP: 1.02 (ref 1–1.03)
UROBILINOGEN UR STRIP-ACNC: 0.2 EU/DL (ref 0–1)
WBC #/AREA URNS HPF: NORMAL /HPF
WBC OTHER # BLD: 14.4 K/UL (ref 4.5–11.5)

## 2024-07-16 PROCEDURE — 85025 COMPLETE CBC W/AUTO DIFF WBC: CPT

## 2024-07-16 PROCEDURE — 74177 CT ABD & PELVIS W/CONTRAST: CPT

## 2024-07-16 PROCEDURE — 81001 URINALYSIS AUTO W/SCOPE: CPT

## 2024-07-16 PROCEDURE — 2580000003 HC RX 258: Performed by: PHYSICIAN ASSISTANT

## 2024-07-16 PROCEDURE — 6360000004 HC RX CONTRAST MEDICATION: Performed by: RADIOLOGY

## 2024-07-16 PROCEDURE — 6370000000 HC RX 637 (ALT 250 FOR IP): Performed by: PHYSICIAN ASSISTANT

## 2024-07-16 PROCEDURE — 83605 ASSAY OF LACTIC ACID: CPT

## 2024-07-16 PROCEDURE — 80053 COMPREHEN METABOLIC PANEL: CPT

## 2024-07-16 PROCEDURE — 6360000002 HC RX W HCPCS: Performed by: PHYSICIAN ASSISTANT

## 2024-07-16 RX ORDER — ONDANSETRON 2 MG/ML
4 INJECTION INTRAMUSCULAR; INTRAVENOUS ONCE
Status: COMPLETED | OUTPATIENT
Start: 2024-07-16 | End: 2024-07-16

## 2024-07-16 RX ORDER — OXYCODONE HYDROCHLORIDE AND ACETAMINOPHEN 5; 325 MG/1; MG/1
1 TABLET ORAL ONCE
Status: COMPLETED | OUTPATIENT
Start: 2024-07-16 | End: 2024-07-16

## 2024-07-16 RX ORDER — MORPHINE SULFATE 2 MG/ML
2 INJECTION, SOLUTION INTRAMUSCULAR; INTRAVENOUS ONCE
Status: DISCONTINUED | OUTPATIENT
Start: 2024-07-16 | End: 2024-07-16

## 2024-07-16 RX ORDER — 0.9 % SODIUM CHLORIDE 0.9 %
1000 INTRAVENOUS SOLUTION INTRAVENOUS ONCE
Status: COMPLETED | OUTPATIENT
Start: 2024-07-16 | End: 2024-07-16

## 2024-07-16 RX ORDER — SUCRALFATE 1 G/1
1 TABLET ORAL 4 TIMES DAILY
Qty: 120 TABLET | Refills: 3 | Status: SHIPPED | OUTPATIENT
Start: 2024-07-16

## 2024-07-16 RX ADMIN — OXYCODONE HYDROCHLORIDE AND ACETAMINOPHEN 1 TABLET: 5; 325 TABLET ORAL at 00:56

## 2024-07-16 RX ADMIN — IOPAMIDOL 75 ML: 755 INJECTION, SOLUTION INTRAVENOUS at 02:14

## 2024-07-16 RX ADMIN — LIDOCAINE HYDROCHLORIDE: 20 SOLUTION ORAL at 01:37

## 2024-07-16 RX ADMIN — PANTOPRAZOLE SODIUM 40 MG: 40 INJECTION, POWDER, FOR SOLUTION INTRAVENOUS at 01:37

## 2024-07-16 RX ADMIN — ONDANSETRON 4 MG: 2 INJECTION INTRAMUSCULAR; INTRAVENOUS at 00:56

## 2024-07-16 RX ADMIN — SODIUM CHLORIDE 1000 ML: 9 INJECTION, SOLUTION INTRAVENOUS at 00:55

## 2024-07-16 NOTE — ED PROVIDER NOTES
Independent LENARD Visit.    HPI:  7/16/24,   Time: 12:09 AM EDT         Favian Vizcaino is a 54 y.o. male with past medical history of COPD, asthma, morbid obesity, depression, bipolar, alcohol use, smoker, type II diabetic and hiatal hernia presenting to the ED for abdominal pain and back pain.  Patient reports he has been dealing with these issues for quite some time now.  His symptoms restarted about 6 weeks ago when he restarted his Trulicity.  He thinks it is related to the medication.  His symptoms have gradually worsened since then.  He has allover diffuse belly pain some episodes of diarrhea at home.  He has had diverticulitis before and is worried it is back.  He also complains of right back pain but states he has chronic back pain.  Denies history of nephrolithiasis or blood in the urine.  Does not currently take any medications at home for his pain.  Nothing makes it better or worse.  Denies fever, chills, body aches, headache, dizziness, syncope, chest pain, shortness of breath, nausea, emesis, melena, hematochezia or dysuria.  ROS:   Pertinent positives and negatives are stated within HPI, all other systems reviewed and are negative.  --------------------------------------------- PAST HISTORY ---------------------------------------------  Past Medical History:  has a past medical history of Abdominal pain, Allergic rhinitis, Anxiety, Asthma, Bipolar 1 disorder ([facility]), COPD (chronic obstructive pulmonary disease) ([facility]), Depression, Diabetes mellitus ([facility]), Encounter for screening colonoscopy, GERD (gastroesophageal reflux disease), Hyperlipidemia, Hypertension, Obesity, Osteoarthritis, Perforation of left tympanic membrane, and Sleep apnea.    Past Surgical History:  has a past surgical history that includes Tonsillectomy (1983); hiatal hernia repair (2012); Tympanoplasty; Colonoscopy (06/08/2017); hernia repair (2012); Endoscopy, colon, diagnostic; pr incision & drainage abscess simple/single (N/A,  - 2.0 %    Neutrophils Absolute 9.02 (H) 1.80 - 7.30 k/uL    Lymphocytes Absolute 3.63 1.50 - 4.00 k/uL    Monocytes Absolute 1.00 (H) 0.10 - 0.95 k/uL    Eosinophils Absolute 0.63 (H) 0.05 - 0.50 k/uL    Basophils Absolute 0.13 0.00 - 0.20 k/uL    RBC Morphology 1+ POLYCHROMASIA    Comprehensive Metabolic Panel   Result Value Ref Range    Sodium 137 132 - 146 mmol/L    Potassium 4.2 3.5 - 5.0 mmol/L    Chloride 100 98 - 107 mmol/L    CO2 23 22 - 29 mmol/L    Anion Gap 14 7 - 16 mmol/L    Glucose 145 (H) 74 - 99 mg/dL    BUN 9 6 - 20 mg/dL    Creatinine 0.7 0.70 - 1.20 mg/dL    Est, Glom Filt Rate >90 >60 mL/min/1.73m2    Calcium 9.7 8.6 - 10.2 mg/dL    Total Protein 8.1 6.4 - 8.3 g/dL    Albumin 4.5 3.5 - 5.2 g/dL    Total Bilirubin 0.3 0.0 - 1.2 mg/dL    Alkaline Phosphatase 109 40 - 129 U/L    ALT 34 0 - 40 U/L    AST 19 0 - 39 U/L   Lactate, Sepsis   Result Value Ref Range    Lactic Acid, Sepsis 1.1 0.5 - 1.9 mmol/L   Urinalysis with Microscopic   Result Value Ref Range    Color, UA Yellow Yellow    Turbidity UA Clear Clear    Glucose, Ur NEGATIVE NEGATIVE mg/dL    Bilirubin, Urine NEGATIVE NEGATIVE    Ketones, Urine NEGATIVE NEGATIVE mg/dL    Specific Gravity, UA 1.020 1.005 - 1.030    Urine Hgb NEGATIVE NEGATIVE    pH, Urine 6.0 5.0 - 9.0    Protein, UA NEGATIVE NEGATIVE mg/dL    Urobilinogen, Urine 0.2 0.0 - 1.0 EU/dL    Nitrite, Urine NEGATIVE NEGATIVE    Leukocyte Esterase, Urine NEGATIVE NEGATIVE    WBC, UA 0 TO 5 0 TO 5 /HPF    RBC, UA 0 TO 2 0 TO 2 /HPF       RADIOLOGY:  Interpreted by Radiologist.  CT ABDOMEN PELVIS W IV CONTRAST Additional Contrast? None   Final Result   No acute intra-abdominal or pelvic process.             ------------------------- NURSING NOTES AND VITALS REVIEWED ---------------------------   The nursing notes within the ED encounter and vital signs as below have been reviewed.   BP (!) 157/110   Pulse 85   Temp 98 °F (36.7 °C) (Oral)   Resp 16   SpO2 97%   Oxygen Saturation

## 2024-07-22 ENCOUNTER — CARE COORDINATION (OUTPATIENT)
Dept: CARE COORDINATION | Age: 54
End: 2024-07-22

## 2024-07-22 NOTE — CARE COORDINATION
-ACM attempted to reach patient to offer enrollment into Care Coordination program & RPM services, however no answer.  -HIPAA compliant VM left introducing self, reason for call, and brief explanation of program.  -Left ACM's contact information, requesting call back.   Plan  If no return call, will attempt outreach again.

## 2024-07-23 ENCOUNTER — CARE COORDINATION (OUTPATIENT)
Dept: CARE COORDINATION | Age: 54
End: 2024-07-23

## 2024-07-23 NOTE — CARE COORDINATION
-ACM spoke with patient to offer enrollment into Care Coordination program & RPM services.  -Introduced self, reason for call, and explanation of program.   -Patient interested.  Enrollment scheduled next week.   -Pt reports he stopped the Trulicity 7-15-24 due to abdominal pain, nausea, esophagus felt raw & painful, constipation and dizziness. Pt said he is not taking any medication for his DM currently. Pt said he was having black tarry stools. Pt said he has had upper scope in 2020.  Pt said he does not have a glucometer to check his BS. Will inform PCP. Pt said today abdominal pain gone, nausea much less and increase appetite with tolerance.   -Pt reports needing a Sleep appt.   -Pt reports he is interested in talking with the dietitian.   -Pt reports he has not gone to the dentist for dental caries. Pt said the Dental Clinic did not call him for an appt. Encourage Pt to call the Dental Clinic for an appt.     -Plan: ACM will outreach patient at an agreed upon date and time for Care Coordination enrollment.      yes

## 2024-07-25 ENCOUNTER — OFFICE VISIT (OUTPATIENT)
Dept: FAMILY MEDICINE CLINIC | Age: 54
End: 2024-07-25

## 2024-07-25 VITALS
BODY MASS INDEX: 39.72 KG/M2 | SYSTOLIC BLOOD PRESSURE: 130 MMHG | DIASTOLIC BLOOD PRESSURE: 80 MMHG | TEMPERATURE: 98.2 F | HEIGHT: 65 IN | OXYGEN SATURATION: 96 % | HEART RATE: 91 BPM | WEIGHT: 238.4 LBS

## 2024-07-25 DIAGNOSIS — F60.2 ANTISOCIAL PERSONALITY DISORDER (HCC): ICD-10-CM

## 2024-07-25 DIAGNOSIS — R31.9 HEMATURIA, UNSPECIFIED TYPE: ICD-10-CM

## 2024-07-25 DIAGNOSIS — E11.69 TYPE 2 DIABETES MELLITUS WITH OTHER SPECIFIED COMPLICATION, WITHOUT LONG-TERM CURRENT USE OF INSULIN (HCC): ICD-10-CM

## 2024-07-25 DIAGNOSIS — F31.9 BIPOLAR AFFECTIVE DISORDER, REMISSION STATUS UNSPECIFIED (HCC): ICD-10-CM

## 2024-07-25 DIAGNOSIS — Z00.00 INITIAL MEDICARE ANNUAL WELLNESS VISIT: ICD-10-CM

## 2024-07-25 DIAGNOSIS — E78.2 MIXED HYPERLIPIDEMIA: ICD-10-CM

## 2024-07-25 DIAGNOSIS — L40.50 PSORIATIC ARTHRITIS (HCC): ICD-10-CM

## 2024-07-25 DIAGNOSIS — G47.33 OSA (OBSTRUCTIVE SLEEP APNEA): ICD-10-CM

## 2024-07-25 DIAGNOSIS — E11.69 TYPE 2 DIABETES MELLITUS WITH OTHER SPECIFIED COMPLICATION, WITHOUT LONG-TERM CURRENT USE OF INSULIN (HCC): Primary | ICD-10-CM

## 2024-07-25 DIAGNOSIS — E66.01 SEVERE OBESITY (BMI 35.0-39.9) WITH COMORBIDITY (HCC): ICD-10-CM

## 2024-07-25 LAB
ALBUMIN: 4 G/DL (ref 3.5–5.2)
ALP BLD-CCNC: 100 U/L (ref 40–129)
ALT SERPL-CCNC: 33 U/L (ref 0–40)
ANION GAP SERPL CALCULATED.3IONS-SCNC: 13 MMOL/L (ref 7–16)
AST SERPL-CCNC: 24 U/L (ref 0–39)
BILIRUB SERPL-MCNC: 0.5 MG/DL (ref 0–1.2)
BILIRUBIN, POC: NEGATIVE
BLOOD URINE, POC: NEGATIVE
BUN BLDV-MCNC: 8 MG/DL (ref 6–20)
CALCIUM SERPL-MCNC: 9.4 MG/DL (ref 8.6–10.2)
CHLORIDE BLD-SCNC: 99 MMOL/L (ref 98–107)
CHOLESTEROL, TOTAL: 178 MG/DL
CLARITY, POC: CLEAR
CO2: 24 MMOL/L (ref 22–29)
COLOR, POC: YELLOW
CREAT SERPL-MCNC: 0.8 MG/DL (ref 0.7–1.2)
CREATININE URINE POCT: NORMAL
GFR, ESTIMATED: >90 ML/MIN/1.73M2
GLUCOSE BLD-MCNC: 188 MG/DL (ref 74–99)
GLUCOSE URINE, POC: NEGATIVE
HBA1C MFR BLD: 9 %
HCT VFR BLD CALC: 52.9 % (ref 37–54)
HDLC SERPL-MCNC: 30 MG/DL
HEMOGLOBIN: 17.1 G/DL (ref 12.5–16.5)
KETONES, POC: NEGATIVE
LDL CHOLESTEROL: 106 MG/DL
LEUKOCYTE EST, POC: NORMAL
MCH RBC QN AUTO: 29.7 PG (ref 26–35)
MCHC RBC AUTO-ENTMCNC: 32.3 G/DL (ref 32–34.5)
MCV RBC AUTO: 92 FL (ref 80–99.9)
MICROALBUMIN/CREAT 24H UR: NORMAL MG/DL
MICROALBUMIN/CREAT UR-RTO: NORMAL MG/G
NITRITE, POC: NEGATIVE
PDW BLD-RTO: 13.2 % (ref 11.5–15)
PH, POC: 5.5
PLATELET # BLD: 184 K/UL (ref 130–450)
PMV BLD AUTO: 11.6 FL (ref 7–12)
POTASSIUM SERPL-SCNC: 4.2 MMOL/L (ref 3.5–5)
PROTEIN, POC: NEGATIVE
RBC # BLD: 5.75 M/UL (ref 3.8–5.8)
SODIUM BLD-SCNC: 136 MMOL/L (ref 132–146)
SPECIFIC GRAVITY, POC: 1.01
TOTAL PROTEIN: 7.1 G/DL (ref 6.4–8.3)
TRIGL SERPL-MCNC: 209 MG/DL
TSH SERPL DL<=0.05 MIU/L-ACNC: 1.34 UIU/ML (ref 0.27–4.2)
UROBILINOGEN, POC: 0.2
VLDLC SERPL CALC-MCNC: 42 MG/DL
WBC # BLD: 12.2 K/UL (ref 4.5–11.5)

## 2024-07-25 RX ORDER — DAPAGLIFLOZIN 10 MG/1
10 TABLET, FILM COATED ORAL EVERY MORNING
Qty: 30 TABLET | Refills: 5 | Status: SHIPPED | OUTPATIENT
Start: 2024-07-25

## 2024-07-25 RX ORDER — BLOOD-GLUCOSE SENSOR
EACH MISCELLANEOUS
Qty: 2 EACH | Refills: 3 | Status: SHIPPED | OUTPATIENT
Start: 2024-07-25

## 2024-07-25 ASSESSMENT — PATIENT HEALTH QUESTIONNAIRE - PHQ9
4. FEELING TIRED OR HAVING LITTLE ENERGY: NEARLY EVERY DAY
9. THOUGHTS THAT YOU WOULD BE BETTER OFF DEAD, OR OF HURTING YOURSELF: NOT AT ALL
5. POOR APPETITE OR OVEREATING: MORE THAN HALF THE DAYS
10. IF YOU CHECKED OFF ANY PROBLEMS, HOW DIFFICULT HAVE THESE PROBLEMS MADE IT FOR YOU TO DO YOUR WORK, TAKE CARE OF THINGS AT HOME, OR GET ALONG WITH OTHER PEOPLE: EXTREMELY DIFFICULT
1. LITTLE INTEREST OR PLEASURE IN DOING THINGS: SEVERAL DAYS
6. FEELING BAD ABOUT YOURSELF - OR THAT YOU ARE A FAILURE OR HAVE LET YOURSELF OR YOUR FAMILY DOWN: SEVERAL DAYS
8. MOVING OR SPEAKING SO SLOWLY THAT OTHER PEOPLE COULD HAVE NOTICED. OR THE OPPOSITE - BEING SO FIDGETY OR RESTLESS THAT YOU HAVE BEEN MOVING AROUND A LOT MORE THAN USUAL: SEVERAL DAYS
SUM OF ALL RESPONSES TO PHQ QUESTIONS 1-9: 13
3. TROUBLE FALLING OR STAYING ASLEEP: NEARLY EVERY DAY
7. TROUBLE CONCENTRATING ON THINGS, SUCH AS READING THE NEWSPAPER OR WATCHING TELEVISION: SEVERAL DAYS
2. FEELING DOWN, DEPRESSED OR HOPELESS: SEVERAL DAYS

## 2024-07-25 NOTE — PATIENT INSTRUCTIONS
[employer]*  (Call [employer]/211 if need more resources.)         HELP NETWORK OF [facility]:  What they do: Provides 24-hr, 7 days a week access to information on community resources for financial help. [facility] AND [facility]  Phone: 211 or [phone]    [facility] FAMILY SERVICE: [address], [place] [address]  What they offer: Limited assistance to restore/ prevent utility disconnection.  Phone Number: [phone]  Website: [employer]    DEPARTMENT OF JOB AND FAMILY SERVICES:  MAIN [facility] LINE FOR ALL [facility]: [phone]  What they do: [place] works first with temporary cash assistance if there are children in household.   [facility] DJFS: [address] [place] [address]  Phone: [phone], [phone]  [facility] DJFS: [address] [place] [address]  Phone: [phone]  [facility] DJFS: [address] [place] [address]  Phone: [phone]  Website: [place].[facility]    [employer] Financial Assistance  What they offer: Assistance with [employer] bills  Phone: [phone], option #5     Medications:  Good Rx  What they offer: Good Rx tracks prescription drug prices and provides free drug coupons for discounts on medications.  Website: https://www.Snappli    [employer]  What they offer: [employer] offers free information on medications and healthcare cost savings programs including prescription assistance programs, coupons, and discount programs.  Helpline: 173.710.5801  Website: https://www.SecondMarket.org    RX Assist  What they offer: Information about free and low-cost medicine programs.  Website: https://www.rxassist.org/    [employer] $4 Prescription Program  What they offer: Prescription Program includes up to a 30-day supply for $4 and a 90-day supply for $10 of some covered generic drugs at commonly prescribed dosages  Website: https://www.Bookitit/cp/4-prescriptions/4799735    [employer]

## 2024-07-25 NOTE — PROGRESS NOTES
needed for Nausea or Vomiting Yes Ugo Ayala DO   melatonin 3 MG TABS tablet Take 1 tablet by mouth nightly  Patient taking differently: Take 1 tablet by mouth nightly prn Yes Tammie Jones APRN - CNP   blood glucose monitor strips Test twice daily & as needed for symptoms of irregular blood glucose. Dx: E11.9 Yes Clary Chamorro MD   glucose monitoring kit (FREESTYLE) monitoring kit 1 kit by Does not apply route daily Dx E11.9 Yes Clary Chamorro MD   Lancets MISC 1 each by Does not apply route daily Yes Clary Chamorro MD   dulaglutide (TRULICITY) 1.5 MG/0.5ML SC injection Inject 0.5 mLs into the skin once a week  Patient not taking: Reported on 7/25/2024  Clary Chamorro MD   TRULICITY 0.75 MG/0.5ML SOPN inject 0.5 milliliters ( 0.75 milligrams ) subcutaneously every week  Patient not taking: Reported on 7/25/2024  Clary Chamorro MD   divalproex (DEPAKOTE) 500 MG DR tablet Take 1 tablet by mouth in the morning and 1 tablet before bedtime.  Patient not taking: Reported on 7/25/2024  Tammie Jones APRN - CNP   ARIPiprazole (ABILIFY) 2 MG tablet Take 1 tablet by mouth daily  Jojo Hanks MD   FLUoxetine (PROZAC) 40 MG capsule 80 mg nightly   Patient not taking: Reported on 6/22/2022  Provider, Historical, MD       CareTe (Including outside providers/suppliers regularly involved in providing care):   Patient Care Team:  Clary Chamorro MD as PCP - General (Family Medicine)  Clary Chamorro MD as PCP - Empaneled Provider  Angelina Cr RN as Registered Nurse  Ava, Afshan Gomez, RN as Ambulatory Care Manager      Reviewed and updated this visit:  Allergies  Meds  Med Hx  Surg Hx  Soc Hx  Fam Hx

## 2024-07-27 LAB
CULTURE: NO GROWTH
SPECIMEN DESCRIPTION: NORMAL

## 2024-07-29 ENCOUNTER — CARE COORDINATION (OUTPATIENT)
Dept: CARE COORDINATION | Age: 54
End: 2024-07-29

## 2024-07-29 DIAGNOSIS — E11.69 TYPE 2 DIABETES MELLITUS WITH OTHER SPECIFIED COMPLICATION, WITHOUT LONG-TERM CURRENT USE OF INSULIN (HCC): ICD-10-CM

## 2024-07-29 DIAGNOSIS — J45.909 UNCOMPLICATED ASTHMA, UNSPECIFIED ASTHMA SEVERITY, UNSPECIFIED WHETHER PERSISTENT: Primary | Chronic | ICD-10-CM

## 2024-07-29 DIAGNOSIS — I10 ESSENTIAL HYPERTENSION: ICD-10-CM

## 2024-07-29 DIAGNOSIS — J44.9 CHRONIC OBSTRUCTIVE PULMONARY DISEASE, UNSPECIFIED COPD TYPE (HCC): ICD-10-CM

## 2024-07-29 SDOH — ECONOMIC STABILITY: TRANSPORTATION INSECURITY
IN THE PAST 12 MONTHS, HAS THE LACK OF TRANSPORTATION KEPT YOU FROM MEDICAL APPOINTMENTS OR FROM GETTING MEDICATIONS?: YES

## 2024-07-29 SDOH — ECONOMIC STABILITY: FOOD INSECURITY: WITHIN THE PAST 12 MONTHS, THE FOOD YOU BOUGHT JUST DIDN'T LAST AND YOU DIDN'T HAVE MONEY TO GET MORE.: NEVER TRUE

## 2024-07-29 SDOH — ECONOMIC STABILITY: TRANSPORTATION INSECURITY
IN THE PAST 12 MONTHS, HAS LACK OF TRANSPORTATION KEPT YOU FROM MEETINGS, WORK, OR FROM GETTING THINGS NEEDED FOR DAILY LIVING?: YES

## 2024-07-29 SDOH — ECONOMIC STABILITY: FOOD INSECURITY: WITHIN THE PAST 12 MONTHS, YOU WORRIED THAT YOUR FOOD WOULD RUN OUT BEFORE YOU GOT MONEY TO BUY MORE.: NEVER TRUE

## 2024-07-29 ASSESSMENT — SOCIAL DETERMINANTS OF HEALTH (SDOH)
IN A TYPICAL WEEK, HOW MANY TIMES DO YOU TALK ON THE PHONE WITH FAMILY, FRIENDS, OR NEIGHBORS?: THREE TIMES A WEEK
DO YOU BELONG TO ANY CLUBS OR ORGANIZATIONS SUCH AS CHURCH GROUPS UNIONS, FRATERNAL OR ATHLETIC GROUPS, OR SCHOOL GROUPS?: YES
HOW OFTEN DO YOU GET TOGETHER WITH FRIENDS OR RELATIVES?: TWICE A WEEK
HOW HARD IS IT FOR YOU TO PAY FOR THE VERY BASICS LIKE FOOD, HOUSING, MEDICAL CARE, AND HEATING?: NOT HARD AT ALL
HOW OFTEN DO YOU ATTEND CHURCH OR RELIGIOUS SERVICES?: NEVER
HOW OFTEN DO YOU ATTENT MEETINGS OF THE CLUB OR ORGANIZATION YOU BELONG TO?: MORE THAN 4 TIMES PER YEAR

## 2024-07-29 ASSESSMENT — LIFESTYLE VARIABLES: HOW OFTEN DO YOU HAVE A DRINK CONTAINING ALCOHOL: NEVER

## 2024-07-29 ASSESSMENT — ENCOUNTER SYMPTOMS: DYSPNEA ASSOCIATED WITH: EXERTION

## 2024-07-29 NOTE — CARE COORDINATION
Ambulatory Care Coordination Note  2024    Patient Current Location:  Home: [address] [place] [address]    ACM contacted the patient by telephone. Verified name and  with patient as identifiers. Provided introduction to self, and explanation of the ACM role.     ACM: Afshan Escalante RN    Challenges to be reviewed by the provider   Additional needs identified to be addressed with provider: Yes  FYI  Pt interested in a sleep study referral. If you agree, please place a referral  order.                Method of communication with provider: chart routing.    Offered patient enrollment in the Remote Patient Monitoring (RPM) program for in-home monitoring: Yes, patient enrolled today:     Remote Patient Monitoring Enrollment Note    Date/Time:  2024 2:20 PM    Offered patient enrollment in the [facility] Remote Patient Monitoring (RPM) program for in home monitoring for COPD; condition managed by Dr. MARITA Chamorro. Diabetes; condition managed by Dr MARITA Chamorro. HTN; condition managed by Dr MARITA Chamorro. Asthma; condition managed by Dr MARITA Chamorro.  Patient accepted.    Patient will be monitoring the following daily:  Blood Glucose, Blood Pressure, Pulse ox, Weight, and Survey questions  Pt reports he plans to  the continuous glucose monitor to day at the pharmacy. Once he has it working , he will begin submitting BS readings. Pt said his glucometer is broken.     ACM reviewed the information below with the patient:    Emergency Contact (name and contact number): Linda Vizcaino, wife P # [phone]    [x]  A member from the care coordination team will reach out to notify the patient once the RPM kit is ordered.  [x]  Once the kit is delivered, the HRS team will contact the patient after UPS delivers to assist with set up.  [x]  Determined BP cuff size: large (13.8\"-19.68\")  [x]  Determined weight scale: regular (<330lbs)  [x]  Hours of ACM monitoring - Monday-Friday

## 2024-07-29 NOTE — PROGRESS NOTES
Remote Patient Monitoring Treatment Plan    Received request from AC/CTN Afshan Escalante RN   to order remote patient monitoring for in home monitoring of COPD; Condition managed by PCP.  Diabetes; Condition managed by PCP.  HTN; Condition managed by PCP.  Asthma; Condition managed by PCP.  and order completed.     Patient will be monitoring   --blood pressure   --glucose  --pulse ox   --weight Please set alert for ONLY weight gain of 5# in 7 days. Pt has no documented hx of HF.    --survey questions.      Patient will engage in Remote Patient Monitoring each day to develop the skills necessary for self management.       RPM Care Team Responsibilities:   Alerts will be reviewed daily and addressed within 2-4 hours during operational hours (Monday -Friday 9 am-4 pm)  Alert response and intervention documented in patient medical record  Alert response escalated to PCP per protocol and documented in patient medical record  Patient monitored over approximately  days  Discharge from program based on self-management readiness    See care coordination encounters for additional details.

## 2024-07-30 ENCOUNTER — CARE COORDINATION (OUTPATIENT)
Dept: CARE COORDINATION | Age: 54
End: 2024-07-30

## 2024-07-30 NOTE — CARE COORDINATION
-ACM attempted to reach patient to follow up on if patient was able to  the CGM, Janumet and Farxiga from the pharmacy and if he made a dental clinic appt for Care Coordination, however no answer.  -HIPAA compliant VM left introducing self and reason for call.  -Left ACM's contact information, requesting call back.  -Plan:   If no return call, ACM will attempt outreach again.

## 2024-07-30 NOTE — CARE COORDINATION
[facility]    Referral for DM, HTN, low sodium diet, weight loss, healthy food choices, balanced diet and portion control.     Pt said he went to DM education class in 2016 and did well with carb counting but hasn't for quite a while. Pt wants to get back on track with healthy diet structure and weight loss.       RD contacted patient and left voicemail regarding Dietitian referral. Left call back number and will follow up as appropriate.     Belkis Gordon RDN, LD  [phone]

## 2024-07-31 ENCOUNTER — CARE COORDINATION (OUTPATIENT)
Dept: CARE COORDINATION | Age: 54
End: 2024-07-31

## 2024-07-31 NOTE — CARE COORDINATION
Contacted Favian Vizcaino and left voicemail regarding Dietitian referral. Left call back number and will follow up as appropriate.         Belkis Gordon RDN, LD  [phone]

## 2024-08-01 ENCOUNTER — CARE COORDINATION (OUTPATIENT)
Dept: CARE COORDINATION | Age: 54
End: 2024-08-01

## 2024-08-01 NOTE — CARE COORDINATION
[facility] received a referral from the [facility] requesting transportation and HHA resources for Favian.   [facility] states that Favian believes he has CARL but is unclear on who his CM is from [facility].      [facility] placed initial outreach call to Favian; there was no answer;  left with [facility] information and call back request.   [facility] also left the [place] benefits [place] for Favian to call and follow prompts to check his CARL status so that he can be certain on if he does or does not have CARL coverage.       [facility] to follow with a 2nd outreach attempt.

## 2024-08-07 ENCOUNTER — CARE COORDINATION (OUTPATIENT)
Dept: CARE COORDINATION | Age: 54
End: 2024-08-07

## 2024-08-07 NOTE — CARE COORDINATION
Contacted Favian Vizcaino and left voicemail regarding Dietitian referral. Left call back number. RD outreached 7/30/24, 7/31/24 and today 8/7/24- left VM all three outreaches. No additional outreach attempts scheduled at this time. RD will notify ACM. RD will continue to follow/assist with patient return call.       Belkis Gordon RDN, LD  [phone]

## 2024-08-08 ENCOUNTER — PATIENT MESSAGE (OUTPATIENT)
Dept: FAMILY MEDICINE CLINIC | Age: 54
End: 2024-08-08

## 2024-08-08 DIAGNOSIS — E11.69 TYPE 2 DIABETES MELLITUS WITH OTHER SPECIFIED COMPLICATION, WITHOUT LONG-TERM CURRENT USE OF INSULIN (HCC): Primary | ICD-10-CM

## 2024-08-08 RX ORDER — KETOROLAC TROMETHAMINE 30 MG/ML
INJECTION, SOLUTION INTRAMUSCULAR; INTRAVENOUS
Qty: 1 EACH | Refills: 0 | Status: SHIPPED | OUTPATIENT
Start: 2024-08-08

## 2024-08-08 NOTE — CARE COORDINATION
Patient returned RD call and left  requesting a call back in the afternoon. RD contacted Favian Vizcaino and left voicemail regarding Dietitian referral. Left call back number and will follow up as appropriate.         Belkis Gordon RDN, LD  [phone]

## 2024-08-08 NOTE — TELEPHONE ENCOUNTER
From: Favian Vizcaino  To: Dr. Clary Chamorro  Sent: 8/8/2024 12:28 PM EDT  Subject: Freestyle Trini 3 sensor    Hi I recently got the new prescription for the Freestyle Trini 3 sensor and unfortunately my mobile phone does not support the steffi. Am I able to get a prescription for reader necessary to use the new sensor?

## 2024-08-09 ENCOUNTER — CARE COORDINATION (OUTPATIENT)
Dept: CARE COORDINATION | Age: 54
End: 2024-08-09

## 2024-08-09 NOTE — CARE COORDINATION
supplement a meal. Patient asked specifically about Boost and Ensure- RD discussed choosing Boost Glucose Control or Ensure HP. RD will mail Glucerna and Ensure coupons. Reviewed the importance of taking medicine as directed and checking BS daily. Patient states he has the Freestyle Trini 3 sensor and he is waiting to get reader for sensor. RD noted patient's current A1C is 9.0% as of 7/25/24. RD discussed the importance of proper nutrition and exercise such as walking to help manage BS. Explained the importance of making small changes and the big impact these changes will have with time. Patient has no nutrition related questions or concerns at this time. RD offered to mail educational handouts to pt to reinforce concepts discussed during phone conversation, pt accepted and RD verified address.    Nutrition Diagnosis:  #1 Problem Altered nutrition-related lab values: A1C       Etiology related to uncontrolled type 2 DM       Signs/Symptoms as evidenced by A1C 9.0% as of 7/25/24    #2 Problem Food and nutrition-related knowledge deficit       Etiology related to lack of prior nutrition related education       Signs/Symptoms as evidenced by elevated A1C and referral for DM nutrition education     Nutrition Intervention:     Estimated Needs  diabetic diet providing 1800 kcals ([place] [address]. Jeor based on AdBW for obesity class II).    Estimated daily CHO Needs: 250 g (based on 45-65% total calorie intake)  Estimated daily Protein Needs: 63-79 g (based on 0.8-1.0 g/kg based on AdBW)  Estimated daily Fluid Needs: per MD    #1 Nutrition Information: Provided patient with Meal Planner DM, Healthy Food Choices for DM, Smart Snacks ADA, Type 2 Diabetes Nutrition Therapy, Hyperglycemia, Hypoglycemia, Understanding A1C handouts. For reinforcement of concepts discussed during nutrition interview.     #2 Nutrition Counseling: Used open-ended questions to assess patients perceived susceptibility and severity of disease state.

## 2024-08-12 DIAGNOSIS — E11.69 TYPE 2 DIABETES MELLITUS WITH OTHER SPECIFIED COMPLICATION, WITHOUT LONG-TERM CURRENT USE OF INSULIN (HCC): Primary | ICD-10-CM

## 2024-08-12 RX ORDER — DAPAGLIFLOZIN 10 MG/1
10 TABLET, FILM COATED ORAL EVERY MORNING
Qty: 30 TABLET | Refills: 5 | Status: SHIPPED | OUTPATIENT
Start: 2024-08-12

## 2024-08-13 ENCOUNTER — CARE COORDINATION (OUTPATIENT)
Dept: CARE COORDINATION | Age: 54
End: 2024-08-13

## 2024-08-13 NOTE — CARE COORDINATION
Warm handoff given to LUANNE Escalante who will follow with next outreach call.  This LUANNE will sign off today.

## 2024-08-13 NOTE — CARE COORDINATION
Initial Contact Social Work Note - Ambulatory  8/13/2024      Date of referral: 8/12/2024  Referral received from: BENEDICT Cavanaugh  Reason for referral: transportation    Previous SW referral: No  If yes, brief summary of outcome: N/A    Two Identifiers Verified: Yes    Insurance Provider: [employer]    Support System:   minimal support     Status:  no    Community Providers:  no    ADL Assistance Needed: N/A    Housing/Living Concerns or Home Modification Needs: Housekeeping assistance    Transportation Concern: yes    Medication Cost Concern: no    Medication Adherence Concern: sometimes, pt has pill case but is agreeable to referral to [facility]    Financial Concern(s): no    Income (only if applicable): ssdi/1272    Ability to Read/Write: Yes    Advance Care Plan:  N/A    Other: N/A    Identified Needs:  Transportation resources  Housekeeping assistance  Prepackaged medications    Social Work Plan:  Reviewed local transportation and  [employer], pt is not interested at this time  [employer] III housekeeping service only provides services to 60 and older  Reviewed local private pay options for housekeeping  Made referral to [facility] for prepackaged medications  Next Steps: SWCC to f/u    Method of Communication With Provider (if appropriate): Chart Routing       Goals Addressed    None      SWCC made call to pt, introduced self, role and completed SW assessment. Pt is in need of transportation resources. Reviewed  [employer], and [employer] although pt is not interested in using either at this time. Pt is in need of housekeeping assistance, reviewed that Title III housekeeping services is for residents 60 and older. Reviewed local private pay cleaning agencies. Pt is agreeable to referral to [facility] for prepackaged medications. Pt did not have any other needs at this time. SWCC to f/u.    Made referral to [facility] for prepackaged medications.  Alesha Mace MSW, LSW   Care Coordinator  [phone]

## 2024-08-16 VITALS
OXYGEN SATURATION: 98 % | WEIGHT: 230.3 LBS | DIASTOLIC BLOOD PRESSURE: 83 MMHG | HEART RATE: 96 BPM | BODY MASS INDEX: 38.32 KG/M2 | SYSTOLIC BLOOD PRESSURE: 118 MMHG

## 2024-08-21 ENCOUNTER — CARE COORDINATION (OUTPATIENT)
Dept: CARE COORDINATION | Age: 54
End: 2024-08-21

## 2024-08-21 NOTE — CARE COORDINATION
CC made f/u call to pt, unable to reach, left message requesting call back to this [facility].    Alesha Mace MSW, LSW   Care Coordinator  [phone]

## 2024-08-22 ENCOUNTER — CARE COORDINATION (OUTPATIENT)
Dept: CASE MANAGEMENT | Age: 54
End: 2024-08-22

## 2024-08-22 NOTE — CARE COORDINATION
-Remote Alert Monitoring Note  Date/Time:  2024 11:19 AM  Patient Current Location: [place]  Verified patients name and  as identifiers.    Rpm alert to be reviewed by the provider   red alert  glucose reading (314)  Vitals Recheck glucose reading (286)  Additional needs to be addressed by provider: No         Background: Asthma, COPD, DM, HTN  Refer to 911 immediately if:  Patient unresponsive or unable to provide history  Change in cognition or sudden confusion  Patient unable to respond in complete sentences  Intense chest pain/tightness  Any concern for any clinical emergency  Red Alert: Provider response time of 1 hr required for any red alert requiring intervention  Yellow Alert: Provider response time of 3hr required for any escalated yellow alert  Patient Chief Complaint:  Hyperglycemia:none  Clinical Interventions: Reviewed and followed up on alerts and treatments-Patient has elevated Glucose of 314. Patient denies any hyperglycemic S/S. Patient takes Farxiga 10 mg daily. Rechecked Glucose at 286.       For any new or worsening symptoms or you are concerned in anyway, please contact your Provider, Call 911 or report to the nearest Emergency Room. Expresses Understanding    Plan/Follow Up: Will continue to review, monitor and address alerts with follow up based on severity of symptoms and risk factors.

## 2024-08-30 ENCOUNTER — CARE COORDINATION (OUTPATIENT)
Dept: CARE COORDINATION | Age: 54
End: 2024-08-30

## 2024-08-30 NOTE — CARE COORDINATION
Favian Vizcaino  8/30/2024    Registered Dietitian Progress Note for Care Coordination    Assessment: Favian is a 54 y.o. male.  RD referred for Diabetes. RD spoke with patient for initial nutrition assessment on 8/9/24. RD called to follow up with pt today 8/30/24. RD discussed previous goals with pt. Patient states he received the handouts and coupons in the mail. RD reiterated the importance of eating 3 meals/day, taking medicine as directed and monitoring BS daily. Patient states he is trying to eat more consistently- no examples of meals provided per patient. RD reviewed the components of a balanced meal using MyPlate. Explained the importance of incorporating a protein source to each meal- reviewed protein sources and provided examples. Patient states he has been adding protein to his meals. RD acknowledged this. Reiterated the importance of not skipping meals- discussed supplementing with Glucerna as needed. Patient states he has been drinking Ensure Original but plans to buy Glucerna with the coupons. RD discussed supplementing with Glucerna or Ensure HP instead of Ensure Original. Patient verbalizes understanding. Patient has the Freestyle Trini 3. Patient states this morning his  mg/dL. RD reviewed BS and A1C goals per ADA. Patient has no nutrition related questions or concerns at this time.     Barriers to meeting goals: overwhelmed by complexity of regimen and lack of education      Nutrition Monitoring and Evaluation  Indicator/Goal Criteria Progress   #1 Eat balanced meals consistently throughout the day.  #1 Aim for 3 meals/day and make meals balanced using MyPlate.  #1 Patient is eating more consistently and incorporating a protein source to each meal.    #2  Supplement with ONS as needed. Do not skip meals.  #2  Utilize ONS coupons. Supplement with Glucerna, Boost Glucose Control or Ensure HP as needed.  #2 Patient will buy Glucerna with coupons and drink as needed.    #3  Monitor BS daily and

## 2024-08-30 NOTE — CARE COORDINATION
CC made f/u call to pt, unable to reach, left message requesting call back to this [facility] with contact info provided.    Alesha Mace MSW, LSW   Care Coordinator  [phone]

## 2024-09-09 ENCOUNTER — CARE COORDINATION (OUTPATIENT)
Dept: CARE COORDINATION | Age: 54
End: 2024-09-09

## 2024-09-12 ENCOUNTER — CARE COORDINATION (OUTPATIENT)
Dept: CARE COORDINATION | Age: 54
End: 2024-09-12

## 2024-09-20 ENCOUNTER — CARE COORDINATION (OUTPATIENT)
Dept: CARE COORDINATION | Age: 54
End: 2024-09-20

## 2024-09-23 ENCOUNTER — CARE COORDINATION (OUTPATIENT)
Dept: CASE MANAGEMENT | Age: 54
End: 2024-09-23

## 2024-09-26 ENCOUNTER — CARE COORDINATION (OUTPATIENT)
Dept: CASE MANAGEMENT | Age: 54
End: 2024-09-26

## 2024-09-26 DIAGNOSIS — J44.1 CHRONIC OBSTRUCTIVE PULMONARY DISEASE WITH ACUTE EXACERBATION (HCC): Chronic | ICD-10-CM

## 2024-09-26 RX ORDER — ALBUTEROL SULFATE 90 UG/1
2 INHALANT RESPIRATORY (INHALATION) EVERY 6 HOURS PRN
Qty: 18 EACH | Refills: 3 | Status: SHIPPED | OUTPATIENT
Start: 2024-09-26

## 2024-10-01 RX ORDER — FLUTICASONE FUROATE, UMECLIDINIUM BROMIDE AND VILANTEROL TRIFENATATE 200; 62.5; 25 UG/1; UG/1; UG/1
POWDER RESPIRATORY (INHALATION)
Qty: 60 EACH | Refills: 2 | Status: SHIPPED | OUTPATIENT
Start: 2024-10-01

## 2024-10-08 ENCOUNTER — CARE COORDINATION (OUTPATIENT)
Dept: OTHER | Facility: CLINIC | Age: 54
End: 2024-10-08

## 2024-10-08 NOTE — CARE COORDINATION
-Remote Alert Monitoring Note      Date/Time:  10/8/2024 9:40 AM  Background: COPD, DM, HTN, Asthma      RPM RN is outreaching this patient for red-alert r/t weight (3 lb increase in 1 day). No answer. HIPAA compliant CM left asking for a return call.       SETH Ding, RN  Associate Care Manager   Cell: [phone]  [email or web address]

## 2024-10-09 ENCOUNTER — CARE COORDINATION (OUTPATIENT)
Dept: CASE MANAGEMENT | Age: 54
End: 2024-10-09

## 2024-10-17 ENCOUNTER — CARE COORDINATION (OUTPATIENT)
Dept: CASE MANAGEMENT | Age: 54
End: 2024-10-17

## 2024-10-18 ENCOUNTER — CARE COORDINATION (OUTPATIENT)
Dept: CASE MANAGEMENT | Age: 54
End: 2024-10-18

## 2024-10-18 DIAGNOSIS — G47.33 OSA (OBSTRUCTIVE SLEEP APNEA): ICD-10-CM

## 2024-10-18 DIAGNOSIS — F17.200 SMOKER: ICD-10-CM

## 2024-10-18 RX ORDER — LISINOPRIL 40 MG/1
TABLET ORAL
Qty: 30 TABLET | Refills: 5 | Status: SHIPPED | OUTPATIENT
Start: 2024-10-18

## 2024-10-18 RX ORDER — TAMSULOSIN HYDROCHLORIDE 0.4 MG/1
0.4 CAPSULE ORAL DAILY
Qty: 30 CAPSULE | Refills: 5 | Status: SHIPPED | OUTPATIENT
Start: 2024-10-18

## 2024-10-18 RX ORDER — OMEPRAZOLE 40 MG/1
CAPSULE, DELAYED RELEASE ORAL
Qty: 30 CAPSULE | Refills: 5 | Status: SHIPPED | OUTPATIENT
Start: 2024-10-18

## 2024-10-18 NOTE — CARE COORDINATION
Remote Patient Monitoring Note      Date/Time:  10/18/2024 10:36 AM  Patient Current Location: [facility] noted red alert in RPM for weight increase.   Background: enrolled in RPM for COPD DM HTN AND ASTHMA  Clinical Interventions: Reviewed and followed up on alerts and treatments-Rpm red alert for weight increase. Pt had an inaccurate reading of 20.2 pounds on 10/16/23.  Removed innaccurate reading in HRS. Pt is at baseline weight today of 231.9#   no call   Plan/Follow Up: Will continue to review, monitor and address alerts with follow up based on severity of symptoms and risk factors.

## 2024-10-23 ENCOUNTER — CARE COORDINATION (OUTPATIENT)
Dept: CARE COORDINATION | Age: 54
End: 2024-10-23

## 2024-10-23 NOTE — CARE COORDINATION
Ambulatory Care Coordination Note     10/23/2024 3:29 PM     ACM outreach attempt by this ACM today to perform care management follow up . ACM was unable to reach the patient by telephone today; left voice message requesting a return phone call to this ACM.  letter mailed requesting patient to contact this ACM.      ACM: Afshan Escalante RN     Care Summary Note:   NA    PCP/Specialist follow up:   Future Appointments         Provider Specialty Dept Phone    10/25/2024 10:15 AM Clary Chamorro MD Family Medicine [phone]            Follow Up:   Plan for next ACM outreach in approximately 1 week to complete:  - CC Protocol assessments  - disease specific assessments  - medication review  - goal progression  - education   - RPM.

## 2024-10-24 NOTE — GROUP NOTE
Group Therapy Note    Date: 8/15/2022    Group Start Time: 1010  Group End Time: 1050  Group Topic: Psychoeducation    [facility]SE ACUTE [facility] 1    KAMLESH Dodd                                                                        Group Therapy Note      Type of Group: Psychoeducation    Wellness Binder Information  Module Name:  the power of positivity. Patient's Goal:  Patient will be able to id steps to change ones mindset to be positive. Notes: Patient able to participate in dice game of answering positive prompts, and willing to share how he/she is going to make an active decision to be positive. Status After Intervention:  Improved    Participation Level:  Active Listener and Interactive    Participation Quality: Appropriate, Attentive, Sharing, and Supportive      Speech:  normal      Thought Process/Content: Logical      Affective Functioning: Congruent      Mood: euthymic      Level of consciousness:  Alert, Oriented x4, and Attentive      Response to Learning: Able to verbalize/acknowledge new learning, Able to retain information, and Progressing to goal      Endings: None Reported    Modes of Intervention: Education, Support, Socialization, Exploration, and Problem-solving      Discipline Responsible: Psychoeducational Specialist      Signature:  KAMLESH Dodd            Patient's Goal:  ***    Notes:  ***    Status After Intervention:  {Status After Intervention:936445133}    Participation Level: {Participation Level:036890911}    Participation Quality: {[facility] PARTICIPATION QUALITY:568032768}      Speech:  {[facility] CD_SPEECH:29634}      Thought Process/Content: {Thought Process/Content:663696708}      Affective Functioning: {Affective Functionin}      Mood: {Mood:248289226}      Level of consciousness:  {Level of consciousness:160111758}      Response to Learning: {[facility] Responses to Learnin}      Endings: {[facility] Endings:65401}    Modes of Intervention: {[facility] Modes of Intervention:830433580}      Discipline Responsible: {[facility] Multidisciplinary:958673421}      Signature:  Maged Francis, CTRS [All other systems negative] : All other systems negative [Dizziness] : dizziness [de-identified] : forgetfulness

## 2024-10-25 ENCOUNTER — OFFICE VISIT (OUTPATIENT)
Dept: FAMILY MEDICINE CLINIC | Age: 54
End: 2024-10-25

## 2024-10-25 ENCOUNTER — CARE COORDINATION (OUTPATIENT)
Dept: CARE COORDINATION | Age: 54
End: 2024-10-25

## 2024-10-25 VITALS
TEMPERATURE: 97.3 F | RESPIRATION RATE: 16 BRPM | WEIGHT: 238 LBS | HEIGHT: 64 IN | OXYGEN SATURATION: 96 % | HEART RATE: 92 BPM | SYSTOLIC BLOOD PRESSURE: 122 MMHG | BODY MASS INDEX: 40.63 KG/M2 | DIASTOLIC BLOOD PRESSURE: 82 MMHG

## 2024-10-25 DIAGNOSIS — E11.69 TYPE 2 DIABETES MELLITUS WITH OTHER SPECIFIED COMPLICATION, WITHOUT LONG-TERM CURRENT USE OF INSULIN (HCC): ICD-10-CM

## 2024-10-25 DIAGNOSIS — M54.2 NECK PAIN: ICD-10-CM

## 2024-10-25 DIAGNOSIS — E11.69 TYPE 2 DIABETES MELLITUS WITH OTHER SPECIFIED COMPLICATION, WITHOUT LONG-TERM CURRENT USE OF INSULIN (HCC): Primary | ICD-10-CM

## 2024-10-25 DIAGNOSIS — M47.816 LUMBAR ARTHROPATHY: ICD-10-CM

## 2024-10-25 LAB
ALBUMIN: 3.9 G/DL (ref 3.5–5.2)
ALP BLD-CCNC: 83 U/L (ref 40–129)
ALT SERPL-CCNC: 19 U/L (ref 0–40)
ANION GAP SERPL CALCULATED.3IONS-SCNC: 15 MMOL/L (ref 7–16)
AST SERPL-CCNC: 14 U/L (ref 0–39)
BILIRUB SERPL-MCNC: 0.3 MG/DL (ref 0–1.2)
BUN BLDV-MCNC: 14 MG/DL (ref 6–20)
CALCIUM SERPL-MCNC: 9.2 MG/DL (ref 8.6–10.2)
CHLORIDE BLD-SCNC: 101 MMOL/L (ref 98–107)
CHOLESTEROL, TOTAL: 217 MG/DL
CO2: 21 MMOL/L (ref 22–29)
CREAT SERPL-MCNC: 0.7 MG/DL (ref 0.7–1.2)
GFR, ESTIMATED: >90 ML/MIN/1.73M2
GLUCOSE BLD-MCNC: 127 MG/DL (ref 74–99)
HBA1C MFR BLD: 7.9 % (ref 4–5.6)
HCT VFR BLD CALC: 51.1 % (ref 37–54)
HDLC SERPL-MCNC: 33 MG/DL
HEMOGLOBIN: 16.6 G/DL (ref 12.5–16.5)
LDL CHOLESTEROL: 145 MG/DL
MCH RBC QN AUTO: 29.6 PG (ref 26–35)
MCHC RBC AUTO-ENTMCNC: 32.5 G/DL (ref 32–34.5)
MCV RBC AUTO: 91.3 FL (ref 80–99.9)
PDW BLD-RTO: 13.4 % (ref 11.5–15)
PLATELET # BLD: 169 K/UL (ref 130–450)
PMV BLD AUTO: 11.4 FL (ref 7–12)
POTASSIUM SERPL-SCNC: 4.3 MMOL/L (ref 3.5–5)
RBC # BLD: 5.6 M/UL (ref 3.8–5.8)
SODIUM BLD-SCNC: 137 MMOL/L (ref 132–146)
TOTAL PROTEIN: 6.7 G/DL (ref 6.4–8.3)
TRIGL SERPL-MCNC: 193 MG/DL
VLDLC SERPL CALC-MCNC: 39 MG/DL
WBC # BLD: 12 K/UL (ref 4.5–11.5)

## 2024-10-25 NOTE — PROGRESS NOTES
Future  -     Hemoglobin A1C; Future  -     Lipid Panel; Future    Neck pain  -     External Referral To Physical Therapy    Lumbar arthropathy  -     External Referral To Physical Therapy    Other orders  -     Influenza, FLUCELVAX Trivalent, (age 6 mo+) IM, Preservative Free, 0.5mL      1. Type 2 diabetes mellitus with other specified complication, without long-term current use of insulin (HCC)  -     Comprehensive Metabolic Panel; Future  -     CBC; Future  -     Lipid Panel; Future  -     Hemoglobin A1C; Future  -     Comprehensive Metabolic Panel; Future  -     CBC; Future  -     Hemoglobin A1C; Future  -     Lipid Panel; Future  2. Neck pain  -     External Referral To Physical Therapy  3. Lumbar arthropathy  -     External Referral To Physical Therapy    No follow-ups on file.         Subjective   History of Present Illness  The patient is a 54-year-old male who presents for a follow-up of diabetes.    He reports that his blood sugar levels have been slightly elevated but has been managing them effectively. He has made dietary changes, including eliminating sugar from his diet. He is currently on Farxiga and Januvia for diabetes management. His sleep schedule is irregular, often going to bed around 3 or 4 in the morning. He has discontinued the use of Depakote and reports no recent episodes of anger outbursts.    He is experiencing persistent back pain, which has not improved. The pain extends throughout his back and was particularly severe in his neck recently. He is unsure if this is due to poor posture or an unsuitable sleeping surface. He also reports significant stiffness, which he attributes to prolonged sitting. He has been attempting to alleviate this with morning stretches.    Review of Systems:  Constitutional:  No fever, no fatigue, no chills, no headaches, no weight change  Dermatology:  No rash, no mole, no dry or sensitive skin  ENT:  No cough, no sore throat, no sinus pain, no runny nose, no

## 2024-10-25 NOTE — CARE COORDINATION
Favian Vizcaino  10/25/2024    Registered Dietitian Progress Note for Care Coordination    Assessment: Favian is a 54 y.o. male. RD referred for Diabetes. RD spoke with patient for initial nutrition assessment on 8/9/24 and has been following up with patient. RD called to follow up with pt today 10/25/24. RD discussed previous goals with pt. Patient states \"everything is going good so far.\" Patient states he went out of town and \"slipped up for a few days\" but he is getting back on track. RD reiterated the importance of eating balanced meals consistently and not skipping meals. Patient states he is eating 2 meals/day (lunch and dinner) and drinking Glucerna in the morning for breakfast. RD acknowledged this. No examples of meals provided per patient. Reviewed the components of a balanced meal using MyPlate. Reiterated the importance of taking medicine as directed and checking BS daily. Patient states his FBS is running between 160-170 mg/dL. Per chart review, patient had OV with PCP today and he got labs drawn including A1C check. RD noted patient's A1C order is active in process- PCP noted \"His A1c is likely below 9, possibly in the high 7s.\" Patient has no nutrition related questions or concerns at this time.     Barriers to meeting goals: overwhelmed by complexity of regimen and lack of education        Nutrition Monitoring and Evaluation  Indicator/Goal Criteria Progress   #1 Eat balanced meals consistently throughout the day.  #1 Aim for 3 meals/day and make meals balanced using MyPlate.  #1 Patient is  eating 2 meals/day (lunch and dinner). Patient will make meals more balanced using MyPlate.    #2  Supplement with ONS as needed. Do not skip meals.  #2  Utilize ONS coupons. Supplement with Glucerna, Boost Glucose Control or Ensure HP as needed.  #2 Patient is drinking Glucerna as needed for breakfast.    #3  Monitor BS daily and take medicine as directed.  #3 Check BS daily and keep a log. Correct appropriately as

## 2024-10-30 DIAGNOSIS — M54.2 NECK PAIN: Primary | ICD-10-CM

## 2024-10-30 DIAGNOSIS — M47.816 LUMBAR ARTHROPATHY: ICD-10-CM

## 2024-10-30 RX ORDER — ATORVASTATIN CALCIUM 40 MG/1
40 TABLET, FILM COATED ORAL DAILY
Qty: 90 TABLET | Refills: 3 | Status: SHIPPED | OUTPATIENT
Start: 2024-10-30

## 2024-11-05 ENCOUNTER — CARE COORDINATION (OUTPATIENT)
Dept: OTHER | Facility: CLINIC | Age: 54
End: 2024-11-05

## 2024-11-05 NOTE — CARE COORDINATION
11/5/2024 8:33 AM  *  Unable to Reach Date/Time:  11/5/2024 8:33 AM  ACM attempted to reach patient by telephone regarding red alert r/t BP in remote patient monitoring program. Left HIPAA compliant message requesting a return call. Will attempt to reach patient again.         SETH Ding, RN  Associate Care Manager   Cell: [phone]  [email or web address]

## 2024-11-07 ENCOUNTER — CARE COORDINATION (OUTPATIENT)
Dept: CARE COORDINATION | Age: 54
End: 2024-11-07

## 2024-11-07 ENCOUNTER — CARE COORDINATION (OUTPATIENT)
Dept: EMERGENCY DEPT | Age: 54
End: 2024-11-07

## 2024-11-07 NOTE — CARE COORDINATION
Date/Time:  11/7/2024 12:18 PM  LPN attempted to reach patient by telephone regarding red alert for /105 in remote patient monitoring program. Left HIPAA compliant message requesting a return call. Will attempt to reach patient again.

## 2024-11-07 NOTE — CARE COORDINATION
Date/Time:  11/7/2024 3:02 PM  LPN made second attempt to reach patient by telephone regarding red alert fo /105 in remote patient monitoring program. Left HIPAA compliant message requesting a return call. Will attempt to reach patient again.

## 2024-11-13 ENCOUNTER — EVALUATION (OUTPATIENT)
Dept: PHYSICAL THERAPY | Age: 54
End: 2024-11-13

## 2024-11-13 DIAGNOSIS — M47.816 LUMBAR ARTHROPATHY: ICD-10-CM

## 2024-11-13 DIAGNOSIS — M54.2 NECK PAIN: Primary | ICD-10-CM

## 2024-11-13 NOTE — PROGRESS NOTES
[facility]          Phone: [phone] Fax: [phone]    Physical Therapy Daily Treatment Note  Date:  2024    Patient Name:  Favian Vizcaino    :  1970  MRN: [id number]    Evaluating therapist: Anita Fischer, PT, DPT  [id number]    Restrictions/Precautions:      Diagnosis:     Diagnosis Orders   1. Neck pain        2. Lumbar arthropathy          Treatment Diagnosis:    Insurance/Certification information:  [employer]  Referring Physician:  Clary Chamorro MD  Plan of care signed (Y/N):    Visit# / total visits:    Pain level: 7-8/10   Time In:  1500  Time Out:  1535    Subjective:  See initial evaluation    Exercises:  Exercise/Equipment Resistance/Repetitions Other comments            Upper trap stretch 3x 20 sec ea side      Levator scapulae stretch 3x 20 sec ea side Gentle UE over pressure fron contralateral     Seated cat/cow 10x      Seated HS stretch 3x 30 sec ea side                                                                                                           Other:  Pt tolerated introduction of TE's for HEP with good form and understanding demonstrated. Pt noted slight decrease in sensation of stiffness after performing.    Home Exercise Program:  upper trap stretch, levator scapulae stretch, seated cat/cow, seated HS stretch    Manual Treatments:      Modalities:       Time-in Time-out Total Time   11599  Evaluation Low Complexity      44840  Evaluation Med Complexity 1500 1520 20   49650  Evaluation High Complexity      60180  Ther Ex 1520 1535 15   83137  Neuro Re-ed        34309  Ther Activities        44463  Manual Therapy       13136  E-stim       53579  Ultrasound            Session 1500 1535 35       Treatment/Activity Tolerance:  [x] Patient tolerated treatment well [] Patient limited by fatigue  [] Patient limited by pain  [] Patient limited by other medical complications  [] Other:     Prognosis: [] Good [x] Fair  []

## 2024-11-13 NOTE — PROGRESS NOTES
[facility]   Phone: [phone]   Fax: [phone]    Patient: Favian Vizcaino  : 1970  MRN: [id number]  Referring Provider: Clary Chamorro MD  [address] [place] [address], [address] [place] [address]     Medical Diagnosis:      Diagnosis Orders   1. Neck pain        2. Lumbar arthropathy             SUBJECTIVE:     Onset date: 2024 pt began to have acute flare up of pain. Pt was taking Trulicity and felt he overall was not responding well. Pt notes postural deviations described as \"slouching\" and reports purchasing a new mattress which he feels may be too soft and could have contributed to onset of symptoms.    Onset:: Unknown    Mechanism of Injury: See above    Previous PT: yes - helped    Medical Management for Current Problem:  none    Chief complaint: pain, decreased mobility, weakness, difficulty with stairs, limited ability to complete home/outdoor chores/tasks, difficulty with lifting/carrying. Generalized weakness.    Behavior: condition is getting worse    Pain: constant  Current: 7-8/10     Best: 3/10     Worst:10/10    Symptom Type/Quality: sharp  Location:: Neck: right lateral neck, R thoracic spine, R lumbar      Provoking Activities/Positions:  movement                 Relieving Activities/Positions:   exercise- light movement     Disturbed Sleep: yes    Imaging results: No results found.    Past Medical History:  Past Medical History:   Diagnosis Date    Abdominal pain     nausea    Allergic rhinitis     Anxiety     Asthma     stable    Bipolar 1 disorder (HCC)     COPD (chronic obstructive pulmonary disease) ([facility])     controlled with inhalers    Depression     no meds    Diabetes mellitus ([facility])     Encounter for screening colonoscopy     and EGD 21    GERD (gastroesophageal reflux disease)     Hyperlipidemia     Hypertension     Obesity     Osteoarthritis     Perforation of left tympanic membrane     Sleep apnea     BMS CPAP 7, not using     Past

## 2024-11-20 ENCOUNTER — TREATMENT (OUTPATIENT)
Dept: PHYSICAL THERAPY | Age: 54
End: 2024-11-20

## 2024-11-20 DIAGNOSIS — M54.2 NECK PAIN: Primary | ICD-10-CM

## 2024-11-20 DIAGNOSIS — M47.816 LUMBAR ARTHROPATHY: ICD-10-CM

## 2024-11-20 NOTE — PROGRESS NOTES
[facility]          Phone: [phone] Fax: [phone]    Physical Therapy Daily Treatment Note  Date:  2024    Patient Name:  Favian Vizcaino    :  1970  MRN: [id number]    Evaluating therapist: Anita Fischer, PT, DPT  [id number]    Restrictions/Precautions:      Diagnosis:     Diagnosis Orders   1. Neck pain        2. Lumbar arthropathy            Treatment Diagnosis:    Insurance/Certification information:  [employer]  Referring Physician:  Clary Chamorro MD  Plan of care signed (Y/N):  yes  Visit# / total visits:    Pain level: 2/10    Time In:  1330  Time Out:  1400    Subjective:  Pt reports cervical pain is significantly reduced but has variable lumbar pain still. He reports good compliance with HEP and feels cervical stretches were very beneficial but lumbar exercises were difficult.    Exercises:  Exercise/Equipment Resistance/Repetitions Other comments            Upper trap stretch 3x 20 sec ea side      Levator scapulae stretch 3x 20 sec ea side Gentle UE over pressure fron contralateral     Mid rows 10x RTB     Resisted ER 10x ea UE RTB     Corner stretch  45 sec x3 reps           Seated cat/cow      Seated HS stretch 3x 30 sec ea side      Supine TA bracing 10x 5 sec holds      Bridges 5x2 2 sec hold      SLR with TA bracing 10x 3 sec hold ea side Increased lumbar pain to 6-7/10     Trunk rotation stretch TBD                                                                                      Other: Pt tolerated progression of upper body postural retraining without c/o pain. Pt had increased pain with all lumbar/core TE's.     Home Exercise Program:  upper trap stretch, levator scapulae stretch, seated cat/cow, seated HS stretch    Manual Treatments:      Modalities:       Time-in Time-out Total Time   42262  Evaluation Low Complexity      07277  Evaluation Med Complexity      50088  Evaluation High Complexity      31254  Ther Ex 1330

## 2024-11-22 ENCOUNTER — TREATMENT (OUTPATIENT)
Dept: PHYSICAL THERAPY | Age: 54
End: 2024-11-22

## 2024-11-22 ENCOUNTER — CARE COORDINATION (OUTPATIENT)
Dept: CARE COORDINATION | Age: 54
End: 2024-11-22

## 2024-11-22 DIAGNOSIS — M54.2 NECK PAIN: Primary | ICD-10-CM

## 2024-11-22 DIAGNOSIS — M47.816 LUMBAR ARTHROPATHY: ICD-10-CM

## 2024-11-22 NOTE — PROGRESS NOTES
[facility]          Phone: [phone] Fax: [phone]    Physical Therapy Daily Treatment Note  Date:  2024    Patient Name:  Favian Vizcaino    :  1970  MRN: [id number]    Evaluating therapist: Anita Fischer, PT, DPT  [id number]    Restrictions/Precautions:      Diagnosis:     Diagnosis Orders   1. Neck pain        2. Lumbar arthropathy              Treatment Diagnosis:    Insurance/Certification information:  [employer]  Referring Physician:  Clary Chamorro MD  Plan of care signed (Y/N):  yes  Visit# / total visits:  3/8  Pain level: 2/10    Time In:  1505  Time Out:  1535    Subjective: Pt reports pain overall has decreased significantly and improved quality of sleep last night.    Exercises:  Exercise/Equipment Resistance/Repetitions Other comments            Upper trap stretch 3x 20 sec ea side      Levator scapulae stretch 3x 20 sec ea side Gentle UE over pressure fron contralateral     Mid rows 15x GTB     B UE ext 10x GTB     Resisted ER 10x ea UE GTB     Corner stretch  45 sec x3 reps           Seated cat/cow      Seated HS stretch 3x 30 sec ea side      Supine TA bracing 5x2 5 sec holds      Bridges 5x2 2 sec hold      SLR with TA bracing 10x 3 sec hold ea side      Trunk rotation stretch 5x ea side 10 sec holds                       Bike 5 min UBE+LEs                                                             Other: Pt tolerated all TE's this date including introduction of trunk rotation stretch and progression of UE strengthening. During all upper body postural TE's pt cued for TA engagement to maximize core and lumbar support. Pt had no increase in symptoms this session.    Home Exercise Program:  upper trap stretch, levator scapulae stretch, seated cat/cow, seated HS stretch    Manual Treatments:      Modalities:       Time-in Time-out Total Time   04890  Evaluation Low Complexity      25138  Evaluation Med Complexity      98801

## 2024-11-22 NOTE — CARE COORDINATION
Contacted Favian Vizcaino and left voicemail regarding Dietitian follow up. Left call back number and will follow up as appropriate.       Belkis Gordon RDN, LD  [phone]

## 2024-11-25 ENCOUNTER — CARE COORDINATION (OUTPATIENT)
Dept: CARE COORDINATION | Age: 54
End: 2024-11-25

## 2024-11-25 NOTE — CARE COORDINATION
Contacted Favian Vizcaino and left voicemail regarding Dietitian follow up. Left call back number and will follow up as appropriate.         Belkis Gordon RDN, LD  [phone]

## 2024-12-02 ENCOUNTER — CARE COORDINATION (OUTPATIENT)
Dept: CARE COORDINATION | Age: 54
End: 2024-12-02

## 2024-12-02 NOTE — CARE COORDINATION
Contacted Favian Vizcaino and left voicemail regarding Dietitian follow up. Left call back number. RD spoke with patient for initial nutrition assessment on 8/9/24 and has been following up with patient. RD outreached 11/22/24, 11/25/24 and today 12/2/24- left VM all three outreaches. No additional outreach attempts scheduled at this time. Per chart review, patient's A1C has improved. RD noted patient's current A1C is 7.9% as of 10/25/24 and patient's previous A1C was 9.0% on 7/25/24. RD will continue to follow/assist with patient return call.       Belkis Gordon RDN, LD  [phone]

## 2024-12-04 RX ORDER — ACYCLOVIR 800 MG/1
TABLET ORAL
Qty: 2 EACH | Refills: 3 | Status: SHIPPED | OUTPATIENT
Start: 2024-12-04

## 2024-12-04 RX ORDER — FLUTICASONE FUROATE, UMECLIDINIUM BROMIDE AND VILANTEROL TRIFENATATE 200; 62.5; 25 UG/1; UG/1; UG/1
1 POWDER RESPIRATORY (INHALATION) DAILY
Qty: 60 EACH | Refills: 2 | Status: SHIPPED | OUTPATIENT
Start: 2024-12-04

## 2024-12-13 ENCOUNTER — CARE COORDINATION (OUTPATIENT)
Dept: CARE COORDINATION | Age: 54
End: 2024-12-13

## 2024-12-13 NOTE — CARE COORDINATION
Ambulatory Care Coordination Note     12/13/2024 5:06 PM     ACM outreach attempt by this ACM today to perform care management follow up . ACM was unable to reach the patient by telephone today;   left voice message requesting a return phone call to this ACM.     ACM: Afshan Escalante RN     Care Summary Note:   NA    PCP/Specialist follow up:   Future Appointments         Provider Specialty Dept Phone    1/28/2025 10:30 AM Clary Chamorro MD Family Medicine [phone]            Follow Up:   Plan for next ACM outreach in approximately 1 week to complete:  - CC Protocol assessments  - disease specific assessments  - medication review  - goal progression  - education   - RPM.

## 2024-12-26 ENCOUNTER — CARE COORDINATION (OUTPATIENT)
Dept: CASE MANAGEMENT | Age: 54
End: 2024-12-26

## 2024-12-26 NOTE — CARE COORDINATION
-Remote Alert Monitoring Note      Date/Time:  2024 2:43 PM  Patient Current Location: Home: [address] [place] [address]  Verified patients name and  as identifiers.    Rpm alert to be reviewed by the provider   red alert  blood pressure reading (127/116) and blood pressure heart rate (135)  Vitals Recheck blood pressure reading (176/92)  Additional needs to be addressed by provider: No                   LPN contacted patient by telephone regarding red alert received   Background: ENROLLED IN [facility] FOR COPD DM HTN AND ASTHMA  Refer to 911 immediately if:  Patient unresponsive or unable to provide history  Change in cognition or sudden confusion  Patient unable to respond in complete sentences  Intense chest pain/tightness  Any concern for any clinical emergency  Red Alert: Provider response time of 1 hr required for any red alert requiring intervention  Yellow Alert: Provider response time of 3hr required for any escalated yellow alert  Patient Chief Complaint:  Blood Pressure BP Triage  Are you having any Chest Pain? no   Are you having any Shortness of Breath? no   Do you have a headache or have any vision changes? no   Are you having any numbness or tingling? no   Are you having any other health concerns or issues? no  Patient/Caregiver educated on how to properly take a blood pressure. Patient/Caregiver verbalizes understanding.     Clinical Interventions: Reviewed and followed up on alerts and treatments-spoke to   Favian regarding RPM ALERT for high blood pressure. Denies chest pain dyspnea numbness tingling or headache. Pt requesting a new BP meter. Call to [facility] spoke to Em . She will call pt to assist.   Plan/Follow Up: Will continue to review, monitor and address alerts with follow up based on severity of symptoms and risk factors.  **For any new or worsening symptoms or you are concerned in anyway, please contact your Provider or report to the nearest Emergency Room.**

## 2024-12-26 NOTE — CARE COORDINATION
Rpm REVIEWED. No metrics entered for 2 days. Message sent to tablet  Message sent to patient via Patient Connect Portal for Remote Patient Monitoring     RPM Nurse message No readings in two days Hello, Please see an important message from your RPM Team:  This is a reminder that we have not received your readings for two days please enter them as soon as possible.     Please do not respond to this message; If you have a question or concern please call your Ambulatory Care Manager or your Primary Care Physician.

## 2024-12-26 NOTE — CARE COORDINATION
Remote Patient Monitoring Note      Date/Time:  2024 3:10 PM  Patient Current Location: Home: [address] [place] [address]  LPN contacted patient by telephone regarding yellow alert received for blood pressure reading (176/92). Verified patients name and  as identifiers.  Background: enrolled in [facility] for COPD DM HTN AND ASTHMA  Clinical Interventions: Reviewed and followed up on alerts and treatments-noted pt rechecked BP metrics. New reading is 124/79  green alert WNL    Plan/Follow Up: Will continue to review, monitor and address alerts with follow up based on severity of symptoms and risk factors.

## 2024-12-26 NOTE — CARE COORDINATION
-Remote Alert Monitoring Note      Date/Time:  2024 1:18 PM  Patient Current Location: Home: [address] [place] [address]  Verified patients name and  as identifiers.    Rpm alert to be reviewed by the provider   red alert  blood pressure reading (127/116) and blood pressure heart rate (135)  Vitals Recheck n/a  Additional needs to be addressed by provider: No                   LPN contacted patient by telephone regarding red alert received   Background: ENROLLED IN [facility] FOR   Refer to [facility] immediately if:  Patient unresponsive or unable to provide history  Change in cognition or sudden confusion  Patient unable to respond in complete sentences  Intense chest pain/tightness  Any concern for any clinical emergency  Red Alert: Provider response time of 1 hr required for any red alert requiring intervention  Yellow Alert: Provider response time of 3hr required for any escalated yellow alert  Patient Chief Complaint:  Blood Pressure BP Triage  Are you having any Chest Pain? no   Are you having any Shortness of Breath? no   Do you have a headache or have any vision changes? no   Are you having any numbness or tingling? no   Are you having any other health concerns or issues? no  Patient/Caregiver educated on how to properly take a blood pressure. Patient/Caregiver verbalizes understanding.     Clinical Interventions: Reviewed and followed up on alerts and treatments-spoke to pt regarding RPM red alert for high BP AND HR. Denies chest pain or dyspnea. No numbness tingling or headache. Pt has to enter his BP and weight manually.  Called to [facility] Spoke to Nancy. He will call pt to assist   Plan/Follow Up: Will continue to review, monitor and address alerts with follow up based on severity of symptoms and risk factors.  **For any new or worsening symptoms or you are concerned in anyway, please contact your Provider or report to the nearest Emergency Room.**

## 2025-01-06 ENCOUNTER — CARE COORDINATION (OUTPATIENT)
Dept: CASE MANAGEMENT | Age: 55
End: 2025-01-06

## 2025-01-06 NOTE — CARE COORDINATION
Rpm Reviewed. No metrics entered for 2 days. [employer] message sent to pt  Favian Vizcaino , I am a nurse with the remote patient monitoring team;  reaching out to you today to remind you to please take your vitals. Important: Please try to take your vitals each day before 12pm. This ensures the monitoring team will have time to connect with your providers and get back to you with any new orders or instructions.

## 2025-01-07 ENCOUNTER — CARE COORDINATION (OUTPATIENT)
Dept: CARE COORDINATION | Age: 55
End: 2025-01-07

## 2025-01-07 NOTE — CARE COORDINATION
1/7/2025 1:11 PM  *  RPM Verification RPM Verification and Welcome Call Successful? No, This RPM Nurse has attempted x1 day to reach the patient to verify proper patient and equipment without success. Will attempt additional outreaches at a later time.

## 2025-01-09 ENCOUNTER — CARE COORDINATION (OUTPATIENT)
Dept: CASE MANAGEMENT | Age: 55
End: 2025-01-09

## 2025-01-09 NOTE — CARE COORDINATION
-Remote Alert Monitoring Note  Date/Time:  2025 3:08 PM  Patient Current Location: [place]  Verified patients name and  as identifiers.    Rpm alert to be reviewed by the provider   red alert  weight (Weight gain of 6# over night.)  Vitals Recheck   Additional needs to be addressed by provider: No         LPN contacted patient by telephone regarding red alert received   Background: Asthma, COPD, DM, HTN  Refer to 911 immediately if:  Patient unresponsive or unable to provide history  Change in cognition or sudden confusion  Patient unable to respond in complete sentences  Intense chest pain/tightness  Any concern for any clinical emergency  Red Alert: Provider response time of 1 hr required for any red alert requiring intervention  Yellow Alert: Provider response time of 3hr required for any escalated yellow alert    Weight Education Provided to Patient or Caregiver:   Patient to weigh on the same scale at the same time each day  Patient to weigh first thing in the morning, after going to the bathroom; before eating breakfast, and before having anything to drink.  Instructed on importance of  not wearing shoes on the scale, and wearing the same type of clothing each day.    Clinical Interventions: Reviewed and followed up on alerts and treatments-Patient has weight gain of 6# over night.      Attempted to reach patient & ER contact for RPM Red Alert Call. Unable to reach patient.  Left HIPAA Compliant message on Voice Mail to call.  Phone number left on Voice Mail to call back.    Will continue to follow.     Arlene Fullre LPN    [phone]  Critical access hospital / [facility] Coordinator    Plan/Follow Up: Will continue to review, monitor and address alerts with follow up based on severity of symptoms and risk factors.  **For any new or worsening symptoms or you are concerned in anyway, please contact your Provider or report to the nearest Emergency Room.**

## 2025-01-09 NOTE — CARE COORDINATION
-Remote Alert Monitoring Note  Date/Time:  2025 4:03 PM  Patient Current Location: [place]  Verified patients name and  as identifiers.    Rpm alert to be reviewed by the provider   red alert  weight (Weight gain of 6# over night.)  Vitals Recheck   Additional needs to be addressed by provider: No         Attempted to reach patient X 2 and ER Contact Wife, Linda for RPM Red Alert Call. Unable to reach patient.  Left HIPAA Compliant message on Voice Mail to call.  Phone number left on Voice Mail to call back.    ACM notified  Will continue to follow.     Arlene Fuller LPN    [phone]  [facility] / [facility] Coordinator

## 2025-01-22 RX ORDER — TERAZOSIN 2 MG/1
CAPSULE ORAL
Qty: 90 CAPSULE | Refills: 3 | Status: SHIPPED | OUTPATIENT
Start: 2025-01-22

## 2025-01-27 ENCOUNTER — CARE COORDINATION (OUTPATIENT)
Dept: CARE COORDINATION | Age: 55
End: 2025-01-27

## 2025-01-27 DIAGNOSIS — J44.1 CHRONIC OBSTRUCTIVE PULMONARY DISEASE WITH ACUTE EXACERBATION (HCC): Chronic | ICD-10-CM

## 2025-01-27 RX ORDER — ALBUTEROL SULFATE 90 UG/1
2 INHALANT RESPIRATORY (INHALATION) EVERY 6 HOURS PRN
Qty: 18 EACH | Refills: 3 | Status: SHIPPED | OUTPATIENT
Start: 2025-01-27

## 2025-01-27 RX ORDER — SITAGLIPTIN 100 MG/1
100 TABLET, FILM COATED ORAL DAILY
Qty: 90 TABLET | Refills: 1 | Status: SHIPPED | OUTPATIENT
Start: 2025-01-27

## 2025-01-27 SDOH — ECONOMIC STABILITY: INCOME INSECURITY: IN THE LAST 12 MONTHS, WAS THERE A TIME WHEN YOU WERE NOT ABLE TO PAY THE MORTGAGE OR RENT ON TIME?: NO

## 2025-01-27 SDOH — ECONOMIC STABILITY: FOOD INSECURITY: WITHIN THE PAST 12 MONTHS, YOU WORRIED THAT YOUR FOOD WOULD RUN OUT BEFORE YOU GOT MONEY TO BUY MORE.: SOMETIMES TRUE

## 2025-01-27 SDOH — HEALTH STABILITY: PHYSICAL HEALTH: ON AVERAGE, HOW MANY DAYS PER WEEK DO YOU ENGAGE IN MODERATE TO STRENUOUS EXERCISE (LIKE A BRISK WALK)?: 0 DAYS

## 2025-01-27 SDOH — ECONOMIC STABILITY: FOOD INSECURITY: WITHIN THE PAST 12 MONTHS, THE FOOD YOU BOUGHT JUST DIDN'T LAST AND YOU DIDN'T HAVE MONEY TO GET MORE.: SOMETIMES TRUE

## 2025-01-27 SDOH — HEALTH STABILITY: PHYSICAL HEALTH: ON AVERAGE, HOW MANY MINUTES DO YOU ENGAGE IN EXERCISE AT THIS LEVEL?: 0 MIN

## 2025-01-27 ASSESSMENT — PATIENT HEALTH QUESTIONNAIRE - PHQ9
SUM OF ALL RESPONSES TO PHQ QUESTIONS 1-9: 0
SUM OF ALL RESPONSES TO PHQ9 QUESTIONS 1 & 2: 0
SUM OF ALL RESPONSES TO PHQ QUESTIONS 1-9: 0
2. FEELING DOWN, DEPRESSED OR HOPELESS: NOT AT ALL
1. LITTLE INTEREST OR PLEASURE IN DOING THINGS: NOT AT ALL

## 2025-01-27 ASSESSMENT — LIFESTYLE VARIABLES
HOW OFTEN DO YOU HAVE A DRINK CONTAINING ALCOHOL: NEVER
HOW MANY STANDARD DRINKS CONTAINING ALCOHOL DO YOU HAVE ON A TYPICAL DAY: PATIENT DOES NOT DRINK
HOW OFTEN DO YOU HAVE A DRINK CONTAINING ALCOHOL: 1
HOW OFTEN DO YOU HAVE SIX OR MORE DRINKS ON ONE OCCASION: 1
HOW MANY STANDARD DRINKS CONTAINING ALCOHOL DO YOU HAVE ON A TYPICAL DAY: 0

## 2025-01-27 NOTE — CARE COORDINATION
DAY 1, NO METRICS RECEIVED, red empty alert,     2025 2:20 PM  *  RPM Alert   Remote Patient Monitoring Note      Date/Time:  2025 2:21 PM  Patient Current Location: Home: [address] [place] [address]  LPN contacted patient by telephone regarding  DAY 1, NO METRICS RECEIVED  . Verified patients name and  as identifiers.  Background: enrolled in RPM for RPM Care Plans: COPD, Diabetes, HTN, and Asthma    Clinical Interventions: Reviewed and followed up on alerts and treatments-       Called [phone], spoke with Favian Vizcaino   noted the red alert blood pressure reading from yesterday and the patient denies chest pain, shortness of breath, swelling, headache, numbness, or any other concerns. The patient just woke up and will complete his metrics for today very soon.   Patient states no other questions or concerns at this time.    Plan/Follow Up: Will continue to review, monitor and address alerts with follow up based on severity of symptoms and risk factors.

## 2025-01-28 ENCOUNTER — OFFICE VISIT (OUTPATIENT)
Dept: FAMILY MEDICINE CLINIC | Age: 55
End: 2025-01-28

## 2025-01-28 VITALS
SYSTOLIC BLOOD PRESSURE: 126 MMHG | RESPIRATION RATE: 16 BRPM | OXYGEN SATURATION: 96 % | HEART RATE: 92 BPM | HEIGHT: 64 IN | BODY MASS INDEX: 40.1 KG/M2 | DIASTOLIC BLOOD PRESSURE: 82 MMHG | WEIGHT: 234.9 LBS | TEMPERATURE: 97 F

## 2025-01-28 DIAGNOSIS — Z00.00 MEDICARE ANNUAL WELLNESS VISIT, SUBSEQUENT: ICD-10-CM

## 2025-01-28 DIAGNOSIS — R33.9 URINARY RETENTION: ICD-10-CM

## 2025-01-28 DIAGNOSIS — Z87.891 PERSONAL HISTORY OF TOBACCO USE: ICD-10-CM

## 2025-01-28 DIAGNOSIS — E11.69 TYPE 2 DIABETES MELLITUS WITH OTHER SPECIFIED COMPLICATION, WITHOUT LONG-TERM CURRENT USE OF INSULIN (HCC): Primary | ICD-10-CM

## 2025-01-28 LAB — HBA1C MFR BLD: 8.6 %

## 2025-01-28 RX ORDER — LISINOPRIL 40 MG/1
TABLET ORAL
Qty: 90 TABLET | Refills: 1 | Status: SHIPPED | OUTPATIENT
Start: 2025-01-28

## 2025-01-28 RX ORDER — FLUTICASONE PROPIONATE 50 MCG
2 SPRAY, SUSPENSION (ML) NASAL DAILY
Qty: 16 G | Refills: 5 | Status: SHIPPED | OUTPATIENT
Start: 2025-01-28

## 2025-01-28 RX ORDER — DAPAGLIFLOZIN 10 MG/1
10 TABLET, FILM COATED ORAL EVERY MORNING
Qty: 30 TABLET | Refills: 5 | Status: SHIPPED | OUTPATIENT
Start: 2025-01-28

## 2025-01-28 SDOH — ECONOMIC STABILITY: FOOD INSECURITY: WITHIN THE PAST 12 MONTHS, THE FOOD YOU BOUGHT JUST DIDN'T LAST AND YOU DIDN'T HAVE MONEY TO GET MORE.: NEVER TRUE

## 2025-01-28 SDOH — ECONOMIC STABILITY: FOOD INSECURITY: WITHIN THE PAST 12 MONTHS, YOU WORRIED THAT YOUR FOOD WOULD RUN OUT BEFORE YOU GOT MONEY TO BUY MORE.: NEVER TRUE

## 2025-01-28 ASSESSMENT — PATIENT HEALTH QUESTIONNAIRE - PHQ9
SUM OF ALL RESPONSES TO PHQ QUESTIONS 1-9: 0
2. FEELING DOWN, DEPRESSED OR HOPELESS: NOT AT ALL
SUM OF ALL RESPONSES TO PHQ QUESTIONS 1-9: 0
6. FEELING BAD ABOUT YOURSELF - OR THAT YOU ARE A FAILURE OR HAVE LET YOURSELF OR YOUR FAMILY DOWN: NOT AT ALL
8. MOVING OR SPEAKING SO SLOWLY THAT OTHER PEOPLE COULD HAVE NOTICED. OR THE OPPOSITE, BEING SO FIGETY OR RESTLESS THAT YOU HAVE BEEN MOVING AROUND A LOT MORE THAN USUAL: NOT AT ALL
SUM OF ALL RESPONSES TO PHQ QUESTIONS 1-9: 0
4. FEELING TIRED OR HAVING LITTLE ENERGY: NOT AT ALL
SUM OF ALL RESPONSES TO PHQ9 QUESTIONS 1 & 2: 0
3. TROUBLE FALLING OR STAYING ASLEEP: NOT AT ALL
1. LITTLE INTEREST OR PLEASURE IN DOING THINGS: NOT AT ALL
SUM OF ALL RESPONSES TO PHQ QUESTIONS 1-9: 0
5. POOR APPETITE OR OVEREATING: NOT AT ALL
9. THOUGHTS THAT YOU WOULD BE BETTER OFF DEAD, OR OF HURTING YOURSELF: NOT AT ALL
7. TROUBLE CONCENTRATING ON THINGS, SUCH AS READING THE NEWSPAPER OR WATCHING TELEVISION: NOT AT ALL
10. IF YOU CHECKED OFF ANY PROBLEMS, HOW DIFFICULT HAVE THESE PROBLEMS MADE IT FOR YOU TO DO YOUR WORK, TAKE CARE OF THINGS AT HOME, OR GET ALONG WITH OTHER PEOPLE: NOT DIFFICULT AT ALL

## 2025-01-28 NOTE — PROGRESS NOTES
Medicare Annual Wellness Visit    Favian Vizcaino is here for Nasal Congestion (Would like to see about a nasal spray ) and Medicare AWV    Assessment & Plan  1. Diabetes mellitus.  His A1c levels have escalated from 7.9 to 8.6, indicating a need for more effective glycemic control. He has experienced gastrointestinal side effects with both Ozempic and METFORMIN. Prescriptions for Farxiga and Januvia have been renewed and sent to [facility] pharmacy. He will be provided with samples of Farxiga if available.    2. Sinus congestion.  A prescription for fluticasone nasal spray has been issued and sent to [facility] pharmacy.    3. Urinary difficulties.  A referral to a urologist has been initiated for further evaluation and management of his urinary difficulties.    4. Respiratory issues.  His respiratory function remains stable, with no reported wheezing. He uses Trelegy daily and requires albuterol inhaler use a few times daily. He reports an improvement in his respiratory status compared to previous periods.    Follow-up  The patient will follow up in 3 months.    PROCEDURE  Cystoscopy performed approximately 5 years ago revealed no abnormalities.  Type 2 diabetes mellitus with other specified complication, without long-term current use of insulin ([facility])  -     POCT glycosylated hemoglobin (Hb A1C)  -     dapagliflozin (FARXIGA) 10 MG tablet; Take 1 tablet by mouth every morning, Disp-30 tablet, R-5Normal  -     Comprehensive Metabolic Panel; Future  -     CBC; Future  -     Lipid Panel; Future  -     Hemoglobin A1C; Future  Urinary retention  -     AFL - Eduardo Perez MD, Urology, [place]  Personal history of tobacco use  -     CA VISIT TO DISCUSS LUNG CA SCREEN W LDCT  -     CT Lung Screen (Initial/Annual/Baseline); Future  Medicare annual wellness visit, subsequent    Results  Laboratory Studies  A1c is 8.6.     Return for Medicare Annual Wellness Visit in 1 year.     Subjective     History of Present Illness  The patient

## 2025-01-28 NOTE — PATIENT INSTRUCTIONS
smoke too.     Stay at a weight that's healthy for you. Talk to your doctor if you need help losing weight.     Try to get 7 to 9 hours of sleep each night.     Limit alcohol to 2 drinks a day for men and 1 drink a day for women. Too much alcohol can cause health problems.     Manage other health problems such as diabetes, high blood pressure, and high cholesterol. If you think you may have a problem with alcohol or drug use, talk to your doctor.   Medicines    Take your medicines exactly as prescribed. Call your doctor if you think you are having a problem with your medicine.     If your doctor recommends aspirin, take the amount directed each day. Make sure you take aspirin and not another kind of pain reliever, such as acetaminophen (Tylenol).   When should you call for help?   Call 911 if you have symptoms of a heart attack. These may include:    Chest pain or pressure, or a strange feeling in the chest.     Sweating.     Shortness of breath.     Pain, pressure, or a strange feeling in the back, neck, jaw, or upper belly or in one or both shoulders or arms.     Lightheadedness or sudden weakness.     A fast or irregular heartbeat.   After you call 911, the  may tell you to chew 1 adult-strength or 2 to 4 low-dose aspirin. Wait for an ambulance. Do not try to drive yourself.  Watch closely for changes in your health, and be sure to contact your doctor if you have any problems.  Where can you learn more?  Go to https://www.[employer].net/patientEd and enter F075 to learn more about \"A Healthy Heart: Care Instructions.\"  Current as of: July 31, 2024  Content Version: 14.3  © 2024 [employer].   Care instructions adapted under license by [employer]. If you have questions about a medical condition or this instruction, always ask your healthcare professional. [employer], [employer] disclaims any warranty or liability for your use of this information.    Personalized Preventive Plan for Favian RUIZ

## 2025-02-04 ENCOUNTER — CARE COORDINATION (OUTPATIENT)
Dept: CARE COORDINATION | Age: 55
End: 2025-02-04

## 2025-02-04 NOTE — CARE COORDINATION
Medicine [phone]            Follow Up:   No further Ambulatory Care Management follow-up scheduled at this time.  Patient  has Ambulatory Care Manager's contact information for any further questions, concerns or needs.

## 2025-02-05 ENCOUNTER — CARE COORDINATION (OUTPATIENT)
Dept: PRIMARY CARE CLINIC | Age: 55
End: 2025-02-05

## 2025-02-05 DIAGNOSIS — J44.9 CHRONIC OBSTRUCTIVE PULMONARY DISEASE, UNSPECIFIED COPD TYPE (HCC): ICD-10-CM

## 2025-02-05 DIAGNOSIS — J45.909 UNCOMPLICATED ASTHMA, UNSPECIFIED ASTHMA SEVERITY, UNSPECIFIED WHETHER PERSISTENT: Chronic | ICD-10-CM

## 2025-02-05 DIAGNOSIS — I10 ESSENTIAL HYPERTENSION: Primary | ICD-10-CM

## 2025-02-05 DIAGNOSIS — E11.69 TYPE 2 DIABETES MELLITUS WITH OTHER SPECIFIED COMPLICATION, WITHOUT LONG-TERM CURRENT USE OF INSULIN (HCC): ICD-10-CM

## 2025-02-05 NOTE — PROGRESS NOTES
Remote Patient Order Discontinued    Received request from Afshan Escalante RN   to discontinue order for remote patient monitoring of COPD, Diabetes, HTN, and Asthma and order completed.

## 2025-02-05 NOTE — CARE COORDINATION
RPM Kit Return    Favian Vizcaino  2/5/25    Care Coordination  placed call to Patient  to arrange RPM kit  through [employer].     CCSS spoke to Patient reviewed with Patient how to pack equipment in original packing. Patient aware [employer] will  equipment in 2-4 days.   All questions and concerns answered.

## 2025-02-25 RX ORDER — FLUTICASONE PROPIONATE 50 MCG
2 SPRAY, SUSPENSION (ML) NASAL DAILY
Qty: 16 G | Refills: 2 | Status: SHIPPED | OUTPATIENT
Start: 2025-02-25

## 2025-04-23 DIAGNOSIS — G47.33 OSA (OBSTRUCTIVE SLEEP APNEA): ICD-10-CM

## 2025-04-23 DIAGNOSIS — F17.200 SMOKER: ICD-10-CM

## 2025-04-23 RX ORDER — TAMSULOSIN HYDROCHLORIDE 0.4 MG/1
CAPSULE ORAL DAILY
Qty: 90 CAPSULE | Refills: 1 | Status: SHIPPED | OUTPATIENT
Start: 2025-04-23

## 2025-04-23 RX ORDER — OMEPRAZOLE 40 MG/1
CAPSULE, DELAYED RELEASE ORAL DAILY
Qty: 90 CAPSULE | Refills: 1 | Status: SHIPPED | OUTPATIENT
Start: 2025-04-23

## 2025-04-29 ENCOUNTER — OFFICE VISIT (OUTPATIENT)
Dept: FAMILY MEDICINE CLINIC | Age: 55
End: 2025-04-29
Payer: COMMERCIAL

## 2025-04-29 VITALS
RESPIRATION RATE: 18 BRPM | BODY MASS INDEX: 40.53 KG/M2 | WEIGHT: 237.4 LBS | TEMPERATURE: 98.4 F | DIASTOLIC BLOOD PRESSURE: 74 MMHG | SYSTOLIC BLOOD PRESSURE: 118 MMHG | HEART RATE: 80 BPM | HEIGHT: 64 IN

## 2025-04-29 DIAGNOSIS — E78.2 MIXED HYPERLIPIDEMIA: ICD-10-CM

## 2025-04-29 DIAGNOSIS — E11.69 TYPE 2 DIABETES MELLITUS WITH OTHER SPECIFIED COMPLICATION, WITHOUT LONG-TERM CURRENT USE OF INSULIN (HCC): Primary | ICD-10-CM

## 2025-04-29 DIAGNOSIS — Z12.11 SCREEN FOR COLON CANCER: ICD-10-CM

## 2025-04-29 DIAGNOSIS — Z87.891 PERSONAL HISTORY OF TOBACCO USE: ICD-10-CM

## 2025-04-29 LAB
ALBUMIN: 4.2 G/DL (ref 3.5–5.2)
ALP BLD-CCNC: 90 U/L (ref 40–129)
ALT SERPL-CCNC: 25 U/L (ref 0–50)
ANION GAP SERPL CALCULATED.3IONS-SCNC: 13 MMOL/L (ref 7–16)
AST SERPL-CCNC: 34 U/L (ref 0–50)
BILIRUB SERPL-MCNC: 0.4 MG/DL (ref 0–1.2)
BUN BLDV-MCNC: 13 MG/DL (ref 6–20)
CALCIUM SERPL-MCNC: 10 MG/DL (ref 8.6–10)
CHLORIDE BLD-SCNC: 101 MMOL/L (ref 98–107)
CHOLESTEROL, TOTAL: 157 MG/DL
CO2: 20 MMOL/L (ref 22–29)
CREAT SERPL-MCNC: 0.7 MG/DL (ref 0.7–1.2)
GFR, ESTIMATED: >90 ML/MIN/1.73M2
GLUCOSE BLD-MCNC: 178 MG/DL (ref 74–99)
HBA1C MFR BLD: 10 % (ref 4–5.6)
HCT VFR BLD CALC: 55.4 % (ref 37–54)
HDLC SERPL-MCNC: 35 MG/DL
HEMOGLOBIN: 18.3 G/DL (ref 12.5–16.5)
LDL CHOLESTEROL: 85 MG/DL
MCH RBC QN AUTO: 29.8 PG (ref 26–35)
MCHC RBC AUTO-ENTMCNC: 33 G/DL (ref 32–34.5)
MCV RBC AUTO: 90.1 FL (ref 80–99.9)
PDW BLD-RTO: 13.4 % (ref 11.5–15)
PLATELET # BLD: 159 K/UL (ref 130–450)
PMV BLD AUTO: 11.3 FL (ref 7–12)
POTASSIUM SERPL-SCNC: 4.7 MMOL/L (ref 3.5–5.1)
RBC # BLD: 6.15 M/UL (ref 3.8–5.8)
SODIUM BLD-SCNC: 134 MMOL/L (ref 136–145)
TOTAL PROTEIN: 7.1 G/DL (ref 6.4–8.3)
TRIGL SERPL-MCNC: 186 MG/DL
VLDLC SERPL CALC-MCNC: 37 MG/DL
WBC # BLD: 10.7 K/UL (ref 4.5–11.5)

## 2025-04-29 PROCEDURE — 36415 COLL VENOUS BLD VENIPUNCTURE: CPT | Performed by: FAMILY MEDICINE

## 2025-04-29 PROCEDURE — 3052F HG A1C>EQUAL 8.0%<EQUAL 9.0%: CPT | Performed by: FAMILY MEDICINE

## 2025-04-29 PROCEDURE — 3078F DIAST BP <80 MM HG: CPT | Performed by: FAMILY MEDICINE

## 2025-04-29 PROCEDURE — G0296 VISIT TO DETERM LDCT ELIG: HCPCS | Performed by: FAMILY MEDICINE

## 2025-04-29 PROCEDURE — 3074F SYST BP LT 130 MM HG: CPT | Performed by: FAMILY MEDICINE

## 2025-04-29 PROCEDURE — 99214 OFFICE O/P EST MOD 30 MIN: CPT | Performed by: FAMILY MEDICINE

## 2025-04-29 RX ORDER — SILDENAFIL 100 MG/1
100 TABLET, FILM COATED ORAL DAILY PRN
Qty: 30 TABLET | Refills: 1 | Status: SHIPPED | OUTPATIENT
Start: 2025-04-29

## 2025-04-29 NOTE — PROGRESS NOTES
Favian Vizcaino (:  1970) is a 54 y.o. male, Established patient, here for evaluation of the following chief complaint(s):  Follow-up and Diabetes (No issues or concerns in particular.)         Assessment & Plan  1. Diabetes mellitus.  - Managing diabetes with dietary changes and medication; not taking Januvia due to cost, using sample Farxiga 10 mg.  - Blood work to be conducted today to assess A1c levels.  - Follow-up appointment in 6 months if A1c levels are reduced; otherwise, follow-up in 3 months.  - Additional samples of Farxiga 10 mg to be provided if available.    2. Chronic obstructive pulmonary disease (COPD).  - Reports using albuterol a couple of times a day and needs a refill for Trelegy.  - Prescription for Trelegy to be sent to the pharmacy.  - Lung screening to be ordered to monitor condition.  - No shortness of breath or wheezing reported with the change in seasons.    3. Hypertension.  - Blood pressure well-controlled at 118/74 mmHg.  - Advised to continue current antihypertensive medication regimen.  - No issues reported with blood pressure management.    4. Erectile dysfunction.  - Prescription for sildenafil to be sent to [facility] pharmacy.  - Discussion on cost and insurance coverage for sildenafil.  - Patient inquired about safety of sildenafil use with current blood pressure status.    5. Health maintenance.  - Due for a colonoscopy, to be scheduled with Dr. Marcelino.  - Will receive a pneumonia vaccine today, as he has not had one in the last five years.  - Blood work to be done in the office today.  - Lung screening order to be placed for further evaluation.    Results    Favian was seen today for follow-up and diabetes.    Diagnoses and all orders for this visit:    Personal history of tobacco use  -     [place] VISIT TO DISCUSS LUNG CA SCREEN W LDCT  -     CT Lung Screen (Initial/Annual/Baseline); Future    1. Personal history of tobacco use  -     AK VISIT TO DISCUSS LUNG CA SCREEN W LDCT  -

## 2025-05-01 ENCOUNTER — RESULTS FOLLOW-UP (OUTPATIENT)
Dept: FAMILY MEDICINE CLINIC | Age: 55
End: 2025-05-01

## 2025-05-07 DIAGNOSIS — E11.69 TYPE 2 DIABETES MELLITUS WITH OTHER SPECIFIED COMPLICATION, WITHOUT LONG-TERM CURRENT USE OF INSULIN (HCC): Primary | ICD-10-CM

## 2025-05-07 RX ORDER — GLIPIZIDE 10 MG/1
10 TABLET, FILM COATED, EXTENDED RELEASE ORAL DAILY
Qty: 30 TABLET | Refills: 3 | Status: SHIPPED | OUTPATIENT
Start: 2025-05-07

## 2025-05-29 DIAGNOSIS — J44.1 CHRONIC OBSTRUCTIVE PULMONARY DISEASE WITH ACUTE EXACERBATION (HCC): Chronic | ICD-10-CM

## 2025-05-29 RX ORDER — ALBUTEROL SULFATE 90 UG/1
2 INHALANT RESPIRATORY (INHALATION) EVERY 6 HOURS PRN
Qty: 18 EACH | Refills: 3 | Status: SHIPPED | OUTPATIENT
Start: 2025-05-29

## 2025-06-05 RX ORDER — FLUTICASONE PROPIONATE 50 MCG
2 SPRAY, SUSPENSION (ML) NASAL DAILY
Qty: 48 ML | Refills: 1 | Status: SHIPPED | OUTPATIENT
Start: 2025-06-05

## 2025-07-14 ENCOUNTER — HOSPITAL ENCOUNTER (EMERGENCY)
Age: 55
Discharge: HOME | End: 2025-07-15
Payer: COMMERCIAL

## 2025-07-14 DIAGNOSIS — L02.91 ABSCESS: Primary | ICD-10-CM

## 2025-07-14 DIAGNOSIS — L02.219 CELLULITIS AND ABSCESS OF TRUNK: ICD-10-CM

## 2025-07-14 DIAGNOSIS — L03.319 CELLULITIS AND ABSCESS OF TRUNK: ICD-10-CM

## 2025-07-14 LAB
ALBUMIN SERPL-MCNC: 4.1 G/DL (ref 3.5–5.2)
ALP SERPL-CCNC: 84 U/L (ref 40–129)
ALT SERPL-CCNC: 15 U/L (ref 0–50)
ANION GAP SERPL CALCULATED.3IONS-SCNC: 15 MMOL/L (ref 7–16)
AST SERPL-CCNC: 13 U/L (ref 0–50)
BILIRUB SERPL-MCNC: 0.6 MG/DL (ref 0–1.2)
BUN SERPL-MCNC: 9 MG/DL (ref 6–20)
CALCIUM SERPL-MCNC: 9.2 MG/DL (ref 8.6–10)
CHLORIDE SERPL-SCNC: 100 MMOL/L (ref 98–107)
CO2 SERPL-SCNC: 21 MMOL/L (ref 22–29)
CREAT SERPL-MCNC: 0.9 MG/DL (ref 0.7–1.2)
GFR, ESTIMATED: >90 ML/MIN/1.73M2
GLUCOSE SERPL-MCNC: 120 MG/DL (ref 74–99)
LACTATE BLDV-SCNC: 1 MMOL/L (ref 0.5–2.2)
POTASSIUM SERPL-SCNC: 4 MMOL/L (ref 3.5–5.1)
PROT SERPL-MCNC: 7.1 G/DL (ref 6.4–8.3)
SODIUM SERPL-SCNC: 136 MMOL/L (ref 136–145)

## 2025-07-14 PROCEDURE — 85025 COMPLETE CBC W/AUTO DIFF WBC: CPT

## 2025-07-14 PROCEDURE — 10060 I&D ABSCESS SIMPLE/SINGLE: CPT

## 2025-07-14 PROCEDURE — 99285 EMERGENCY DEPT VISIT HI MDM: CPT

## 2025-07-14 PROCEDURE — 83605 ASSAY OF LACTIC ACID: CPT

## 2025-07-14 PROCEDURE — 96374 THER/PROPH/DIAG INJ IV PUSH: CPT

## 2025-07-14 PROCEDURE — 2580000003 HC RX 258: Performed by: NURSE PRACTITIONER

## 2025-07-14 PROCEDURE — 6360000002 HC RX W HCPCS: Performed by: NURSE PRACTITIONER

## 2025-07-14 PROCEDURE — 80053 COMPREHEN METABOLIC PANEL: CPT

## 2025-07-14 RX ORDER — 0.9 % SODIUM CHLORIDE 0.9 %
1000 INTRAVENOUS SOLUTION INTRAVENOUS ONCE
Status: COMPLETED | OUTPATIENT
Start: 2025-07-14 | End: 2025-07-15

## 2025-07-14 RX ORDER — KETOROLAC TROMETHAMINE 30 MG/ML
15 INJECTION, SOLUTION INTRAMUSCULAR; INTRAVENOUS ONCE
Status: COMPLETED | OUTPATIENT
Start: 2025-07-14 | End: 2025-07-14

## 2025-07-14 RX ADMIN — SODIUM CHLORIDE 1000 ML: 0.9 INJECTION, SOLUTION INTRAVENOUS at 23:22

## 2025-07-14 RX ADMIN — KETOROLAC TROMETHAMINE 15 MG: 30 INJECTION, SOLUTION INTRAMUSCULAR at 23:21

## 2025-07-14 ASSESSMENT — PAIN DESCRIPTION - DESCRIPTORS
DESCRIPTORS: SHARP;THROBBING;STABBING
DESCRIPTORS: SHARP;THROBBING

## 2025-07-14 ASSESSMENT — PAIN DESCRIPTION - LOCATION
LOCATION: BACK
LOCATION: BACK

## 2025-07-14 ASSESSMENT — PAIN - FUNCTIONAL ASSESSMENT: PAIN_FUNCTIONAL_ASSESSMENT: 0-10

## 2025-07-14 ASSESSMENT — PAIN SCALES - GENERAL: PAINLEVEL_OUTOF10: 9

## 2025-07-14 ASSESSMENT — PAIN DESCRIPTION - ORIENTATION: ORIENTATION: RIGHT

## 2025-07-15 ENCOUNTER — APPOINTMENT (OUTPATIENT)
Dept: CT IMAGING | Age: 55
End: 2025-07-15
Payer: COMMERCIAL

## 2025-07-15 VITALS
SYSTOLIC BLOOD PRESSURE: 106 MMHG | TEMPERATURE: 97.5 F | HEIGHT: 64 IN | RESPIRATION RATE: 16 BRPM | HEART RATE: 81 BPM | WEIGHT: 230 LBS | OXYGEN SATURATION: 97 % | BODY MASS INDEX: 39.27 KG/M2 | DIASTOLIC BLOOD PRESSURE: 67 MMHG

## 2025-07-15 LAB
BASOPHILS # BLD: 0.08 K/UL (ref 0–0.2)
BASOPHILS NFR BLD: 1 % (ref 0–2)
EOSINOPHIL # BLD: 0.32 K/UL (ref 0.05–0.5)
EOSINOPHILS RELATIVE PERCENT: 2 % (ref 0–6)
ERYTHROCYTE [DISTWIDTH] IN BLOOD BY AUTOMATED COUNT: 13.6 % (ref 11.5–15)
HCT VFR BLD AUTO: 48.9 % (ref 37–54)
HGB BLD-MCNC: 16.3 G/DL (ref 12.5–16.5)
IMM GRANULOCYTES # BLD AUTO: 0.08 K/UL (ref 0–0.58)
IMM GRANULOCYTES NFR BLD: 1 % (ref 0–5)
LYMPHOCYTES NFR BLD: 2.68 K/UL (ref 1.5–4)
LYMPHOCYTES RELATIVE PERCENT: 19 % (ref 20–42)
MCH RBC QN AUTO: 29.4 PG (ref 26–35)
MCHC RBC AUTO-ENTMCNC: 33.3 G/DL (ref 32–34.5)
MCV RBC AUTO: 88.3 FL (ref 80–99.9)
MONOCYTES NFR BLD: 1.52 K/UL (ref 0.1–0.95)
MONOCYTES NFR BLD: 11 % (ref 2–12)
NEUTROPHILS NFR BLD: 68 % (ref 43–80)
NEUTS SEG NFR BLD: 9.83 K/UL (ref 1.8–7.3)
PLATELET # BLD AUTO: 156 K/UL (ref 130–450)
PMV BLD AUTO: 10.7 FL (ref 7–12)
RBC # BLD AUTO: 5.54 M/UL (ref 3.8–5.8)
RBC # BLD: ABNORMAL 10*6/UL
RBC # BLD: ABNORMAL 10*6/UL
WBC OTHER # BLD: 14.5 K/UL (ref 4.5–11.5)

## 2025-07-15 PROCEDURE — 6370000000 HC RX 637 (ALT 250 FOR IP): Performed by: NURSE PRACTITIONER

## 2025-07-15 PROCEDURE — 71260 CT THORAX DX C+: CPT

## 2025-07-15 PROCEDURE — 6360000004 HC RX CONTRAST MEDICATION: Performed by: RADIOLOGY

## 2025-07-15 RX ORDER — DOXYCYCLINE 100 MG/1
100 CAPSULE ORAL ONCE
Status: COMPLETED | OUTPATIENT
Start: 2025-07-15 | End: 2025-07-15

## 2025-07-15 RX ORDER — IOPAMIDOL 755 MG/ML
75 INJECTION, SOLUTION INTRAVASCULAR
Status: COMPLETED | OUTPATIENT
Start: 2025-07-15 | End: 2025-07-15

## 2025-07-15 RX ORDER — CEPHALEXIN 500 MG/1
500 CAPSULE ORAL ONCE
Status: COMPLETED | OUTPATIENT
Start: 2025-07-15 | End: 2025-07-15

## 2025-07-15 RX ORDER — DOXYCYCLINE HYCLATE 100 MG
100 TABLET ORAL 2 TIMES DAILY
Qty: 20 TABLET | Refills: 0 | Status: SHIPPED | OUTPATIENT
Start: 2025-07-15 | End: 2025-07-25

## 2025-07-15 RX ORDER — CEPHALEXIN 500 MG/1
500 CAPSULE ORAL 3 TIMES DAILY
Qty: 30 CAPSULE | Refills: 0 | Status: SHIPPED | OUTPATIENT
Start: 2025-07-15 | End: 2025-07-25

## 2025-07-15 RX ADMIN — CEPHALEXIN 500 MG: 500 CAPSULE ORAL at 05:02

## 2025-07-15 RX ADMIN — IOPAMIDOL 75 ML: 755 INJECTION, SOLUTION INTRAVENOUS at 03:43

## 2025-07-15 RX ADMIN — DOXYCYCLINE HYCLATE 100 MG: 100 CAPSULE ORAL at 05:02

## 2025-07-15 NOTE — ED PROVIDER NOTES
Independent LENARD Visit.       [facility] EMERGENCY DEPARTMENT  ED  Encounter Note  Admit Date/RoomTime: 2025 10:42 PM  ED Room: 33/  NAME: Favian Vizcaino  : 1970  MRN: [id number]  PCP: Clary Chamorro MD    CHIEF COMPLAINT     Abscess (Abscess on back, right side near scapula. red swollen hot to touch. )    HISTORY OF PRESENT ILLNESS        Favian Vizcaino is a 55 y.o. male who presents to the ED via private vehicle with abscess to right neck.  States he noticed Saturday.  Does have a history of diabetes.  No fever or chills.  No chest pain or shortness of breath.  Has not had any drainage.  He does have a history of abscesses in the past.  REVIEW OF SYSTEMS     Pertinent positives and negatives are stated within HPI, all other systems reviewed and are negative.    Past Medical History:  has a past medical history of Abdominal pain, Allergic rhinitis, Anxiety, Asthma, Bipolar 1 disorder ([facility]), COPD (chronic obstructive pulmonary disease) ([facility]), Depression, Diabetes mellitus ([facility]), Encounter for screening colonoscopy, GERD (gastroesophageal reflux disease), Hyperlipidemia, Hypertension, Obesity, Osteoarthritis, Perforation of left tympanic membrane, and Sleep apnea.  Surgical History:  has a past surgical history that includes Tonsillectomy (); hiatal hernia repair (); Tympanoplasty; Colonoscopy (2017); hernia repair (); Endoscopy, colon, diagnostic; pr incision & drainage abscess simple/single (N/A, 2018); EXCISION HIDRADENITIS OF INGUINAL / UMBILICAL AREA (N/A, 2019); Upper gastrointestinal endoscopy (N/A, 2020); Colonoscopy (N/A, 2020); Upper gastrointestinal endoscopy (N/A, 2021); Abdomen surgery (N/A, 2019); Cholecystectomy, laparoscopic (N/A, 2021); and Tympanoplasty (Left, 2023).  Social History:  reports that he has been smoking cigarettes. He has a 45 pack-year smoking history. He has never used smokeless

## 2025-09-01 DIAGNOSIS — J44.1 CHRONIC OBSTRUCTIVE PULMONARY DISEASE WITH ACUTE EXACERBATION (HCC): Chronic | ICD-10-CM

## 2025-09-02 RX ORDER — ALBUTEROL SULFATE 90 UG/1
2 INHALANT RESPIRATORY (INHALATION) EVERY 6 HOURS PRN
Qty: 18 EACH | Refills: 3 | Status: SHIPPED | OUTPATIENT
Start: 2025-09-02

## (undated) DEVICE — KIT SURG PREP POVIDONE IOD PRESATURATED PAINT WET FOR UNIV

## (undated) DEVICE — WARMER SCP 2 ANTIFOG LAP DISP

## (undated) DEVICE — BLADELESS OBTURATOR: Brand: WECK VISTA

## (undated) DEVICE — INTENDED FOR TISSUE SEPARATION, AND OTHER PROCEDURES THAT REQUIRE A SHARP SURGICAL BLADE TO PUNCTURE OR CUT.: Brand: BARD-PARKER ® STAINLESS STEEL BLADES

## (undated) DEVICE — SET INST MINOR PROCEDURE

## (undated) DEVICE — BLADE CLIPPER GEN PURP NS

## (undated) DEVICE — KIT,ANTI FOG,W/SPONGE & FLUID,SOFT PACK: Brand: MEDLINE

## (undated) DEVICE — PENCIL,CAUTERY,ROCKER,PTFE,15'CORD: Brand: MEDLINE INDUSTRIES, INC.

## (undated) DEVICE — INSTRUMENT CLAMP TOWEL LARGE REUSABLE

## (undated) DEVICE — DBD-BIOPSY PUNCH,STERILE,DISP,3MM,50/BX: Brand: MEDLINE

## (undated) DEVICE — BLOCK BITE 60FR RUBBER ADLT DENTAL

## (undated) DEVICE — DOUBLE BASIN SET: Brand: MEDLINE INDUSTRIES, INC.

## (undated) DEVICE — SYRINGE MED 3ML CLR PLAS STD N CTRL LUERLOCK TIP DISP

## (undated) DEVICE — Device

## (undated) DEVICE — STANDARD HYPODERMIC NEEDLE,ALUMINUM HUB: Brand: MONOJECT

## (undated) DEVICE — ROUND TIP SCISSORS: Brand: ENDOWRIST

## (undated) DEVICE — ELECTRODE PT RET AD L9FT HI MOIST COND ADH HYDRGEL CORDED

## (undated) DEVICE — GOWN,SIRUS,FABRNF,XL,20/CS: Brand: MEDLINE

## (undated) DEVICE — PAD,ABDOMINAL,5"X9",ST,LF,25/BX: Brand: MEDLINE INDUSTRIES, INC.

## (undated) DEVICE — SWAB SPEC COLL SHFT L5.25IN POLYUR FOAM TIP SFT DBL MEDIA

## (undated) DEVICE — CORD,CAUTERY,BIPOLAR,STERILE: Brand: MEDLINE

## (undated) DEVICE — DRAPE,LAP,CHOLE,W/TROUGHS,STERILE: Brand: MEDLINE

## (undated) DEVICE — GAUZE,SPONGE,4"X4",8PLY,STRL,LF,10/TRAY: Brand: MEDLINE

## (undated) DEVICE — SOLUTION IV IRRIG POUR BRL 0.9% SODIUM CHL 2F7124

## (undated) DEVICE — SPECIMEN CUP W/LID: Brand: DEROYAL

## (undated) DEVICE — POUCH FLD COLL W/ DRNGE PRT N LINTING TEAR RESIST MOLD STRP

## (undated) DEVICE — PACK,HEAVY DRAINAGE,STERILE: Brand: MEDLINE INDUSTRIES, INC.

## (undated) DEVICE — GRADUATE TRIANG MEASURE 1000ML BLK PRNT

## (undated) DEVICE — SURGICAL PROCEDURE PACK BASIC

## (undated) DEVICE — REUSABLE BIPOLAR FORCEPS CABLE: Brand: SILVERGLIDE

## (undated) DEVICE — SKIN AFFIX SURG ADHESIVE 72/CS 0.55ML: Brand: MEDLINE

## (undated) DEVICE — CHLORAPREP 26ML ORANGE

## (undated) DEVICE — SOLUTION IV IRRIG WATER 1000ML POUR BRL 2F7114

## (undated) DEVICE — ELECTRODE 8227410 PAIRED 2 CH SET ROHS

## (undated) DEVICE — TOWEL,OR,DSP,ST,BLUE,STD,6/PK,12PK/CS: Brand: MEDLINE

## (undated) DEVICE — CATHETER IV 18 GAX1.25 IN WNG INTROCAN SAFETY

## (undated) DEVICE — GOWN,SIRUS,FABRNF,L,20/CS: Brand: MEDLINE

## (undated) DEVICE — GLOVE SURG SZ 6 L12IN FNGR THK94MIL TRNSLUC YEL LTX HYDRGEL

## (undated) DEVICE — STERILE COTTON BALLS LARGE 5/P: Brand: MEDLINE

## (undated) DEVICE — DRESSING ALG W0.75XL18IN REINF GEL FBR CHYTOFORM TECHNOLOGY

## (undated) DEVICE — DRAPE MICROSCOPE PRESCOTT

## (undated) DEVICE — PROBE 8225101 5PK STD PRASS FL TIP ROHS

## (undated) DEVICE — PACK PROCEDURE SURG GEN CUST

## (undated) DEVICE — SPONGE LAP W18XL18IN WHT COT 4 PLY FLD STRUNG RADPQ DISP ST

## (undated) DEVICE — BANDAGE,GAUZE,4.5"X4.1YD,STERILE,LF: Brand: MEDLINE

## (undated) DEVICE — GLOVE SURG SZ 55 CRM LTX FREE POLYISOPRENE POLYMER BEAD ANTI

## (undated) DEVICE — NEEDLE FLTR 18GA L1.5IN MEM THK5UM BLNT DISP

## (undated) DEVICE — ADHESIVE SKIN CLSR 0.7ML TOP DERMBND ADV

## (undated) DEVICE — SUCTION IRRIGATOR: Brand: ENDOWRIST

## (undated) DEVICE — 4-PORT MANIFOLD: Brand: NEPTUNE 2

## (undated) DEVICE — MASTISOL ADHESIVE LIQ 2/3ML

## (undated) DEVICE — BANDAGE,GAUZE,BULKEE LITE,2" X3.5YD,STRL: Brand: MEDLINE

## (undated) DEVICE — CANNULA SEAL

## (undated) DEVICE — BLADE, TONGUE, 6", STERILE: Brand: MEDLINE

## (undated) DEVICE — PAD,NON-ADHERENT,3X8,STERILE,LF,1/PK: Brand: MEDLINE

## (undated) DEVICE — PERMANENT CAUTERY HOOK: Brand: ENDOWRIST

## (undated) DEVICE — SURGICAL PROCEDURE PACK EENT CUST

## (undated) DEVICE — INSUFFLATION TUBING SET WITH FILTER, FUNNEL CONNECTOR AND LUER LOCK: Brand: JOSNOE MEDICAL INC

## (undated) DEVICE — SET INSTRUMENT LAP II

## (undated) DEVICE — GARMENT,MEDLINE,DVT,INT,CALF,MED, GEN2: Brand: MEDLINE

## (undated) DEVICE — 1 ML INSULIN SYRINGE LUER-LOCK WITH TIP CAP: Brand: MONOJECT

## (undated) DEVICE — PACK,UNIV, II AURORA: Brand: MEDLINE

## (undated) DEVICE — MEDIUM-LARGE CLIP APPLIER: Brand: ENDOWRIST

## (undated) DEVICE — TRAP,MUCUS SPECIMEN,40CC: Brand: MEDLINE

## (undated) DEVICE — GLOVE ORANGE PI 7 1/2   MSG9075

## (undated) DEVICE — SYRINGE, LUER LOCK, 10ML: Brand: MEDLINE

## (undated) DEVICE — 1.5L THIN WALL CAN: Brand: CRD

## (undated) DEVICE — KIT OPHTHLMC W/7.3CM X 7.3CM INSTRMNT WIPE 0.4CM X 15CM EYE

## (undated) DEVICE — CADIERE FORCEPS: Brand: ENDOWRIST

## (undated) DEVICE — SET INSTRUMENT LAP I

## (undated) DEVICE — INSUFFLATION NEEDLE TO ESTABLISH PNEUMOPERITONEUM.: Brand: INSUFFLATION NEEDLE

## (undated) DEVICE — 1000 S-DRAPE TOWEL DRAPE 10/BX: Brand: STERI-DRAPE™

## (undated) DEVICE — MEDIBRIEF MESH BRIEF - SIZE X-LARGE - SIZE COLOR CODE: GREEN - 2EA/BG, 50BG/CS: Brand: MEDIBRIEF

## (undated) DEVICE — SYRINGE, LUER LOCK, 5ML: Brand: MEDLINE

## (undated) DEVICE — CONTROL SYRINGE LUER-LOCK TIP: Brand: MONOJECT

## (undated) DEVICE — SPONGE GZ W4XL4IN RAYON POLY FILL CVR W/ NONWOVEN FAB

## (undated) DEVICE — ARM DRAPE

## (undated) DEVICE — CONTAINER VACUTAINER ANAER CULTURE SWAB

## (undated) DEVICE — PATIENT RETURN ELECTRODE, SINGLE-USE, CONTACT QUALITY MONITORING, ADULT, WITH 9FT CORD, FOR PATIENTS WEIGING OVER 33LBS. (15KG): Brand: MEGADYNE

## (undated) DEVICE — FORCEPS BX OVL CUP FEN DISPOSABLE CAP L 160CM RAD JAW 4

## (undated) DEVICE — NEEDLE CLOSURE OMNICLOSE

## (undated) DEVICE — BLADE OPHTH GRN ROUNDED TIP 1 SIDE SHRP GRINDLESS MINI-BLDE

## (undated) DEVICE — PAD GRND DISP ACCT ASSNM

## (undated) DEVICE — GLOVE SURG SZ 75 STD WHT LTX SYN POLYMER BEAD REINF ANTI RL

## (undated) DEVICE — CLEANER,CAUTERY TIP,2X2",STERILE: Brand: MEDLINE